# Patient Record
Sex: MALE | Race: WHITE | NOT HISPANIC OR LATINO | Employment: OTHER | URBAN - METROPOLITAN AREA
[De-identification: names, ages, dates, MRNs, and addresses within clinical notes are randomized per-mention and may not be internally consistent; named-entity substitution may affect disease eponyms.]

---

## 2017-01-09 ENCOUNTER — TELEPHONE (OUTPATIENT)
Dept: ENDOCRINOLOGY | Facility: CLINIC | Age: 72
End: 2017-01-09

## 2017-01-12 ENCOUNTER — LAB VISIT (OUTPATIENT)
Dept: LAB | Facility: HOSPITAL | Age: 72
End: 2017-01-12
Attending: NURSE PRACTITIONER
Payer: MEDICARE

## 2017-01-12 DIAGNOSIS — E78.5 HYPERLIPIDEMIA LDL GOAL <70: ICD-10-CM

## 2017-01-12 LAB
CHOLEST/HDLC SERPL: 4.7 {RATIO}
HDL/CHOLESTEROL RATIO: 21.5 %
HDLC SERPL-MCNC: 149 MG/DL
HDLC SERPL-MCNC: 32 MG/DL
LDLC SERPL CALC-MCNC: 76.6 MG/DL
NONHDLC SERPL-MCNC: 117 MG/DL
TRIGL SERPL-MCNC: 202 MG/DL

## 2017-01-12 PROCEDURE — 36415 COLL VENOUS BLD VENIPUNCTURE: CPT | Mod: PO

## 2017-01-12 PROCEDURE — 80061 LIPID PANEL: CPT

## 2017-01-12 PROCEDURE — 83036 HEMOGLOBIN GLYCOSYLATED A1C: CPT

## 2017-01-13 LAB
ESTIMATED AVG GLUCOSE: 143 MG/DL
HBA1C MFR BLD HPLC: 6.6 %

## 2017-01-19 ENCOUNTER — OFFICE VISIT (OUTPATIENT)
Dept: FAMILY MEDICINE | Facility: CLINIC | Age: 72
End: 2017-01-19
Attending: FAMILY MEDICINE
Payer: MEDICARE

## 2017-01-19 VITALS
SYSTOLIC BLOOD PRESSURE: 120 MMHG | HEART RATE: 80 BPM | DIASTOLIC BLOOD PRESSURE: 78 MMHG | WEIGHT: 230 LBS | HEIGHT: 73 IN | BODY MASS INDEX: 30.48 KG/M2

## 2017-01-19 DIAGNOSIS — I48.20 CHRONIC ATRIAL FIBRILLATION: ICD-10-CM

## 2017-01-19 DIAGNOSIS — E11.22 CONTROLLED TYPE 2 DIABETES MELLITUS WITH STAGE 3 CHRONIC KIDNEY DISEASE, WITHOUT LONG-TERM CURRENT USE OF INSULIN: Primary | ICD-10-CM

## 2017-01-19 DIAGNOSIS — I15.2 HYPERTENSION ASSOCIATED WITH DIABETES: ICD-10-CM

## 2017-01-19 DIAGNOSIS — I25.10 CORONARY ARTERY DISEASE, OCCLUSIVE: ICD-10-CM

## 2017-01-19 DIAGNOSIS — N18.30 CKD (CHRONIC KIDNEY DISEASE) STAGE 3, GFR 30-59 ML/MIN: ICD-10-CM

## 2017-01-19 DIAGNOSIS — E11.69 HYPERLIPIDEMIA ASSOCIATED WITH TYPE 2 DIABETES MELLITUS: ICD-10-CM

## 2017-01-19 DIAGNOSIS — E11.59 HYPERTENSION ASSOCIATED WITH DIABETES: ICD-10-CM

## 2017-01-19 DIAGNOSIS — E78.5 HYPERLIPIDEMIA ASSOCIATED WITH TYPE 2 DIABETES MELLITUS: ICD-10-CM

## 2017-01-19 DIAGNOSIS — N18.30 CONTROLLED TYPE 2 DIABETES MELLITUS WITH STAGE 3 CHRONIC KIDNEY DISEASE, WITHOUT LONG-TERM CURRENT USE OF INSULIN: Primary | ICD-10-CM

## 2017-01-19 PROCEDURE — 1126F AMNT PAIN NOTED NONE PRSNT: CPT | Mod: S$GLB,,, | Performed by: FAMILY MEDICINE

## 2017-01-19 PROCEDURE — 3044F HG A1C LEVEL LT 7.0%: CPT | Mod: S$GLB,,, | Performed by: FAMILY MEDICINE

## 2017-01-19 PROCEDURE — 99999 PR PBB SHADOW E&M-EST. PATIENT-LVL IV: CPT | Mod: PBBFAC,,, | Performed by: FAMILY MEDICINE

## 2017-01-19 PROCEDURE — 99213 OFFICE O/P EST LOW 20 MIN: CPT | Mod: S$GLB,,, | Performed by: FAMILY MEDICINE

## 2017-01-19 PROCEDURE — 1160F RVW MEDS BY RX/DR IN RCRD: CPT | Mod: S$GLB,,, | Performed by: FAMILY MEDICINE

## 2017-01-19 PROCEDURE — 99499 UNLISTED E&M SERVICE: CPT | Mod: S$GLB,,, | Performed by: FAMILY MEDICINE

## 2017-01-19 PROCEDURE — 1157F ADVNC CARE PLAN IN RCRD: CPT | Mod: S$GLB,,, | Performed by: FAMILY MEDICINE

## 2017-01-19 PROCEDURE — 3074F SYST BP LT 130 MM HG: CPT | Mod: S$GLB,,, | Performed by: FAMILY MEDICINE

## 2017-01-19 PROCEDURE — 3078F DIAST BP <80 MM HG: CPT | Mod: S$GLB,,, | Performed by: FAMILY MEDICINE

## 2017-01-19 PROCEDURE — 1159F MED LIST DOCD IN RCRD: CPT | Mod: S$GLB,,, | Performed by: FAMILY MEDICINE

## 2017-01-19 PROCEDURE — 4010F ACE/ARB THERAPY RXD/TAKEN: CPT | Mod: S$GLB,,, | Performed by: FAMILY MEDICINE

## 2017-01-19 PROCEDURE — 2022F DILAT RTA XM EVC RTNOPTHY: CPT | Mod: S$GLB,,, | Performed by: FAMILY MEDICINE

## 2017-01-19 NOTE — PROGRESS NOTES
Subjective:       Patient ID: Janak Booker is a 71 y.o. male.    Chief Complaint: Diabetes    Diabetes   He presents for his follow-up diabetic visit. He has type 2 diabetes mellitus. His disease course has been stable. There are no hypoglycemic associated symptoms. Pertinent negatives for hypoglycemia include no dizziness, headaches or nervousness/anxiousness. There are no diabetic associated symptoms. Pertinent negatives for diabetes include no chest pain, no fatigue and no weakness. There are no hypoglycemic complications. Diabetic complications include nephropathy. Risk factors for coronary artery disease include dyslipidemia, diabetes mellitus, male sex and hypertension. Current diabetic treatment includes oral agent (dual therapy) (w/Victoza). He is compliant with treatment most of the time. His weight is stable. He is following a diabetic and generally healthy diet. When asked about meal planning, he reported none. He has had a previous visit with a dietitian. He participates in exercise intermittently. There is no change in his home blood glucose trend. An ACE inhibitor/angiotensin II receptor blocker is being taken. He does not see a podiatrist.Eye exam is current.     Patient Active Problem List   Diagnosis    Family history of prostate cancer    Disc displacement, lumbar    GERD (gastroesophageal reflux disease)    Coronary artery disease, occlusive    Solitary kidney    CKD (chronic kidney disease) stage 3, GFR 30-59 ml/min    Controlled type 2 diabetes mellitus with stage 3 chronic kidney disease, without long-term current use of insulin    S/P coronary artery stent placement    Lumbar spondylosis    Facet arthritis of lumbar region    Hypertension associated with diabetes    Hyperlipidemia associated with type 2 diabetes mellitus    Facet arthritis of cervical region    Atrial fibrillation    DDD (degenerative disc disease), cervical       Current Outpatient Prescriptions:     blood  "sugar diagnostic [facility], Use as directed to check blood sugar daily., Disp: 100 each, Rfl: 3    blood-glucose meter Misc, Use as directed., Disp: 1 each, Rfl: 0    CALCIUM CITRATE-VITAMIN D3 ORAL, , Disp: , Rfl:     fenofibric acid (TRILIPIX) 135 mg capsule, 1 Capsule(s) Oral  Every day., Disp: , Rfl:     lancets (TRUEPLUS LANCETS) 28 gauge Misc, 1 lancet by Misc.(Non-Drug; Combo Route) route once daily., Disp: 100 each, Rfl: 3    lisinopril (PRINIVIL,ZESTRIL) 5 MG tablet, 1 Tablet(s) Oral Every evening., Disp: , Rfl:     metformin (GLUCOPHAGE-XR) 500 MG 24 hr tablet, TAKE ONE TABLET BY MOUTH ONCE DAILY, Disp: 90 tablet, Rfl: 0    multivitamin with minerals tablet, , Disp: , Rfl:     nitroGLYCERIN (NITROSTAT) 0.4 MG SL tablet, 0.4mg Sublingual PRN .  , Disp: , Rfl:     omega-3 fatty acids 1,000 mg Cap, 1 Capsule(s) Oral OTC once a day., Disp: , Rfl:     omeprazole (PRILOSEC) 40 MG capsule, Take 1 capsule (40 mg total) by mouth once daily., Disp: 90 capsule, Rfl: 3    pen needle, diabetic (BD ULTRA-FINE BARRY PEN NEEDLES) 32 gauge x 5/32" Ndle, 1 Device by Misc.(Non-Drug; Combo Route) route once daily., Disp: 100 each, Rfl: 3    pioglitazone (ACTOS) 30 MG tablet, Take 1 tablet (30 mg total) by mouth once daily., Disp: 90 tablet, Rfl: 1    PRADAXA 150 mg Cap, Take 150 mg by mouth Twice daily., Disp: , Rfl:     rosuvastatin (CRESTOR) 10 MG tablet, Every day, Disp: , Rfl:     sotalol (BETAPACE) 120 MG Tab, 2 (two) times daily. , Disp: , Rfl:     TRUE METRIX AIR GLUCOSE METER kit, , Disp: , Rfl:     VICTOZA 3-CAITIE 0.6 mg/0.1 mL (18 mg/3 mL) PnIj, INJECT 1.8 MGS UNDER THE SKIN DAILY., Disp: 9 mL, Rfl: 5    vitamin E 400 UNIT capsule, , Disp: , Rfl:     There have been no changes to the patient's past medical, surgical, family, or social histories.    Review of Systems   Constitutional: Negative for fatigue and unexpected weight change.   HENT: Negative for ear discharge, ear pain, hearing loss, tinnitus and " voice change.    Eyes: Negative for pain.   Respiratory: Negative for cough and shortness of breath.    Cardiovascular: Negative for chest pain, palpitations and leg swelling.   Gastrointestinal: Negative for abdominal pain, blood in stool, constipation, diarrhea, nausea and vomiting.   Genitourinary: Negative for decreased urine volume, difficulty urinating, dysuria, enuresis, frequency, hematuria and urgency.   Musculoskeletal: Negative for arthralgias, back pain and myalgias.   Skin: Negative for rash.   Neurological: Negative for dizziness, weakness, light-headedness and headaches.   Hematological: Does not bruise/bleed easily.   Psychiatric/Behavioral: Negative for dysphoric mood and sleep disturbance. The patient is not nervous/anxious.          Objective:      Physical Exam   Constitutional: He is oriented to person, place, and time. He appears well-developed and well-nourished. He is cooperative.   HENT:   Head: Normocephalic and atraumatic.   Right Ear: External ear normal.   Left Ear: External ear normal.   Nose: Nose normal.   Mouth/Throat: Oropharynx is clear and moist and mucous membranes are normal. No oropharyngeal exudate.   Eyes: Conjunctivae are normal. No scleral icterus.   Neck: Neck supple. No JVD present. Carotid bruit is not present. No thyromegaly present.   Cardiovascular: Normal rate, regular rhythm, normal heart sounds and normal pulses.  Exam reveals no gallop and no friction rub.    No murmur heard.  Pulmonary/Chest: Effort normal and breath sounds normal. He has no wheezes. He has no rhonchi. He has no rales.   Abdominal: Soft. Bowel sounds are normal. He exhibits no distension and no mass. There is no splenomegaly or hepatomegaly. There is no tenderness.   Musculoskeletal: Normal range of motion. He exhibits no edema or tenderness.   Lymphadenopathy:     He has no cervical adenopathy.     He has no axillary adenopathy.   Neurological: He is alert and oriented to person, place, and  "time. He has normal strength and normal reflexes. No cranial nerve deficit or sensory deficit. Coordination normal.   Skin: Skin is warm and dry.   Psychiatric: He has a normal mood and affect.   Vitals reviewed.        Assessment:       1. Controlled type 2 diabetes mellitus with stage 3 chronic kidney disease, without long-term current use of insulin    2. CKD (chronic kidney disease) stage 3, GFR 30-59 ml/min    3. Solitary kidney    4. Coronary artery disease, occlusive    5. Chronic atrial fibrillation    6. Hypertension associated with diabetes    7. Hyperlipidemia associated with type 2 diabetes mellitus        Plan:       Continue present medications.  RTC 6 months.    "This note will not be shared with the patient."  "

## 2017-03-15 RX ORDER — METFORMIN HYDROCHLORIDE 500 MG/1
TABLET, EXTENDED RELEASE ORAL
Qty: 90 TABLET | Refills: 0 | Status: SHIPPED | OUTPATIENT
Start: 2017-03-15 | End: 2017-06-16 | Stop reason: SDUPTHER

## 2017-04-06 RX ORDER — PIOGLITAZONEHYDROCHLORIDE 30 MG/1
TABLET ORAL
Qty: 90 TABLET | Refills: 0 | Status: SHIPPED | OUTPATIENT
Start: 2017-04-06 | End: 2017-07-26 | Stop reason: SDUPTHER

## 2017-05-10 RX ORDER — ISOPROPYL ALCOHOL 70 ML/100ML
1 SWAB TOPICAL
Refills: 0 | COMMUNITY
Start: 2017-05-10 | End: 2017-05-19 | Stop reason: SDUPTHER

## 2017-05-15 RX ORDER — LIRAGLUTIDE 6 MG/ML
INJECTION SUBCUTANEOUS
Qty: 9 ML | Refills: 4 | Status: SHIPPED | OUTPATIENT
Start: 2017-05-15 | End: 2017-10-23 | Stop reason: SDUPTHER

## 2017-05-19 RX ORDER — ISOPROPYL ALCOHOL 0.75 G/1
SWAB TOPICAL
Qty: 100 EACH | Refills: 5 | Status: SHIPPED | OUTPATIENT
Start: 2017-05-19

## 2017-05-22 RX ORDER — PEN NEEDLE, DIABETIC 31 GX5/16"
NEEDLE, DISPOSABLE MISCELLANEOUS
Qty: 100 EACH | Refills: 2 | Status: SHIPPED | OUTPATIENT
Start: 2017-05-22 | End: 2018-03-19 | Stop reason: SDUPTHER

## 2017-06-16 RX ORDER — METFORMIN HYDROCHLORIDE 500 MG/1
500 TABLET, EXTENDED RELEASE ORAL DAILY
Qty: 90 TABLET | Refills: 2 | Status: SHIPPED | OUTPATIENT
Start: 2017-06-16 | End: 2018-03-05 | Stop reason: SDUPTHER

## 2017-07-26 RX ORDER — PIOGLITAZONEHYDROCHLORIDE 30 MG/1
30 TABLET ORAL DAILY
Qty: 90 TABLET | Refills: 0 | OUTPATIENT
Start: 2017-07-26

## 2017-07-26 RX ORDER — PIOGLITAZONEHYDROCHLORIDE 30 MG/1
30 TABLET ORAL DAILY
Qty: 90 TABLET | Refills: 0 | Status: SHIPPED | OUTPATIENT
Start: 2017-07-26 | End: 2017-10-22 | Stop reason: SDUPTHER

## 2017-08-22 ENCOUNTER — LAB VISIT (OUTPATIENT)
Dept: LAB | Facility: HOSPITAL | Age: 72
End: 2017-08-22
Attending: FAMILY MEDICINE
Payer: MEDICARE

## 2017-08-22 DIAGNOSIS — E11.22 CONTROLLED TYPE 2 DIABETES MELLITUS WITH STAGE 3 CHRONIC KIDNEY DISEASE, WITHOUT LONG-TERM CURRENT USE OF INSULIN: ICD-10-CM

## 2017-08-22 DIAGNOSIS — N18.30 CKD (CHRONIC KIDNEY DISEASE) STAGE 3, GFR 30-59 ML/MIN: ICD-10-CM

## 2017-08-22 DIAGNOSIS — I25.10 CORONARY ARTERY DISEASE, OCCLUSIVE: ICD-10-CM

## 2017-08-22 DIAGNOSIS — N18.30 CONTROLLED TYPE 2 DIABETES MELLITUS WITH STAGE 3 CHRONIC KIDNEY DISEASE, WITHOUT LONG-TERM CURRENT USE OF INSULIN: ICD-10-CM

## 2017-08-22 LAB
ALBUMIN SERPL BCP-MCNC: 3.3 G/DL
ALP SERPL-CCNC: 31 U/L
ALT SERPL W/O P-5'-P-CCNC: 11 U/L
ANION GAP SERPL CALC-SCNC: 6 MMOL/L
AST SERPL-CCNC: 16 U/L
BILIRUB SERPL-MCNC: 0.6 MG/DL
BUN SERPL-MCNC: 22 MG/DL
CALCIUM SERPL-MCNC: 9.5 MG/DL
CHLORIDE SERPL-SCNC: 107 MMOL/L
CO2 SERPL-SCNC: 27 MMOL/L
CREAT SERPL-MCNC: 1.7 MG/DL
EST. GFR  (AFRICAN AMERICAN): 45.9 ML/MIN/1.73 M^2
EST. GFR  (NON AFRICAN AMERICAN): 39.7 ML/MIN/1.73 M^2
GLUCOSE SERPL-MCNC: 129 MG/DL
POTASSIUM SERPL-SCNC: 4.8 MMOL/L
PROT SERPL-MCNC: 6.6 G/DL
SODIUM SERPL-SCNC: 140 MMOL/L

## 2017-08-22 PROCEDURE — 80053 COMPREHEN METABOLIC PANEL: CPT

## 2017-08-22 PROCEDURE — 36415 COLL VENOUS BLD VENIPUNCTURE: CPT | Mod: PO

## 2017-08-22 PROCEDURE — 83036 HEMOGLOBIN GLYCOSYLATED A1C: CPT

## 2017-08-23 LAB
ESTIMATED AVG GLUCOSE: 131 MG/DL
HBA1C MFR BLD HPLC: 6.2 %

## 2017-08-31 ENCOUNTER — OFFICE VISIT (OUTPATIENT)
Dept: FAMILY MEDICINE | Facility: CLINIC | Age: 72
End: 2017-08-31
Attending: FAMILY MEDICINE
Payer: MEDICARE

## 2017-08-31 VITALS
HEIGHT: 73 IN | WEIGHT: 233.31 LBS | HEART RATE: 78 BPM | BODY MASS INDEX: 30.92 KG/M2 | OXYGEN SATURATION: 98 % | DIASTOLIC BLOOD PRESSURE: 70 MMHG | SYSTOLIC BLOOD PRESSURE: 120 MMHG

## 2017-08-31 DIAGNOSIS — N52.9 ERECTILE DYSFUNCTION, UNSPECIFIED ERECTILE DYSFUNCTION TYPE: ICD-10-CM

## 2017-08-31 DIAGNOSIS — Z95.5 S/P CORONARY ARTERY STENT PLACEMENT: ICD-10-CM

## 2017-08-31 DIAGNOSIS — E11.22 CONTROLLED TYPE 2 DIABETES MELLITUS WITH STAGE 3 CHRONIC KIDNEY DISEASE, WITHOUT LONG-TERM CURRENT USE OF INSULIN: ICD-10-CM

## 2017-08-31 DIAGNOSIS — E78.5 HYPERLIPIDEMIA ASSOCIATED WITH TYPE 2 DIABETES MELLITUS: ICD-10-CM

## 2017-08-31 DIAGNOSIS — E11.59 HYPERTENSION ASSOCIATED WITH DIABETES: ICD-10-CM

## 2017-08-31 DIAGNOSIS — Z00.00 ROUTINE GENERAL MEDICAL EXAMINATION AT A HEALTH CARE FACILITY: Primary | ICD-10-CM

## 2017-08-31 DIAGNOSIS — N18.30 CKD (CHRONIC KIDNEY DISEASE) STAGE 3, GFR 30-59 ML/MIN: ICD-10-CM

## 2017-08-31 DIAGNOSIS — I15.2 HYPERTENSION ASSOCIATED WITH DIABETES: ICD-10-CM

## 2017-08-31 DIAGNOSIS — I25.10 CORONARY ARTERY DISEASE, OCCLUSIVE: ICD-10-CM

## 2017-08-31 DIAGNOSIS — E11.69 HYPERLIPIDEMIA ASSOCIATED WITH TYPE 2 DIABETES MELLITUS: ICD-10-CM

## 2017-08-31 DIAGNOSIS — I48.20 CHRONIC ATRIAL FIBRILLATION: ICD-10-CM

## 2017-08-31 DIAGNOSIS — N18.30 CONTROLLED TYPE 2 DIABETES MELLITUS WITH STAGE 3 CHRONIC KIDNEY DISEASE, WITHOUT LONG-TERM CURRENT USE OF INSULIN: ICD-10-CM

## 2017-08-31 PROCEDURE — 99499 UNLISTED E&M SERVICE: CPT | Mod: S$GLB,,, | Performed by: FAMILY MEDICINE

## 2017-08-31 PROCEDURE — 99397 PER PM REEVAL EST PAT 65+ YR: CPT | Mod: S$GLB,,, | Performed by: FAMILY MEDICINE

## 2017-08-31 PROCEDURE — 99999 PR PBB SHADOW E&M-EST. PATIENT-LVL III: CPT | Mod: PBBFAC,,, | Performed by: FAMILY MEDICINE

## 2017-08-31 RX ORDER — TADALAFIL 20 MG/1
20 TABLET ORAL DAILY
Qty: 6 TABLET | Refills: 5 | Status: SHIPPED | OUTPATIENT
Start: 2017-08-31 | End: 2018-09-04

## 2017-08-31 NOTE — PROGRESS NOTES
"Subjective:       Patient ID: Janak Booker is a 72 y.o. male.    Chief Complaint: Annual Exam    HPI     The patient presents to the office today requesting a routine periodic health examination.    Patient Active Problem List   Diagnosis    Family history of prostate cancer    Disc displacement, lumbar    GERD (gastroesophageal reflux disease)    Coronary artery disease, occlusive    Solitary kidney    CKD (chronic kidney disease) stage 3, GFR 30-59 ml/min    Controlled type 2 diabetes mellitus with stage 3 chronic kidney disease, without long-term current use of insulin    S/P coronary artery stent placement    Lumbar spondylosis    Facet arthritis of lumbar region    Hypertension associated with diabetes    Hyperlipidemia associated with type 2 diabetes mellitus    Facet arthritis of cervical region    Atrial fibrillation    DDD (degenerative disc disease), cervical       Past Surgical History:   Procedure Laterality Date    BACK SURGERY      CARDIAC SURGERY      stents     CARPAL TUNNEL RELEASE Right     CATARACT EXTRACTION W/  INTRAOCULAR LENS IMPLANT Bilateral     CORONARY ANGIOPLASTY WITH STENT PLACEMENT      x 3    KNEE ARTHROSCOPY W/ MENISCECTOMY  12/22/2008    right    LUMBAR DISCECTOMY  12/2011    L4-L5 Fusion    ROTATOR CUFF REPAIR Left 7/2012    SHOULDER OPEN ROTATOR CUFF REPAIR      TIBIA FRACTURE SURGERY           Current Outpatient Prescriptions:     BD ALCOHOL SWABS PadM, USE AS DIRECTED TO CHECK BLOOD GLUCOSE DAILY, Disp: 100 each, Rfl: 5    BD ULTRA-FINE BARRY PEN NEEDLES 32 gauge x 5/32" Ndle, USE ONE NEEDLE ONCE DAILY, Disp: 100 each, Rfl: 2    blood sugar diagnostic Str[facility], Use as directed to check blood sugar daily., Disp: 100 each, Rfl: 3    blood-glucose meter Misc, Use as directed., Disp: 1 each, Rfl: 0    CALCIUM CITRATE-VITAMIN D3 ORAL, , Disp: , Rfl:     fenofibric acid (TRILIPIX) 135 mg capsule, 1 Capsule(s) Oral  Every day., Disp: , Rfl:     lancets " (TRUEPLUS LANCETS) 28 gauge Misc, 1 lancet by Misc.(Non-Drug; Combo Route) route once daily., Disp: 100 each, Rfl: 3    lisinopril (PRINIVIL,ZESTRIL) 5 MG tablet, 1 Tablet(s) Oral Every evening., Disp: , Rfl:     metformin (GLUCOPHAGE-XR) 500 MG 24 hr tablet, Take 1 tablet (500 mg total) by mouth once daily., Disp: 90 tablet, Rfl: 2    multivitamin with minerals tablet, , Disp: , Rfl:     nitroGLYCERIN (NITROSTAT) 0.4 MG SL tablet, 0.4mg Sublingual PRN .  , Disp: , Rfl:     omega-3 fatty acids 1,000 mg Cap, 1 Capsule(s) Oral OTC once a day., Disp: , Rfl:     omeprazole (PRILOSEC) 40 MG capsule, Take 1 capsule (40 mg total) by mouth once daily., Disp: 90 capsule, Rfl: 3    pioglitazone (ACTOS) 30 MG tablet, Take 1 tablet (30 mg total) by mouth once daily., Disp: 90 tablet, Rfl: 0    PRADAXA 150 mg Cap, Take 150 mg by mouth Twice daily., Disp: , Rfl:     rosuvastatin (CRESTOR) 10 MG tablet, Every day, Disp: , Rfl:     sotalol (BETAPACE) 120 MG Tab, 2 (two) times daily. , Disp: , Rfl:     TRUE METRIX AIR GLUCOSE METER kit, , Disp: , Rfl:     VICTOZA 3-CAITIE 0.6 mg/0.1 mL (18 mg/3 mL) PnIj, INJECT 1.8 MGS UNDER THE SKIN DAILY., Disp: 9 mL, Rfl: 4    Review of patient's allergies indicates:   Allergen Reactions    No known drug allergies        Family History   Problem Relation Age of Onset    Heart failure Father     Heart disease Father      MI    Prostate cancer Father     Heart failure Mother     Heart disease Mother     No Known Problems Sister     No Known Problems Daughter     No Known Problems Son     No Known Problems Daughter        Social History     Social History    Marital status:      Spouse name: Santino    Number of children: 3    Years of education: N/A     Occupational History    Not on file.     Social History Main Topics    Smoking status: Never Smoker    Smokeless tobacco: Never Used    Alcohol use 3.0 oz/week     6 Standard drinks or equivalent per week      Comment:  socially    Drug use: No    Sexual activity: Yes     Partners: Female     Other Topics Concern    Not on file     Social History Narrative    The patient does not exercise regularly ().      He is not satisfied with weight.    Rates diet as fair.    He does drink at least 1/2 gallon water daily.    He drinks 2 coffee/tea/caffeine-containing soft drinks daily.    Total sleep time at night is 7 hours.    He does wear seat belts.    Hobbies include off-road, camping.           Patient Care Team:  John Claire Jr., MD as PCP - General (Family Medicine)  Sharif Phipps MD as Consulting Physician (Cardiology)  Mahesh Martinez MD as Consulting Physician (Neurosurgery)  Jamari Cortes MD as Consulting Physician (Nephrology)  Zechariah Dillard OD (Optometry)      Review of Systems   Constitutional: Negative for fatigue and unexpected weight change.   HENT: Negative for ear discharge, ear pain, hearing loss, tinnitus and voice change.    Eyes: Negative for pain.   Respiratory: Negative for cough and shortness of breath.    Cardiovascular: Negative for chest pain, palpitations and leg swelling.   Gastrointestinal: Negative for abdominal pain, blood in stool, constipation, diarrhea, nausea and vomiting.        Periodic heartburn; uses PPIs prn with good results   Genitourinary: Negative for decreased urine volume, difficulty urinating, dysuria, enuresis, frequency, hematuria and urgency.   Musculoskeletal: Negative for arthralgias, back pain and myalgias.   Skin: Negative for rash.   Neurological: Negative for dizziness, weakness, light-headedness and headaches.   Hematological: Does not bruise/bleed easily.   Psychiatric/Behavioral: Negative for dysphoric mood and sleep disturbance. The patient is not nervous/anxious.        Lab Visit on 08/22/2017   Component Date Value Ref Range Status    Sodium 08/22/2017 140  136 - 145 mmol/L Final    Potassium 08/22/2017 4.8  3.5 - 5.1 mmol/L Final    Chloride 08/22/2017 107   95 - 110 mmol/L Final    CO2 08/22/2017 27  23 - 29 mmol/L Final    Glucose 08/22/2017 129* 70 - 110 mg/dL Final    BUN, Bld 08/22/2017 22  8 - 23 mg/dL Final    Creatinine 08/22/2017 1.7* 0.5 - 1.4 mg/dL Final    Calcium 08/22/2017 9.5  8.7 - 10.5 mg/dL Final    Total Protein 08/22/2017 6.6  6.0 - 8.4 g/dL Final    Albumin 08/22/2017 3.3* 3.5 - 5.2 g/dL Final    Total Bilirubin 08/22/2017 0.6  0.1 - 1.0 mg/dL Final    Alkaline Phosphatase 08/22/2017 31* 55 - 135 U/L Final    AST 08/22/2017 16  10 - 40 U/L Final    ALT 08/22/2017 11  10 - 44 U/L Final    Anion Gap 08/22/2017 6* 8 - 16 mmol/L Final    eGFR if  08/22/2017 45.9* >60 mL/min/1.73 m^2 Final    eGFR if non  08/22/2017 39.7* >60 mL/min/1.73 m^2 Final    Hemoglobin A1C 08/23/2017 6.2* 4.0 - 5.6 % Final    Estimated Avg Glucose 08/23/2017 131  68 - 131 mg/dL Final         Objective:      Physical Exam   Constitutional: He is oriented to person, place, and time. He appears well-developed and well-nourished. He is cooperative.   HENT:   Head: Normocephalic and atraumatic.   Right Ear: External ear normal.   Left Ear: External ear normal.   Nose: Nose normal.   Mouth/Throat: Oropharynx is clear and moist and mucous membranes are normal. No oropharyngeal exudate.   Eyes: Conjunctivae are normal. No scleral icterus.   Neck: Neck supple. No JVD present. Carotid bruit is not present. No thyromegaly present.   Cardiovascular: Normal rate, regular rhythm, normal heart sounds and normal pulses.  Exam reveals no gallop and no friction rub.    No murmur heard.  Pulmonary/Chest: Effort normal and breath sounds normal. He has no wheezes. He has no rhonchi. He has no rales.   Abdominal: Soft. Bowel sounds are normal. He exhibits no distension and no mass. There is no splenomegaly or hepatomegaly. There is no tenderness.   Musculoskeletal: Normal range of motion. He exhibits no edema or tenderness.   Lymphadenopathy:     He has  "no cervical adenopathy.     He has no axillary adenopathy.   Neurological: He is alert and oriented to person, place, and time. He has normal strength and normal reflexes. No cranial nerve deficit or sensory deficit. Coordination normal.   Skin: Skin is warm and dry.   Psychiatric: He has a normal mood and affect.   Vitals reviewed.        Assessment:       1. Routine general medical examination at a health care facility    2. Controlled type 2 diabetes mellitus with stage 3 chronic kidney disease, without long-term current use of insulin    3. CKD (chronic kidney disease) stage 3, GFR 30-59 ml/min    4. Hypertension associated with diabetes    5. Hyperlipidemia associated with type 2 diabetes mellitus    6. Chronic atrial fibrillation    7. Coronary artery disease, occlusive    8. S/P coronary artery stent placement    9. Solitary kidney        Plan:       Continue present medications. Reminded to see nephrologist annually.      "This note will not be shared with the patient."  "

## 2017-10-22 RX ORDER — PIOGLITAZONEHYDROCHLORIDE 30 MG/1
TABLET ORAL
Qty: 90 TABLET | Refills: 1 | Status: SHIPPED | OUTPATIENT
Start: 2017-10-22 | End: 2018-04-20 | Stop reason: SDUPTHER

## 2017-10-23 NOTE — TELEPHONE ENCOUNTER
----- Message from Reyna Smith sent at 10/23/2017  3:37 PM CDT -----  Contact: KIERRA WILSON [[id number]]  x 1st Request  _  2nd Request  _  3rd Request    Please refill the medication(s) listed below. Please call the patient when the prescription(s) is ready for  at this phone number            Medication #1 VICTOZA 3-CAITIE 0.6 mg/0.1 mL (18 mg/3 mL) PnIj      Preferred Pharmacy:   [id number] - [place] - [address]  [address] [place] [address]  Phone: [phone] Fax: [phone]

## 2017-12-14 ENCOUNTER — TELEPHONE (OUTPATIENT)
Dept: FAMILY MEDICINE | Facility: CLINIC | Age: 72
End: 2017-12-14

## 2017-12-14 NOTE — TELEPHONE ENCOUNTER
Please schedule the patient an appointment with the providers provider in the message below.Thanks.

## 2017-12-14 NOTE — TELEPHONE ENCOUNTER
----- Message from Tammi Cortez sent at 12/14/2017  1:53 PM CST -----  _  1st Request  _  2nd Request  _  3rd Request        Who: Janak Booker    Why: Called and stated Dr Claire was going to give him a couple of ENT in [place].  Pt stated he forgot.    What Number to Call Back:    When to Expect a call back: (Within 24 hours)    Please return the call at earliest convenience. Thanks!

## 2017-12-20 RX ORDER — OMEPRAZOLE 40 MG/1
40 CAPSULE, DELAYED RELEASE ORAL DAILY
Qty: 90 CAPSULE | Refills: 3 | Status: SHIPPED | OUTPATIENT
Start: 2017-12-20 | End: 2018-12-27 | Stop reason: SDUPTHER

## 2018-02-02 DIAGNOSIS — E11.9 TYPE 2 DIABETES MELLITUS WITHOUT COMPLICATION: ICD-10-CM

## 2018-03-05 DIAGNOSIS — I25.10 CORONARY ARTERY DISEASE, OCCLUSIVE: ICD-10-CM

## 2018-03-05 DIAGNOSIS — Z12.5 PROSTATE CANCER SCREENING: ICD-10-CM

## 2018-03-05 DIAGNOSIS — E11.22 CONTROLLED TYPE 2 DIABETES MELLITUS WITH STAGE 3 CHRONIC KIDNEY DISEASE, WITHOUT LONG-TERM CURRENT USE OF INSULIN: Primary | ICD-10-CM

## 2018-03-05 DIAGNOSIS — N18.30 CKD (CHRONIC KIDNEY DISEASE) STAGE 3, GFR 30-59 ML/MIN: ICD-10-CM

## 2018-03-05 DIAGNOSIS — E11.69 HYPERLIPIDEMIA ASSOCIATED WITH TYPE 2 DIABETES MELLITUS: ICD-10-CM

## 2018-03-05 DIAGNOSIS — I15.2 HYPERTENSION ASSOCIATED WITH DIABETES: ICD-10-CM

## 2018-03-05 DIAGNOSIS — N18.30 CONTROLLED TYPE 2 DIABETES MELLITUS WITH STAGE 3 CHRONIC KIDNEY DISEASE, WITHOUT LONG-TERM CURRENT USE OF INSULIN: Primary | ICD-10-CM

## 2018-03-05 DIAGNOSIS — E78.5 HYPERLIPIDEMIA ASSOCIATED WITH TYPE 2 DIABETES MELLITUS: ICD-10-CM

## 2018-03-05 DIAGNOSIS — E11.59 HYPERTENSION ASSOCIATED WITH DIABETES: ICD-10-CM

## 2018-03-05 RX ORDER — METFORMIN HYDROCHLORIDE 500 MG/1
TABLET, EXTENDED RELEASE ORAL
Qty: 90 TABLET | Refills: 1 | Status: SHIPPED | OUTPATIENT
Start: 2018-03-05 | End: 2018-10-01 | Stop reason: SDUPTHER

## 2018-03-05 NOTE — TELEPHONE ENCOUNTER
Medication refilled.  Patient was due for follow-up in February; please call to schedule previsit lab appointment and return visit with me.

## 2018-03-06 ENCOUNTER — LAB VISIT (OUTPATIENT)
Dept: LAB | Facility: HOSPITAL | Age: 73
End: 2018-03-06
Attending: FAMILY MEDICINE
Payer: MEDICARE

## 2018-03-06 DIAGNOSIS — N18.30 CONTROLLED TYPE 2 DIABETES MELLITUS WITH STAGE 3 CHRONIC KIDNEY DISEASE, WITHOUT LONG-TERM CURRENT USE OF INSULIN: ICD-10-CM

## 2018-03-06 DIAGNOSIS — I15.2 HYPERTENSION ASSOCIATED WITH DIABETES: ICD-10-CM

## 2018-03-06 DIAGNOSIS — N18.30 CKD (CHRONIC KIDNEY DISEASE) STAGE 3, GFR 30-59 ML/MIN: ICD-10-CM

## 2018-03-06 DIAGNOSIS — Z12.5 PROSTATE CANCER SCREENING: ICD-10-CM

## 2018-03-06 DIAGNOSIS — E11.22 CONTROLLED TYPE 2 DIABETES MELLITUS WITH STAGE 3 CHRONIC KIDNEY DISEASE, WITHOUT LONG-TERM CURRENT USE OF INSULIN: ICD-10-CM

## 2018-03-06 DIAGNOSIS — E11.69 HYPERLIPIDEMIA ASSOCIATED WITH TYPE 2 DIABETES MELLITUS: ICD-10-CM

## 2018-03-06 DIAGNOSIS — E78.5 HYPERLIPIDEMIA ASSOCIATED WITH TYPE 2 DIABETES MELLITUS: ICD-10-CM

## 2018-03-06 DIAGNOSIS — I25.10 CORONARY ARTERY DISEASE, OCCLUSIVE: ICD-10-CM

## 2018-03-06 DIAGNOSIS — E11.59 HYPERTENSION ASSOCIATED WITH DIABETES: ICD-10-CM

## 2018-03-06 LAB
ALBUMIN SERPL BCP-MCNC: 3.7 G/DL
ALP SERPL-CCNC: 39 U/L
ALT SERPL W/O P-5'-P-CCNC: 14 U/L
ANION GAP SERPL CALC-SCNC: 8 MMOL/L
AST SERPL-CCNC: 19 U/L
BILIRUB SERPL-MCNC: 0.9 MG/DL
BUN SERPL-MCNC: 25 MG/DL
CALCIUM SERPL-MCNC: 10.1 MG/DL
CHLORIDE SERPL-SCNC: 105 MMOL/L
CHOLEST SERPL-MCNC: 148 MG/DL
CHOLEST/HDLC SERPL: 4.4 {RATIO}
CO2 SERPL-SCNC: 26 MMOL/L
COMPLEXED PSA SERPL-MCNC: 0.31 NG/ML
CREAT SERPL-MCNC: 1.8 MG/DL
EST. GFR  (AFRICAN AMERICAN): 42.8 ML/MIN/1.73 M^2
EST. GFR  (NON AFRICAN AMERICAN): 37 ML/MIN/1.73 M^2
ESTIMATED AVG GLUCOSE: 128 MG/DL
GLUCOSE SERPL-MCNC: 111 MG/DL
HBA1C MFR BLD HPLC: 6.1 %
HDLC SERPL-MCNC: 34 MG/DL
HDLC SERPL: 23 %
LDLC SERPL CALC-MCNC: 79.4 MG/DL
NONHDLC SERPL-MCNC: 114 MG/DL
POTASSIUM SERPL-SCNC: 5.2 MMOL/L
PROT SERPL-MCNC: 7.1 G/DL
SODIUM SERPL-SCNC: 139 MMOL/L
TRIGL SERPL-MCNC: 173 MG/DL

## 2018-03-06 PROCEDURE — 80053 COMPREHEN METABOLIC PANEL: CPT

## 2018-03-06 PROCEDURE — 84153 ASSAY OF PSA TOTAL: CPT

## 2018-03-06 PROCEDURE — 83036 HEMOGLOBIN GLYCOSYLATED A1C: CPT

## 2018-03-06 PROCEDURE — 36415 COLL VENOUS BLD VENIPUNCTURE: CPT | Mod: PO

## 2018-03-06 PROCEDURE — 80061 LIPID PANEL: CPT

## 2018-03-13 ENCOUNTER — TELEPHONE (OUTPATIENT)
Dept: GASTROENTEROLOGY | Facility: CLINIC | Age: 73
End: 2018-03-13

## 2018-03-13 NOTE — TELEPHONE ENCOUNTER
----- Message from Lay De La Cruz sent at 3/13/2018  1:46 PM CDT -----  Please call patient in regards to scheduling a endoscopy, [phone] (eapq)

## 2018-03-14 DIAGNOSIS — Z86.010 HISTORY OF COLON POLYPS: Primary | ICD-10-CM

## 2018-03-15 ENCOUNTER — PES CALL (OUTPATIENT)
Dept: ADMINISTRATIVE | Facility: CLINIC | Age: 73
End: 2018-03-15

## 2018-03-19 ENCOUNTER — OFFICE VISIT (OUTPATIENT)
Dept: FAMILY MEDICINE | Facility: CLINIC | Age: 73
End: 2018-03-19
Attending: FAMILY MEDICINE
Payer: MEDICARE

## 2018-03-19 VITALS
HEIGHT: 73 IN | BODY MASS INDEX: 31.92 KG/M2 | HEART RATE: 63 BPM | DIASTOLIC BLOOD PRESSURE: 80 MMHG | OXYGEN SATURATION: 99 % | RESPIRATION RATE: 16 BRPM | WEIGHT: 240.81 LBS | SYSTOLIC BLOOD PRESSURE: 124 MMHG

## 2018-03-19 DIAGNOSIS — E78.5 HYPERLIPIDEMIA ASSOCIATED WITH TYPE 2 DIABETES MELLITUS: ICD-10-CM

## 2018-03-19 DIAGNOSIS — E11.69 HYPERLIPIDEMIA ASSOCIATED WITH TYPE 2 DIABETES MELLITUS: ICD-10-CM

## 2018-03-19 DIAGNOSIS — I48.20 CHRONIC ATRIAL FIBRILLATION: ICD-10-CM

## 2018-03-19 DIAGNOSIS — I25.10 CORONARY ARTERY DISEASE, OCCLUSIVE: ICD-10-CM

## 2018-03-19 DIAGNOSIS — N18.30 CKD (CHRONIC KIDNEY DISEASE) STAGE 3, GFR 30-59 ML/MIN: ICD-10-CM

## 2018-03-19 DIAGNOSIS — K21.9 GASTROESOPHAGEAL REFLUX DISEASE WITHOUT ESOPHAGITIS: ICD-10-CM

## 2018-03-19 DIAGNOSIS — I15.2 HYPERTENSION ASSOCIATED WITH DIABETES: ICD-10-CM

## 2018-03-19 DIAGNOSIS — Z95.5 S/P CORONARY ARTERY STENT PLACEMENT: ICD-10-CM

## 2018-03-19 DIAGNOSIS — E11.22 CONTROLLED TYPE 2 DIABETES MELLITUS WITH STAGE 3 CHRONIC KIDNEY DISEASE, WITHOUT LONG-TERM CURRENT USE OF INSULIN: Primary | ICD-10-CM

## 2018-03-19 DIAGNOSIS — E11.59 HYPERTENSION ASSOCIATED WITH DIABETES: ICD-10-CM

## 2018-03-19 DIAGNOSIS — N18.30 CONTROLLED TYPE 2 DIABETES MELLITUS WITH STAGE 3 CHRONIC KIDNEY DISEASE, WITHOUT LONG-TERM CURRENT USE OF INSULIN: Primary | ICD-10-CM

## 2018-03-19 PROCEDURE — 3079F DIAST BP 80-89 MM HG: CPT | Mod: CPTII,S$GLB,, | Performed by: FAMILY MEDICINE

## 2018-03-19 PROCEDURE — 99499 UNLISTED E&M SERVICE: CPT | Mod: S$GLB,,, | Performed by: FAMILY MEDICINE

## 2018-03-19 PROCEDURE — 3074F SYST BP LT 130 MM HG: CPT | Mod: CPTII,S$GLB,, | Performed by: FAMILY MEDICINE

## 2018-03-19 PROCEDURE — 99214 OFFICE O/P EST MOD 30 MIN: CPT | Mod: S$GLB,,, | Performed by: FAMILY MEDICINE

## 2018-03-19 PROCEDURE — 3044F HG A1C LEVEL LT 7.0%: CPT | Mod: CPTII,S$GLB,, | Performed by: FAMILY MEDICINE

## 2018-03-19 PROCEDURE — 99999 PR PBB SHADOW E&M-EST. PATIENT-LVL III: CPT | Mod: PBBFAC,,, | Performed by: FAMILY MEDICINE

## 2018-03-19 RX ORDER — NITROGLYCERIN 0.4 MG/1
1 TABLET SUBLINGUAL CONTINUOUS PRN
COMMUNITY
Start: 2018-02-22 | End: 2020-11-09 | Stop reason: SDUPTHER

## 2018-03-19 RX ORDER — METOPROLOL TARTRATE 25 MG/1
1 TABLET, FILM COATED ORAL 2 TIMES DAILY
COMMUNITY
Start: 2018-02-09 | End: 2021-01-29 | Stop reason: SDUPTHER

## 2018-03-19 NOTE — PATIENT INSTRUCTIONS
Your test results will be communicated to you via : [facility], Telephone or Letter.   If you have not received your test results in one week, please contact the clinic at [phone].

## 2018-03-19 NOTE — PROGRESS NOTES
"Subjective:       Janak Booker is a 72 y.o. male who presents for follow up of diabetes.. Current symptoms include: none. Patient denies foot ulcerations, hyperglycemia, hypoglycemia , increased appetite, nausea, paresthesia of the feet, polydipsia, polyuria, visual disturbances, vomiting and weight loss. Evaluation to date has been: fasting lipid panel, hemoglobin A1C and microalbuminuria. Home sugars: BGs consistently in an acceptable range. Current treatments: no recent interventions. Last dilated eye exam 2017.    Patient Active Problem List   Diagnosis    Family history of prostate cancer    Disc displacement, lumbar    GERD (gastroesophageal reflux disease)    Coronary artery disease, occlusive    Solitary kidney    CKD (chronic kidney disease) stage 3, GFR 30-59 ml/min    Controlled type 2 diabetes mellitus with stage 3 chronic kidney disease, without long-term current use of insulin    S/P coronary artery stent placement    Lumbar spondylosis    Facet arthritis of lumbar region    Hypertension associated with diabetes    Hyperlipidemia associated with type 2 diabetes mellitus    Facet arthritis of cervical region    Atrial fibrillation    DDD (degenerative disc disease), cervical       Current Outpatient Prescriptions:     BD ALCOHOL SWABS PadM, USE AS DIRECTED TO CHECK BLOOD GLUCOSE DAILY, Disp: 100 each, Rfl: 5    BD ULTRA-FINE BARRY PEN NEEDLES 32 gauge x 5/32" Ndle, USE ONE NEEDLE ONCE DAILY, Disp: 100 each, Rfl: 2    blood sugar diagnostic Strp, Use as directed to check blood sugar daily., Disp: 100 each, Rfl: 3    blood-glucose meter Misc, Use as directed., Disp: 1 each, Rfl: 0    CALCIUM CITRATE-VITAMIN D3 ORAL, , Disp: , Rfl:     fenofibric acid (TRILIPIX) 135 mg capsule, 1 Capsule(s) Oral  Every day., Disp: , Rfl:     lancets (TRUEPLUS LANCETS) 28 gauge Misc, 1 lancet by Misc.(Non-Drug; Combo Route) route once daily., Disp: 100 each, Rfl: 3    liraglutide 0.6 mg/0.1 mL, 18 " "mg/3 mL, subq PNIJ (VICTOZA 3-CAITIE) 0.6 mg/0.1 mL (18 mg/3 mL) PnIj, INJECT 1.8 MGS UNDER THE SKIN DAILY., Disp: 9 mL, Rfl: 4    lisinopril (PRINIVIL,ZESTRIL) 5 MG tablet, 1 Tablet(s) Oral Every evening., Disp: , Rfl:     metFORMIN (GLUCOPHAGE-XR) 500 MG 24 hr tablet, TAKE ONE TABLET BY MOUTH ONCE DAILY, Disp: 90 tablet, Rfl: 1    metoprolol tartrate (LOPRESSOR) 25 MG tablet, Take 1 tablet by mouth once daily., Disp: , Rfl:     multivitamin with minerals tablet, , Disp: , Rfl:     NITROSTAT 0.4 mg SL tablet, Take 1 tablet by mouth continuous prn., Disp: , Rfl:     omega-3 fatty acids 1,000 mg Cap, 1 Capsule(s) Oral OTC once a day., Disp: , Rfl:     omeprazole (PRILOSEC) 40 MG capsule, Take 1 capsule (40 mg total) by mouth once daily., Disp: 90 capsule, Rfl: 3    pioglitazone (ACTOS) 30 MG tablet, TAKE ONE TABLET BY MOUTH ONCE DAILY, Disp: 90 tablet, Rfl: 1    PRADAXA 150 mg Cap, Take 150 mg by mouth Twice daily., Disp: , Rfl:     rosuvastatin (CRESTOR) 10 MG tablet, Every day, Disp: , Rfl:     sotalol (BETAPACE) 120 MG Tab, 2 (two) times daily. , Disp: , Rfl:     tadalafil (CIALIS) 20 MG Tab, Take 1 tablet (20 mg total) by mouth once daily., Disp: 6 tablet, Rfl: 5    TRUE METRIX AIR GLUCOSE METER kit, , Disp: , Rfl:     The following portions of the patient's history were reviewed and updated as appropriate: allergies, past family history, past medical history, past social history and past surgical history.    Review of Systems  Pertinent items are noted in HPI.      Objective:      /80 (BP Location: Right arm, Patient Position: Sitting, BP Method: Large (Manual))   Pulse 63   Resp 16   Ht 6' 1" (1.854 m)   Wt 109.2 kg (240 lb 12.8 oz)   SpO2 99%   BMI 31.77 kg/m²     Physical Exam   Constitutional: He is oriented to person, place, and time. He appears well-developed and well-nourished. He is cooperative.   HENT:   Head: Normocephalic and atraumatic.   Right Ear: External ear normal.   Left " Ear: External ear normal.   Nose: Nose normal.   Mouth/Throat: Oropharynx is clear and moist and mucous membranes are normal. No oropharyngeal exudate.   Eyes: Conjunctivae are normal. No scleral icterus.   Neck: Neck supple. No JVD present. Carotid bruit is not present. No thyromegaly present.   Cardiovascular: Normal rate, regular rhythm, normal heart sounds and normal pulses.  Exam reveals no gallop and no friction rub.    No murmur heard.  Pulses:       Dorsalis pedis pulses are 2+ on the right side, and 2+ on the left side.        Posterior tibial pulses are 2+ on the right side, and 2+ on the left side.   Pulmonary/Chest: Effort normal and breath sounds normal. He has no wheezes. He has no rhonchi. He has no rales.   Abdominal: Soft. Bowel sounds are normal. He exhibits no distension and no mass. There is no splenomegaly or hepatomegaly. There is no tenderness.   Musculoskeletal: Normal range of motion. He exhibits no edema or tenderness.   Feet:   Right Foot:   Protective Sensation: 9 sites tested. 9 sites sensed.   Skin Integrity: Positive for callus (lateral great toe). Negative for erythema or dry skin.   Left Foot:   Protective Sensation: 9 sites tested. 9 sites sensed.   Skin Integrity: Positive for callus (lateral great toe). Negative for erythema or dry skin.   Lymphadenopathy:     He has no cervical adenopathy.     He has no axillary adenopathy.   Neurological: He is alert and oriented to person, place, and time. He has normal strength and normal reflexes. No cranial nerve deficit or sensory deficit. Coordination normal.   Skin: Skin is warm and dry.   Psychiatric: He has a normal mood and affect.   Vitals reviewed.    Laboratory:  Lab Visit on 03/06/2018   Component Date Value Ref Range Status    Microalbum.,U,Random 03/06/2018 75.0  ug/mL Final    Creatinine, Random Ur 03/06/2018 100.0  23.0 - 375.0 mg/dL Final    Microalb Creat Ratio 03/06/2018 75.0* 0.0 - 30.0 ug/mg Final   Lab Visit on  03/06/2018   Component Date Value Ref Range Status    Sodium 03/06/2018 139  136 - 145 mmol/L Final    Potassium 03/06/2018 5.2* 3.5 - 5.1 mmol/L Final    Chloride 03/06/2018 105  95 - 110 mmol/L Final    CO2 03/06/2018 26  23 - 29 mmol/L Final    Glucose 03/06/2018 111* 70 - 110 mg/dL Final    BUN, Bld 03/06/2018 25* 8 - 23 mg/dL Final    Creatinine 03/06/2018 1.8* 0.5 - 1.4 mg/dL Final    Calcium 03/06/2018 10.1  8.7 - 10.5 mg/dL Final    Total Protein 03/06/2018 7.1  6.0 - 8.4 g/dL Final    Albumin 03/06/2018 3.7  3.5 - 5.2 g/dL Final    Total Bilirubin 03/06/2018 0.9  0.1 - 1.0 mg/dL Final    Alkaline Phosphatase 03/06/2018 39* 55 - 135 U/L Final    AST 03/06/2018 19  10 - 40 U/L Final    ALT 03/06/2018 14  10 - 44 U/L Final    Anion Gap 03/06/2018 8  8 - 16 mmol/L Final    eGFR if  03/06/2018 42.8* >60 mL/min/1.73 m^2 Final    eGFR if non African American 03/06/2018 37.0* >60 mL/min/1.73 m^2 Final    Cholesterol 03/06/2018 148  120 - 199 mg/dL Final    Triglycerides 03/06/2018 173* 30 - 150 mg/dL Final    HDL 03/06/2018 34* 40 - 75 mg/dL Final    LDL Cholesterol 03/06/2018 79.4  63.0 - 159.0 mg/dL Final    HDL/Chol Ratio 03/06/2018 23.0  20.0 - 50.0 % Final    Total Cholesterol/HDL Ratio 03/06/2018 4.4  2.0 - 5.0 Final    Non-HDL Cholesterol 03/06/2018 114  mg/dL Final    Hemoglobin A1C 03/06/2018 6.1* 4.0 - 5.6 % Final    Estimated Avg Glucose 03/06/2018 128  68 - 131 mg/dL Final    PSA, SCREEN 03/06/2018 0.31  0.00 - 4.00 ng/mL Final          Assessment:     1. Controlled type 2 diabetes mellitus with stage 3 chronic kidney disease, without long-term current use of insulin    2. CKD (chronic kidney disease) stage 3, GFR 30-59 ml/min    3. Coronary artery disease, occlusive    4. S/P coronary artery stent placement    5. Hypertension associated with diabetes    6. Hyperlipidemia associated with type 2 diabetes mellitus    7. Chronic atrial fibrillation    8.  Gastroesophageal reflux disease without esophagitis         Plan:      Encouraged aerobic exercise.  Discussed foot care.  Reminded to get yearly retinal exam.  Follow up in 6 months or as needed.

## 2018-03-20 RX ORDER — PEN NEEDLE, DIABETIC 31 GX5/16"
NEEDLE, DISPOSABLE MISCELLANEOUS
Qty: 100 EACH | Refills: 1 | Status: SHIPPED | OUTPATIENT
Start: 2018-03-20 | End: 2018-07-20 | Stop reason: SDUPTHER

## 2018-03-20 RX ORDER — LIRAGLUTIDE 6 MG/ML
INJECTION SUBCUTANEOUS
Qty: 9 ML | Refills: 3 | Status: SHIPPED | OUTPATIENT
Start: 2018-03-20 | End: 2018-07-19 | Stop reason: SDUPTHER

## 2018-03-27 ENCOUNTER — ANESTHESIA EVENT (OUTPATIENT)
Dept: ENDOSCOPY | Facility: HOSPITAL | Age: 73
End: 2018-03-27
Payer: MEDICARE

## 2018-03-27 ENCOUNTER — SURGERY (OUTPATIENT)
Age: 73
End: 2018-03-27

## 2018-03-27 ENCOUNTER — HOSPITAL ENCOUNTER (OUTPATIENT)
Facility: HOSPITAL | Age: 73
Discharge: HOME | End: 2018-03-27
Attending: INTERNAL MEDICINE | Admitting: INTERNAL MEDICINE
Payer: MEDICARE

## 2018-03-27 ENCOUNTER — ANESTHESIA (OUTPATIENT)
Dept: ENDOSCOPY | Facility: HOSPITAL | Age: 73
End: 2018-03-27
Payer: MEDICARE

## 2018-03-27 VITALS
WEIGHT: 230 LBS | DIASTOLIC BLOOD PRESSURE: 73 MMHG | BODY MASS INDEX: 30.48 KG/M2 | RESPIRATION RATE: 14 BRPM | SYSTOLIC BLOOD PRESSURE: 110 MMHG | TEMPERATURE: 98 F | OXYGEN SATURATION: 100 % | HEART RATE: 69 BPM | HEIGHT: 73 IN

## 2018-03-27 DIAGNOSIS — Z86.010 HX OF COLONIC POLYPS: ICD-10-CM

## 2018-03-27 DIAGNOSIS — K57.30 DIVERTICULOSIS OF LARGE INTESTINE WITHOUT HEMORRHAGE: ICD-10-CM

## 2018-03-27 DIAGNOSIS — K64.8 INTERNAL HEMORRHOIDS: ICD-10-CM

## 2018-03-27 DIAGNOSIS — K63.5 POLYP OF COLON, UNSPECIFIED PART OF COLON, UNSPECIFIED TYPE: Primary | ICD-10-CM

## 2018-03-27 DIAGNOSIS — Z87.898 HISTORY OF CHEST PAIN: ICD-10-CM

## 2018-03-27 PROBLEM — Z86.0100 HX OF COLONIC POLYPS: Status: ACTIVE | Noted: 2018-03-27

## 2018-03-27 PROCEDURE — 93010 ELECTROCARDIOGRAM REPORT: CPT | Mod: ,,, | Performed by: INTERNAL MEDICINE

## 2018-03-27 PROCEDURE — 45380 COLONOSCOPY AND BIOPSY: CPT | Mod: 59,,, | Performed by: INTERNAL MEDICINE

## 2018-03-27 PROCEDURE — 45380 COLONOSCOPY AND BIOPSY: CPT | Performed by: INTERNAL MEDICINE

## 2018-03-27 PROCEDURE — D9220A PRA ANESTHESIA: Mod: PT,ANES,, | Performed by: ANESTHESIOLOGY

## 2018-03-27 PROCEDURE — 88305 TISSUE EXAM BY PATHOLOGIST: CPT | Performed by: PATHOLOGY

## 2018-03-27 PROCEDURE — 37000009 HC ANESTHESIA EA ADD 15 MINS: Performed by: INTERNAL MEDICINE

## 2018-03-27 PROCEDURE — 45385 COLONOSCOPY W/LESION REMOVAL: CPT | Performed by: INTERNAL MEDICINE

## 2018-03-27 PROCEDURE — 45385 COLONOSCOPY W/LESION REMOVAL: CPT | Mod: PT,,, | Performed by: INTERNAL MEDICINE

## 2018-03-27 PROCEDURE — 88305 TISSUE EXAM BY PATHOLOGIST: CPT | Mod: 26,,, | Performed by: PATHOLOGY

## 2018-03-27 PROCEDURE — D9220A PRA ANESTHESIA: Mod: PT,CRNA,, | Performed by: NURSE ANESTHETIST, CERTIFIED REGISTERED

## 2018-03-27 PROCEDURE — 37000008 HC ANESTHESIA 1ST 15 MINUTES: Performed by: INTERNAL MEDICINE

## 2018-03-27 PROCEDURE — 93005 ELECTROCARDIOGRAM TRACING: CPT

## 2018-03-27 PROCEDURE — 25000003 PHARM REV CODE 250: Performed by: INTERNAL MEDICINE

## 2018-03-27 PROCEDURE — 27201089 HC SNARE, DISP (ANY): Performed by: INTERNAL MEDICINE

## 2018-03-27 PROCEDURE — 25000003 PHARM REV CODE 250: Performed by: NURSE ANESTHETIST, CERTIFIED REGISTERED

## 2018-03-27 PROCEDURE — 27201012 HC FORCEPS, HOT/COLD, DISP: Performed by: INTERNAL MEDICINE

## 2018-03-27 PROCEDURE — 63600175 PHARM REV CODE 636 W HCPCS: Performed by: NURSE ANESTHETIST, CERTIFIED REGISTERED

## 2018-03-27 RX ORDER — LIDOCAINE HYDROCHLORIDE 10 MG/ML
INJECTION, SOLUTION EPIDURAL; INFILTRATION; INTRACAUDAL; PERINEURAL
Status: DISCONTINUED
Start: 2018-03-27 | End: 2018-03-27 | Stop reason: HOSPADM

## 2018-03-27 RX ORDER — LIDOCAINE HYDROCHLORIDE 10 MG/ML
INJECTION, SOLUTION EPIDURAL; INFILTRATION; INTRACAUDAL; PERINEURAL
Status: DISCONTINUED | OUTPATIENT
Start: 2018-03-27 | End: 2018-03-27

## 2018-03-27 RX ORDER — SODIUM CHLORIDE 9 MG/ML
INJECTION, SOLUTION INTRAVENOUS CONTINUOUS
Status: DISCONTINUED | OUTPATIENT
Start: 2018-03-27 | End: 2018-03-27 | Stop reason: HOSPADM

## 2018-03-27 RX ORDER — PROPOFOL 10 MG/ML
INJECTION, EMULSION INTRAVENOUS
Status: DISCONTINUED
Start: 2018-03-27 | End: 2018-03-27 | Stop reason: HOSPADM

## 2018-03-27 RX ORDER — DEXTROMETHORPHAN/PSEUDOEPHED 2.5-7.5/.8
DROPS ORAL
Status: DISCONTINUED
Start: 2018-03-27 | End: 2018-03-27 | Stop reason: HOSPADM

## 2018-03-27 RX ORDER — PROPOFOL 10 MG/ML
VIAL (ML) INTRAVENOUS
Status: DISCONTINUED | OUTPATIENT
Start: 2018-03-27 | End: 2018-03-27

## 2018-03-27 RX ADMIN — PROPOFOL 30 MG: 10 INJECTION, EMULSION INTRAVENOUS at 08:03

## 2018-03-27 RX ADMIN — SODIUM CHLORIDE: 0.9 INJECTION, SOLUTION INTRAVENOUS at 08:03

## 2018-03-27 RX ADMIN — PROPOFOL 100 MG: 10 INJECTION, EMULSION INTRAVENOUS at 08:03

## 2018-03-27 RX ADMIN — LIDOCAINE HYDROCHLORIDE 50 MG: 10 INJECTION, SOLUTION EPIDURAL; INFILTRATION; INTRACAUDAL; PERINEURAL at 08:03

## 2018-03-27 NOTE — ANESTHESIA POSTPROCEDURE EVALUATION
"Anesthesia Post Evaluation    Patient: Janak Booker    Procedure(s) Performed: Procedure(s) (LRB):  COLONOSCOPY (N/A)    Final Anesthesia Type: general  Patient location during evaluation: PACU  Patient participation: Yes- Able to Participate  Level of consciousness: awake and alert  Post-procedure vital signs: reviewed and stable  Pain management: adequate  Airway patency: patent  PONV status at discharge: No PONV  Anesthetic complications: no      Cardiovascular status: blood pressure returned to baseline and hemodynamically stable  Respiratory status: unassisted  Hydration status: euvolemic  Follow-up not needed.        Visit Vitals  /73   Pulse 70   Temp 36.7 °C (98.1 °F) (Oral)   Resp 18   Ht 6' 1" (1.854 m)   Wt 104.3 kg (230 lb)   SpO2 97%   BMI 30.34 kg/m²       Pain/Dilip Score: Pain Assessment Performed: Yes (3/27/2018  8:58 AM)  Presence of Pain: denies (3/27/2018  7:29 AM)      "

## 2018-03-27 NOTE — PROVATION PATIENT INSTRUCTIONS
Discharge Summary/Instructions after an Endoscopic Procedure  Patient Name: Janak Booker  Patient MRN: [id number]  Patient YOB: 1945 Tuesday, March 27, 2018  Rishi Fofana MD  RESTRICTIONS:  During your procedure today, you received medications for sedation.  These   medications may affect your judgment, balance and coordination.  Therefore,   for 24 hours, you have the following restrictions:   - DO NOT drive a car, operate machinery, make legal/financial decisions,   sign important papers or drink alcohol.    ACTIVITY:  The following day: return to full activity including work, except no heavy   lifting, straining or running for 3 days if polyps were removed.  DIET:  Eat and drink normally unless instructed otherwise.     TREATMENT FOR COMMON SIDE EFFECTS:  - Mild abdominal pain, nausea, belching, bloating or excessive gas:  rest,   eat lightly and use a heating pad.  - Sore Throat: treat with throat lozenges and/or gargle with warm salt   water.  - Because air was used during the procedure, expelling large amounts of air   from your rectum or belching is normal.  - If a bowel prep was taken, you may not have a bowel movement for 1-3 days.    This is normal.  SYMPTOMS TO WATCH FOR AND REPORT TO YOUR PHYSICIAN:  1. Abdominal pain or bloating, other than gas cramps.  2. Chest pain.  3. Back pain.  4. Signs of infection such as: chills or fever occurring within 24 hours   after the procedure.  5. Rectal bleeding, which would show as bright red, maroon, or black stools.   (A tablespoon of blood from the rectum is not serious, especially if   hemorrhoids are present.)  6. Vomiting.  7. Weakness or dizziness.  GO DIRECTLY TO THE NEAREST EMERGENCY ROOM IF YOU HAVE ANY OF THE FOLLOWING:      Difficulty breathing              Chills and/or fever over 101 F   Persistent vomiting and/or vomiting blood   Severe abdominal pain   Severe chest pain   Black, tarry stools   Bleeding- more than one tablespoon   Any  other symptom or condition that you feel may need urgent attention  Your doctor recommends these additional instructions:  If any biopsies were taken, your doctors clinic will contact you in 1 to 2   weeks with any results.  You have a contact number available for emergencies.  The signs and symptoms   of potential delayed complications were discussed with you.  You may return   to normal activities tomorrow.  Written discharge instructions were   provided to you.   Eat a high fiber diet.   Continue your present medications.   We are waiting for your pathology results.   Your physician has recommended a repeat colonoscopy in three to five years   for surveillance.   You are being discharged to home.   Return to my office as needed.  For questions, problems or results please call your physician - Rishi Fofana MD at Work:  [phone].  [facility], EMERGENCY ROOM PHONE NUMBER: [phone]  IF A COMPLICATION OR EMERGENCY SITUATION ARISES AND YOU ARE UNABLE TO REACH   YOUR PHYSICIAN - GO DIRECTLY TO THE EMERGENCY ROOM.  Rishi Fofana MD  3/27/2018 9:00:06 AM  This report has been verified and signed electronically.

## 2018-03-27 NOTE — DISCHARGE INSTRUCTIONS

## 2018-03-27 NOTE — TRANSFER OF CARE
"Anesthesia Transfer of Care Note    Patient: Janak Booker    Procedure(s) Performed: Procedure(s) (LRB):  COLONOSCOPY (N/A)    Patient location: PACU    Anesthesia Type: general    Transport from OR: Transported from OR on room air with adequate spontaneous ventilation    Post pain: adequate analgesia    Post assessment: no apparent anesthetic complications    Post vital signs: stable    Level of consciousness: awake    Nausea/Vomiting: no nausea/vomiting    Complications: none    Transfer of care protocol was followed      Last vitals:   Visit Vitals  /73   Pulse 70   Temp 36.7 °C (98.1 °F) (Oral)   Resp 18   Ht 6' 1" (1.854 m)   Wt 104.3 kg (230 lb)   SpO2 97%   BMI 30.34 kg/m²     "

## 2018-03-27 NOTE — ANESTHESIA PREPROCEDURE EVALUATION
03/27/2018  Janak Booker is a 72 y.o., male.    Anesthesia Evaluation    I have reviewed the Patient Summary Reports.    I have reviewed the Nursing Notes.      Review of Systems  Anesthesia Hx:  No problems with previous Anesthesia    Cardiovascular:   Hypertension, well controlled CAD asymptomatic CABG/stent  CAD - stents in place - stable   Endocrine:   Diabetes, well controlled, type 2        Physical Exam  General:  Obesity                 Anesthesia Plan  Type of Anesthesia, risks & benefits discussed:  Anesthesia Type:  general  Patient's Preference:   Intra-op Monitoring Plan:   Intra-op Monitoring Plan Comments:   Post Op Pain Control Plan:   Post Op Pain Control Plan Comments:   Induction:   IV  Beta Blocker:  Patient is not currently on a Beta-Blocker (No further documentation required).       Informed Consent: Patient understands risks and agrees with Anesthesia plan.  Questions answered. Anesthesia consent signed with patient.  ASA Score: 3     Day of Surgery Review of History & Physical:    H&P update referred to the surgeon.         Ready For Surgery From Anesthesia Perspective.

## 2018-03-27 NOTE — H&P
CC: History of colon polyps - last scope in 2014    72 year old male with above. States that symptoms are absent, no alleviating/exacerbating factors. No family history of CA. Positive personal history of polyps. No bleeding or weight loss.     ROS:  No headache, no fever/chills, no chest pain/SOB, no nausea/vomiting/diarrhea/constipation/GI bleeding/abdominal pain, no dysuria/hematuria.    VSSAF   Exam:   Alert and oriented x 3; no apparent distress   PERRLA, sclera anicteric  CV: Regular rate/rhythm, normal PMI   Lungs: Clear bilaterally with no wheeze/rales   Abdomen: Soft, NT/ND, normal bowel sounds   Ext: No cyanosis, clubbing     Impression:   As above    Plan:   Proceed with endoscopy. Further recs to follow.

## 2018-04-02 ENCOUNTER — TELEPHONE (OUTPATIENT)
Dept: GASTROENTEROLOGY | Facility: CLINIC | Age: 73
End: 2018-04-02

## 2018-04-02 NOTE — TELEPHONE ENCOUNTER
----- Message from Jenna Ramírez sent at 4/2/2018  9:00 AM CDT -----  Contact: patient   Patient returning a missed call. Please advise. Call to pod. Call connected. Warm transferred.  Thanks!

## 2018-04-21 RX ORDER — PIOGLITAZONEHYDROCHLORIDE 30 MG/1
TABLET ORAL
Qty: 90 TABLET | Refills: 3 | Status: SHIPPED | OUTPATIENT
Start: 2018-04-21 | End: 2019-04-13 | Stop reason: SDUPTHER

## 2018-05-29 ENCOUNTER — TELEPHONE (OUTPATIENT)
Dept: FAMILY MEDICINE | Facility: CLINIC | Age: 73
End: 2018-05-29

## 2018-05-29 NOTE — TELEPHONE ENCOUNTER
Spoke with [facility] in regards to the patient's orders. I Kyung was informed that we did not receive the fax for the orders. Kyung states she will manually fax it over to us.

## 2018-05-29 NOTE — TELEPHONE ENCOUNTER
----- Message from Luis Enrique Orellana sent at 5/29/2018  9:01 AM CDT -----  Contact: [facility]            Name of Who is Calling: [facility]      What is the request in detail: Called to get orders and chart notes for patient's diabetic shoes      Can the clinic reply by [facility]: No      What Number to Call Back if not in [facility]: [phone] Ext. [phone]

## 2018-05-30 ENCOUNTER — TELEPHONE (OUTPATIENT)
Dept: FAMILY MEDICINE | Facility: CLINIC | Age: 73
End: 2018-05-30

## 2018-05-30 NOTE — TELEPHONE ENCOUNTER
----- Message from Sangeetha Branham sent at 5/30/2018  1:48 PM CDT -----  Contact: Ej with [facility]            Name of Who is Calling: Ej with [facility]      What is the request in detail: Ej is calling to get some corrections that's needed on the documentation that was fax on today for pt. Ej says the physicians form states pt has corns and callus but chart notes on 03/19/18 states pt doesn't but they are needing his chart notes to also reflect pt has corns and callus and the location of them.  Please contact Ej to further discuss and advise.     Can the clinic reply by [facility]: No      What Number to Call Back if not in [facility]: [phone]

## 2018-06-01 ENCOUNTER — TELEPHONE (OUTPATIENT)
Dept: FAMILY MEDICINE | Facility: CLINIC | Age: 73
End: 2018-06-01

## 2018-06-01 NOTE — TELEPHONE ENCOUNTER
Ej states the patient's chart notes from 03/19/18. Need to state detailed location of the Corn and Callus in the patient's chart notes and Dr. lCaire's signature needs to be included as well in the chart notes.

## 2018-06-01 NOTE — TELEPHONE ENCOUNTER
----- Message from Leonor Blackburn sent at 6/1/2018  9:38 AM CDT -----  Name of Who is Calling: Ej ([facility]abetes [facility])      What is the request in detail: Ej is requesting the location of the pts callus and signed chart notes from 3.19.18      Can the clinic reply by [facility]:   No       [facility] PHONE: [phone]  FAX: [phone]

## 2018-06-01 NOTE — TELEPHONE ENCOUNTER
Chart notes were faxed over to [facility] this morning. Checked my email and it states the fax was successfully sent over.

## 2018-06-15 ENCOUNTER — PES CALL (OUTPATIENT)
Dept: ADMINISTRATIVE | Facility: CLINIC | Age: 73
End: 2018-06-15

## 2018-07-18 ENCOUNTER — PES CALL (OUTPATIENT)
Dept: ADMINISTRATIVE | Facility: CLINIC | Age: 73
End: 2018-07-18

## 2018-07-19 RX ORDER — LIRAGLUTIDE 6 MG/ML
INJECTION SUBCUTANEOUS
Qty: 9 ML | Refills: 2 | Status: SHIPPED | OUTPATIENT
Start: 2018-07-19 | End: 2018-10-16 | Stop reason: SDUPTHER

## 2018-07-20 RX ORDER — PEN NEEDLE, DIABETIC 31 GX5/16"
NEEDLE, DISPOSABLE MISCELLANEOUS
Qty: 100 EACH | Refills: 0 | Status: SHIPPED | OUTPATIENT
Start: 2018-07-20 | End: 2018-12-12 | Stop reason: SDUPTHER

## 2018-08-17 ENCOUNTER — TELEPHONE (OUTPATIENT)
Dept: FAMILY MEDICINE | Facility: CLINIC | Age: 73
End: 2018-08-17

## 2018-08-17 DIAGNOSIS — N18.30 CONTROLLED TYPE 2 DIABETES MELLITUS WITH STAGE 3 CHRONIC KIDNEY DISEASE, WITHOUT LONG-TERM CURRENT USE OF INSULIN: Primary | ICD-10-CM

## 2018-08-17 DIAGNOSIS — N18.30 CKD (CHRONIC KIDNEY DISEASE) STAGE 3, GFR 30-59 ML/MIN: ICD-10-CM

## 2018-08-17 DIAGNOSIS — E11.22 CONTROLLED TYPE 2 DIABETES MELLITUS WITH STAGE 3 CHRONIC KIDNEY DISEASE, WITHOUT LONG-TERM CURRENT USE OF INSULIN: Primary | ICD-10-CM

## 2018-08-17 NOTE — TELEPHONE ENCOUNTER
----- Message from Jannet Abernathy sent at 8/17/2018 11:13 AM CDT -----  Contact: pt  Name of Who is Calling: KIERRA WILSON [[id number]]    What is the request in detail:Patient is wondering if he needs blood work done before his appointment on 9/4......Please contact to further discuss and advise      Can the clinic reply by [facility]: No    What Number to Call Back if not in [facility]:  [phone]

## 2018-08-20 ENCOUNTER — LAB VISIT (OUTPATIENT)
Dept: LAB | Facility: HOSPITAL | Age: 73
End: 2018-08-20
Attending: FAMILY MEDICINE
Payer: MEDICARE

## 2018-08-20 DIAGNOSIS — N18.30 CKD (CHRONIC KIDNEY DISEASE) STAGE 3, GFR 30-59 ML/MIN: ICD-10-CM

## 2018-08-20 DIAGNOSIS — N18.30 CONTROLLED TYPE 2 DIABETES MELLITUS WITH STAGE 3 CHRONIC KIDNEY DISEASE, WITHOUT LONG-TERM CURRENT USE OF INSULIN: ICD-10-CM

## 2018-08-20 DIAGNOSIS — E11.22 CONTROLLED TYPE 2 DIABETES MELLITUS WITH STAGE 3 CHRONIC KIDNEY DISEASE, WITHOUT LONG-TERM CURRENT USE OF INSULIN: ICD-10-CM

## 2018-08-20 LAB
ANION GAP SERPL CALC-SCNC: 5 MMOL/L
BUN SERPL-MCNC: 27 MG/DL
CALCIUM SERPL-MCNC: 9.3 MG/DL
CHLORIDE SERPL-SCNC: 106 MMOL/L
CO2 SERPL-SCNC: 27 MMOL/L
CREAT SERPL-MCNC: 1.7 MG/DL
EST. GFR  (AFRICAN AMERICAN): 45.6 ML/MIN/1.73 M^2
EST. GFR  (NON AFRICAN AMERICAN): 39.4 ML/MIN/1.73 M^2
ESTIMATED AVG GLUCOSE: 134 MG/DL
GLUCOSE SERPL-MCNC: 127 MG/DL
HBA1C MFR BLD HPLC: 6.3 %
POTASSIUM SERPL-SCNC: 5 MMOL/L
SODIUM SERPL-SCNC: 138 MMOL/L

## 2018-08-20 PROCEDURE — 36415 COLL VENOUS BLD VENIPUNCTURE: CPT | Mod: PO

## 2018-08-20 PROCEDURE — 80048 BASIC METABOLIC PNL TOTAL CA: CPT

## 2018-08-20 PROCEDURE — 83036 HEMOGLOBIN GLYCOSYLATED A1C: CPT

## 2018-09-04 ENCOUNTER — TELEPHONE (OUTPATIENT)
Dept: FAMILY MEDICINE | Facility: CLINIC | Age: 73
End: 2018-09-04

## 2018-09-04 ENCOUNTER — OFFICE VISIT (OUTPATIENT)
Dept: FAMILY MEDICINE | Facility: CLINIC | Age: 73
End: 2018-09-04
Attending: FAMILY MEDICINE
Payer: MEDICARE

## 2018-09-04 VITALS
SYSTOLIC BLOOD PRESSURE: 106 MMHG | DIASTOLIC BLOOD PRESSURE: 66 MMHG | WEIGHT: 240.5 LBS | OXYGEN SATURATION: 95 % | HEIGHT: 73 IN | HEART RATE: 63 BPM | BODY MASS INDEX: 31.87 KG/M2

## 2018-09-04 DIAGNOSIS — I25.10 CORONARY ARTERY DISEASE, OCCLUSIVE: ICD-10-CM

## 2018-09-04 DIAGNOSIS — E11.69 HYPERLIPIDEMIA ASSOCIATED WITH TYPE 2 DIABETES MELLITUS: ICD-10-CM

## 2018-09-04 DIAGNOSIS — Z95.5 S/P CORONARY ARTERY STENT PLACEMENT: ICD-10-CM

## 2018-09-04 DIAGNOSIS — N18.30 CONTROLLED TYPE 2 DIABETES MELLITUS WITH STAGE 3 CHRONIC KIDNEY DISEASE, WITHOUT LONG-TERM CURRENT USE OF INSULIN: ICD-10-CM

## 2018-09-04 DIAGNOSIS — E78.5 HYPERLIPIDEMIA ASSOCIATED WITH TYPE 2 DIABETES MELLITUS: ICD-10-CM

## 2018-09-04 DIAGNOSIS — G47.9 SLEEP DISTURBANCE: ICD-10-CM

## 2018-09-04 DIAGNOSIS — I15.2 HYPERTENSION ASSOCIATED WITH DIABETES: ICD-10-CM

## 2018-09-04 DIAGNOSIS — Z80.42 FAMILY HISTORY OF PROSTATE CANCER: ICD-10-CM

## 2018-09-04 DIAGNOSIS — Z00.00 ROUTINE GENERAL MEDICAL EXAMINATION AT A HEALTH CARE FACILITY: Primary | ICD-10-CM

## 2018-09-04 DIAGNOSIS — I48.20 CHRONIC ATRIAL FIBRILLATION: ICD-10-CM

## 2018-09-04 DIAGNOSIS — N18.30 CKD (CHRONIC KIDNEY DISEASE) STAGE 3, GFR 30-59 ML/MIN: ICD-10-CM

## 2018-09-04 DIAGNOSIS — E11.59 HYPERTENSION ASSOCIATED WITH DIABETES: ICD-10-CM

## 2018-09-04 DIAGNOSIS — E11.22 CONTROLLED TYPE 2 DIABETES MELLITUS WITH STAGE 3 CHRONIC KIDNEY DISEASE, WITHOUT LONG-TERM CURRENT USE OF INSULIN: ICD-10-CM

## 2018-09-04 PROCEDURE — 3078F DIAST BP <80 MM HG: CPT | Mod: CPTII,,, | Performed by: FAMILY MEDICINE

## 2018-09-04 PROCEDURE — 99397 PER PM REEVAL EST PAT 65+ YR: CPT | Mod: S$PBB,,, | Performed by: FAMILY MEDICINE

## 2018-09-04 PROCEDURE — 99213 OFFICE O/P EST LOW 20 MIN: CPT | Mod: PBBFAC,PO | Performed by: FAMILY MEDICINE

## 2018-09-04 PROCEDURE — 3044F HG A1C LEVEL LT 7.0%: CPT | Mod: CPTII,,, | Performed by: FAMILY MEDICINE

## 2018-09-04 PROCEDURE — 3074F SYST BP LT 130 MM HG: CPT | Mod: CPTII,,, | Performed by: FAMILY MEDICINE

## 2018-09-04 PROCEDURE — 99999 PR PBB SHADOW E&M-EST. PATIENT-LVL III: CPT | Mod: PBBFAC,,, | Performed by: FAMILY MEDICINE

## 2018-09-04 RX ORDER — DOXEPIN HYDROCHLORIDE 10 MG/1
10 CAPSULE ORAL NIGHTLY
Qty: 10 CAPSULE | Refills: 0 | Status: SHIPPED | OUTPATIENT
Start: 2018-09-04 | End: 2018-11-29

## 2018-09-04 NOTE — TELEPHONE ENCOUNTER
Spoke with Lisa in regards to phone call to inform her that the Doxepin is a trial and medication is ok to fill.

## 2018-09-04 NOTE — TELEPHONE ENCOUNTER
----- Message from Jannet Abernathy sent at 9/4/2018 10:37 AM CDT -----  Contact: Lisa from [facility]   Name of Who is Calling: Lisa from [facility]     What is the request in detail:Lisa from [facility]  Is requesting a call back in regards to RX  doxepin (SINEQUAN) 10 MG capsule, Lisa states the medication counter acts with another medication patient is currently taking......Please contact to further discuss and advise      Can the clinic reply by [facility]: No     What Number to Call Back if not in [facility]: [phone].

## 2018-10-01 DIAGNOSIS — N18.30 CONTROLLED TYPE 2 DIABETES MELLITUS WITH STAGE 3 CHRONIC KIDNEY DISEASE, WITHOUT LONG-TERM CURRENT USE OF INSULIN: ICD-10-CM

## 2018-10-01 DIAGNOSIS — E11.22 CONTROLLED TYPE 2 DIABETES MELLITUS WITH STAGE 3 CHRONIC KIDNEY DISEASE, WITHOUT LONG-TERM CURRENT USE OF INSULIN: ICD-10-CM

## 2018-10-01 RX ORDER — METFORMIN HYDROCHLORIDE 500 MG/1
TABLET, EXTENDED RELEASE ORAL
Qty: 90 TABLET | Refills: 1 | Status: SHIPPED | OUTPATIENT
Start: 2018-10-01 | End: 2019-03-31 | Stop reason: SDUPTHER

## 2018-10-17 RX ORDER — LIRAGLUTIDE 6 MG/ML
INJECTION SUBCUTANEOUS
Qty: 9 ML | Refills: 1 | Status: SHIPPED | OUTPATIENT
Start: 2018-10-17 | End: 2018-10-17 | Stop reason: SDUPTHER

## 2018-10-17 RX ORDER — LIRAGLUTIDE 6 MG/ML
INJECTION SUBCUTANEOUS
Qty: 9 ML | Refills: 1 | Status: SHIPPED | OUTPATIENT
Start: 2018-10-17 | End: 2018-12-16 | Stop reason: SDUPTHER

## 2018-11-29 ENCOUNTER — OFFICE VISIT (OUTPATIENT)
Dept: FAMILY MEDICINE | Facility: CLINIC | Age: 73
End: 2018-11-29
Attending: FAMILY MEDICINE
Payer: MEDICARE

## 2018-11-29 VITALS
DIASTOLIC BLOOD PRESSURE: 60 MMHG | SYSTOLIC BLOOD PRESSURE: 108 MMHG | BODY MASS INDEX: 32.27 KG/M2 | WEIGHT: 243.5 LBS | HEIGHT: 73 IN

## 2018-11-29 DIAGNOSIS — R93.89 ABNORMAL CT OF THE CHEST: ICD-10-CM

## 2018-11-29 DIAGNOSIS — S46.211S: ICD-10-CM

## 2018-11-29 DIAGNOSIS — S22.32XD CLOSED FRACTURE OF ONE RIB OF LEFT SIDE WITH ROUTINE HEALING, SUBSEQUENT ENCOUNTER: Primary | ICD-10-CM

## 2018-11-29 PROCEDURE — 99999 PR PBB SHADOW E&M-EST. PATIENT-LVL III: CPT | Mod: PBBFAC,HCNC,, | Performed by: FAMILY MEDICINE

## 2018-11-29 PROCEDURE — 3074F SYST BP LT 130 MM HG: CPT | Mod: CPTII,HCNC,S$GLB, | Performed by: FAMILY MEDICINE

## 2018-11-29 PROCEDURE — 99214 OFFICE O/P EST MOD 30 MIN: CPT | Mod: HCNC,S$GLB,, | Performed by: FAMILY MEDICINE

## 2018-11-29 PROCEDURE — 3078F DIAST BP <80 MM HG: CPT | Mod: CPTII,HCNC,S$GLB, | Performed by: FAMILY MEDICINE

## 2018-11-29 PROCEDURE — 1100F PTFALLS ASSESS-DOCD GE2>/YR: CPT | Mod: CPTII,HCNC,S$GLB, | Performed by: FAMILY MEDICINE

## 2018-11-29 PROCEDURE — 3288F FALL RISK ASSESSMENT DOCD: CPT | Mod: CPTII,HCNC,S$GLB, | Performed by: FAMILY MEDICINE

## 2018-11-29 RX ORDER — DICLOFENAC SODIUM 10 MG/G
GEL TOPICAL
COMMUNITY
Start: 2018-11-19 | End: 2020-03-13

## 2018-11-29 RX ORDER — HYDROCODONE BITARTRATE AND ACETAMINOPHEN 5; 325 MG/1; MG/1
1 TABLET ORAL EVERY 6 HOURS PRN
Refills: 0 | COMMUNITY
Start: 2018-11-24 | End: 2019-04-10

## 2018-11-29 NOTE — PROGRESS NOTES
"Subjective:       Patient ID: Janak Booker is a 73 y.o. male.    Chief Complaint: Hospital Follow Up    HPI     The patient presents to the office today for Emergency Department follow-up.  He was seen at [facility] on 11/24, following a fall few days prior.  He suffered a head and rib contusion, with some bruising on his abdomen.  There was concern given that he is on anticoagulation.    CT reportedly showed a left 5th rib fracture, and a large calcification right lower lobe (not seen on chest x-ray).  There was also an asymptomatic gallstone.    The patient is on incentive spirometry at home.  He denies any fever; pain is well controlled.  He is concerned about possibly torn right biceps muscle.  He is scheduled for surgery with his orthopedic surgeon in January for a rotator cuff repair on the same shoulder.      Patient Active Problem List   Diagnosis    Family history of prostate cancer    Disc displacement, lumbar    GERD (gastroesophageal reflux disease)    Coronary artery disease, occlusive    Solitary kidney    CKD (chronic kidney disease) stage 3, GFR 30-59 ml/min    Controlled type 2 diabetes mellitus with stage 3 chronic kidney disease, without long-term current use of insulin    S/P coronary artery stent placement    Lumbar spondylosis    Facet arthritis of lumbar region    Hypertension associated with diabetes    Hyperlipidemia associated with type 2 diabetes mellitus    Facet arthritis of cervical region    Atrial fibrillation    DDD (degenerative disc disease), cervical    Hx of colonic polyps       Current Outpatient Medications:     BD ALCOHOL SWABS PadM, USE AS DIRECTED TO CHECK BLOOD GLUCOSE DAILY, Disp: 100 each, Rfl: 5    BD ULTRA-FINE BARRY PEN NEEDLE 32 gauge x 5/32" Ndle, USE ONE NEEDLE ONCE DAILY, Disp: 100 each, Rfl: 0    blood sugar diagnostic Strp, Use as directed to check blood sugar daily., Disp: 100 each, Rfl: 3    blood-glucose meter Misc, Use as " "directed., Disp: 1 each, Rfl: 0    CALCIUM CITRATE-VITAMIN D3 ORAL, , Disp: , Rfl:     fenofibric acid (TRILIPIX) 135 mg capsule, 1 Capsule(s) Oral  Every day., Disp: , Rfl:     HYDROcodone-acetaminophen (NORCO) 5-325 mg per tablet, Take 1 tablet by mouth every 6 (six) hours as needed., Disp: , Rfl: 0    lancets (TRUEPLUS LANCETS) 28 gauge Misc, 1 lancet by Misc.(Non-Drug; Combo Route) route once daily., Disp: 100 each, Rfl: 3    lisinopril (PRINIVIL,ZESTRIL) 5 MG tablet, 1 Tablet(s) Oral Every evening., Disp: , Rfl:     metFORMIN (GLUCOPHAGE-XR) 500 MG 24 hr tablet, TAKE ONE TABLET BY MOUTH ONCE DAILY, Disp: 90 tablet, Rfl: 1    metoprolol tartrate (LOPRESSOR) 25 MG tablet, Take 1 tablet by mouth once daily., Disp: , Rfl:     multivitamin with minerals tablet, , Disp: , Rfl:     NITROSTAT 0.4 mg SL tablet, Take 1 tablet by mouth continuous prn., Disp: , Rfl:     omega-3 fatty acids 1,000 mg Cap, 1 Capsule(s) Oral OTC once a day., Disp: , Rfl:     omeprazole (PRILOSEC) 40 MG capsule, Take 1 capsule (40 mg total) by mouth once daily., Disp: 90 capsule, Rfl: 3    pioglitazone (ACTOS) 30 MG tablet, TAKE ONE TABLET BY MOUTH ONCE DAILY, Disp: 90 tablet, Rfl: 3    PRADAXA 150 mg Cap, Take 150 mg by mouth Twice daily., Disp: , Rfl:     rosuvastatin (CRESTOR) 10 MG tablet, Every day, Disp: , Rfl:     sotalol (BETAPACE) 120 MG Tab, 2 (two) times daily. , Disp: , Rfl:     TRUE METRIX AIR GLUCOSE METER kit, , Disp: , Rfl:     VICTOZA 3-CAITIE 0.6 mg/0.1 mL (18 mg/3 mL) PnIj, INJECT 1.8 MGS UNDER THE SKIN DAILY., Disp: 9 mL, Rfl: 1    VOLTAREN 1 % Gel, Use as directed, Disp: , Rfl:     The following portions of the patient's history were reviewed and updated as appropriate: allergies, past family history, past medical history, past social history and past surgical history.    Review of Systems    Objective:      /60 (BP Location: Left arm, Patient Position: Sitting, BP Method: Large (Manual))   Ht 6' 1" " (1.854 m)   Wt 110.5 kg (243 lb 8 oz)   BMI 32.13 kg/m²     Physical Exam   Constitutional: He is oriented to person, place, and time. He appears well-developed and well-nourished. He is cooperative.   HENT:   Head: Normocephalic and atraumatic.   Right Ear: External ear normal.   Left Ear: External ear normal.   Nose: Nose normal.   Mouth/Throat: Oropharynx is clear and moist and mucous membranes are normal. No oropharyngeal exudate.   Eyes: Conjunctivae are normal. No scleral icterus.   Neck: Neck supple. No JVD present. Carotid bruit is not present. No thyromegaly present.   Cardiovascular: Normal rate, regular rhythm, normal heart sounds and normal pulses. Exam reveals no gallop and no friction rub.   No murmur heard.  Pulmonary/Chest: Effort normal and breath sounds normal. He has no wheezes. He has no rhonchi. He has no rales.   Abdominal: Soft. Bowel sounds are normal. He exhibits no distension and no mass. There is no splenomegaly or hepatomegaly. There is no tenderness.       Area of ecchymoses   Musculoskeletal: Normal range of motion. He exhibits no edema or tenderness.        Right upper arm: He exhibits deformity (c/w bicepos tendon tear). He exhibits no tenderness and no bony tenderness.        Arms:  Area of ecchymoses, with soft tissue deformity.   Lymphadenopathy:     He has no cervical adenopathy.     He has no axillary adenopathy.   Neurological: He is alert and oriented to person, place, and time. He has normal strength and normal reflexes. No cranial nerve deficit or sensory deficit. Coordination normal.   Skin: Skin is warm and dry.   Psychiatric: He has a normal mood and affect.   Vitals reviewed.                  Assessment:       1. Closed fracture of one rib of left side with routine healing, subsequent encounter    2. Traumatic partial tear of biceps tendon, right, sequela    3. Abnormal CT of the chest        Plan:       Continue incentive spirometry.  Heat prn.  Followup with  "orthopedist (right rotator cuff repair already scheduled early January).  Will request copy of CT.  Further recommendations to follow after above.      "This note will not be shared with the patient."  "

## 2018-12-12 RX ORDER — PEN NEEDLE, DIABETIC 31 GX5/16"
NEEDLE, DISPOSABLE MISCELLANEOUS
Qty: 100 EACH | Refills: 0 | Status: SHIPPED | OUTPATIENT
Start: 2018-12-12 | End: 2019-03-13 | Stop reason: SDUPTHER

## 2018-12-17 RX ORDER — LIRAGLUTIDE 6 MG/ML
INJECTION SUBCUTANEOUS
Qty: 9 ML | Refills: 2 | Status: SHIPPED | OUTPATIENT
Start: 2018-12-17 | End: 2019-03-18 | Stop reason: SDUPTHER

## 2018-12-27 RX ORDER — OMEPRAZOLE 40 MG/1
CAPSULE, DELAYED RELEASE ORAL
Qty: 90 CAPSULE | Refills: 2 | Status: SHIPPED | OUTPATIENT
Start: 2018-12-27 | End: 2019-09-27 | Stop reason: SDUPTHER

## 2019-01-17 ENCOUNTER — TELEPHONE (OUTPATIENT)
Dept: PAIN MEDICINE | Facility: CLINIC | Age: 74
End: 2019-01-17

## 2019-01-17 NOTE — TELEPHONE ENCOUNTER
----- Message from Zuleima Lewis sent at 1/17/2019  9:07 AM CST -----  Contact: pt  Type: Needs Medical Advice    Who Called:  Pt  Best Call Back Number: [phone] (home)   Additional Information: Would an earlier appt than scheduled if poss please. Thanks.

## 2019-01-18 ENCOUNTER — OFFICE VISIT (OUTPATIENT)
Dept: PAIN MEDICINE | Facility: CLINIC | Age: 74
End: 2019-01-18
Payer: MEDICARE

## 2019-01-18 VITALS
HEART RATE: 59 BPM | WEIGHT: 243 LBS | HEIGHT: 73 IN | DIASTOLIC BLOOD PRESSURE: 67 MMHG | SYSTOLIC BLOOD PRESSURE: 110 MMHG | BODY MASS INDEX: 32.2 KG/M2

## 2019-01-18 DIAGNOSIS — M54.16 LUMBAR RADICULOPATHY: Primary | ICD-10-CM

## 2019-01-18 DIAGNOSIS — M96.1 POST LAMINECTOMY SYNDROME: ICD-10-CM

## 2019-01-18 DIAGNOSIS — M51.36 DDD (DEGENERATIVE DISC DISEASE), LUMBAR: ICD-10-CM

## 2019-01-18 DIAGNOSIS — M47.26 OSTEOARTHRITIS OF SPINE WITH RADICULOPATHY, LUMBAR REGION: ICD-10-CM

## 2019-01-18 PROCEDURE — 3074F PR MOST RECENT SYSTOLIC BLOOD PRESSURE < 130 MM HG: ICD-10-PCS | Mod: CPTII,HCNC,S$GLB, | Performed by: PHYSICIAN ASSISTANT

## 2019-01-18 PROCEDURE — 3078F PR MOST RECENT DIASTOLIC BLOOD PRESSURE < 80 MM HG: ICD-10-PCS | Mod: CPTII,HCNC,S$GLB, | Performed by: PHYSICIAN ASSISTANT

## 2019-01-18 PROCEDURE — 3074F SYST BP LT 130 MM HG: CPT | Mod: CPTII,HCNC,S$GLB, | Performed by: PHYSICIAN ASSISTANT

## 2019-01-18 PROCEDURE — 1101F PT FALLS ASSESS-DOCD LE1/YR: CPT | Mod: CPTII,HCNC,S$GLB, | Performed by: PHYSICIAN ASSISTANT

## 2019-01-18 PROCEDURE — 99999 PR PBB SHADOW E&M-EST. PATIENT-LVL V: ICD-10-PCS | Mod: PBBFAC,HCNC,, | Performed by: PHYSICIAN ASSISTANT

## 2019-01-18 PROCEDURE — 99999 PR PBB SHADOW E&M-EST. PATIENT-LVL V: CPT | Mod: PBBFAC,HCNC,, | Performed by: PHYSICIAN ASSISTANT

## 2019-01-18 PROCEDURE — 99213 OFFICE O/P EST LOW 20 MIN: CPT | Mod: HCNC,S$GLB,, | Performed by: PHYSICIAN ASSISTANT

## 2019-01-18 PROCEDURE — 1101F PR PT FALLS ASSESS DOC 0-1 FALLS W/OUT INJ PAST YR: ICD-10-PCS | Mod: CPTII,HCNC,S$GLB, | Performed by: PHYSICIAN ASSISTANT

## 2019-01-18 PROCEDURE — 3078F DIAST BP <80 MM HG: CPT | Mod: CPTII,HCNC,S$GLB, | Performed by: PHYSICIAN ASSISTANT

## 2019-01-18 PROCEDURE — 99213 PR OFFICE/OUTPT VISIT, EST, LEVL III, 20-29 MIN: ICD-10-PCS | Mod: HCNC,S$GLB,, | Performed by: PHYSICIAN ASSISTANT

## 2019-01-18 RX ORDER — FENOFIBRATE 134 MG/1
CAPSULE ORAL
COMMUNITY
Start: 2018-11-14 | End: 2020-09-03

## 2019-01-18 NOTE — PROGRESS NOTES
Referring Physician: No ref. provider found    PCP: John Claire Jr, MD      CC: neck pain  Interval History:   Mr. Booker is a 73 y.o. male with chronic low back pain who presents today for evaluation. Reports low back pain with radiation into left buttock to posterior left thigh. Terminates before the knee. Denies b/b changes or LE weakness. Pain worsens with standing and prolonged sitting. Improves with rest. He had successful lumbar MB RFAs in 2016 with Dr. Harley. He has benefited from  Cervical ESIs. Pain today is rated 3/10.    HPI:   Patient is 70-year-old male with past medical history of diabetes, coronary artery disease, lumbar DDD who is referred by Dr. Norton for neck pain.  Neck pain has gradually worsened over the past 3 months.  No traumatic incident.  He has constant aching and sharp pain over his posterior neck.  Pain radiates to his right shoulder.  Pain worsens with turning his head from side to side.  He has tried physical therapy with minimal benefit.  He has taken NSAIDs with mild benefits as well.  He has a recent cervical MRI which showed cervical DDD and spondylosis.  He has not had any cervical spine injections.  He does have a history of lumbar DDD and spondylosis.  He has had moderate benefit from size and rhizotomies performed by my colleagues in the past.  He denies any weakness.  No bowel bladder changes.  He rates his pain 5/10 today.    ROS:  CONSTITUTIONAL: No fevers, chills, night sweats, wt. loss, appetite changes  SKIN: no rashes or itching  ENT: No headaches, head trauma, vision changes, or eye pain  LYMPH NODES: None noticed   CV: No chest pain, palpitations.   RESP: No shortness of breath, dyspnea on exertion, cough, wheezing, or hemoptysis  GI: No nausea, emesis, diarrhea, constipation, melena, hematochezia, pain.    : No dysuria, hematuria, urgency, or frequency   HEME: No easy bruising, bleeding problems  PSYCHIATRIC: No depression, anxiety, psychosis,  hallucinations.  NEURO: No seizures, memory loss, dizziness or difficulty sleeping  MSK: + history of present illness      Past Medical History:   Diagnosis Date    Anticoagulant long-term use     Arthritis     Atrial fibrillation     CAD (coronary artery disease)     Colon polyps     per colonoscopy 9/11/2014    Diabetes mellitus, type 2     Disc displacement, lumbar     L3    Family history of prostate cancer     GERD (gastroesophageal reflux disease)     Heel bone fracture     left foot    Hyperlipidemia LDL goal < 70     Hypertension     Renal manifestation of secondary diabetes mellitus     Spinal stenosis, lumbar      Past Surgical History:   Procedure Laterality Date    BACK SURGERY      BLOCK, NERVE, FACET JOINT, LUMBAR, MEDIAL BRANCH Left 10/30/2013    [facility]    BLOCK, NERVE, FACET JOINT, LUMBAR, MEDIAL BRANCH Left 10/23/2013    [facility]    CARDIAC SURGERY      stents     CARPAL TUNNEL RELEASE Right     CATARACT EXTRACTION W/  INTRAOCULAR LENS IMPLANT Bilateral     COLONOSCOPY N/A 3/27/2018    [facility]    COLONOSCOPY N/A 9/11/2014    [facility]    CORONARY ANGIOPLASTY WITH STENT PLACEMENT      x 3    ADI-TRANSFORAMINAL Right 9/18/2013    [facility]    INJECTION, FACET JOINT N/A 8/21/2013    Performed by Jose [facility]adalupe Linn [facility]    INJECTION-STEROID-EPIDURAL-CERVICAL N/A 2/12/2016    [facility]    KNEE ARTHROSCOPY W/ MENISCECTOMY  12/22/2008    right    LUMBAR DISCECTOMY  12/2011    L4-L5 Fusion    RADIOFREQUENCY ABLATION, NERVE, SPINAL, LUMBAR, MEDIAL BRANCH, 1 LEVEL Left 12/18/2013    [facility]    ROTATOR CUFF REPAIR Left 7/2012    SHOULDER OPEN ROTATOR CUFF REPAIR      TIBIA FRACTURE SURGERY    "    Family History   Problem Relation Age of Onset    Heart failure Father     Heart disease Father         MI    Prostate cancer Father     Heart failure Mother     Heart disease Mother     No Known Problems Sister     No Known Problems Daughter     No Known Problems Son     No Known Problems Daughter      Social History     Socioeconomic History    Marital status:      Spouse name: Santino    Number of children: 3    Years of education: None    Highest education level: None   Social Needs    Financial resource strain: Not hard at all    Food insecurity - worry: Never true    Food insecurity - inability: Never true    Transportation needs - medical: No    Transportation needs - non-medical: No   Occupational History    None   Tobacco Use    Smoking status: Never Smoker    Smokeless tobacco: Never Used   Substance and Sexual Activity    Alcohol use: Yes     Alcohol/week: 3.0 oz     Types: 6 Standard drinks or equivalent per week     Frequency: Monthly or less     Drinks per session: 1 or 2     Binge frequency: Never    Drug use: No    Sexual activity: Yes     Partners: Female   Other Topics Concern    None   Social History Narrative    The patient does not exercise regularly ().      He is not satisfied with weight.    Rates diet as fair.    He does drink at least 1/2 gallon water daily.    He drinks 2 coffee/tea/caffeine-containing soft drinks daily.    Total sleep time at night is 7 hours.    He does wear seat belts.    Hobbies include off-road, camping.         Medications/Allergies: See med card    Vitals:    01/18/19 0826   BP: 110/67   Pulse: (!) 59   Weight: 110.2 kg (243 lb)   Height: 6' 1" (1.854 m)   PainSc:   3   PainLoc: Back         Physical exam:    GENERAL: A and O x3, the patient appears well groomed and is in no acute distress.  Skin: No rashes or obvious lesions  HEENT: normocephalic, atraumatic  CARDIOVASCULAR:  Bradycardia  LUNGS: Non labored breathing  ABDOMEN: soft, " nontender   UPPER EXTREMITIES: Normal alignment, normal range of motion, no atrophy, no skin changes,  hair growth and nail growth normal and equal bilaterally. No swelling, no tenderness.    LOWER EXTREMITIES:  Normal alignment, normal range of motion, no atrophy, no skin changes,  hair growth and nail growth normal and equal bilaterally. No swelling, no tenderness.  CERVICAL SPINE:  Cervical spine: ROM is full in flexion, extension and lateral rotation with mild increased pain.  Spurling's maneuver negative   Myofascial exam: mild Tenderness to palpation across cervical paraspinous region bilaterally.    LUMBAR SPINE  Lumbar spine: ROM is full with flexion extension and oblique extension with increased pain on left.    Bobby's test causes no increased pain on either side.    Supine straight leg raise is positive on left  Internal and external rotation of the hip causes no increased pain on either side.  Myofascial exam: No tenderness to palpation across lumbar paraspinous muscles.      MENTAL STATUS: normal orientation, speech, language, and fund of knowledge for social situation.  Emotional state appropriate.    CRANIAL NERVES:  II:  PERRL bilaterally,   III,IV,VI: EOMI.    V:  Facial sensation equal bilaterally  VII:  Facial motor function normal.  VIII:  Hearing equal to finger rub bilaterally  IX/X: Gag normal, palate symmetric  XI:  Shoulder shrug equal, head turn equal  XII:  Tongue midline without fasciculations      MOTOR: Tone and bulk: normal bilateral upper and lower Strength: normal   Delt Bi Tri WE WF     R 5 5 5 5 5 5   L 5 5 5 5 5 5     IP ADD ABD Quad TA Gas HAM  R 5 5 5 5 5 5 5  L 5 5 5 5 5 5 5    SENSATION: Light touch and pinprick intact bilaterally  REFLEXES: normal, symmetric, nonbrisk.  Toes down, no clonus. No hoffmans.  GAIT: normal rise, base, steps, and arm swing.        Imaging:  MRI of the cervical spine without contrast from [facility] 10-28-15:  C3/4 left facet hypertrophy greater than  right with mild bilateral foraminal narrowing.  C4/5 facet hypertrophy with mild bilateral foraminal narrowing.  C5/6 disk/ osteophyte and facet hypertrophy with mild-moderate central canal narrowing and severe right/ moderate left foraminal narrowing.  C6/7 disk/ osteophyte and facet hypertrophy with mild central canal narrowing and moderate-severe bilateral foraminal narrowing.  No cord compression. No myelomalacia.    Assessment:  Mr. Booker is a 73 y.o. male with      1. Lumbar radiculopathy    2. DDD (degenerative disc disease), lumbar    3. Post laminectomy syndrome    4. Osteoarthritis of spine with radiculopathy, lumbar region      Plan:  1. I have stressed the importance of physical activity and exercise to improve overall health  2. Update lumbar MRI without contrast. He has CKD so we will avoid contrast despite surgical history  3. I think he would benefit from lumbar TF ADI at L5-S1. Will call with MRI results and further recommendations  4. Consider repeat MB RFA in the future

## 2019-01-18 NOTE — H&P (VIEW-ONLY)
Referring Physician: No ref. provider found    PCP: John Claire Jr, MD      CC: neck pain  Interval History:   Mr. Booker is a 73 y.o. male with chronic low back pain who presents today for evaluation. Reports low back pain with radiation into left buttock to posterior left thigh. Terminates before the knee. Denies b/b changes or LE weakness. Pain worsens with standing and prolonged sitting. Improves with rest. He had successful lumbar MB RFAs in 2016 with Dr. Harley. He has benefited from  Cervical ESIs. Pain today is rated 3/10.    HPI:   Patient is 70-year-old male with past medical history of diabetes, coronary artery disease, lumbar DDD who is referred by Dr. Norton for neck pain.  Neck pain has gradually worsened over the past 3 months.  No traumatic incident.  He has constant aching and sharp pain over his posterior neck.  Pain radiates to his right shoulder.  Pain worsens with turning his head from side to side.  He has tried physical therapy with minimal benefit.  He has taken NSAIDs with mild benefits as well.  He has a recent cervical MRI which showed cervical DDD and spondylosis.  He has not had any cervical spine injections.  He does have a history of lumbar DDD and spondylosis.  He has had moderate benefit from size and rhizotomies performed by my colleagues in the past.  He denies any weakness.  No bowel bladder changes.  He rates his pain 5/10 today.    ROS:  CONSTITUTIONAL: No fevers, chills, night sweats, wt. loss, appetite changes  SKIN: no rashes or itching  ENT: No headaches, head trauma, vision changes, or eye pain  LYMPH NODES: None noticed   CV: No chest pain, palpitations.   RESP: No shortness of breath, dyspnea on exertion, cough, wheezing, or hemoptysis  GI: No nausea, emesis, diarrhea, constipation, melena, hematochezia, pain.    : No dysuria, hematuria, urgency, or frequency   HEME: No easy bruising, bleeding problems  PSYCHIATRIC: No depression, anxiety, psychosis,  hallucinations.  NEURO: No seizures, memory loss, dizziness or difficulty sleeping  MSK: + history of present illness      Past Medical History:   Diagnosis Date    Anticoagulant long-term use     Arthritis     Atrial fibrillation     CAD (coronary artery disease)     Colon polyps     per colonoscopy 9/11/2014    Diabetes mellitus, type 2     Disc displacement, lumbar     L3    Family history of prostate cancer     GERD (gastroesophageal reflux disease)     Heel bone fracture     left foot    Hyperlipidemia LDL goal < 70     Hypertension     Renal manifestation of secondary diabetes mellitus     Spinal stenosis, lumbar      Past Surgical History:   Procedure Laterality Date    BACK SURGERY      BLOCK, NERVE, FACET JOINT, LUMBAR, MEDIAL BRANCH Left 10/30/2013    [facility] at [facility]    BLOCK, NERVE, FACET JOINT, LUMBAR, MEDIAL BRANCH Left 10/23/2013    [facility] at [facility]    CARDIAC SURGERY      stents     CARPAL TUNNEL RELEASE Right     CATARACT EXTRACTION W/  INTRAOCULAR LENS IMPLANT Bilateral     COLONOSCOPY N/A 3/27/2018    [facility]    COLONOSCOPY N/A 9/11/2014    [facility]    CORONARY ANGIOPLASTY WITH STENT PLACEMENT      x 3    ADI-TRANSFORAMINAL Right 9/18/2013    [facility]    INJECTION, FACET JOINT N/A 8/21/2013    Per[facility] by Jose Guadalupe Linn [facility] at [facility]    INJECTION-STEROID-EPIDURAL-CERVICAL N/A 2/12/2016    [facility]    KNEE ARTHROSCOPY W/ MENISCECTOMY  12/22/2008    right    LUMBAR DISCECTOMY  12/2011    L4-L5 Fusion    RADIOFREQUENCY ABLATION, NERVE, SPINAL, LUMBAR, MEDIAL BRANCH, 1 LEVEL Left 12/18/2013    [facility] at [facility]    ROTATOR CUFF REPAIR Left 7/2012    SHOULDER OPEN ROTATOR CUFF REPAIR      TIBIA FRACTURE SURGERY    "    Family History   Problem Relation Age of Onset    Heart failure Father     Heart disease Father         MI    Prostate cancer Father     Heart failure Mother     Heart disease Mother     No Known Problems Sister     No Known Problems Daughter     No Known Problems Son     No Known Problems Daughter      Social History     Socioeconomic History    Marital status:      Spouse name: Santino    Number of children: 3    Years of education: None    Highest education level: None   Social Needs    Financial resource strain: Not hard at all    Food insecurity - worry: Never true    Food insecurity - inability: Never true    Transportation needs - medical: No    Transportation needs - non-medical: No   Occupational History    None   Tobacco Use    Smoking status: Never Smoker    Smokeless tobacco: Never Used   Substance and Sexual Activity    Alcohol use: Yes     Alcohol/week: 3.0 oz     Types: 6 Standard drinks or equivalent per week     Frequency: Monthly or less     Drinks per session: 1 or 2     Binge frequency: Never    Drug use: No    Sexual activity: Yes     Partners: Female   Other Topics Concern    None   Social History Narrative    The patient does not exercise regularly ().      He is not satisfied with weight.    Rates diet as fair.    He does drink at least 1/2 gallon water daily.    He drinks 2 coffee/tea/caffeine-containing soft drinks daily.    Total sleep time at night is 7 hours.    He does wear seat belts.    Hobbies include off-road, camping.         Medications/Allergies: See med card    Vitals:    01/18/19 0826   BP: 110/67   Pulse: (!) 59   Weight: 110.2 kg (243 lb)   Height: 6' 1" (1.854 m)   PainSc:   3   PainLoc: Back         Physical exam:    GENERAL: A and O x3, the patient appears well groomed and is in no acute distress.  Skin: No rashes or obvious lesions  HEENT: normocephalic, atraumatic  CARDIOVASCULAR:  Bradycardia  LUNGS: Non labored breathing  ABDOMEN: soft, " nontender   UPPER EXTREMITIES: Normal alignment, normal range of motion, no atrophy, no skin changes,  hair growth and nail growth normal and equal bilaterally. No swelling, no tenderness.    LOWER EXTREMITIES:  Normal alignment, normal range of motion, no atrophy, no skin changes,  hair growth and nail growth normal and equal bilaterally. No swelling, no tenderness.  CERVICAL SPINE:  Cervical spine: ROM is full in flexion, extension and lateral rotation with mild increased pain.  Spurling's maneuver negative   Myofascial exam: mild Tenderness to palpation across cervical paraspinous region bilaterally.    LUMBAR SPINE  Lumbar spine: ROM is full with flexion extension and oblique extension with increased pain on left.    Bobby's test causes no increased pain on either side.    Supine straight leg raise is positive on left  Internal and external rotation of the hip causes no increased pain on either side.  Myofascial exam: No tenderness to palpation across lumbar paraspinous muscles.      MENTAL STATUS: normal orientation, speech, language, and fund of knowledge for social situation.  Emotional state appropriate.    CRANIAL NERVES:  II:  PERRL bilaterally,   III,IV,VI: EOMI.    V:  Facial sensation equal bilaterally  VII:  Facial motor function normal.  VIII:  Hearing equal to finger rub bilaterally  IX/X: Gag normal, palate symmetric  XI:  Shoulder shrug equal, head turn equal  XII:  Tongue midline without fasciculations      MOTOR: Tone and bulk: normal bilateral upper and lower Strength: normal   Delt Bi Tri WE WF     R 5 5 5 5 5 5   L 5 5 5 5 5 5     IP ADD ABD Quad TA Gas HAM  R 5 5 5 5 5 5 5  L 5 5 5 5 5 5 5    SENSATION: Light touch and pinprick intact bilaterally  REFLEXES: normal, symmetric, nonbrisk.  Toes down, no clonus. No hoffmans.  GAIT: normal rise, base, steps, and arm swing.        Imaging:  MRI of the cervical spine without contrast from [facility] 10-28-15:  C3/4 left facet hypertrophy greater than  right with mild bilateral foraminal narrowing.  C4/5 facet hypertrophy with mild bilateral foraminal narrowing.  C5/6 disk/ osteophyte and facet hypertrophy with mild-moderate central canal narrowing and severe right/ moderate left foraminal narrowing.  C6/7 disk/ osteophyte and facet hypertrophy with mild central canal narrowing and moderate-severe bilateral foraminal narrowing.  No cord compression. No myelomalacia.    Assessment:  Mr. Booker is a 73 y.o. male with      1. Lumbar radiculopathy    2. DDD (degenerative disc disease), lumbar    3. Post laminectomy syndrome    4. Osteoarthritis of spine with radiculopathy, lumbar region      Plan:  1. I have stressed the importance of physical activity and exercise to improve overall health  2. Update lumbar MRI without contrast. He has CKD so we will avoid contrast despite surgical history  3. I think he would benefit from lumbar TF ADI at L5-S1. Will call with MRI results and further recommendations  4. Consider repeat MB RFA in the future

## 2019-01-19 ENCOUNTER — HOSPITAL ENCOUNTER (OUTPATIENT)
Dept: RADIOLOGY | Facility: HOSPITAL | Age: 74
Discharge: HOME | End: 2019-01-19
Attending: PHYSICIAN ASSISTANT
Payer: MEDICARE

## 2019-01-19 DIAGNOSIS — M54.16 LUMBAR RADICULOPATHY: ICD-10-CM

## 2019-01-19 PROCEDURE — 72148 MRI LUMBAR SPINE WITHOUT CONTRAST: ICD-10-PCS | Mod: 26,HCNC,, | Performed by: RADIOLOGY

## 2019-01-19 PROCEDURE — 72148 MRI LUMBAR SPINE W/O DYE: CPT | Mod: 26,HCNC,, | Performed by: RADIOLOGY

## 2019-01-19 PROCEDURE — 72148 MRI LUMBAR SPINE W/O DYE: CPT | Mod: TC,HCNC

## 2019-01-21 ENCOUNTER — TELEPHONE (OUTPATIENT)
Dept: PAIN MEDICINE | Facility: CLINIC | Age: 74
End: 2019-01-21

## 2019-01-21 DIAGNOSIS — M51.36 DDD (DEGENERATIVE DISC DISEASE), LUMBAR: Primary | ICD-10-CM

## 2019-01-21 NOTE — TELEPHONE ENCOUNTER
Informed patient of MRI results and recommendation per NICOLE Doan. Patient scheduled for 2/4/19 instructions given. Patient accepted and voice understanding.

## 2019-02-04 ENCOUNTER — HOSPITAL ENCOUNTER (OUTPATIENT)
Facility: AMBULARY SURGERY CENTER | Age: 74
Discharge: HOME | End: 2019-02-04
Attending: ANESTHESIOLOGY | Admitting: ANESTHESIOLOGY
Payer: MEDICARE

## 2019-02-04 DIAGNOSIS — M54.16 LUMBAR RADICULITIS: Primary | ICD-10-CM

## 2019-02-04 LAB — POCT GLUCOSE: 98 MG/DL (ref 70–110)

## 2019-02-04 PROCEDURE — 99152 PR MOD CONSCIOUS SEDATION, SAME PHYS, 5+ YRS, FIRST 15 MIN: ICD-10-PCS | Mod: ,,, | Performed by: ANESTHESIOLOGY

## 2019-02-04 PROCEDURE — 64483 PR EPIDURAL INJ, ANES/STEROID, TRANSFORAMINAL, LUMB/SACR, SNGL LEVL: ICD-10-PCS | Mod: LT,,, | Performed by: ANESTHESIOLOGY

## 2019-02-04 PROCEDURE — 64483 NJX AA&/STRD TFRM EPI L/S 1: CPT | Performed by: ANESTHESIOLOGY

## 2019-02-04 PROCEDURE — 99152 MOD SED SAME PHYS/QHP 5/>YRS: CPT | Mod: ,,, | Performed by: ANESTHESIOLOGY

## 2019-02-04 PROCEDURE — 64483 NJX AA&/STRD TFRM EPI L/S 1: CPT | Mod: LT,,, | Performed by: ANESTHESIOLOGY

## 2019-02-04 RX ORDER — SODIUM CHLORIDE, SODIUM LACTATE, POTASSIUM CHLORIDE, CALCIUM CHLORIDE 600; 310; 30; 20 MG/100ML; MG/100ML; MG/100ML; MG/100ML
INJECTION, SOLUTION INTRAVENOUS ONCE AS NEEDED
Status: COMPLETED | OUTPATIENT
Start: 2019-02-04 | End: 2019-02-04

## 2019-02-04 RX ORDER — FENTANYL CITRATE 50 UG/ML
INJECTION, SOLUTION INTRAMUSCULAR; INTRAVENOUS
Status: DISCONTINUED
Start: 2019-02-04 | End: 2019-02-04 | Stop reason: HOSPADM

## 2019-02-04 RX ORDER — LIDOCAINE HYDROCHLORIDE 10 MG/ML
INJECTION, SOLUTION EPIDURAL; INFILTRATION; INTRACAUDAL; PERINEURAL
Status: DISCONTINUED
Start: 2019-02-04 | End: 2019-02-04 | Stop reason: HOSPADM

## 2019-02-04 RX ORDER — MIDAZOLAM HYDROCHLORIDE 1 MG/ML
INJECTION INTRAMUSCULAR; INTRAVENOUS
Status: DISCONTINUED | OUTPATIENT
Start: 2019-02-04 | End: 2019-02-04 | Stop reason: HOSPADM

## 2019-02-04 RX ORDER — BUPIVACAINE HYDROCHLORIDE 2.5 MG/ML
INJECTION, SOLUTION EPIDURAL; INFILTRATION; INTRACAUDAL
Status: DISCONTINUED | OUTPATIENT
Start: 2019-02-04 | End: 2019-02-04 | Stop reason: HOSPADM

## 2019-02-04 RX ORDER — MIDAZOLAM HYDROCHLORIDE 1 MG/ML
INJECTION INTRAMUSCULAR; INTRAVENOUS
Status: DISCONTINUED
Start: 2019-02-04 | End: 2019-02-04 | Stop reason: HOSPADM

## 2019-02-04 RX ORDER — DEXAMETHASONE SODIUM PHOSPHATE 10 MG/ML
INJECTION INTRAMUSCULAR; INTRAVENOUS
Status: DISCONTINUED
Start: 2019-02-04 | End: 2019-02-04 | Stop reason: HOSPADM

## 2019-02-04 RX ORDER — FENTANYL CITRATE 50 UG/ML
INJECTION, SOLUTION INTRAMUSCULAR; INTRAVENOUS
Status: DISCONTINUED | OUTPATIENT
Start: 2019-02-04 | End: 2019-02-04 | Stop reason: HOSPADM

## 2019-02-04 RX ORDER — BUPIVACAINE HYDROCHLORIDE 2.5 MG/ML
INJECTION, SOLUTION EPIDURAL; INFILTRATION; INTRACAUDAL
Status: DISCONTINUED
Start: 2019-02-04 | End: 2019-02-04 | Stop reason: HOSPADM

## 2019-02-04 RX ORDER — LIDOCAINE HYDROCHLORIDE 10 MG/ML
INJECTION, SOLUTION EPIDURAL; INFILTRATION; INTRACAUDAL; PERINEURAL
Status: DISCONTINUED | OUTPATIENT
Start: 2019-02-04 | End: 2019-02-04 | Stop reason: HOSPADM

## 2019-02-04 RX ORDER — DEXAMETHASONE SODIUM PHOSPHATE 10 MG/ML
INJECTION INTRAMUSCULAR; INTRAVENOUS
Status: DISCONTINUED | OUTPATIENT
Start: 2019-02-04 | End: 2019-02-04 | Stop reason: HOSPADM

## 2019-02-04 RX ADMIN — SODIUM CHLORIDE, SODIUM LACTATE, POTASSIUM CHLORIDE, CALCIUM CHLORIDE: 600; 310; 30; 20 INJECTION, SOLUTION INTRAVENOUS at 11:02

## 2019-02-04 NOTE — DISCHARGE SUMMARY
"[facility]  Discharge Note  Short Stay    Admit Date: 2/4/2019    Discharge Date and Time: 2/4/2019    Attending Physician: Quinten Doan MD     Discharge Provider: Quinten Doan    Diagnoses:  Active Hospital Problems    Diagnosis  POA    *Lumbar radiculitis [M54.16]  Yes      Resolved Hospital Problems   No resolved problems to display.       Hospital Course: Lumbar ADI  Discharged Condition: Good    Final Diagnoses:   Active Hospital Problems    Diagnosis  POA    *Lumbar radiculitis [M54.16]  Yes      Resolved Hospital Problems   No resolved problems to display.       Disposition: Home or Self Care    Follow up/Patient Instructions:    Medications:  Reconciled Home Medications:      Medication List      CONTINUE taking these medications    BD ALCOHOL SWABS Padm  Generic drug:  alcohol swabs  USE AS DIRECTED TO CHECK BLOOD GLUCOSE DAILY     BD ULTRA-FINE BARRY PEN NEEDLE 32 gauge x 5/32" Ndle  Generic drug:  pen needle, diabetic  USE ONE NEEDLE ONCE DAILY     blood sugar diagnostic Strp  Use as directed to check blood sugar daily.     * TRUE METRIX AIR GLUCOSE METER kit  Generic drug:  blood-glucose meter     * blood-glucose meter Misc  Use as directed.     CALCIUM CITRATE-VITAMIN D3 ORAL     CRESTOR 10 MG tablet  Generic drug:  rosuvastatin  Every day     fenofibrate micronized 134 MG Cap  Commonly known as:  LOFIBRA     HYDROcodone-acetaminophen 5-325 mg per tablet  Commonly known as:  NORCO  Take 1 tablet by mouth every 6 (six) hours as needed.     lancets 28 gauge Misc  Commonly known as:  TRUEPLUS LANCETS  1 lancet by Misc.(Non-Drug; Combo Route) route once daily.     lisinopril 5 MG tablet  Commonly known as:  PRINIVIL,ZESTRIL  1 Tablet(s) Oral Every evening.     metFORMIN 500 MG 24 hr tablet  Commonly known as:  GLUCOPHAGE-XR  TAKE ONE TABLET BY MOUTH ONCE DAILY     metoprolol tartrate 25 MG tablet  Commonly known as:  LOPRESSOR  Take 1 tablet by mouth once daily.     multivitamin with minerals " tablet     NITROSTAT 0.4 MG SL tablet  Generic drug:  nitroGLYCERIN  Take 1 tablet by mouth continuous prn.     omega-3 fatty acids 1,000 mg Cap  1 Capsule(s) Oral OTC once a day.     omeprazole 40 MG capsule  Commonly known as:  PRILOSEC  TAKE ONE CAPSULE BY MOUTH ONCE DAILY     pioglitazone 30 MG tablet  Commonly known as:  ACTOS  TAKE ONE TABLET BY MOUTH ONCE DAILY     PRADAXA 150 mg Cap  Generic drug:  dabigatran etexilate  Take 150 mg by mouth Twice daily.     sotalol 120 MG Tab  Commonly known as:  BETAPACE  2 (two) times daily.     TRILIPIX 135 mg Cpdr  Generic drug:  fenofibric acid  1 Capsule(s) Oral  Every day.     VICTOZA 3-CAITIE 0.6 mg/0.1 mL (18 mg/3 mL) Pnij  Generic drug:  liraglutide 0.6 mg/0.1 mL (18 mg/3 mL) subq PNIJ  INJECT 1.8 MGS UNDER THE SKIN DAILY.     VOLTAREN 1 % Gel  Generic drug:  diclofenac sodium  Use as directed         * This list has 2 medication(s) that are the same as other medications prescribed for you. Read the directions carefully, and ask your doctor or other care provider to review them with you.              Discharge Procedure Orders   Call MD for:  temperature >100.4     Call MD for:  persistent nausea and vomiting or diarrhea     Call MD for:  severe uncontrolled pain     Call MD for:  redness, tenderness, or signs of infection (pain, swelling, redness, odor or green/yellow discharge around incision site)     Call MD for:  difficulty breathing or increased cough     Call MD for:  severe persistent headache        Follow up with MD in 2-3 weeks    Discharge Procedure Orders (must include Diet, Follow-up, Activity):   Discharge Procedure Orders (must include Diet, Follow-up, Activity)   Call MD for:  temperature >100.4     Call MD for:  persistent nausea and vomiting or diarrhea     Call MD for:  severe uncontrolled pain     Call MD for:  redness, tenderness, or signs of infection (pain, swelling, redness, odor or green/yellow discharge around incision site)     Call MD for:   difficulty breathing or increased cough     Call MD for:  severe persistent headache

## 2019-02-04 NOTE — DISCHARGE INSTRUCTIONS
Before leaving, please make sure you have all your personal belongings such as glasses, purses, wallets, keys, cell phones, jewelry, jackets etc  Anesthesia information    Anesthesia Safety      You have been given medicine  to sedate you during your procedure today. This may have included both a pain medicine and sleeping medicine. Most of the effects have worn off; however, you may continue to have some drowsiness for the next  24 hours. Anesthesia and pain medicines can cause nausea, sleepiness, dizziness and  constipation.    HOME CARE:  1) For the next EIGHT HOURS, you should be watched by a responsible adult to look for any worsening of your condition.  2) DO NOT DRINK any ALCOHOL for the next 24 HOURS.  3) DO NOT DRIVE or operate dangerous machinery during the next 24 HOURS.  FOLLOW UP with your doctor or this facility if you are not alert and back to your usual level of activity within 24 hrs.  GET PROMPT MEDICAL ATTENTION if any of the following occur:  -- Increased drowsiness  -- Increased weakness or dizziness  -- Repeated vomiting  -- If you cannot be awakened    Pain injection instructions:     This procedure may take a couple weeks to relieve pain  You may get some pain relief from the local anesthetic initally.    No driving for 24 hrs.   Activity as tolerated- gradually increase activities.  Dont lift over 10 lbs for 24 hrs   No heat at injection sites x 2 days. No heating pads, hot tubs, saunas, or swimming in any body of water or pool for 2 days.  Use ice pack for mild swelling and for comfort , apply for 20 minutes, remove for 20 minute intervals. No direct contact of ice itself  to skin.  May shower today. No tub baths for two days.      Resume Aspirin, Plavix, or Coumadin the day after the procedure unless otherwise instructed.   If diabetic,monitor your glucose carefully as steroids can increase your glucose level    Seek immediate medical help for:   Severe increase in your usual pain or  appearance of new pain.  Prolonged (mor than 8 hours) or increasing weakness or numbness in the legs or arms. Numbing medicine was injected and can affect the messages to and from the brain and legs or arms.  .    Fever above 101 ,Drainage,redness,active bleeding, or increased swelling at the injection site.  Headache, shortness of breath, chest pain, or breathing problems.

## 2019-02-04 NOTE — PLAN OF CARE
Patient able to transfer to chair using gait belt. Numbness to left leg felt. His wife will be driving him home. She pulled the car to the front door. He is able to move foot and lift legs.

## 2019-02-04 NOTE — OP NOTE
PROCEDURE DATE: 2/4/2019    PROCEDURE: Left L5-S1 transforaminal epidural steroid injection under fluoroscopy    DIAGNOSIS: Lumbar disc displacement without myelopathy  Post op diagnosis: Same    PHYSICIAN: Quinten Doan MD    MEDICATIONS INJECTED:  Dexamethasone 5mg (0.5ml) and 1.5ml 0.25% bupivicaine at each nerve root.     LOCAL ANESTHETIC INJECTED:  Lidocaine 1%. 2 ml per site.    SEDATION MEDICATIONS: RN IV sedation    ESTIMATED BLOOD LOSS:  None    COMPLICATIONS:  None    TECHNIQUE:   A time-out was taken to identify patient and procedure side prior to starting the procedure. The patient was placed in a prone position, prepped and draped in the usual sterile fashion using ChloraPrep and sterile towels.  The area to be injected was determined under fluoroscopic guidance in AP and oblique view.  Local anesthetic was given by raising a wheal and going down to the hub of a 25-gauge 1.5 inch needle.  In oblique view, a 3.5 inch 22-gauge bent-tip spinal needle was introduced towards 6 oclock position of the pedicle of each above named nerve root level.  The needle was walked medially then hinged into the neural foramen and position was confirmed in AP and lateral views.  1ml contrast dye was injected to confirm appropriate placement and that there was no vascular uptake.  After negative aspiration for blood or CSF, the medication was then injected. This was performed at the left L5-S1 level(s). The patient tolerated the procedure well.    The patient was monitored after the procedure.  Patient was given post procedure and discharge instructions to follow at home. The patient was discharged in a stable condition.

## 2019-02-05 VITALS
TEMPERATURE: 98 F | BODY MASS INDEX: 32.2 KG/M2 | WEIGHT: 243 LBS | DIASTOLIC BLOOD PRESSURE: 68 MMHG | HEART RATE: 66 BPM | SYSTOLIC BLOOD PRESSURE: 117 MMHG | RESPIRATION RATE: 18 BRPM | HEIGHT: 73 IN | OXYGEN SATURATION: 95 %

## 2019-03-06 ENCOUNTER — OFFICE VISIT (OUTPATIENT)
Dept: PAIN MEDICINE | Facility: CLINIC | Age: 74
End: 2019-03-06
Payer: MEDICARE

## 2019-03-06 VITALS
HEIGHT: 73 IN | SYSTOLIC BLOOD PRESSURE: 114 MMHG | BODY MASS INDEX: 32.2 KG/M2 | HEART RATE: 73 BPM | WEIGHT: 243 LBS | DIASTOLIC BLOOD PRESSURE: 78 MMHG

## 2019-03-06 DIAGNOSIS — M47.26 OSTEOARTHRITIS OF SPINE WITH RADICULOPATHY, LUMBAR REGION: ICD-10-CM

## 2019-03-06 DIAGNOSIS — M54.16 LUMBAR RADICULOPATHY: Primary | ICD-10-CM

## 2019-03-06 DIAGNOSIS — M96.1 POST LAMINECTOMY SYNDROME: ICD-10-CM

## 2019-03-06 DIAGNOSIS — M51.36 DDD (DEGENERATIVE DISC DISEASE), LUMBAR: ICD-10-CM

## 2019-03-06 PROCEDURE — 99213 PR OFFICE/OUTPT VISIT, EST, LEVL III, 20-29 MIN: ICD-10-PCS | Mod: HCNC,S$GLB,, | Performed by: PHYSICIAN ASSISTANT

## 2019-03-06 PROCEDURE — 1100F PTFALLS ASSESS-DOCD GE2>/YR: CPT | Mod: HCNC,CPTII,S$GLB, | Performed by: PHYSICIAN ASSISTANT

## 2019-03-06 PROCEDURE — 99999 PR PBB SHADOW E&M-EST. PATIENT-LVL IV: CPT | Mod: PBBFAC,HCNC,, | Performed by: PHYSICIAN ASSISTANT

## 2019-03-06 PROCEDURE — 3078F PR MOST RECENT DIASTOLIC BLOOD PRESSURE < 80 MM HG: ICD-10-PCS | Mod: HCNC,CPTII,S$GLB, | Performed by: PHYSICIAN ASSISTANT

## 2019-03-06 PROCEDURE — 3288F FALL RISK ASSESSMENT DOCD: CPT | Mod: HCNC,CPTII,S$GLB, | Performed by: PHYSICIAN ASSISTANT

## 2019-03-06 PROCEDURE — 99999 PR PBB SHADOW E&M-EST. PATIENT-LVL IV: ICD-10-PCS | Mod: PBBFAC,HCNC,, | Performed by: PHYSICIAN ASSISTANT

## 2019-03-06 PROCEDURE — 3074F PR MOST RECENT SYSTOLIC BLOOD PRESSURE < 130 MM HG: ICD-10-PCS | Mod: HCNC,CPTII,S$GLB, | Performed by: PHYSICIAN ASSISTANT

## 2019-03-06 PROCEDURE — 3074F SYST BP LT 130 MM HG: CPT | Mod: HCNC,CPTII,S$GLB, | Performed by: PHYSICIAN ASSISTANT

## 2019-03-06 PROCEDURE — 99213 OFFICE O/P EST LOW 20 MIN: CPT | Mod: HCNC,S$GLB,, | Performed by: PHYSICIAN ASSISTANT

## 2019-03-06 PROCEDURE — 1100F PR PT FALLS ASSESS DOC 2+ FALLS/FALL W/INJURY/YR: ICD-10-PCS | Mod: HCNC,CPTII,S$GLB, | Performed by: PHYSICIAN ASSISTANT

## 2019-03-06 PROCEDURE — 3078F DIAST BP <80 MM HG: CPT | Mod: HCNC,CPTII,S$GLB, | Performed by: PHYSICIAN ASSISTANT

## 2019-03-06 PROCEDURE — 3288F PR FALLS RISK ASSESSMENT DOCUMENTED: ICD-10-PCS | Mod: HCNC,CPTII,S$GLB, | Performed by: PHYSICIAN ASSISTANT

## 2019-03-06 RX ORDER — GABAPENTIN 300 MG/1
300 CAPSULE ORAL 3 TIMES DAILY
Qty: 90 CAPSULE | Refills: 0 | Status: SHIPPED | OUTPATIENT
Start: 2019-03-06 | End: 2019-04-10

## 2019-03-06 NOTE — PROGRESS NOTES
Referring Physician: No ref. provider found    PCP: John Claire Jr, MD      CC: neck pain  Interval History:   Mr. Booker is a 73 y.o. male with chronic low back pain who presents today for  F/u s/p left TF ADI at L5-S1. Reports no improvement with ADI. He actually feels like symptoms are worsening and occurring after sitting or standing for less time. Pain is unchanged in quality or location. He reports low back pain with radiation into left buttock to posterior left thigh. Terminates before the knee. Denies b/b changes or LE weakness. Pain worsens with standing and prolonged sitting.  He had successful lumbar MB RFAs in 2016 with Dr. Harley. He has benefited from  Cervical ESIs. He is scheduled for right rotator cuff repair next week.  Pain today is rated 8/10.    HPI:   Patient is 70-year-old male with past medical history of diabetes, coronary artery disease, lumbar DDD who is referred by Dr. Norton for neck pain.  Neck pain has gradually worsened over the past 3 months.  No traumatic incident.  He has constant aching and sharp pain over his posterior neck.  Pain radiates to his right shoulder.  Pain worsens with turning his head from side to side.  He has tried physical therapy with minimal benefit.  He has taken NSAIDs with mild benefits as well.  He has a recent cervical MRI which showed cervical DDD and spondylosis.  He has not had any cervical spine injections.  He does have a history of lumbar DDD and spondylosis.  He has had moderate benefit from size and rhizotomies performed by my colleagues in the past.  He denies any weakness.  No bowel bladder changes.  He rates his pain 5/10 today.    ROS:  CONSTITUTIONAL: No fevers, chills, night sweats, wt. loss, appetite changes  SKIN: no rashes or itching  ENT: No headaches, head trauma, vision changes, or eye pain  LYMPH NODES: None noticed   CV: No chest pain, palpitations.   RESP: No shortness of breath, dyspnea on exertion, cough, wheezing, or  hemoptysis  GI: No nausea, emesis, diarrhea, constipation, melena, hematochezia, pain.    : No dysuria, hematuria, urgency, or frequency   HEME: No easy bruising, bleeding problems  PSYCHIATRIC: No depression, anxiety, psychosis, hallucinations.  NEURO: No seizures, memory loss, dizziness or difficulty sleeping  MSK: + history of present illness      Past Medical History:   Diagnosis Date    Anticoagulant long-term use     Arthritis     Atrial fibrillation     CAD (coronary artery disease)     Colon polyps     per colonoscopy 9/11/2014    Diabetes mellitus, type 2     Disc displacement, lumbar     L3    Family history of prostate cancer     GERD (gastroesophageal reflux disease)     Heel bone fracture     left foot    Hyperlipidemia LDL goal < 70     Hypertension     Renal manifestation of secondary diabetes mellitus     Spinal stenosis, lumbar      Past Surgical History:   Procedure Laterality Date    BACK SURGERY      BLOCK, NERVE, FACET JOINT, LUMBAR, MEDIAL BRANCH Left 10/30/2013    [facility] MGT    BLOCK, NERVE, FACET JOINT, LUMBAR, MEDIAL BRANCH Left 10/23/2013    [facility] at [facility] PAIN MGT    CARDIAC SURGERY      stents     CARPAL TUNNEL RELEASE Right     CATARACT EXTRACTION W/  INTRAOCULAR LENS IMPLANT Bilateral     COLONOSCOPY N/A 3/27/2018    [facility]ishi [facility] ENDO    COLONOSCOPY N/A 9/11/2014    [facility] iRshi [facility] at [facility] ENDO    CORONARY ANGIOPLASTY WITH STENT PLACEMENT      x 3    ADI-TRANSFORAMINAL Right 9/18/2013    [facility] at [facility] PA[facility] MGT    INJECTION, FACET JOINT N/A 8/21/2013    Performed by Jose Guadalupe Linn MD at [facility] PA[facility] MGT    Injection,steroid,epidural,transforaminal approach L5-S1 Left 2/4/2019    [facility] OR    INJECTION-STEROID-EPIDURAL-CERVICAL N/A 2/12/2016    [facility] at [facility] OR    KNEE  ARTHROSCOPY W/ MENISCECTOMY  12/22/2008    right    LUMBAR DISCECTOMY  12/2011    L4-L5 Fusion    LUMBAR EPIDURAL INJECTION  02/04/2019    RADIOFREQUENCY ABLATION, NERVE, SPINAL, LUMBAR, MEDIAL BRANCH, 1 LEVEL Left 12/18/2013    [facility] MGT    ROTATOR CUFF REPAIR Left 7/2012    SHOULDER OPEN ROTATOR CUFF REPAIR      TIBIA FRACTURE SURGERY       Family History   Problem Relation Age of Onset    Heart failure Father     Heart disease Father         MI    Prostate cancer Father     Heart failure Mother     Heart disease Mother     No Known Problems Sister     No Known Problems Daughter     No Known Problems Son     No Known Problems Daughter      Social History     Socioeconomic History    Marital status:      Spouse name: Santino    Number of children: 3    Years of education: None    Highest education level: None   Social Needs    Financial resource strain: Not hard at all    Food insecurity - worry: Never true    Food insecurity - inability: Never true    Transportation needs - medical: No    Transportation needs - non-medical: No   Occupational History    None   Tobacco Use    Smoking status: Never Smoker    Smokeless tobacco: Never Used   Substance and Sexual Activity    Alcohol use: Yes     Alcohol/week: 3.0 oz     Types: 6 Standard drinks or equivalent per week     Frequency: Monthly or less     Drinks per session: 1 or 2     Binge frequency: Never    Drug use: No    Sexual activity: Yes     Partners: Female   Other Topics Concern    None   Social History Narrative    The patient does not exercise regularly ().      He is not satisfied with weight.    Rates diet as fair.    He does drink at least 1/2 gallon water daily.    He drinks 2 coffee/tea/caffeine-containing soft drinks daily.    Total sleep time at night is 7 hours.    He does wear seat belts.    Hobbies include off-road, camping.         Medications/Allergies: See med  "card    Vitals:    03/06/19 0853   BP: 114/78   Pulse: 73   Weight: 110.2 kg (243 lb)   Height: 6' 1" (1.854 m)   PainSc:   8   PainLoc: Back         Physical exam:    GENERAL: A and O x3, the patient appears well groomed and is in no acute distress.  Skin: No rashes or obvious lesions  HEENT: normocephalic, atraumatic  CARDIOVASCULAR:  RRR  LUNGS: Non labored breathing  ABDOMEN: soft, nontender   UPPER EXTREMITIES: Normal alignment,  no atrophy, no skin changes,  hair growth and nail growth normal and equal bilaterally. No swelling. TTP right shoulder girdle. Decreased abduction.   LOWER EXTREMITIES:  Normal alignment, normal range of motion, no atrophy, no skin changes,  hair growth and nail growth normal and equal bilaterally. No swelling, no tenderness.  CERVICAL SPINE:  Cervical spine: ROM is full in flexion, extension and lateral rotation with mild increased pain.  Spurling's maneuver negative   Myofascial exam: mild Tenderness to palpation across cervical paraspinous region bilaterally.    LUMBAR SPINE  Lumbar spine: ROM is full with flexion extension and oblique extension with increased pain on left.    Bobby's test causes no increased pain on either side.    Supine straight leg raise is positive on left  Internal and external rotation of the hip causes no increased pain on either side.  Myofascial exam: No tenderness to palpation across lumbar paraspinous muscles.      MENTAL STATUS: normal orientation, speech, language, and fund of knowledge for social situation.  Emotional state appropriate.    CRANIAL NERVES:  II:  PERRL bilaterally,   III,IV,VI: EOMI.    V:  Facial sensation equal bilaterally  VII:  Facial motor function normal.  VIII:  Hearing equal to finger rub bilaterally  IX/X: Gag normal, palate symmetric  XI:  Shoulder shrug equal, head turn equal  XII:  Tongue midline without fasciculations      MOTOR: Tone and bulk: normal bilateral upper and lower Strength: normal "   Delt Bi Tri WE WF     R 5 5 5 5 5 5   L 5 5 5 5 5 5     IP ADD ABD Quad TA Gas HAM  R 5 5 5 5 5 5 5  L 5 5 5 5 5 5 5    SENSATION: Light touch and pinprick intact bilaterally  REFLEXES: normal, symmetric, nonbrisk.  Toes down, no clonus. No hoffmans.  GAIT: normal rise, base, steps, and arm swing.        Imaging:  Lumbar MRI 1/18/19  1. There are postsurgical changes of prior posterior interbody fusion of L4 and L5 without apparent complication.  Fusion appears solid.  There is stable trace anterolisthesis of L4 on L5.  2. There is no spinal canal or foraminal stenosis at the T11-12, T12-L1 levels.  3. At the L1-2 level there is borderline spinal stenosis but the foramina are patent.  4. At the L2-3 level there is mild spinal canal and bilateral foraminal stenosis.  5. At the L3-4 level there is moderate spinal stenosis with mild right and moderate left foraminal stenosis.  6. At the L4-5 level there is mild spinal stenosis with mild-to-moderate bilateral foraminal stenosis.  7. At the L5-S1 level there is mild bilateral foraminal stenosis as well as crowding of the left lateral recess without spinal stenosis.  MRI of the cervical spine without contrast from [facility] 10-28-15:  C3/4 left facet hypertrophy greater than right with mild bilateral foraminal narrowing.  C4/5 facet hypertrophy with mild bilateral foraminal narrowing.  C5/6 disk/ osteophyte and facet hypertrophy with mild-moderate central canal narrowing and severe right/ moderate left foraminal narrowing.  C6/7 disk/ osteophyte and facet hypertrophy with mild central canal narrowing and moderate-severe bilateral foraminal narrowing.  No cord compression. No myelomalacia.    Assessment:  Mr. Booker is a 73 y.o. male with      1. Lumbar radiculopathy    2. DDD (degenerative disc disease), lumbar    3. Post laminectomy syndrome    4. Osteoarthritis of spine with radiculopathy, lumbar region      Plan:  1. I have stressed the importance of physical activity  and exercise to improve overall health  2. Reviewed updated lumbar MRI results.   3. Will order LE EMG. He will wait until after shoulder surgery. Recommend surgical consult as well. He has had surgery with Dr. Martinez in the past but he wishes to remain in [place]. We will refer to Dr. Champagne   3. Start Gabapentin 300mg TID for radicular pain. Will start at 300 mg qhs for 1 week, if tolerated, increase to 300 mg in am and pm for 1 week, if tolerated increase to TID. We will titrate up to 9314-7563 mg based on therapeutic response and SEs.  4. Will call for referrals.

## 2019-03-13 RX ORDER — PEN NEEDLE, DIABETIC 31 GX5/16"
NEEDLE, DISPOSABLE MISCELLANEOUS
Qty: 100 EACH | Refills: 3 | Status: SHIPPED | OUTPATIENT
Start: 2019-03-13 | End: 2020-03-06

## 2019-03-15 DIAGNOSIS — E11.9 TYPE 2 DIABETES MELLITUS WITHOUT COMPLICATION: ICD-10-CM

## 2019-03-18 RX ORDER — LIRAGLUTIDE 6 MG/ML
INJECTION SUBCUTANEOUS
Qty: 9 ML | Refills: 1 | Status: SHIPPED | OUTPATIENT
Start: 2019-03-18 | End: 2019-06-14 | Stop reason: SDUPTHER

## 2019-03-31 DIAGNOSIS — E11.22 CONTROLLED TYPE 2 DIABETES MELLITUS WITH STAGE 3 CHRONIC KIDNEY DISEASE, WITHOUT LONG-TERM CURRENT USE OF INSULIN: ICD-10-CM

## 2019-03-31 DIAGNOSIS — N18.30 CONTROLLED TYPE 2 DIABETES MELLITUS WITH STAGE 3 CHRONIC KIDNEY DISEASE, WITHOUT LONG-TERM CURRENT USE OF INSULIN: ICD-10-CM

## 2019-03-31 RX ORDER — METFORMIN HYDROCHLORIDE 500 MG/1
TABLET, EXTENDED RELEASE ORAL
Qty: 90 TABLET | Refills: 0 | Status: SHIPPED | OUTPATIENT
Start: 2019-03-31 | End: 2019-06-30 | Stop reason: SDUPTHER

## 2019-04-02 RX ORDER — PREGABALIN 75 MG/1
75 CAPSULE ORAL 2 TIMES DAILY
Qty: 60 CAPSULE | Refills: 6 | Status: SHIPPED | OUTPATIENT
Start: 2019-04-02 | End: 2019-05-29

## 2019-04-02 NOTE — TELEPHONE ENCOUNTER
Pt stated he can not take gabapentin. He took this medication in the past and he had suicidal ideation. He also stated he can not take it because of his kidneys. Pt would like to know what else he can take.

## 2019-04-02 NOTE — TELEPHONE ENCOUNTER
----- Message from Nakita Frost sent at 4/2/2019  9:28 AM CDT -----  Type: Needs Medical Advice    Who Called:  Patient   Symptoms (please be specific):  Unable to take gabapentin (NEURONTIN) 300 MG capsule  Pharmacy name and phone #:    [id number] - [place] - [address] [address]  Phone: [phone] Fax: [phone]  [facility] Call Back Number: [phone]  Additional Information: Patient is requesting to speak to nurse regarding medication

## 2019-04-02 NOTE — TELEPHONE ENCOUNTER
We can try Lyrica if he would like. The recommended max dose is 300 mg daily in Stage 3 CKD and we would stay below that.

## 2019-04-04 ENCOUNTER — TELEPHONE (OUTPATIENT)
Dept: FAMILY MEDICINE | Facility: CLINIC | Age: 74
End: 2019-04-04

## 2019-04-04 NOTE — TELEPHONE ENCOUNTER
Patient states he had an appt on 03/06/19 with pain management in [place]. Patient states he was orginally prescribed gabapentin, but he's had issue with gabapentin in the past. Patient states he was then prescribed Lyrica, but the Lyrica has not been therapeutically effective.     Patient would like to know if Dr. Claire recommends alternative therapy?     Would you like the patient to schedule an appt to discuss?

## 2019-04-04 NOTE — TELEPHONE ENCOUNTER
----- Message from Leonor Blackburn sent at 4/4/2019  1:04 PM CDT -----  Name of Who is Calling:KIERRA WILSON [[id number]]    What is the request in detail: Pt wants to discuss his condition and treatment plan. Please call      Can the clinic reply by [facility]:   No       What Number to Call Back if not in [facility]:[phone]

## 2019-04-06 RX ORDER — HYDROCODONE BITARTRATE AND ACETAMINOPHEN 10; 325 MG/1; MG/1
TABLET ORAL
COMMUNITY
Start: 2019-03-22 | End: 2019-04-10 | Stop reason: SDUPTHER

## 2019-04-06 RX ORDER — DOXYCYCLINE 100 MG/1
CAPSULE ORAL
COMMUNITY
Start: 2019-03-11 | End: 2019-03-25

## 2019-04-07 NOTE — TELEPHONE ENCOUNTER
It appears that Lyrica was just prescribed last Tuesday (4/2).   It may take up to week to begin notice any improvement with Lyrica.    Dr. Amaya also referred patient to Dr. Champagne for surgical evaluation.  I see that that appointment has yet to be scheduled.    Have the narcotics prescribed by Dr. Myrick helped?

## 2019-04-08 NOTE — TELEPHONE ENCOUNTER
Spoke with the patient. Patient states the pain is bearable. However the prescription from Dr. Varela slightly helped. Patient states he has not taken the Lyrica because he has concerns due to his kidney condition.     Patient states a MRI was done on the lower back, however his pain is mainly down the back of his legs. Patient states the pain starts from his buttocks down to the back of his knees. Patient also states complaint of numbness whenever he has to stand or walk for a long period of time.     Patient did not schedule an appt Dr. Champagne. Patient wanted to know Dr. Claire thought it would be a good idea to have the surgical evaluation.

## 2019-04-10 ENCOUNTER — PATIENT OUTREACH (OUTPATIENT)
Dept: ADMINISTRATIVE | Facility: HOSPITAL | Age: 74
End: 2019-04-10

## 2019-04-10 ENCOUNTER — OFFICE VISIT (OUTPATIENT)
Dept: FAMILY MEDICINE | Facility: CLINIC | Age: 74
End: 2019-04-10
Attending: FAMILY MEDICINE
Payer: MEDICARE

## 2019-04-10 VITALS
WEIGHT: 252 LBS | SYSTOLIC BLOOD PRESSURE: 112 MMHG | DIASTOLIC BLOOD PRESSURE: 66 MMHG | OXYGEN SATURATION: 97 % | HEART RATE: 60 BPM | HEIGHT: 73 IN | BODY MASS INDEX: 33.4 KG/M2

## 2019-04-10 DIAGNOSIS — E11.59 HYPERTENSION ASSOCIATED WITH DIABETES: ICD-10-CM

## 2019-04-10 DIAGNOSIS — E78.5 HYPERLIPIDEMIA ASSOCIATED WITH TYPE 2 DIABETES MELLITUS: ICD-10-CM

## 2019-04-10 DIAGNOSIS — M51.26 DISC DISPLACEMENT, LUMBAR: ICD-10-CM

## 2019-04-10 DIAGNOSIS — M54.16 LUMBAR RADICULITIS: ICD-10-CM

## 2019-04-10 DIAGNOSIS — E11.69 HYPERLIPIDEMIA ASSOCIATED WITH TYPE 2 DIABETES MELLITUS: ICD-10-CM

## 2019-04-10 DIAGNOSIS — E11.22 CONTROLLED TYPE 2 DIABETES MELLITUS WITH STAGE 3 CHRONIC KIDNEY DISEASE, WITHOUT LONG-TERM CURRENT USE OF INSULIN: ICD-10-CM

## 2019-04-10 DIAGNOSIS — N18.30 CONTROLLED TYPE 2 DIABETES MELLITUS WITH STAGE 3 CHRONIC KIDNEY DISEASE, WITHOUT LONG-TERM CURRENT USE OF INSULIN: ICD-10-CM

## 2019-04-10 DIAGNOSIS — I15.2 HYPERTENSION ASSOCIATED WITH DIABETES: ICD-10-CM

## 2019-04-10 DIAGNOSIS — M47.816 LUMBAR SPONDYLOSIS: Primary | ICD-10-CM

## 2019-04-10 PROCEDURE — 99214 PR OFFICE/OUTPT VISIT, EST, LEVL IV, 30-39 MIN: ICD-10-PCS | Mod: HCNC,S$GLB,, | Performed by: FAMILY MEDICINE

## 2019-04-10 PROCEDURE — 99999 PR PBB SHADOW E&M-EST. PATIENT-LVL III: CPT | Mod: PBBFAC,HCNC,, | Performed by: FAMILY MEDICINE

## 2019-04-10 PROCEDURE — 3288F FALL RISK ASSESSMENT DOCD: CPT | Mod: HCNC,CPTII,S$GLB, | Performed by: FAMILY MEDICINE

## 2019-04-10 PROCEDURE — 3074F SYST BP LT 130 MM HG: CPT | Mod: HCNC,CPTII,S$GLB, | Performed by: FAMILY MEDICINE

## 2019-04-10 PROCEDURE — 3078F DIAST BP <80 MM HG: CPT | Mod: HCNC,CPTII,S$GLB, | Performed by: FAMILY MEDICINE

## 2019-04-10 PROCEDURE — 3078F PR MOST RECENT DIASTOLIC BLOOD PRESSURE < 80 MM HG: ICD-10-PCS | Mod: HCNC,CPTII,S$GLB, | Performed by: FAMILY MEDICINE

## 2019-04-10 PROCEDURE — 99999 PR PBB SHADOW E&M-EST. PATIENT-LVL III: ICD-10-PCS | Mod: PBBFAC,HCNC,, | Performed by: FAMILY MEDICINE

## 2019-04-10 PROCEDURE — 99499 UNLISTED E&M SERVICE: CPT | Mod: HCNC,S$GLB,, | Performed by: FAMILY MEDICINE

## 2019-04-10 PROCEDURE — 3044F HG A1C LEVEL LT 7.0%: CPT | Mod: HCNC,CPTII,S$GLB, | Performed by: FAMILY MEDICINE

## 2019-04-10 PROCEDURE — 1100F PR PT FALLS ASSESS DOC 2+ FALLS/FALL W/INJURY/YR: ICD-10-PCS | Mod: HCNC,CPTII,S$GLB, | Performed by: FAMILY MEDICINE

## 2019-04-10 PROCEDURE — 1100F PTFALLS ASSESS-DOCD GE2>/YR: CPT | Mod: HCNC,CPTII,S$GLB, | Performed by: FAMILY MEDICINE

## 2019-04-10 PROCEDURE — 99499 RISK ADDL DX/OHS AUDIT: ICD-10-PCS | Mod: HCNC,S$GLB,, | Performed by: FAMILY MEDICINE

## 2019-04-10 PROCEDURE — 3074F PR MOST RECENT SYSTOLIC BLOOD PRESSURE < 130 MM HG: ICD-10-PCS | Mod: HCNC,CPTII,S$GLB, | Performed by: FAMILY MEDICINE

## 2019-04-10 PROCEDURE — 3288F PR FALLS RISK ASSESSMENT DOCUMENTED: ICD-10-PCS | Mod: HCNC,CPTII,S$GLB, | Performed by: FAMILY MEDICINE

## 2019-04-10 PROCEDURE — 3044F PR MOST RECENT HEMOGLOBIN A1C LEVEL <7.0%: ICD-10-PCS | Mod: HCNC,CPTII,S$GLB, | Performed by: FAMILY MEDICINE

## 2019-04-10 PROCEDURE — 99214 OFFICE O/P EST MOD 30 MIN: CPT | Mod: HCNC,S$GLB,, | Performed by: FAMILY MEDICINE

## 2019-04-10 NOTE — PATIENT INSTRUCTIONS
Janak,     We are always striving for excellence. Should you receive a patient experience survey in the mail, we would appreciate if you would take a few moments to give us your feedback. These surveys let us know our strengths as well as areas of opportunity for improvement to better serve you.    Thank you for your time,  Марина Astorga LPN    Test results will be communicated to you via : [facility], Telephone or Letter.   If you have not received test results in one week, please contact the clinic at   [phone].

## 2019-04-10 NOTE — TELEPHONE ENCOUNTER
Spoke with patient. Patient states he will discuss with Dr. Claire during his office visit today.

## 2019-04-10 NOTE — PROGRESS NOTES
"Subjective:       Patient ID: Janak Booker is a 73 y.o. male.    Chief Complaint: Back Pain    HPI     The patient present today to discuss his back pain.  He had been prescribed gabapentin, but did not take due to side effects during a prior trial (suicidal thoughts).  Lyrica has also been recommended; he filled the Rx but has yet to take it (concerns about his CKD).    We reviewed his recent MRI, which revealed significant DJD with central and foraminal stenosis, and bulging discs.    Patient Active Problem List   Diagnosis    Family history of prostate cancer    Disc displacement, lumbar    GERD (gastroesophageal reflux disease)    Coronary artery disease, occlusive    Solitary kidney    CKD (chronic kidney disease) stage 3, GFR 30-59 ml/min    Controlled type 2 diabetes mellitus with stage 3 chronic kidney disease, without long-term current use of insulin    S/P coronary artery stent placement    Lumbar spondylosis    Facet arthritis of lumbar region    Hypertension associated with diabetes    Hyperlipidemia associated with type 2 diabetes mellitus    Facet arthritis of cervical region    Atrial fibrillation    DDD (degenerative disc disease), cervical    Hx of colonic polyps    Lumbar radiculitis       Current Outpatient Medications:     BD ALCOHOL SWABS PadM, USE AS DIRECTED TO CHECK BLOOD GLUCOSE DAILY, Disp: 100 each, Rfl: 5    BD ULTRA-FINE BARRY PEN NEEDLE 32 gauge x 5/32" Ndle, USE ONE NEEDLE ONCE DAILY, Disp: 100 each, Rfl: 3    blood sugar diagnostic Strp, Use as directed to check blood sugar daily., Disp: 100 each, Rfl: 3    blood-glucose meter Misc, Use as directed., Disp: 1 each, Rfl: 0    CALCIUM CITRATE-VITAMIN D3 ORAL, , Disp: , Rfl:     fenofibrate micronized (LOFIBRA) 134 MG Cap, , Disp: , Rfl:     lancets (TRUEPLUS LANCETS) 28 gauge Misc, 1 lancet by Misc.(Non-Drug; Combo Route) route once daily., Disp: 100 each, Rfl: 3    lisinopril (PRINIVIL,ZESTRIL) 5 MG tablet, 1 " "Tablet(s) Oral Every evening., Disp: , Rfl:     metFORMIN (GLUCOPHAGE-XR) 500 MG 24 hr tablet, TAKE ONE TABLET BY MOUTH ONCE DAILY, Disp: 90 tablet, Rfl: 0    metoprolol tartrate (LOPRESSOR) 25 MG tablet, Take 1 tablet by mouth once daily., Disp: , Rfl:     multivitamin with minerals tablet, , Disp: , Rfl:     NITROSTAT 0.4 mg SL tablet, Take 1 tablet by mouth continuous prn., Disp: , Rfl:     omega-3 fatty acids 1,000 mg Cap, 1 Capsule(s) Oral OTC once a day., Disp: , Rfl:     omeprazole (PRILOSEC) 40 MG capsule, TAKE ONE CAPSULE BY MOUTH ONCE DAILY, Disp: 90 capsule, Rfl: 2    pioglitazone (ACTOS) 30 MG tablet, TAKE ONE TABLET BY MOUTH ONCE DAILY, Disp: 90 tablet, Rfl: 3    PRADAXA 150 mg Cap, Take 150 mg by mouth Twice daily., Disp: , Rfl:     pregabalin (LYRICA) 75 MG capsule, Take 1 capsule (75 mg total) by mouth 2 (two) times daily., Disp: 60 capsule, Rfl: 6    rosuvastatin (CRESTOR) 10 MG tablet, Every day, Disp: , Rfl:     sotalol (BETAPACE) 120 MG Tab, 2 (two) times daily. , Disp: , Rfl:     TRUE METRIX AIR GLUCOSE METER kit, , Disp: , Rfl:     VICTOZA 3-CAITIE 0.6 mg/0.1 mL (18 mg/3 mL) PnIj, INJECT 1.8 MGS UNDER THE SKIN DAILY., Disp: 9 mL, Rfl: 1    VOLTAREN 1 % Gel, Use as directed, Disp: , Rfl:     fenofibric acid (TRILIPIX) 135 mg capsule, 1 Capsule(s) Oral  Every day., Disp: , Rfl:     The following portions of the patient's history were reviewed and updated as appropriate: allergies, past family history, past medical history, past social history and past surgical history.    Review of Systems   Cardiovascular: Negative for chest pain, palpitations and leg swelling.         Objective:      /66 (BP Location: Left arm, Patient Position: Sitting, BP Method: Large (Manual))   Pulse 60   Ht 6' 1" (1.854 m)   Wt 114.3 kg (252 lb)   SpO2 97%   BMI 33.25 kg/m²     Physical Exam   Constitutional: He is oriented to person, place, and time. He appears well-developed and well-nourished. " "  Musculoskeletal:        Lumbar back: He exhibits tenderness, bony tenderness and pain.   Positive SLR on left.   Neurological: He is alert and oriented to person, place, and time.   Skin: Skin is warm and dry.   Psychiatric: He has a normal mood and affect.   Vitals reviewed.      Assessment:       1. Lumbar spondylosis    2. Disc displacement, lumbar    3. Lumbar radiculitis    4. Controlled type 2 diabetes mellitus with stage 3 chronic kidney disease, without long-term current use of insulin    5. Hypertension associated with diabetes    6. Hyperlipidemia associated with type 2 diabetes mellitus        Plan:       Ensure eye exam soon.  Labs (see Orders).  Enroll in digital medicine clinics.  Schedule appt w/Dr. Martinez for neurosurgical followup.        "This note will not be shared with the patient."  "

## 2019-04-11 ENCOUNTER — LAB VISIT (OUTPATIENT)
Dept: LAB | Facility: HOSPITAL | Age: 74
End: 2019-04-11
Attending: FAMILY MEDICINE
Payer: MEDICARE

## 2019-04-11 DIAGNOSIS — I15.2 HYPERTENSION ASSOCIATED WITH DIABETES: ICD-10-CM

## 2019-04-11 DIAGNOSIS — N18.30 CONTROLLED TYPE 2 DIABETES MELLITUS WITH STAGE 3 CHRONIC KIDNEY DISEASE, WITHOUT LONG-TERM CURRENT USE OF INSULIN: ICD-10-CM

## 2019-04-11 DIAGNOSIS — E11.59 HYPERTENSION ASSOCIATED WITH DIABETES: ICD-10-CM

## 2019-04-11 DIAGNOSIS — E11.22 CONTROLLED TYPE 2 DIABETES MELLITUS WITH STAGE 3 CHRONIC KIDNEY DISEASE, WITHOUT LONG-TERM CURRENT USE OF INSULIN: ICD-10-CM

## 2019-04-11 DIAGNOSIS — E11.69 HYPERLIPIDEMIA ASSOCIATED WITH TYPE 2 DIABETES MELLITUS: ICD-10-CM

## 2019-04-11 DIAGNOSIS — E78.5 HYPERLIPIDEMIA ASSOCIATED WITH TYPE 2 DIABETES MELLITUS: ICD-10-CM

## 2019-04-11 LAB
ALBUMIN SERPL BCP-MCNC: 3.6 G/DL (ref 3.5–5.2)
ALP SERPL-CCNC: 39 U/L (ref 55–135)
ALT SERPL W/O P-5'-P-CCNC: 11 U/L (ref 10–44)
ANION GAP SERPL CALC-SCNC: 7 MMOL/L (ref 8–16)
AST SERPL-CCNC: 20 U/L (ref 10–40)
BILIRUB SERPL-MCNC: 0.8 MG/DL (ref 0.1–1)
BUN SERPL-MCNC: 24 MG/DL (ref 8–23)
CALCIUM SERPL-MCNC: 9.8 MG/DL (ref 8.7–10.5)
CHLORIDE SERPL-SCNC: 106 MMOL/L (ref 95–110)
CHOLEST SERPL-MCNC: 133 MG/DL (ref 120–199)
CHOLEST/HDLC SERPL: 3.4 {RATIO} (ref 2–5)
CO2 SERPL-SCNC: 26 MMOL/L (ref 23–29)
CREAT SERPL-MCNC: 1.8 MG/DL (ref 0.5–1.4)
EST. GFR  (AFRICAN AMERICAN): 42.5 ML/MIN/1.73 M^2
EST. GFR  (NON AFRICAN AMERICAN): 36.8 ML/MIN/1.73 M^2
GLUCOSE SERPL-MCNC: 90 MG/DL (ref 70–110)
HDLC SERPL-MCNC: 39 MG/DL (ref 40–75)
HDLC SERPL: 29.3 % (ref 20–50)
LDLC SERPL CALC-MCNC: 66.8 MG/DL (ref 63–159)
NONHDLC SERPL-MCNC: 94 MG/DL
POTASSIUM SERPL-SCNC: 5.3 MMOL/L (ref 3.5–5.1)
PROT SERPL-MCNC: 6.6 G/DL (ref 6–8.4)
SODIUM SERPL-SCNC: 139 MMOL/L (ref 136–145)
TRIGL SERPL-MCNC: 136 MG/DL (ref 30–150)

## 2019-04-11 PROCEDURE — 83036 HEMOGLOBIN GLYCOSYLATED A1C: CPT | Mod: HCNC

## 2019-04-11 PROCEDURE — 80061 LIPID PANEL: CPT | Mod: HCNC

## 2019-04-11 PROCEDURE — 36415 COLL VENOUS BLD VENIPUNCTURE: CPT | Mod: HCNC,PO

## 2019-04-11 PROCEDURE — 80053 COMPREHEN METABOLIC PANEL: CPT | Mod: HCNC

## 2019-04-12 ENCOUNTER — PATIENT MESSAGE (OUTPATIENT)
Dept: FAMILY MEDICINE | Facility: CLINIC | Age: 74
End: 2019-04-12

## 2019-04-12 LAB
ESTIMATED AVG GLUCOSE: 140 MG/DL (ref 68–131)
HBA1C MFR BLD HPLC: 6.5 % (ref 4–5.6)

## 2019-04-13 RX ORDER — PIOGLITAZONEHYDROCHLORIDE 30 MG/1
TABLET ORAL
Qty: 90 TABLET | Refills: 2 | Status: SHIPPED | OUTPATIENT
Start: 2019-04-13 | End: 2020-01-07

## 2019-04-24 ENCOUNTER — OFFICE VISIT (OUTPATIENT)
Dept: NEUROSURGERY | Facility: CLINIC | Age: 74
End: 2019-04-24
Payer: MEDICARE

## 2019-04-24 VITALS
WEIGHT: 253.31 LBS | HEART RATE: 66 BPM | HEIGHT: 73 IN | BODY MASS INDEX: 33.57 KG/M2 | TEMPERATURE: 98 F | SYSTOLIC BLOOD PRESSURE: 102 MMHG | DIASTOLIC BLOOD PRESSURE: 50 MMHG

## 2019-04-24 DIAGNOSIS — M47.816 FACET ARTHRITIS OF LUMBAR REGION: Primary | ICD-10-CM

## 2019-04-24 PROCEDURE — 3074F PR MOST RECENT SYSTOLIC BLOOD PRESSURE < 130 MM HG: ICD-10-PCS | Mod: HCNC,CPTII,S$GLB, | Performed by: NEUROLOGICAL SURGERY

## 2019-04-24 PROCEDURE — 1101F PT FALLS ASSESS-DOCD LE1/YR: CPT | Mod: HCNC,CPTII,S$GLB, | Performed by: NEUROLOGICAL SURGERY

## 2019-04-24 PROCEDURE — 99999 PR PBB SHADOW E&M-EST. PATIENT-LVL III: CPT | Mod: PBBFAC,HCNC,, | Performed by: NEUROLOGICAL SURGERY

## 2019-04-24 PROCEDURE — 3078F DIAST BP <80 MM HG: CPT | Mod: HCNC,CPTII,S$GLB, | Performed by: NEUROLOGICAL SURGERY

## 2019-04-24 PROCEDURE — 99204 OFFICE O/P NEW MOD 45 MIN: CPT | Mod: HCNC,S$GLB,, | Performed by: NEUROLOGICAL SURGERY

## 2019-04-24 PROCEDURE — 99204 PR OFFICE/OUTPT VISIT, NEW, LEVL IV, 45-59 MIN: ICD-10-PCS | Mod: HCNC,S$GLB,, | Performed by: NEUROLOGICAL SURGERY

## 2019-04-24 PROCEDURE — 3078F PR MOST RECENT DIASTOLIC BLOOD PRESSURE < 80 MM HG: ICD-10-PCS | Mod: HCNC,CPTII,S$GLB, | Performed by: NEUROLOGICAL SURGERY

## 2019-04-24 PROCEDURE — 1101F PR PT FALLS ASSESS DOC 0-1 FALLS W/OUT INJ PAST YR: ICD-10-PCS | Mod: HCNC,CPTII,S$GLB, | Performed by: NEUROLOGICAL SURGERY

## 2019-04-24 PROCEDURE — 3074F SYST BP LT 130 MM HG: CPT | Mod: HCNC,CPTII,S$GLB, | Performed by: NEUROLOGICAL SURGERY

## 2019-04-24 PROCEDURE — 99999 PR PBB SHADOW E&M-EST. PATIENT-LVL III: ICD-10-PCS | Mod: PBBFAC,HCNC,, | Performed by: NEUROLOGICAL SURGERY

## 2019-04-24 NOTE — PROGRESS NOTES
Subjective:   I, Yadira Hassan, attest that this documentation has been prepared under the direction and in the presence of Mahesh Martinez MD.     Patient ID: Janak Booker is a 73 y.o. male     Chief Complaint: Consult      HPI  Mr. Janak Booker is a pleasant 73 y.o. gentleman with hx of cervical and lumbar spondylosis who presents today for follow up. He has a hx of L4-5 direct lateral fusion, which I performed numerous years ago. The pt was seen by me in 2013 after sustaining two falls, resulting in two leg fractures and exacerbation of his low back pain.     He returns today with a complaint of recurrent low back pain, which intermittently radiates into his BLE, and into his left foot whenever he stands. He states he is able to walk for approximately 10 minutes and drive for at least 30 minutes before the pain begins. He endorses associated numbness and paraesthesias in his left foot. He recently followed up with pain management and received a L5-S1 ADI with no improvement in his pain. In 2013, he did receive a rhizotomy of the medial branch nerves at the L4, L5 and sacral ala.       Review of Systems   Constitutional: Negative for activity change, appetite change, fatigue, fever and unexpected weight change.   HENT: Negative for facial swelling.    Eyes: Negative.    Respiratory: Negative.    Cardiovascular: Negative.    Gastrointestinal: Negative for diarrhea, nausea and vomiting.   Endocrine: Negative.    Genitourinary: Negative.    Musculoskeletal: Positive for back pain (low). Negative for joint swelling, myalgias and neck pain.   Neurological: Positive for numbness (bilateral feet). Negative for dizziness, weakness and headaches.        Positive for pain in BLE and paraesthesias in feet.   Psychiatric/Behavioral: Negative.       Past Medical History:   Diagnosis Date    Anticoagulant long-term use     Arthritis     Atrial fibrillation     CAD (coronary artery disease)     Colon polyps     per  colonoscopy 9/11/2014    Diabetes mellitus, type 2     Disc displacement, lumbar     L3    Family history of prostate cancer     GERD (gastroesophageal reflux disease)     Heel bone fracture     left foot    Hyperlipidemia LDL goal < 70     Hypertension     Renal manifestation of secondary diabetes mellitus     Spinal stenosis, lumbar        Objective:      Vitals:    04/24/19 1231   BP: (!) 102/50   Pulse: 66   Temp: 97.8 °F (36.6 °C)      Physical Exam   Constitutional: He is oriented to person, place, and time. He appears well-nourished.   HENT:   Head: Normocephalic and atraumatic.   Neck: Neck supple.   Neurological: He is alert and oriented to person, place, and time. No cranial nerve deficit. He displays a negative Romberg sign. GCS eye subscore is 4. GCS verbal subscore is 5. GCS motor subscore is 6.          IMAGING:  MRI Lumbar Spine Without Contrast (01/19/2019) shows evidence of fusion at L4-5 without stenosis at that level. At the L3-4 level there are some degenerative changes and a lateral annular tear and a little lateral recess stenosis bilaterally at that level. There is no canal stenosis or foraminal stenosis at L2-3 or L1-2.       I have personally reviewed the images with the pt.      I, Dr. Mahesh Martinez, personally performed the services described in this documentation. All medical record entries made by the scribe, Yadira Hassan, were at my direction and in my presence.  I have reviewed the chart and agree that the record reflects my personal performance and is accurate and complete. Mahesh Martinez MD. 04/24/2019    Assessment:       1. Facet arthritis of lumbar region         Plan:   I have personally reviewed the MRI of lumbar spine with the pt which shows evidence of fusion at L4-5 without stenosis at that level. At the L3-4 level there are some degenerative changes and a lateral annular tear and a little lateral recess stenosis bilaterally at that level. There is no canal stenosis or  foraminal stenosis at L2-3 or L1-2.     I recommend the patient receive a facet block at L3-4. I will refer him to pain management.      I will schedule the patient for follow up approximately 2-3 weeks after he receives the injection.

## 2019-04-24 NOTE — PATIENT INSTRUCTIONS
I have personally reviewed the MRI of lumbar spine with the pt which shows evidence of fusion at L4-5 without stenosis at that level. At the L3-4 level there are some degenerative changes and a lateral annular tear and a little lateral recess stenosis bilaterally at that level. There is no canal stenosis or foraminal stenosis at L2-3 or L1-2.     I recommend the patient receive a facet block at L3-4. I will refer him to pain management.      I will schedule the patient for follow up approximately 2-3 weeks after he receives the injection.

## 2019-04-25 ENCOUNTER — TELEPHONE (OUTPATIENT)
Dept: FAMILY MEDICINE | Facility: CLINIC | Age: 74
End: 2019-04-25

## 2019-04-25 ENCOUNTER — TELEPHONE (OUTPATIENT)
Dept: PAIN MEDICINE | Facility: CLINIC | Age: 74
End: 2019-04-25

## 2019-04-25 DIAGNOSIS — M47.816 FACET ARTHRITIS OF LUMBAR REGION: Primary | ICD-10-CM

## 2019-04-25 NOTE — TELEPHONE ENCOUNTER
----- Message from Caterina Johnson LPN sent at 4/25/2019  8:56 AM CDT -----  Regarding: PRADAXA HOLD  Good morning,  Patient is scheduled for a Lumbar Block on 5/14. Staff requesting to hold Pradaxa 5 days prior to procedure. Please advise.    Thanks,  Caterina

## 2019-04-25 NOTE — TELEPHONE ENCOUNTER
The pain medicine literature would suggest that this medication should be continued.    https://academic.oup.com/painmedicine/article/[email or web address]/7/[email or web address]    However, if procedure will be canceled due to this, then withhold the medication for 5 days.

## 2019-04-25 NOTE — TELEPHONE ENCOUNTER
Thank you sir, we prefer to hold the medication for safety because this is an epidural and not a peripheral joint injection.    Routing comment        John Claire Jr., MD routed conversation to You 28 minutes ago (9:11 AM)      John Claire Jr., MD 31 minutes ago (9:08 AM)         The pain medicine literature would suggest that this medication should be continued.     https://academic.oup.com/painmedicine/article/[email or web address]/7/1218/9757431     However, if procedure will be canceled due to this, then withhold the medication for 5 days.         Documentation       John Claire Jr., MD 34 minutes ago (9:05 AM)         ----- Message from Caterina Johnson LPN sent at 4/25/2019  8:56 AM CDT -----  Regarding: PRADAXA HOLD  Good morning,  Patient is scheduled for a Lumbar Block on 5/14. Staff requesting to hold Pradaxa 5 days prior to procedure. Please advise.     Thanks,  Caterina

## 2019-04-29 ENCOUNTER — PATIENT MESSAGE (OUTPATIENT)
Dept: FAMILY MEDICINE | Facility: CLINIC | Age: 74
End: 2019-04-29

## 2019-05-14 ENCOUNTER — HOSPITAL ENCOUNTER (OUTPATIENT)
Facility: OTHER | Age: 74
Discharge: HOME | End: 2019-05-14
Attending: ANESTHESIOLOGY | Admitting: ANESTHESIOLOGY
Payer: MEDICARE

## 2019-05-14 VITALS
HEART RATE: 53 BPM | DIASTOLIC BLOOD PRESSURE: 72 MMHG | OXYGEN SATURATION: 99 % | TEMPERATURE: 99 F | RESPIRATION RATE: 18 BRPM | SYSTOLIC BLOOD PRESSURE: 152 MMHG

## 2019-05-14 DIAGNOSIS — M47.816 FACET ARTHRITIS OF LUMBAR REGION: ICD-10-CM

## 2019-05-14 DIAGNOSIS — G89.29 CHRONIC PAIN: ICD-10-CM

## 2019-05-14 DIAGNOSIS — M47.816 LUMBAR SPONDYLOSIS: Primary | ICD-10-CM

## 2019-05-14 LAB — POCT GLUCOSE: 109 MG/DL (ref 70–110)

## 2019-05-14 PROCEDURE — 64493 INJ PARAVERT F JNT L/S 1 LEV: CPT | Mod: 50,HCNC | Performed by: ANESTHESIOLOGY

## 2019-05-14 PROCEDURE — 82947 ASSAY GLUCOSE BLOOD QUANT: CPT | Mod: HCNC | Performed by: ANESTHESIOLOGY

## 2019-05-14 PROCEDURE — 25500020 PHARM REV CODE 255: Mod: HCNC | Performed by: ANESTHESIOLOGY

## 2019-05-14 PROCEDURE — 64493 PR INJ DX/THER AGNT PARAVERT FACET JOINT,IMG GUIDE,LUMBAR/SAC,1ST LVL: ICD-10-PCS | Mod: 50,HCNC,, | Performed by: ANESTHESIOLOGY

## 2019-05-14 PROCEDURE — 64493 INJ PARAVERT F JNT L/S 1 LEV: CPT | Mod: 50,HCNC,, | Performed by: ANESTHESIOLOGY

## 2019-05-14 PROCEDURE — 25000003 PHARM REV CODE 250: Mod: HCNC | Performed by: ANESTHESIOLOGY

## 2019-05-14 PROCEDURE — 63600175 PHARM REV CODE 636 W HCPCS: Mod: HCNC | Performed by: ANESTHESIOLOGY

## 2019-05-14 RX ORDER — METHYLPREDNISOLONE ACETATE 40 MG/ML
INJECTION, SUSPENSION INTRA-ARTICULAR; INTRALESIONAL; INTRAMUSCULAR; SOFT TISSUE
Status: DISCONTINUED | OUTPATIENT
Start: 2019-05-14 | End: 2019-05-14 | Stop reason: HOSPADM

## 2019-05-14 RX ORDER — BUPIVACAINE HYDROCHLORIDE 2.5 MG/ML
INJECTION, SOLUTION EPIDURAL; INFILTRATION; INTRACAUDAL
Status: DISCONTINUED | OUTPATIENT
Start: 2019-05-14 | End: 2019-05-14 | Stop reason: HOSPADM

## 2019-05-14 RX ORDER — SODIUM CHLORIDE 9 MG/ML
500 INJECTION, SOLUTION INTRAVENOUS CONTINUOUS
Status: DISCONTINUED | OUTPATIENT
Start: 2019-05-14 | End: 2019-05-14 | Stop reason: HOSPADM

## 2019-05-14 RX ORDER — LIDOCAINE HYDROCHLORIDE 10 MG/ML
INJECTION INFILTRATION; PERINEURAL
Status: DISCONTINUED | OUTPATIENT
Start: 2019-05-14 | End: 2019-05-14 | Stop reason: HOSPADM

## 2019-05-14 NOTE — DISCHARGE INSTRUCTIONS
Thank you for allowing us to care for you today. You may receive a survey about the care we provided. Your feedback is valuable and helps us provide excellent care throughout the community.     Home Care Instructions for Pain Management:    1. DIET:   You may resume your normal diet today.   2. BATHING:   You may shower with luke warm water. No tub baths or anything that will soak injection sites under water for the next 24 hours.  3. DRESSING:   You may remove your bandage today.   4. ACTIVITY LEVEL:   You may resume your normal activities 24 hrs after your procedure. Nothing strenuous today.  5. MEDICATIONS:   You may resume your normal medications today. To restart blood thinners, ask your doctor.  6. DRIVING    If you have received any sedatives by mouth today, you may not drive for 12 hours.    If you have received any sedation through your IV, you may not drive for 24 hrs.   7. SPECIAL INSTRUCTIONS:   No heat to the injection site for 24 hrs including, hot bath or shower, heating pad, moist heat, or hot tubs.    Use ice pack to injection site for any pain or discomfort.  Apply ice packs for 20 minute intervals as needed.    IF you have diabetes, be sure to monitor your blood sugar more closely. IF your injection contained steroids your blood sugar levels may become higher than normal.    If you are still having pain upon discharge:  Your pain may improve over the next 48 hours. The anesthetic (numbing medication) works immediately to 48 hours. IF your injection contained a steroid (anti-inflammatory medication), it takes approximately 3 days to start feeling relief and 7-10 days to see your greatest results from the medication. It is possible you may need subsequent injections. This would be discussed at your follow up appointment with pain management or your referring doctor.      PLEASE CALL YOUR DOCTOR IF:  1. Redness or swelling around the injection site.  2. Fever of 101 degrees or more  3. Drainage  (pus) from the injection site.  4. For any continuous bleeding (some dried blood over the incision is normal.)    FOR EMERGENCIES:   If any unusual problems or difficulties occur during clinic hours, call [phone] or [phone].

## 2019-05-14 NOTE — OP NOTE
Facet joint injection     05/14/2019    Attending: Catrachito Harley MD    ASSISTANTS: Елена Musa MD    PREOPERATIVE DIAGNOSIS:  Facet arthritis of lumbar region [M46.96]     OPERATION:   Bilateral facet joint injection L3/4  .    POSTOPERATIVE DIAGNOSIS:  Facet arthritis of lumbar region [M46.96]    Estimated Blood Loss:  None    Complications:  None    Specimens:  None    PROCEDURE:  After obtaining consent, the patient was brought to the procedure room and placed in the prone position.  I identified the facet joint at the above levels in the Scottie dog view.  I prepped the area with chlorhexidine using sterile fashion technique.  I used  1% lidocaine for skin infiltration overlying the facet joint.  I Used a 22 gauge spinal quincke needle.  I advanced the needle toward the facet joint until I had good position of the needle into the above level.  I had negative aspirate and negative paresthesia.  I injected  0.5 mL of 300mg/mL radio opaque contrast dye into each joint, which showed good spread of the dye confirming needle tip position.  This was followed by the injection of a mixture of 3 mL 0.25% bupivicaine + 80depo-medrol for a total volume of 4 mL, and 2 mL injected per joint.  Displacement of the radio opaque contrast after injection of the medication confirmed that the medication went into the area of the joint.  The patient tolerated the procedure well and was transferred to the recovery room in stable condition.      Attending physician was present throughout the entire case and all critical portions.

## 2019-05-16 ENCOUNTER — PES CALL (OUTPATIENT)
Dept: ADMINISTRATIVE | Facility: CLINIC | Age: 74
End: 2019-05-16

## 2019-05-29 ENCOUNTER — OFFICE VISIT (OUTPATIENT)
Dept: NEUROSURGERY | Facility: CLINIC | Age: 74
End: 2019-05-29
Payer: MEDICARE

## 2019-05-29 VITALS
HEART RATE: 56 BPM | BODY MASS INDEX: 32.07 KG/M2 | WEIGHT: 242 LBS | TEMPERATURE: 98 F | SYSTOLIC BLOOD PRESSURE: 116 MMHG | DIASTOLIC BLOOD PRESSURE: 66 MMHG | HEIGHT: 73 IN

## 2019-05-29 DIAGNOSIS — M47.816 FACET ARTHRITIS OF LUMBAR REGION: Primary | ICD-10-CM

## 2019-05-29 PROCEDURE — 1101F PT FALLS ASSESS-DOCD LE1/YR: CPT | Mod: HCNC,CPTII,S$GLB, | Performed by: NEUROLOGICAL SURGERY

## 2019-05-29 PROCEDURE — 99499 UNLISTED E&M SERVICE: CPT | Mod: HCNC,S$GLB,, | Performed by: NEUROLOGICAL SURGERY

## 2019-05-29 PROCEDURE — 3074F SYST BP LT 130 MM HG: CPT | Mod: HCNC,CPTII,S$GLB, | Performed by: NEUROLOGICAL SURGERY

## 2019-05-29 PROCEDURE — 99214 PR OFFICE/OUTPT VISIT, EST, LEVL IV, 30-39 MIN: ICD-10-PCS | Mod: HCNC,S$GLB,, | Performed by: NEUROLOGICAL SURGERY

## 2019-05-29 PROCEDURE — 3078F DIAST BP <80 MM HG: CPT | Mod: HCNC,CPTII,S$GLB, | Performed by: NEUROLOGICAL SURGERY

## 2019-05-29 PROCEDURE — 99999 PR PBB SHADOW E&M-EST. PATIENT-LVL III: CPT | Mod: PBBFAC,HCNC,, | Performed by: NEUROLOGICAL SURGERY

## 2019-05-29 PROCEDURE — 99214 OFFICE O/P EST MOD 30 MIN: CPT | Mod: HCNC,S$GLB,, | Performed by: NEUROLOGICAL SURGERY

## 2019-05-29 PROCEDURE — 3078F PR MOST RECENT DIASTOLIC BLOOD PRESSURE < 80 MM HG: ICD-10-PCS | Mod: HCNC,CPTII,S$GLB, | Performed by: NEUROLOGICAL SURGERY

## 2019-05-29 PROCEDURE — 3074F PR MOST RECENT SYSTOLIC BLOOD PRESSURE < 130 MM HG: ICD-10-PCS | Mod: HCNC,CPTII,S$GLB, | Performed by: NEUROLOGICAL SURGERY

## 2019-05-29 PROCEDURE — 1101F PR PT FALLS ASSESS DOC 0-1 FALLS W/OUT INJ PAST YR: ICD-10-PCS | Mod: HCNC,CPTII,S$GLB, | Performed by: NEUROLOGICAL SURGERY

## 2019-05-29 PROCEDURE — 99999 PR PBB SHADOW E&M-EST. PATIENT-LVL III: ICD-10-PCS | Mod: PBBFAC,HCNC,, | Performed by: NEUROLOGICAL SURGERY

## 2019-05-29 PROCEDURE — 99499 RISK ADDL DX/OHS AUDIT: ICD-10-PCS | Mod: HCNC,S$GLB,, | Performed by: NEUROLOGICAL SURGERY

## 2019-05-29 NOTE — PATIENT INSTRUCTIONS
I have referred the pt to pain management for a facet rhizotomy at L3-4 bilaterally and a SI joint injection.   He will follow up with me as needed and advised to contact us with any questions, concerns, or if he experiences any new or worsening symptoms.

## 2019-05-29 NOTE — PROGRESS NOTES
Subjective:   I, Yadira Hassan, attest that this documentation has been prepared under the direction and in the presence of Mahesh Martinez MD.     Patient ID: Janak Booker is a 73 y.o. male     Chief Complaint: Follow-up      HPI  Mr. Janak Booker is a pleasant 73 y.o. gentleman with facet arthritis of lumbar region who presents today for follow up s/p L3/4 facet injections.  He has a hx of L4-5 direct lateral fusion, which I performed numerous years ago. He returned to clinic on 04/24/2019 with a complaint of recurrent low back pain that intermittently radiates into his BLE, and left foot whenever he stands. The pain has limited his mobility and he is only able to walk for approximately 10 minutes and drive for at least 30 minutes before the pain recurs.  He has associated numbness and paraesthesias in his left foot.  I therefore referred him to pain management for facet injections and he returns today to discuss his response.    Pt states his pain has decreased significantly s/p injections. He is now able to drive and walk for longer periods of time (at least 45 minutes) before he develops discomfort/pain. He has a hx of facet rhizotomy at L4/5 several years ago. He has tried Gabapentin and Lyrica which he was unable to tolerate secondary to mood disturbances..     Review of Systems   Constitutional: Negative for activity change, appetite change, fatigue, fever and unexpected weight change.   HENT: Negative for facial swelling.    Eyes: Negative.    Respiratory: Negative.    Cardiovascular: Negative.    Gastrointestinal: Negative for diarrhea, nausea and vomiting.   Endocrine: Negative.    Genitourinary: Negative.    Musculoskeletal: Negative for back pain, joint swelling, myalgias and neck pain.   Neurological: Negative for dizziness, weakness, numbness and headaches.   Psychiatric/Behavioral: Negative.       Past Medical History:   Diagnosis Date    Anticoagulant long-term use     Arthritis     Atrial  fibrillation     CAD (coronary artery disease)     Colon polyps     per colonoscopy 9/11/2014    Diabetes mellitus, type 2     Disc displacement, lumbar     L3    Family history of prostate cancer     GERD (gastroesophageal reflux disease)     Heel bone fracture     left foot    Hyperlipidemia LDL goal < 70     Hypertension     Renal manifestation of secondary diabetes mellitus     Spinal stenosis, lumbar        Objective:      Vitals:    05/29/19 1040   BP: 116/66   Pulse: (!) 56   Temp: 97.8 °F (36.6 °C)      Physical Exam   Constitutional: He is oriented to person, place, and time. He appears well-nourished.   HENT:   Head: Normocephalic and atraumatic.   Neck: Neck supple.   Neurological: He is alert and oriented to person, place, and time. No cranial nerve deficit. He displays a negative Romberg sign. GCS eye subscore is 4. GCS verbal subscore is 5. GCS motor subscore is 6.         I, Dr. Mahesh Martinez, personally performed the services described in this documentation. All medical record entries made by the scribe, Yadira Hassan, were at my direction and in my presence.  I have reviewed the chart and agree that the record reflects my personal performance and is accurate and complete. Mahesh Martinez MD. 05/29/2019    Assessment:       1. Facet arthritis of lumbar region         Plan:     I have referred the pt to pain management for a facet rhizotomy at L3-4 bilaterally and a SI joint injection.   He will follow up with me as needed and advised to contact us with any questions, concerns, or if he experiences any new or worsening symptoms.

## 2019-05-30 ENCOUNTER — TELEPHONE (OUTPATIENT)
Dept: PAIN MEDICINE | Facility: CLINIC | Age: 74
End: 2019-05-30

## 2019-05-30 ENCOUNTER — TELEPHONE (OUTPATIENT)
Dept: FAMILY MEDICINE | Facility: CLINIC | Age: 74
End: 2019-05-30

## 2019-05-30 DIAGNOSIS — M47.816 FACET ARTHRITIS OF LUMBAR REGION: Primary | ICD-10-CM

## 2019-05-30 NOTE — TELEPHONE ENCOUNTER
----- Message from Caterina Johnson LPN sent at 5/30/2019  9:24 AM CDT -----  Regarding: Request to hold Pradaxa  Good morning,    Patient is scheduled to have an L3-4 Radiofrequency Ablation on 7/8. Staff requesting to hold Pradaxa 5 days prior to procedure.    Thank you,  Caterina

## 2019-05-30 NOTE — TELEPHONE ENCOUNTER
You  John Claire Jr., MD 2 minutes ago (10:50 AM)      Ok. I will let him know. Thank you.    Routing comment        John Claire Jr., MD routed conversation to You 3 minutes ago (10:48 AM)      John Claire Jr., MD 5 minutes ago (10:46 AM)         The patient should take his last dose of Pradaxa 5 days before the procedure, and restart the medication on Postprocedure Day #3.         Documentation       John Claire Jr., MD 7 minutes ago (10:44 AM)         ----- Message from Caterina Johnson LPN sent at 5/30/2019  9:24 AM CDT -----  Regarding: Request to hold Pradaxa  Good morning,     Patient is scheduled to have an L3-4 Radiofrequency Ablation on 7/8. Staff requesting to hold Pradaxa 5 days prior to procedure.     Thank you,  Caterina            Documentation

## 2019-05-30 NOTE — TELEPHONE ENCOUNTER
Contacted patient to schedule procedure. Date. Time, and instructions given and mailed. Patient verbalized understanding.

## 2019-05-30 NOTE — TELEPHONE ENCOUNTER
The patient should take his last dose of Pradaxa 5 days before the procedure, and restart the medication on Postprocedure Day #3.

## 2019-06-14 RX ORDER — LIRAGLUTIDE 6 MG/ML
INJECTION SUBCUTANEOUS
Qty: 9 ML | Refills: 0 | Status: SHIPPED | OUTPATIENT
Start: 2019-06-14 | End: 2019-07-13 | Stop reason: SDUPTHER

## 2019-06-30 DIAGNOSIS — E11.22 CONTROLLED TYPE 2 DIABETES MELLITUS WITH STAGE 3 CHRONIC KIDNEY DISEASE, WITHOUT LONG-TERM CURRENT USE OF INSULIN: ICD-10-CM

## 2019-06-30 DIAGNOSIS — N18.30 CONTROLLED TYPE 2 DIABETES MELLITUS WITH STAGE 3 CHRONIC KIDNEY DISEASE, WITHOUT LONG-TERM CURRENT USE OF INSULIN: ICD-10-CM

## 2019-07-01 RX ORDER — METFORMIN HYDROCHLORIDE 500 MG/1
TABLET, EXTENDED RELEASE ORAL
Qty: 90 TABLET | Refills: 0 | Status: SHIPPED | OUTPATIENT
Start: 2019-07-01 | End: 2019-09-26 | Stop reason: SDUPTHER

## 2019-07-13 RX ORDER — LIRAGLUTIDE 6 MG/ML
INJECTION SUBCUTANEOUS
Qty: 9 ML | Refills: 0 | Status: SHIPPED | OUTPATIENT
Start: 2019-07-13 | End: 2019-08-10 | Stop reason: SDUPTHER

## 2019-07-18 ENCOUNTER — TELEPHONE (OUTPATIENT)
Dept: PAIN MEDICINE | Facility: CLINIC | Age: 74
End: 2019-07-18

## 2019-07-18 NOTE — TELEPHONE ENCOUNTER
Spoke with patient to schedule office appointment with Dr Linn for 07/23/2019 after receiving staff message

## 2019-07-18 NOTE — TELEPHONE ENCOUNTER
----- Message from Ginette Rose sent at 7/18/2019  3:27 PM CDT -----  Message forward to Dr. Linn staff to get a sooner consult date.   ----- Message -----  From: Agatha Blackburn RN  Sent: 7/18/2019   3:03 PM  To: Temitope Shi Staff, #    Can you please schedule pt a consult visit with Dr Temitope YU (per MD)? He did not have his injection today because his pain has changed and Dr. Linn wants to evaluate him in the office. Please call the pt with his appt. Info.     Thanks

## 2019-07-22 ENCOUNTER — PATIENT OUTREACH (OUTPATIENT)
Dept: ADMINISTRATIVE | Facility: OTHER | Age: 74
End: 2019-07-22

## 2019-07-22 DIAGNOSIS — N18.30 CONTROLLED TYPE 2 DIABETES MELLITUS WITH STAGE 3 CHRONIC KIDNEY DISEASE, WITHOUT LONG-TERM CURRENT USE OF INSULIN: Primary | ICD-10-CM

## 2019-07-22 DIAGNOSIS — E11.22 CONTROLLED TYPE 2 DIABETES MELLITUS WITH STAGE 3 CHRONIC KIDNEY DISEASE, WITHOUT LONG-TERM CURRENT USE OF INSULIN: Primary | ICD-10-CM

## 2019-07-23 ENCOUNTER — OFFICE VISIT (OUTPATIENT)
Dept: PAIN MEDICINE | Facility: CLINIC | Age: 74
End: 2019-07-23
Attending: ANESTHESIOLOGY
Payer: MEDICARE

## 2019-07-23 VITALS
TEMPERATURE: 98 F | BODY MASS INDEX: 32.64 KG/M2 | HEIGHT: 73 IN | WEIGHT: 246.25 LBS | DIASTOLIC BLOOD PRESSURE: 74 MMHG | SYSTOLIC BLOOD PRESSURE: 106 MMHG | HEART RATE: 60 BPM

## 2019-07-23 DIAGNOSIS — M79.652 LEFT THIGH PAIN: ICD-10-CM

## 2019-07-23 DIAGNOSIS — M96.1 LUMBAR POSTLAMINECTOMY SYNDROME: ICD-10-CM

## 2019-07-23 DIAGNOSIS — M54.16 LUMBAR RADICULOPATHY: ICD-10-CM

## 2019-07-23 DIAGNOSIS — M62.89 HAMSTRING TIGHTNESS OF LEFT LOWER EXTREMITY: Primary | ICD-10-CM

## 2019-07-23 PROCEDURE — 99499 RISK ADDL DX/OHS AUDIT: ICD-10-PCS | Mod: HCNC,S$GLB,, | Performed by: ANESTHESIOLOGY

## 2019-07-23 PROCEDURE — 3078F PR MOST RECENT DIASTOLIC BLOOD PRESSURE < 80 MM HG: ICD-10-PCS | Mod: HCNC,CPTII,S$GLB, | Performed by: ANESTHESIOLOGY

## 2019-07-23 PROCEDURE — 3074F SYST BP LT 130 MM HG: CPT | Mod: HCNC,CPTII,S$GLB, | Performed by: ANESTHESIOLOGY

## 2019-07-23 PROCEDURE — 99215 OFFICE O/P EST HI 40 MIN: CPT | Mod: HCNC,S$GLB,, | Performed by: ANESTHESIOLOGY

## 2019-07-23 PROCEDURE — 99499 UNLISTED E&M SERVICE: CPT | Mod: HCNC,S$GLB,, | Performed by: ANESTHESIOLOGY

## 2019-07-23 PROCEDURE — 1101F PT FALLS ASSESS-DOCD LE1/YR: CPT | Mod: HCNC,CPTII,S$GLB, | Performed by: ANESTHESIOLOGY

## 2019-07-23 PROCEDURE — 99215 PR OFFICE/OUTPT VISIT, EST, LEVL V, 40-54 MIN: ICD-10-PCS | Mod: HCNC,S$GLB,, | Performed by: ANESTHESIOLOGY

## 2019-07-23 PROCEDURE — 3074F PR MOST RECENT SYSTOLIC BLOOD PRESSURE < 130 MM HG: ICD-10-PCS | Mod: HCNC,CPTII,S$GLB, | Performed by: ANESTHESIOLOGY

## 2019-07-23 PROCEDURE — 3078F DIAST BP <80 MM HG: CPT | Mod: HCNC,CPTII,S$GLB, | Performed by: ANESTHESIOLOGY

## 2019-07-23 PROCEDURE — 1101F PR PT FALLS ASSESS DOC 0-1 FALLS W/OUT INJ PAST YR: ICD-10-PCS | Mod: HCNC,CPTII,S$GLB, | Performed by: ANESTHESIOLOGY

## 2019-07-23 PROCEDURE — 99999 PR PBB SHADOW E&M-EST. PATIENT-LVL III: CPT | Mod: PBBFAC,HCNC,, | Performed by: ANESTHESIOLOGY

## 2019-07-23 PROCEDURE — 99999 PR PBB SHADOW E&M-EST. PATIENT-LVL III: ICD-10-PCS | Mod: PBBFAC,HCNC,, | Performed by: ANESTHESIOLOGY

## 2019-07-23 NOTE — LETTER
July 23, 2019      Mahesh Martinez MD  [address]  [facility] [address]           [facility] [address] [facility] [address]  [address]  [facility] [address]  Phone: [phone]  Fax: [phone]          Patient: Janak Booker   MR Number: [id number]   YOB: 1945   Date of Visit: 7/23/2019       Dear Dr. Mahesh Martinez:    Thank you for referring Janak Booker to me for evaluation. Attached you will find relevant portions of my assessment and plan of care.    If you have questions, please do not hesitate to call me. I look forward to following Janak Booker along with you.    Sincerely,    Jose Guadalupe Linn MD    Enclosure  CC:  No Recipients    If you would like to receive this communication electronically, please contact [email or web address] or [phone] to request more information on [facility] access.    For providers and/or their staff who would like to refer a patient to [facility], please contact us through our one-stop-shop provider referral line, [facility], at [phone].    If you feel you have received this communication in error or would no longer like to receive these types of communications, please e-mail [email or web address]

## 2019-07-23 NOTE — PROGRESS NOTES
Subjective:      Patient ID: Janak Booker is a 74 y.o. male.    Chief Complaint: No chief complaint on file.    Referred by: Mahesh Martinez MD     Pain Scales  Best: 2/10  Worst: 9/10  Usually: 7/10  Today: 6/10    Very pleasant 75 yo M with PMH A fib on pradaxa, DM, CAD, CKD, GERD, and HTN who comes for consultation regarding left leg pain. He was a direct referral from Dr Martinez for facet rhizotomy, but his symptomatology was not completely consistent with this diagnosis at time of presentation and so it was recommended he come for full evaluation in the office. He has a history of L4-5 direct lateral fusion performed by Dr Martinez several years ago. Previously he has had rhizotomy at left L3,4,5 by Dr Harley in 2013.    Today he reports similar pain as before with left sided low back pain with radiation into left buttock to posterior left thigh. Terminates before the knee. Current pain started after a fall Nov 2018, had MRI post fall. Denies b/b changes or LE weakness. This pain is most bothersome/exacerbated with sitting in the car for long periods of time. He does experience intermittent left sided paresthesias in posterior calf and foot, happens occasionally when standing for long periods of time.    He has tried Gabapentin and Lyrica which he was unable to tolerate secondary to mood disturbances. He has CKD and solitary kidney, should not take NSAIDs. He occasionally takes Tylenol with partial improvement.     Interventional Pain history:  -5/14/19: B facet joint injection L3/4: minimal improvement  -2/4/19: L L5-S1 TFESI Dr Doan-no improvement  -2/12/16: C7/T1 ADI  -12/18/13: RFA left L3,4,5-good relief for several years for pain that was higher in lumbar spine (different than current complaints)  - 10/2013: L L3,4,5 MBB    Imaging:  MRI L spine 1/2019:  FINDINGS:  Vertebral column: There are postsurgical changes of posterior interbody fusion of L4 and L5.  There is susceptibility artifact related to fusion  hardware.  Interbody fusion appears solid.  There is stable trace anterolisthesis of L4 on L5.  There is a chronic Schmorl's node in the superior endplate of L2.  The discs are mildly desiccated but there is no significant disc space narrowing.  There is a small hemangioma in the L1 vertebral body centrally in the superior aspect of T12.  Otherwise, baseline marrow signal is normal.    Spinal canal, conus, epidural space: The spinal canal is developmentally normal.  The conus is normal in location, contour and signal intensity, terminating at the level of T12-L1.  There is no abnormal epidural mass or fluid collection.    Findings by level:    On the sagittal images, there is no spinal stenosis or cord compression at the T11-T12 or T12-L1 levels.    L1-2: There is mild disc space narrowing.  There is a diffuse disc bulge with superimposed right paracentral disc protrusion with annular fissure.  There is mild facet joint arthropathy.  There is borderline spinal stenosis.  The foramina are patent.    L2-3: There is a diffuse disc bulge with mild-to-moderate facet joint arthropathy.  There is flattening of the ventral dural sac.  There is mild spinal stenosis and bilateral foraminal stenosis.    L3-4: There is a mild-to-moderate diffuse disc bulge.  There is marked facet joint arthropathy with ligamentum flavum thickening.  There is moderate spinal stenosis with mild right and moderate left foraminal stenosis.    L4-5: There are changes of posterior interbody fusion.  There is facet joint overgrowth and fusion.  There is ligamentum flavum thickening.  There is mild spinal stenosis with mild-to-moderate bilateral foraminal stenosis.    L5-S1: There is a diffuse disc bulge with osteophytic ridging and mild-to-moderate facet joint arthropathy.  There is no spinal stenosis.  There is mild crowding of the left lateral recess.  There is mild bilateral foraminal stenosis.    Soft tissues, other: The prevertebral soft tissues  are normal.    Past Medical History:   Diagnosis Date    Anticoagulant long-term use     Arthritis     Atrial fibrillation     CAD (coronary artery disease)     Colon polyps     per colonoscopy 9/11/2014    Diabetes mellitus, type 2     Disc displacement, lumbar     L3    Family history of prostate cancer     GERD (gastroesophageal reflux disease)     Heel bone fracture     left foot    Hyperlipidemia LDL goal < 70     Hypertension     Renal manifestation of secondary diabetes mellitus     Spinal stenosis, lumbar        Past Surgical History:   Procedure Laterality Date    BACK SURGERY      BLOCK, NERVE, FACET JOINT, LUMBAR, MEDIAL BRANCH Left 10/30/2013    [facility] at [facility]    BLOCK, NERVE, FACET JOINT, LUMBAR, MEDIAL BRANCH Left 10/23/2013    [facility]    CARDIAC SURGERY      stents     CARPAL TUNNEL RELEASE Right     CATARACT EXTRACTION W/  INTRAOCULAR LENS IMPLANT Bilateral     COLONOSCOPY N/A 3/27/2018    Performed by Rishi [facility] at [facility]    COLONOSCOPY N/A 9/11/2014    Performed by Rishi [facility] at [facility]    CORONARY ANGIOPLASTY WITH STENT PLACEMENT      x 3    ADI-TRANSFORAMINAL Right 9/18/2013    Per[facility] at [facility]    INJECTION, FACET JOINT N/A 8/21/2013    Performed by Jose Guadalupe Linn MD at [facility]    INJECTION, FACET JOINT INJECTION (LUMBAR BLOCK) PLEASE INJECT L3/4 N/A 5/14/2019    Performed by Cat[facility]ito [facility]ley [facility] at [facility]    Injection,steroid,epidural,transforaminal approach L5-S1 Left 2/4/2019    [facility] at [facility]    INJECTION-STEROID-EPIDURAL-CERVICAL N/A 2/12/2016    [facility] at [facility]    KNEE ARTHROSCOPY W/ MENISCECTOMY  12/22/2008    right    LUMBAR DISCECTOMY  12/2011    L4-L5 Fusion    LUMBAR EPIDURAL INJECTION  02/04/2019    RADIOFREQUENCY ABLATION, NERVE, SPINAL, LUMBAR, MEDIAL  "BRANCH, 1 LEVEL Left 12/18/2013    [facility]    ROTATOR CUFF REPAIR Left 7/2012    SHOULDER OPEN ROTATOR CUFF REPAIR      TIBIA FRACTURE SURGERY         Review of patient's allergies indicates:   Allergen Reactions    No known drug allergies        Current Outpatient Medications   Medication Sig Dispense Refill    BD ALCOHOL SWABS PadM USE AS DIRECTED TO CHECK BLOOD GLUCOSE DAILY 100 each 5    BD ULTRA-FINE BARRY PEN NEEDLE 32 gauge x 5/32" Ndle USE ONE NEEDLE ONCE DAILY 100 each 3    blood sugar diagnostic Strp Use as directed to check blood sugar daily. 100 each 3    blood-glucose meter Misc Use as directed. 1 each 0    CALCIUM CITRATE-VITAMIN D3 ORAL       fenofibrate micronized (LOFIBRA) 134 MG Cap       fenofibric acid (TRILIPIX) 135 mg capsule 1 Capsule(s) Oral  Every day.      lancets (TRUEPLUS LANCETS) 28 gauge Misc 1 lancet by Misc.(Non-Drug; Combo Route) route once daily. 100 each 3    lisinopril (PRINIVIL,ZESTRIL) 5 MG tablet 1 Tablet(s) Oral Every evening.      metFORMIN (GLUCOPHAGE-XR) 500 MG 24 hr tablet TAKE ONE TABLET BY MOUTH ONCE DAILY 90 tablet 0    metoprolol tartrate (LOPRESSOR) 25 MG tablet Take 1 tablet by mouth once daily.      multivitamin with minerals tablet       NITROSTAT 0.4 mg SL tablet Take 1 tablet by mouth continuous prn.      omega-3 fatty acids 1,000 mg Cap 1 Capsule(s) Oral OTC once a day.      omeprazole (PRILOSEC) 40 MG capsule TAKE ONE CAPSULE BY MOUTH ONCE DAILY 90 capsule 2    pioglitazone (ACTOS) 30 MG tablet TAKE ONE TABLET BY MOUTH ONCE DAILY 90 tablet 2    PRADAXA 150 mg Cap Take 150 mg by mouth Twice daily.      rosuvastatin (CRESTOR) 10 MG tablet Every day      sotalol (BETAPACE) 120 MG Tab 2 (two) times daily.       TRUE METRIX AIR GLUCOSE METER kit       VICTOZA 3-CAITIE 0.6 mg/0.1 mL (18 mg/3 mL) PnIj INJECT 1.8 MGS UNDER THE SKIN DAILY. 9 mL 0    VOLTAREN 1 % Gel Use as directed       No current " facility-administered medications for this visit.        Family History   Problem Relation Age of Onset    Heart failure Father     Heart disease Father         MI    Prostate cancer Father     Heart failure Mother     Heart disease Mother     No Known Problems Sister     No Known Problems Daughter     No Known Problems Son     No Known Problems Daughter        Social History     Socioeconomic History    Marital status:      Spouse name: Santino    Number of children: 3    Years of education: Not on file    Highest education level: Not on file   Occupational History    Not on file   Social Needs    Financial resource strain: Not hard at all    Food insecurity:     Worry: Never true     Inability: Never true    Transportation needs:     Medical: No     Non-medical: No   Tobacco Use    Smoking status: Never Smoker    Smokeless tobacco: Never Used   Substance and Sexual Activity    Alcohol use: Yes     Alcohol/week: 3.0 oz     Types: 6 Standard drinks or equivalent per week     Frequency: Monthly or less     Drinks per session: 1 or 2     Binge frequency: Never    Drug use: No    Sexual activity: Yes     Partners: Female   Lifestyle    Physical activity:     Days per week: 0 days     Minutes per session: Not on file    Stress: Not at all   Relationships    Social connections:     Talks on phone: More than three times a week     Gets together: Twice a week     Attends Mosque service: More than 4 times per year     Active member of club or organization: Yes     Attends meetings of clubs or organizations: More than 4 times per year     Relationship status:    Other Topics Concern    Not on file   Social History Narrative    The patient does not exercise regularly ().      He is not satisfied with weight.    Rates diet as fair.    He does drink at least 1/2 gallon water daily.    He drinks 2 coffee/tea/caffeine-containing soft drinks daily.    Total sleep time at night is 7 hours.     "He does wear seat belts.    Hobbies include off-road, camping.           Review of Systems   Constitution: Negative for chills, fever and weight loss.   HENT: Negative for hearing loss.    Eyes: Negative for visual disturbance.   Cardiovascular: Negative for chest pain.        A fib on pradaxa   Respiratory: Negative for cough.    Hematologic/Lymphatic: Bruises/bleeds easily (on pradaxa).   Skin: Negative for rash.   Musculoskeletal: Positive for back pain and stiffness.        Leg Pain   Gastrointestinal: Negative for bowel incontinence.   Genitourinary: Negative for bladder incontinence.   Neurological: Positive for paresthesias.   Psychiatric/Behavioral: Negative for altered mental status.           Objective:   /74   Pulse 60   Temp 98.3 °F (36.8 °C)   Ht 6' 1" (1.854 m)   Wt 111.7 kg (246 lb 4.1 oz)   BMI 32.49 kg/m²   Pain Disability Index Review:  Last 3 PDI Scores 7/23/2019 3/6/2019 1/18/2019   Pain Disability Index (PDI) 44 52 45     Normocephalic.  Atraumatic.  Affect appropriate.  Breathing unlabored.  Extra ocular muscles intact.               General Musculoskeletal Exam   Gait: normal     Right Ankle/Foot Exam     Tests   Heel Walk: able to perform  Tiptoe Walk: able to perform    Left Ankle/Foot Exam     Tests   Heel Walk: able to perform  Tiptoe Walk: able to perform      Right Hip Exam     Range of Motion   External rotation: normal   Internal rotation: normal     Tests   Pain w/ forced internal rotation (KATELYNN): absent  Merna: negative    Other   Sensation: normal  Left Hip Exam     Range of Motion   External rotation: normal   Internal rotation: normal     Tests   Pain w/ forced internal rotation (KATELYNN): present  Merna: negative    Other   Sensation: normal      Back (L-Spine & T-Spine) / Neck (C-Spine) Exam     Tenderness Left paramedian tenderness of the Lower L-Spine.     Back (L-Spine & T-Spine) Range of Motion   Extension: normal   Flexion: normal   Lateral bend right: normal "   Lateral bend left: normal   Rotation right: normal   Rotation left: normal     Spinal Sensation   Right Side Sensation  L-Spine Level: normal  S-Spine Level: normal  Left Side Sensation  L-Spine Level: normal  S-Spine Level: normal    Back (L-Spine & T-Spine) Tests   Right Side Tests  Femoral Stretch: negative  Left Side Tests  Straight leg raise:       Supine SLR:  60 degrees  Femoral Stretch: positive    Comments:      Femoral nerve stress test reproduces anterior thigh pain on left    Straight leg raise produces radiating pain into left posterior thigh    + tenderness to palpation over ischial bursa    SI joint nontender but + left KATELYNN testing for SIJ region pain    +Milgram's test for low back pain      Muscle Strength   Right Lower Extremity   Hip Flexion: 5/5   Quadriceps:  5/5   Hamstrin/5   Gastrocsoleus:  5/5/5  EHL:  5/5  Left Lower Extremity   Hip Flexion: 4/5   Quadriceps:  5/5   Hamstrin/5   Gastrocsoleus:  5/5/5  EHL:  5/5    Reflexes     Left Side  Quadriceps:  1+  Achilles:  1+  Left Nuñez's Sign:  Absent  Babinski Sign:  absent  Ankle Clonus:  absent    Right Side   Quadriceps:  1+  Achilles:  1+  Right Nuñez's Sign:  absent  Babinski Sign:  absent  Ankle Clonus:  absent    Vascular Exam     Right Pulses  Dorsalis Pedis:      2+          Left Pulses  Dorsalis Pedis:      2+          Capillary Refill  Right Hand: normal capillary refill  Left Hand: normal capillary refill    Edema  Right Upper Leg: absent  Left Upper Leg: absent        Assessment:       Encounter Diagnoses   Name Primary?    Hamstring tightness of left lower extremity Yes    Left thigh pain     Lumbar radiculopathy     Lumbar postlaminectomy syndrome          Plan:   We discussed with the patient the assessment and recommendations. The following is the plan we agreed on:    1. Patient has complicated symptomatology with likely multiple contributing factors, but on exam he is most tender within the proximal  hamstring muscles, reproducing his symptoms on palpation. Would first recommend conservative treatment for tight hamstring muscles including home exercise program and PT. Home stretches demonstrated today for tight hamstrings. Patient given paper copy of PT order to take to outside facility per his request.     2. Alternate etiology could be lumbar radiculopathy, as MRI does not crowding of nerve in left lateral recess at L5-S1. He has failed ADI at this level. We could consider TFESI at left L4 and L5 to target postoperative scarring at these levels.     3. If subopitmal improvement with PT, will first obtain new imaging including the hamstring tendons, MRI l-spine and left hip/thigh, to evaluate for etiology of symptoms.     4. Patient is on pradaxa which would possibly need to be held for future procedures.    5. RTC 4 weeks to assess response.         Diagnoses and all orders for this visit:    Hamstring tightness of left lower extremity  -     Ambulatory referral to Physical Therapy    Left thigh pain    Lumbar radiculopathy    Lumbar postlaminectomy syndrome     NANDA Dobbins MD  [facility]      I have personally taken the history and examined this patient and agree with the fellow's note as stated above.

## 2019-08-10 RX ORDER — LIRAGLUTIDE 6 MG/ML
INJECTION SUBCUTANEOUS
Qty: 9 ML | Refills: 1 | Status: SHIPPED | OUTPATIENT
Start: 2019-08-10 | End: 2019-10-09 | Stop reason: SDUPTHER

## 2019-08-16 ENCOUNTER — TELEPHONE (OUTPATIENT)
Dept: PAIN MEDICINE | Facility: CLINIC | Age: 74
End: 2019-08-16

## 2019-08-16 NOTE — TELEPHONE ENCOUNTER
My name is Staff, I am contacting you from [facility] regarding your appointment scheduled for 08.20.19, with Provider Dr. Linn, just confirming you will be able to make it.    If you feel you need to reschedule or canceled please give our office a call so we can better assist you.      Staff requesting patient to arrive 15 mins ahead of schedule appointment time.    Left voicemail reminding patient of their appt

## 2019-08-19 ENCOUNTER — PATIENT OUTREACH (OUTPATIENT)
Dept: ADMINISTRATIVE | Facility: OTHER | Age: 74
End: 2019-08-19

## 2019-08-20 ENCOUNTER — OFFICE VISIT (OUTPATIENT)
Dept: SPINE | Facility: CLINIC | Age: 74
End: 2019-08-20
Attending: ANESTHESIOLOGY
Payer: MEDICARE

## 2019-08-20 VITALS
DIASTOLIC BLOOD PRESSURE: 73 MMHG | BODY MASS INDEX: 32.67 KG/M2 | HEIGHT: 73 IN | WEIGHT: 246.5 LBS | TEMPERATURE: 98 F | SYSTOLIC BLOOD PRESSURE: 127 MMHG | HEART RATE: 68 BPM

## 2019-08-20 DIAGNOSIS — M62.838 MUSCLE SPASM: ICD-10-CM

## 2019-08-20 DIAGNOSIS — M47.26 OSTEOARTHRITIS OF SPINE WITH RADICULOPATHY, LUMBAR REGION: Primary | ICD-10-CM

## 2019-08-20 PROCEDURE — 3078F DIAST BP <80 MM HG: CPT | Mod: HCNC,CPTII,S$GLB, | Performed by: ANESTHESIOLOGY

## 2019-08-20 PROCEDURE — 99999 PR PBB SHADOW E&M-EST. PATIENT-LVL III: CPT | Mod: PBBFAC,HCNC,, | Performed by: ANESTHESIOLOGY

## 2019-08-20 PROCEDURE — 3074F SYST BP LT 130 MM HG: CPT | Mod: HCNC,CPTII,S$GLB, | Performed by: ANESTHESIOLOGY

## 2019-08-20 PROCEDURE — 1101F PR PT FALLS ASSESS DOC 0-1 FALLS W/OUT INJ PAST YR: ICD-10-PCS | Mod: HCNC,CPTII,S$GLB, | Performed by: ANESTHESIOLOGY

## 2019-08-20 PROCEDURE — 99999 PR PBB SHADOW E&M-EST. PATIENT-LVL III: ICD-10-PCS | Mod: PBBFAC,HCNC,, | Performed by: ANESTHESIOLOGY

## 2019-08-20 PROCEDURE — 99214 OFFICE O/P EST MOD 30 MIN: CPT | Mod: HCNC,S$GLB,, | Performed by: ANESTHESIOLOGY

## 2019-08-20 PROCEDURE — 1101F PT FALLS ASSESS-DOCD LE1/YR: CPT | Mod: HCNC,CPTII,S$GLB, | Performed by: ANESTHESIOLOGY

## 2019-08-20 PROCEDURE — 99214 PR OFFICE/OUTPT VISIT, EST, LEVL IV, 30-39 MIN: ICD-10-PCS | Mod: HCNC,S$GLB,, | Performed by: ANESTHESIOLOGY

## 2019-08-20 PROCEDURE — 3074F PR MOST RECENT SYSTOLIC BLOOD PRESSURE < 130 MM HG: ICD-10-PCS | Mod: HCNC,CPTII,S$GLB, | Performed by: ANESTHESIOLOGY

## 2019-08-20 PROCEDURE — 3078F PR MOST RECENT DIASTOLIC BLOOD PRESSURE < 80 MM HG: ICD-10-PCS | Mod: HCNC,CPTII,S$GLB, | Performed by: ANESTHESIOLOGY

## 2019-08-20 NOTE — PROGRESS NOTES
Subjective:      Patient ID: Janak Booker is a 74 y.o. male.    Chief Complaint: Low-back Pain    Referred by: No ref. provider found     Pain Scales  Best: 2/10  Worst: 9/10  Usually: 4/10  Today: 6/10    Janak Booker returns to clinic for follow-up after conservative management with PT and home exercises. He does not feel that these therapies improved his pain and now it is possibly worse. He continues to report pain originating in his back/hip that radiates down the posterior left leg above his knee. Worse with driving; nothing improves it.    Interventional Pain history:  -5/14/19: B facet joint injection L3/4: minimal improvement  -2/4/19: L L5-S1 TFESI Dr Doan-no improvement  -2/12/16: C7/T1 ADI  -12/18/13: RFA left L3,4,5-good relief for several years for pain that was higher in lumbar spine (different than current complaints)  -10/2013: L L3,4,5 MBB    Imaging:  MRI L spine 1/2019:  FINDINGS:  Vertebral column: There are postsurgical changes of posterior interbody fusion of L4 and L5.  There is susceptibility artifact related to fusion hardware.  Interbody fusion appears solid.  There is stable trace anterolisthesis of L4 on L5.  There is a chronic Schmorl's node in the superior endplate of L2.  The discs are mildly desiccated but there is no significant disc space narrowing.  There is a small hemangioma in the L1 vertebral body centrally in the superior aspect of T12.  Otherwise, baseline marrow signal is normal.    Spinal canal, conus, epidural space: The spinal canal is developmentally normal.  The conus is normal in location, contour and signal intensity, terminating at the level of T12-L1.  There is no abnormal epidural mass or fluid collection.    Findings by level:    On the sagittal images, there is no spinal stenosis or cord compression at the T11-T12 or T12-L1 levels.    L1-2: There is mild disc space narrowing.  There is a diffuse disc bulge with superimposed right paracentral disc protrusion  with annular fissure.  There is mild facet joint arthropathy.  There is borderline spinal stenosis.  The foramina are patent.    L2-3: There is a diffuse disc bulge with mild-to-moderate facet joint arthropathy.  There is flattening of the ventral dural sac.  There is mild spinal stenosis and bilateral foraminal stenosis.    L3-4: There is a mild-to-moderate diffuse disc bulge.  There is marked facet joint arthropathy with ligamentum flavum thickening.  There is moderate spinal stenosis with mild right and moderate left foraminal stenosis.    L4-5: There are changes of posterior interbody fusion.  There is facet joint overgrowth and fusion.  There is ligamentum flavum thickening.  There is mild spinal stenosis with mild-to-moderate bilateral foraminal stenosis.    L5-S1: There is a diffuse disc bulge with osteophytic ridging and mild-to-moderate facet joint arthropathy.  There is no spinal stenosis.  There is mild crowding of the left lateral recess.  There is mild bilateral foraminal stenosis.    Soft tissues, other: The prevertebral soft tissues are normal.    Past Medical History:   Diagnosis Date    Anticoagulant long-term use     Arthritis     Atrial fibrillation     CAD (coronary artery disease)     Colon polyps     per colonoscopy 9/11/2014    Diabetes mellitus, type 2     Disc displacement, lumbar     L3    Family history of prostate cancer     GERD (gastroesophageal reflux disease)     Heel bone fracture     left foot    Hyperlipidemia LDL goal < 70     Hypertension     Renal manifestation of secondary diabetes mellitus     Spinal stenosis, lumbar        Past Surgical History:   Procedure Laterality Date    BACK SURGERY      BLOCK, NERVE, FACET JOINT, LUMBAR, MEDIAL BRANCH Left 10/30/2013    [facility]    BLOCK, NERVE, FACET JOINT, LUMBAR, MEDIAL BRANCH Left 10/23/2013    [facility]    CARDIAC SURGERY       "stents     CARPAL TUNNEL RELEASE Right     CATARACT EXTRACTION W/  INTRAOCULAR LENS IMPLANT Bilateral     COLONOSCOPY N/A 3/27/2018    [facility]    COLONOSCOPY N/A 9/11/2014    [facility]    CORONARY ANGIOPLASTY WITH STENT PLACEMENT      x 3    ADI-TRANSFORAMINAL Right 9/18/2013    [facility]GT    INJECTION, FACET JOINT N/A 8/21/2013    Performed by Jose [facility]adalupe Linn MD at [facility]    INJECTION, FACET JOINT INJECTION (LUMBAR BLOCK) PLEASE INJECT L3/4 N/A 5/14/2019    Performed by [facility] at [facility]    Injection,steroid,epidural,transforaminal approach L5-S1 Left 2/4/2019    [facility] at [facility]    INJECTION-STEROID-EPIDURAL-CERVICAL N/A 2/12/2016    [facility] at [facility]    KNEE ARTHROSCOPY W/ MENISCECTOMY  12/22/2008    right    LUMBAR DISCECTOMY  12/2011    L4-L5 Fusion    LUMBAR EPIDURAL INJECTION  02/04/2019    RADIOFREQUENCY ABLATION, NERVE, SPINAL, LUMBAR, MEDIAL BRANCH, 1 LEVEL Left 12/18/2013    Performed by [facility] at [facility]GT    ROTATOR CUFF REPAIR Left 7/2012    SHOULDER OPEN ROTATOR CUFF REPAIR      TIBIA FRACTURE SURGERY         Review of patient's allergies indicates:   Allergen Reactions    No known drug allergies        Current Outpatient Medications   Medication Sig Dispense Refill    BD ALCOHOL SWABS PadM USE AS DIRECTED TO CHECK BLOOD GLUCOSE DAILY 100 each 5    BD ULTRA-FINE BARRY PEN NEEDLE 32 gauge x 5/32" Ndle USE ONE NEEDLE ONCE DAILY 100 each 3    blood sugar diagnostic Strp Use as directed to check blood sugar daily. 100 each 3    blood-glucose meter Misc Use as directed. 1 each 0    CALCIUM CITRATE-VITAMIN D3 ORAL       fenofibrate micronized (LOFIBRA) 134 MG Cap       fenofibric acid (TRILIPIX) 135 mg capsule 1 Capsule(s) Oral  Every day.      lancets (TRUEPLUS LANCETS) 28 gauge " Misc 1 lancet by Misc.(Non-Drug; Combo Route) route once daily. 100 each 3    lisinopril (PRINIVIL,ZESTRIL) 5 MG tablet 1 Tablet(s) Oral Every evening.      metFORMIN (GLUCOPHAGE-XR) 500 MG 24 hr tablet TAKE ONE TABLET BY MOUTH ONCE DAILY 90 tablet 0    metoprolol tartrate (LOPRESSOR) 25 MG tablet Take 1 tablet by mouth once daily.      multivitamin with minerals tablet       NITROSTAT 0.4 mg SL tablet Take 1 tablet by mouth continuous prn.      omega-3 fatty acids 1,000 mg Cap 1 Capsule(s) Oral OTC once a day.      omeprazole (PRILOSEC) 40 MG capsule TAKE ONE CAPSULE BY MOUTH ONCE DAILY 90 capsule 2    pioglitazone (ACTOS) 30 MG tablet TAKE ONE TABLET BY MOUTH ONCE DAILY 90 tablet 2    PRADAXA 150 mg Cap Take 150 mg by mouth Twice daily.      rosuvastatin (CRESTOR) 10 MG tablet Every day      sotalol (BETAPACE) 120 MG Tab 2 (two) times daily.       TRUE METRIX AIR GLUCOSE METER kit       VICTOZA 3-CAITIE 0.6 mg/0.1 mL (18 mg/3 mL) PnIj INJECT 1.8 MGS UNDER THE SKIN DAILY. 9 mL 1    VOLTAREN 1 % Gel Use as directed       No current facility-administered medications for this visit.        Family History   Problem Relation Age of Onset    Heart failure Father     Heart disease Father         MI    Prostate cancer Father     Heart failure Mother     Heart disease Mother     No Known Problems Sister     No Known Problems Daughter     No Known Problems Son     No Known Problems Daughter        Social History     Socioeconomic History    Marital status:      Spouse name: Santino    Number of children: 3    Years of education: Not on file    Highest education level: Not on file   Occupational History    Not on file   Social Needs    Financial resource strain: Not hard at all    Food insecurity:     Worry: Never true     Inability: Never true    Transportation needs:     Medical: No     Non-medical: No   Tobacco Use    Smoking status: Never Smoker    Smokeless tobacco: Never Used  "  Substance and Sexual Activity    Alcohol use: Yes     Alcohol/week: 3.0 oz     Types: 6 Standard drinks or equivalent per week     Frequency: Monthly or less     Drinks per session: 1 or 2     Binge frequency: Never    Drug use: No    Sexual activity: Yes     Partners: Female   Lifestyle    Physical activity:     Days per week: 0 days     Minutes per session: Not on file    Stress: Not at all   Relationships    Social connections:     Talks on phone: More than three times a week     Gets together: Twice a week     Attends Jewish service: More than 4 times per year     Active member of club or organization: Yes     Attends meetings of clubs or organizations: More than 4 times per year     Relationship status:    Other Topics Concern    Not on file   Social History Narrative    The patient does not exercise regularly ().      He is not satisfied with weight.    Rates diet as fair.    He does drink at least 1/2 gallon water daily.    He drinks 2 coffee/tea/caffeine-containing soft drinks daily.    Total sleep time at night is 7 hours.    He does wear seat belts.    Hobbies include off-road, camping.           Review of Systems   Constitution: Negative for chills, fever and weight loss.   HENT: Negative for hearing loss.    Eyes: Negative for visual disturbance.   Cardiovascular: Negative for chest pain.        A fib on pradaxa   Respiratory: Negative for cough.    Hematologic/Lymphatic: Bruises/bleeds easily (on pradaxa).   Skin: Negative for rash.   Musculoskeletal: Positive for back pain and stiffness.        Leg Pain   Gastrointestinal: Negative for bowel incontinence.   Genitourinary: Negative for bladder incontinence.   Neurological: Positive for paresthesias.   Psychiatric/Behavioral: Negative for altered mental status.           Objective:   /73   Pulse 68   Temp 98.1 °F (36.7 °C)   Ht 6' 1" (1.854 m)   Wt 111.8 kg (246 lb 7.6 oz)   BMI 32.52 kg/m²   Pain Disability Index " Review:  Last 3 PDI Scores 2019 2019 3/6/2019   Pain Disability Index (PDI) 51 44 52     Normocephalic.  Atraumatic.  Affect appropriate.  Breathing unlabored.  Extra ocular muscles intact.               General Musculoskeletal Exam   Gait: normal     Right Ankle/Foot Exam     Tests   Heel Walk: able to perform  Tiptoe Walk: able to perform    Left Ankle/Foot Exam     Tests   Heel Walk: able to perform  Tiptoe Walk: able to perform      Right Hip Exam     Range of Motion   External rotation: normal   Internal rotation: normal     Tests   Pain w/ forced internal rotation (KATELYNN): absent  Merna: negative    Other   Sensation: normal  Left Hip Exam     Range of Motion   External rotation: normal   Internal rotation: normal     Tests   Pain w/ forced internal rotation (KATELYNN): present  Merna: negative    Other   Sensation: normal      Back (L-Spine & T-Spine) / Neck (C-Spine) Exam     Tenderness Left paramedian tenderness of the Lower L-Spine.     Back (L-Spine & T-Spine) Range of Motion   Extension: normal   Flexion: normal   Lateral bend right: normal   Lateral bend left: normal   Rotation right: normal   Rotation left: normal     Spinal Sensation   Right Side Sensation  L-Spine Level: normal  S-Spine Level: normal  Left Side Sensation  L-Spine Level: normal  S-Spine Level: normal    Back (L-Spine & T-Spine) Tests   Right Side Tests  Femoral Stretch: negative  Left Side Tests  Straight leg raise:       Supine SLR:  60 degrees  Femoral Stretch: positive    Comments:      Femoral nerve stress test reproduces anterior thigh pain on left    Straight leg raise produces radiating pain into left posterior thigh    + tenderness to palpation over ischial bursa    SI joint nontender but + left KATELYNN testing for SIJ region pain    +Milgram's test for low back pain      Muscle Strength   Right Lower Extremity   Hip Flexion: 5/5   Quadriceps:  5/5   Hamstrin/5   Gastrocsoleus:    EHL:  /  Left Lower Extremity    Hip Flexion: 4/5   Quadriceps:  5/5   Hamstrin/5   Gastrocsoleus:  5/5/5  EHL:  5/5    Reflexes     Left Side  Quadriceps:  1+  Achilles:  1+  Left Nuñez's Sign:  Absent  Babinski Sign:  absent  Ankle Clonus:  absent    Right Side   Quadriceps:  1+  Achilles:  1+  Right Nuñez's Sign:  absent  Babinski Sign:  absent  Ankle Clonus:  absent    Vascular Exam     Right Pulses  Dorsalis Pedis:      2+          Left Pulses  Dorsalis Pedis:      2+          Capillary Refill  Right Hand: normal capillary refill  Left Hand: normal capillary refill    Edema  Right Upper Leg: absent  Left Upper Leg: absent        Assessment:       Encounter Diagnoses   Name Primary?    Osteoarthritis of spine with radiculopathy, lumbar region Yes    Muscle spasm          Plan:   We discussed with the patient the assessment and recommendations. The following is the plan we agreed on:    1. Patient did not benefit from PT and home exercises. Unlikely that hamstrings are his etiology of pain.    2. Scheduled for Left T4 and T5 TFESI to target sciatic-type radicular pain. Consented in office.    3. RTC in 2 weeks following procedure.        Diagnoses and all orders for this visit:    Osteoarthritis of spine with radiculopathy, lumbar region    Muscle spasm  Comments:  hamstrings    Valente Hodges MD  PGY3/CA2  I have personally taken the history and examined this patient and agree with the resident's note as stated above.

## 2019-09-23 ENCOUNTER — HOSPITAL ENCOUNTER (OUTPATIENT)
Facility: OTHER | Age: 74
Discharge: HOME | End: 2019-09-23
Attending: ANESTHESIOLOGY | Admitting: ANESTHESIOLOGY
Payer: MEDICARE

## 2019-09-23 VITALS
BODY MASS INDEX: 31.14 KG/M2 | WEIGHT: 235 LBS | OXYGEN SATURATION: 95 % | HEART RATE: 69 BPM | RESPIRATION RATE: 18 BRPM | SYSTOLIC BLOOD PRESSURE: 109 MMHG | TEMPERATURE: 98 F | DIASTOLIC BLOOD PRESSURE: 71 MMHG | HEIGHT: 73 IN

## 2019-09-23 DIAGNOSIS — M54.16 LUMBAR RADICULITIS: ICD-10-CM

## 2019-09-23 DIAGNOSIS — M54.16 LUMBAR RADICULOPATHY: Primary | ICD-10-CM

## 2019-09-23 DIAGNOSIS — M50.30 DDD (DEGENERATIVE DISC DISEASE), CERVICAL: ICD-10-CM

## 2019-09-23 DIAGNOSIS — M47.816 LUMBAR SPONDYLOSIS: ICD-10-CM

## 2019-09-23 LAB — POCT GLUCOSE: 106 MG/DL (ref 70–110)

## 2019-09-23 PROCEDURE — 64483 PR EPIDURAL INJ, ANES/STEROID, TRANSFORAMINAL, LUMB/SACR, SNGL LEVL: ICD-10-PCS | Mod: HCNC,LT,, | Performed by: ANESTHESIOLOGY

## 2019-09-23 PROCEDURE — 25500020 PHARM REV CODE 255: Mod: HCNC | Performed by: ANESTHESIOLOGY

## 2019-09-23 PROCEDURE — 64484 PRA INJECT ANES/STEROID FORAMEN LUMBAR/SACRAL W IMG GUIDE ,EA ADD LEVEL: ICD-10-PCS | Mod: HCNC,LT,, | Performed by: ANESTHESIOLOGY

## 2019-09-23 PROCEDURE — 63600175 PHARM REV CODE 636 W HCPCS: Mod: HCNC | Performed by: ANESTHESIOLOGY

## 2019-09-23 PROCEDURE — 64484 NJX AA&/STRD TFRM EPI L/S EA: CPT | Mod: HCNC | Performed by: ANESTHESIOLOGY

## 2019-09-23 PROCEDURE — 99152 PR MOD CONSCIOUS SEDATION, SAME PHYS, 5+ YRS, FIRST 15 MIN: ICD-10-PCS | Mod: HCNC,,, | Performed by: ANESTHESIOLOGY

## 2019-09-23 PROCEDURE — 64483 NJX AA&/STRD TFRM EPI L/S 1: CPT | Mod: HCNC | Performed by: ANESTHESIOLOGY

## 2019-09-23 PROCEDURE — 64483 NJX AA&/STRD TFRM EPI L/S 1: CPT | Mod: HCNC,LT,, | Performed by: ANESTHESIOLOGY

## 2019-09-23 PROCEDURE — 99152 MOD SED SAME PHYS/QHP 5/>YRS: CPT | Mod: HCNC,,, | Performed by: ANESTHESIOLOGY

## 2019-09-23 PROCEDURE — 25000003 PHARM REV CODE 250: Mod: HCNC | Performed by: ANESTHESIOLOGY

## 2019-09-23 PROCEDURE — 64484 NJX AA&/STRD TFRM EPI L/S EA: CPT | Mod: HCNC,LT,, | Performed by: ANESTHESIOLOGY

## 2019-09-23 RX ORDER — FENTANYL CITRATE 50 UG/ML
INJECTION, SOLUTION INTRAMUSCULAR; INTRAVENOUS
Status: DISCONTINUED | OUTPATIENT
Start: 2019-09-23 | End: 2019-09-23 | Stop reason: HOSPADM

## 2019-09-23 RX ORDER — SODIUM CHLORIDE 9 MG/ML
INJECTION, SOLUTION INTRAVENOUS
Status: COMPLETED | OUTPATIENT
Start: 2019-09-23 | End: 2019-09-23

## 2019-09-23 RX ORDER — LIDOCAINE HYDROCHLORIDE 10 MG/ML
INJECTION INFILTRATION; PERINEURAL
Status: DISCONTINUED | OUTPATIENT
Start: 2019-09-23 | End: 2019-09-23 | Stop reason: HOSPADM

## 2019-09-23 RX ORDER — MIDAZOLAM HYDROCHLORIDE 1 MG/ML
INJECTION INTRAMUSCULAR; INTRAVENOUS
Status: DISCONTINUED | OUTPATIENT
Start: 2019-09-23 | End: 2019-09-23 | Stop reason: HOSPADM

## 2019-09-23 RX ORDER — DEXAMETHASONE SODIUM PHOSPHATE 100 MG/10ML
INJECTION INTRAMUSCULAR; INTRAVENOUS
Status: DISCONTINUED | OUTPATIENT
Start: 2019-09-23 | End: 2019-09-23 | Stop reason: HOSPADM

## 2019-09-23 RX ORDER — LIDOCAINE HYDROCHLORIDE 10 MG/ML
INJECTION, SOLUTION EPIDURAL; INFILTRATION; INTRACAUDAL; PERINEURAL
Status: DISCONTINUED | OUTPATIENT
Start: 2019-09-23 | End: 2019-09-23 | Stop reason: HOSPADM

## 2019-09-23 RX ORDER — SODIUM CHLORIDE 9 MG/ML
500 INJECTION, SOLUTION INTRAVENOUS CONTINUOUS
Status: DISCONTINUED | OUTPATIENT
Start: 2019-09-23 | End: 2019-09-23 | Stop reason: HOSPADM

## 2019-09-23 RX ADMIN — SODIUM CHLORIDE 500 ML: 0.9 INJECTION, SOLUTION INTRAVENOUS at 07:09

## 2019-09-23 NOTE — DISCHARGE INSTRUCTIONS

## 2019-09-23 NOTE — DISCHARGE SUMMARY
"Discharge Note  Short Stay      SUMMARY     Admit Date: 9/23/2019    Attending Physician: Jose Guadalupe Linn      Discharge Physician: Jose Guadalupe Linn      Discharge Date: 9/23/2019 8:30 AM    Procedure(s) (LRB):  INJECTION, STEROID, EPIDURAL, TRANSFORAMINAL APPROACH (Left)    Final Diagnosis: Lumbar radiculopathy [M54.16]    Disposition: Home or self care    Patient Instructions:   Current Discharge Medication List      CONTINUE these medications which have NOT CHANGED    Details   BD ALCOHOL SWABS PadM USE AS DIRECTED TO CHECK BLOOD GLUCOSE DAILY  Qty: 100 each, Refills: 5      BD ULTRA-FINE BARRY PEN NEEDLE 32 gauge x 5/32" Ndle USE ONE NEEDLE ONCE DAILY  Qty: 100 each, Refills: 3      blood sugar diagnostic Strp Use as directed to check blood sugar daily.  Qty: 100 each, Refills: 3      blood-glucose meter Misc Use as directed.  Qty: 1 each, Refills: 0      CALCIUM CITRATE-VITAMIN D3 ORAL       fenofibrate micronized (LOFIBRA) 134 MG Cap       fenofibric acid (TRILIPIX) 135 mg capsule 1 Capsule(s) Oral  Every day.      lancets (TRUEPLUS LANCETS) 28 gauge Misc 1 lancet by Misc.(Non-Drug; Combo Route) route once daily.  Qty: 100 each, Refills: 3      lisinopril (PRINIVIL,ZESTRIL) 5 MG tablet 1 Tablet(s) Oral Every evening.      metFORMIN (GLUCOPHAGE-XR) 500 MG 24 hr tablet TAKE ONE TABLET BY MOUTH ONCE DAILY  Qty: 90 tablet, Refills: 0    Associated Diagnoses: Controlled type 2 diabetes mellitus with stage 3 chronic kidney disease, without long-term current use of insulin      metoprolol tartrate (LOPRESSOR) 25 MG tablet Take 1 tablet by mouth once daily.      multivitamin with minerals tablet       NITROSTAT 0.4 mg SL tablet Take 1 tablet by mouth continuous prn.      omega-3 fatty acids 1,000 mg Cap 1 Capsule(s) Oral OTC once a day.      omeprazole (PRILOSEC) 40 MG capsule TAKE ONE CAPSULE BY MOUTH ONCE DAILY  Qty: 90 capsule, Refills: 2      pioglitazone (ACTOS) 30 MG tablet TAKE ONE TABLET BY MOUTH ONCE DAILY  Qty: 90 " tablet, Refills: 2    Associated Diagnoses: Uncontrolled type 2 diabetes mellitus with complication, without long-term current use of insulin      PRADAXA 150 mg Cap Take 150 mg by mouth Twice daily.      rosuvastatin (CRESTOR) 10 MG tablet Every day      sotalol (BETAPACE) 120 MG Tab 2 (two) times daily.       TRUE METRIX AIR GLUCOSE METER kit       VICTOZA 3-CAITIE 0.6 mg/0.1 mL (18 mg/3 mL) PnIj INJECT 1.8 MGS UNDER THE SKIN DAILY.  Qty: 9 mL, Refills: 1      VOLTAREN 1 % Gel Use as directed                 Discharge Diagnosis: Lumbar radiculopathy [M54.16]  Condition on Discharge: Stable with no complications to procedure   Diet on Discharge: Same as before.  Activity: as per instruction sheet.  Discharge to: Home with a responsible adult.  Follow up: 2-4 weeks       Please call my office or pager at [phone] if experienced any weakness or loss of sensation, fever > 101.5, pain uncontrolled with oral medications, persistent nausea/vomiting/or diarrhea, redness or drainage from the incisions, or any other worrisome concerns. If physician on call was not reached or could not communicate with our office for any reason please go to the nearest emergency department

## 2019-09-23 NOTE — H&P
HPI  Patient presenting for Procedure(s) (LRB):  INJECTION, STEROID, EPIDURAL, TRANSFORAMINAL APPROACH (Left) L4 and L5    Patient on Anti-coagulation Yes, last took Pradaxa on 9/17/19    No health changes since previous encounter    Past Medical History:   Diagnosis Date    Anticoagulant long-term use     Arthritis     Atrial fibrillation     CAD (coronary artery disease)     Colon polyps     per colonoscopy 9/11/2014    Diabetes mellitus, type 2     Disc displacement, lumbar     L3    Family history of prostate cancer     GERD (gastroesophageal reflux disease)     Heel bone fracture     left foot    Hyperlipidemia LDL goal < 70     Hypertension     Renal manifestation of secondary diabetes mellitus     Spinal stenosis, lumbar      Past Surgical History:   Procedure Laterality Date    BACK SURGERY      BLOCK, NERVE, FACET JOINT, LUMBAR, MEDIAL BRANCH Left 10/30/2013    [facility]    BLOCK, NERVE, FACET JOINT, LUMBAR, MEDIAL BRANCH Left 10/23/2013    [facility]    CARDIAC SURGERY      stents     CARPAL TUNNEL RELEASE Right     CATARACT EXTRACTION W/  INTRAOCULAR LENS IMPLANT Bilateral     COLONOSCOPY N/A 3/27/2018    [facility] Rishi [facility]    COLONOSCOPY N/A 9/11/2014    [facility]    CORONARY ANGIOPLASTY WITH STENT PLACEMENT      x 3    ADI-TRANSFORAMINAL Right 9/18/2013    [facility] at [facility] PAIN MGT    INJECTION, FACET JOINT N/A 8/21/2013    Performed by Jose Guadalupe Linn MD at [facility] PA[facility] MGT    INJECTION, FACET JOINT INJECTION (LUMBAR BLOCK) PLEASE INJECT L3/4 N/A 5/14/2019    Performed by [facility] at [facility]GT    Injection,steroid,epidural,transforaminal approach L5-S1 Left 2/4/2019    [facility]    INJECTION-STEROID-EPIDURAL-CERVICAL N/A 2/12/2016    Performed by [facility] at  "[facility] OR    KNEE ARTHROSCOPY W/ MENISCECTOMY  12/22/2008    right    LUMBAR DISCECTOMY  12/2011    L4-L5 Fusion    LUMBAR EPIDURAL INJECTION  02/04/2019    RADIOFREQUENCY ABLATION, NERVE, SPINAL, LUMBAR, MEDIAL BRANCH, 1 LEVEL Left 12/18/2013    [facility] at [facility]    ROTATOR CUFF REPAIR Left 7/2012    SHOULDER OPEN ROTATOR CUFF REPAIR      TIBIA FRACTURE SURGERY       Review of patient's allergies indicates:   Allergen Reactions    No known drug allergies       No current facility-administered medications for this encounter.        PMHx, PSHx, Allergies, Medications reviewed in epic    ROS negative except pain complaints in HPI    OBJECTIVE:    BP (!) 148/84 (BP Location: Right arm)   Temp 98.2 °F (36.8 °C) (Oral)   Ht 6' 1" (1.854 m)   Wt 106.6 kg (235 lb)   SpO2 98%   BMI 31.00 kg/m²     PHYSICAL EXAMINATION:    GENERAL: Well appearing, in no acute distress, alert and oriented x3.  PSYCH:  Mood and affect appropriate.  SKIN: Skin color, texture, turgor normal, no rashes or lesions which will impact the procedure.  CV: RRR with palpation of the radial artery.  PULM: No evidence of respiratory difficulty, symmetric chest rise. Clear to auscultation.  NEURO: Cranial nerves grossly intact.    Plan:    Proceed with procedure as planned Procedure(s) (LRB):  INJECTION, STEROID, EPIDURAL, TRANSFORAMINAL APPROACH (Left) L4 and L5    Alphonso Matson  09/23/2019            "

## 2019-09-23 NOTE — OP NOTE
Date of Service: 09/23/2019    PCP: John Claire Jr, MD    Referring Physician:    Time-out taken to identify patient and procedure side prior to starting the procedure.   I attest that I have reviewed the patient's home medications prior to the procedure and no contraindication have been identified. I  re-evaluated the patient after the patient was positioned for the procedure in the procedure room immediately before the procedural time-out. The vital signs are current and represent the current state of the patient which has not significantly changed since the preprocedure assessment.                                                           PROCEDURE: Left L4 & 5 transforaminal epidural steroid injection under fluoroscopy    REASON FOR PROCEDURE: Left Lumbar radiculopathy [M54.16]  1. Lumbar radiculopathy    2. Lumbar spondylosis    3. DDD (degenerative disc disease), cervical    4. Lumbar radiculitis      POSTPROCEDURE DIAGNOSIS:   Lumbar radiculopathy [M54.16]    1. Lumbar radiculopathy    2. Lumbar spondylosis    3. DDD (degenerative disc disease), cervical    4. Lumbar radiculitis           PHYSICIAN: Jose Guadalupe Linn MD  ASSISTANTS:Alphonso Matson D.O.  Pain Fellow    MEDICATIONS INJECTED:  Preservative-free dexamethasone 10mg, Xylocaine 1% MPF 3-5ml. 3ml per level. Preservative free, sterile normal saline is used to get larger volume as needed.  LOCAL ANESTHETIC INJECTED:  Xylocaine 1% 9ml with Sodium Bicarbonate 1ml. 3ml per site.    SEDATION MEDICATIONS: Versed 2mg, Fentanyl 25mcg    ESTIMATED BLOOD LOSS:  None.    COMPLICATIONS:  None.    TECHNIQUE:   Laying in a prone position, the patient was prepped and draped in the usual sterile fashion using ChloraPrep and fenestrated drape.  The area to be injected was determined under fluoroscopic guidance.  Local anesthetic was given by raising a wheel and going down to the hub of a 27-gauge 1.25 inch needle.  The 3.5inch 22-gauge spinal needle was introduced  towards the transverse process of all levels as stated above.   The needle was walked medially then hinged into the neural foramen.  Omnipaque was injected to confirm appropriate placement and that there was no vascular runoff.  The medication was then injected after applying negative pressure. The patient tolerated the procedure well.    PAIN BEFORE THE PROCEDURE: 5/10.    PAIN AFTER THE PROCEDURE: 0/10.    The patient was monitored after the procedure.  Patient was given post procedure and discharge instructions to follow at home.  We will see the patient back in two weeks or the patient may call to inform of status. The patient was discharged in a stable condition.

## 2019-09-26 ENCOUNTER — TELEPHONE (OUTPATIENT)
Dept: FAMILY MEDICINE | Facility: CLINIC | Age: 74
End: 2019-09-26

## 2019-09-26 DIAGNOSIS — E11.22 CONTROLLED TYPE 2 DIABETES MELLITUS WITH STAGE 3 CHRONIC KIDNEY DISEASE, WITHOUT LONG-TERM CURRENT USE OF INSULIN: ICD-10-CM

## 2019-09-26 DIAGNOSIS — N18.30 CONTROLLED TYPE 2 DIABETES MELLITUS WITH STAGE 3 CHRONIC KIDNEY DISEASE, WITHOUT LONG-TERM CURRENT USE OF INSULIN: ICD-10-CM

## 2019-09-26 RX ORDER — METFORMIN HYDROCHLORIDE 500 MG/1
TABLET, EXTENDED RELEASE ORAL
Qty: 90 TABLET | Refills: 0 | Status: SHIPPED | OUTPATIENT
Start: 2019-09-26 | End: 2019-12-27

## 2019-09-27 RX ORDER — OMEPRAZOLE 40 MG/1
CAPSULE, DELAYED RELEASE ORAL
Qty: 90 CAPSULE | Refills: 1 | Status: SHIPPED | OUTPATIENT
Start: 2019-09-27 | End: 2020-03-27

## 2019-10-06 ENCOUNTER — PATIENT OUTREACH (OUTPATIENT)
Dept: ADMINISTRATIVE | Facility: OTHER | Age: 74
End: 2019-10-06

## 2019-10-08 ENCOUNTER — OFFICE VISIT (OUTPATIENT)
Dept: PAIN MEDICINE | Facility: CLINIC | Age: 74
End: 2019-10-08
Payer: MEDICARE

## 2019-10-08 VITALS
HEIGHT: 73 IN | BODY MASS INDEX: 32.7 KG/M2 | DIASTOLIC BLOOD PRESSURE: 71 MMHG | WEIGHT: 246.69 LBS | HEART RATE: 68 BPM | RESPIRATION RATE: 18 BRPM | SYSTOLIC BLOOD PRESSURE: 105 MMHG | TEMPERATURE: 98 F

## 2019-10-08 DIAGNOSIS — M50.30 DDD (DEGENERATIVE DISC DISEASE), CERVICAL: ICD-10-CM

## 2019-10-08 DIAGNOSIS — M54.16 LUMBAR RADICULOPATHY: Primary | ICD-10-CM

## 2019-10-08 DIAGNOSIS — M47.816 LUMBAR SPONDYLOSIS: ICD-10-CM

## 2019-10-08 DIAGNOSIS — M62.89 HAMSTRING TIGHTNESS OF LEFT LOWER EXTREMITY: ICD-10-CM

## 2019-10-08 PROCEDURE — 1101F PT FALLS ASSESS-DOCD LE1/YR: CPT | Mod: HCNC,CPTII,S$GLB, | Performed by: NURSE PRACTITIONER

## 2019-10-08 PROCEDURE — 99213 OFFICE O/P EST LOW 20 MIN: CPT | Mod: HCNC,S$GLB,, | Performed by: NURSE PRACTITIONER

## 2019-10-08 PROCEDURE — 3074F SYST BP LT 130 MM HG: CPT | Mod: HCNC,CPTII,S$GLB, | Performed by: NURSE PRACTITIONER

## 2019-10-08 PROCEDURE — 99213 PR OFFICE/OUTPT VISIT, EST, LEVL III, 20-29 MIN: ICD-10-PCS | Mod: HCNC,S$GLB,, | Performed by: NURSE PRACTITIONER

## 2019-10-08 PROCEDURE — 3074F PR MOST RECENT SYSTOLIC BLOOD PRESSURE < 130 MM HG: ICD-10-PCS | Mod: HCNC,CPTII,S$GLB, | Performed by: NURSE PRACTITIONER

## 2019-10-08 PROCEDURE — 99999 PR PBB SHADOW E&M-EST. PATIENT-LVL III: CPT | Mod: PBBFAC,HCNC,, | Performed by: NURSE PRACTITIONER

## 2019-10-08 PROCEDURE — 1101F PR PT FALLS ASSESS DOC 0-1 FALLS W/OUT INJ PAST YR: ICD-10-PCS | Mod: HCNC,CPTII,S$GLB, | Performed by: NURSE PRACTITIONER

## 2019-10-08 PROCEDURE — 99499 RISK ADDL DX/OHS AUDIT: ICD-10-PCS | Mod: S$GLB,,, | Performed by: NURSE PRACTITIONER

## 2019-10-08 PROCEDURE — 99499 UNLISTED E&M SERVICE: CPT | Mod: S$GLB,,, | Performed by: NURSE PRACTITIONER

## 2019-10-08 PROCEDURE — 99999 PR PBB SHADOW E&M-EST. PATIENT-LVL III: ICD-10-PCS | Mod: PBBFAC,HCNC,, | Performed by: NURSE PRACTITIONER

## 2019-10-08 PROCEDURE — 3078F PR MOST RECENT DIASTOLIC BLOOD PRESSURE < 80 MM HG: ICD-10-PCS | Mod: HCNC,CPTII,S$GLB, | Performed by: NURSE PRACTITIONER

## 2019-10-08 PROCEDURE — 3078F DIAST BP <80 MM HG: CPT | Mod: HCNC,CPTII,S$GLB, | Performed by: NURSE PRACTITIONER

## 2019-10-08 NOTE — PROGRESS NOTES
Chronic patient Established Note (Follow up visit)      SUBJECTIVE:    Janak Booker presents to the clinic for a follow-up appointment for lower back and left leg pain.  He is s/p left L4 and L5 TF ADI with about 50% relief.  He says that this helped him more than previous injections for this pain.  The pain radiates down the back of the left leg, stopping at the knee.  He denies any symptoms on the right side.  He denies any numbness in his feet.  He has intermittent numbness to left lateral thigh.  The pain worsens with prolonged standing/sitting and with increased activity.  His left leg pain is currently greater than his back pain.  He started PT in August but stopped because it worsened his pain.  Since the last visit, Janak Booker states the pain has been improving.  Current pain intensity is 3/10.    Pain Disability Index Review:  Last 3 PDI Scores 10/8/2019 8/20/2019 7/23/2019   Pain Disability Index (PDI) 19 51 44       Pain Medications:  None    Opioid Contract: no     report:  Not applicable    Pain Procedures:   12/18/13 Left L3,4,5 RFA  2/12/16 C7-T1 IL ADI  2/4/19 Left L5-S1 TF ADI  5/14/19 Bilateral L3/4 facet joint injections  9/23/19 Left L4 and L5 TF ADI- 50% relief    Physical Therapy/Home Exercise: yes    Imaging:     Narrative     EXAMINATION:  MRI LUMBAR SPINE WITHOUT CONTRAST    CLINICAL HISTORY:  lumbar radiculopathy; Radiculopathy, lumbar region    TECHNIQUE:  Multiplanar, multisequence MR images were acquired from the thoracolumbar junction to the sacrum without the administration of contrast.    COMPARISON:  CT lumbar spine dated 08/07/2013, plain films of the lumbar spine dated 01/31/2012    FINDINGS:  Vertebral column: There are postsurgical changes of posterior interbody fusion of L4 and L5.  There is susceptibility artifact related to fusion hardware.  Interbody fusion appears solid.  There is stable trace anterolisthesis of L4 on L5.  There is a chronic Schmorl's node in the  superior endplate of L2.  The discs are mildly desiccated but there is no significant disc space narrowing.  There is a small hemangioma in the L1 vertebral body centrally in the superior aspect of T12.  Otherwise, baseline marrow signal is normal.    Spinal canal, conus, epidural space: The spinal canal is developmentally normal.  The conus is normal in location, contour and signal intensity, terminating at the level of T12-L1.  There is no abnormal epidural mass or fluid collection.    Findings by level:    On the sagittal images, there is no spinal stenosis or cord compression at the T11-T12 or T12-L1 levels.    L1-2: There is mild disc space narrowing.  There is a diffuse disc bulge with superimposed right paracentral disc protrusion with annular fissure.  There is mild facet joint arthropathy.  There is borderline spinal stenosis.  The foramina are patent.    L2-3: There is a diffuse disc bulge with mild-to-moderate facet joint arthropathy.  There is flattening of the ventral dural sac.  There is mild spinal stenosis and bilateral foraminal stenosis.    L3-4: There is a mild-to-moderate diffuse disc bulge.  There is marked facet joint arthropathy with ligamentum flavum thickening.  There is moderate spinal stenosis with mild right and moderate left foraminal stenosis.    L4-5: There are changes of posterior interbody fusion.  There is facet joint overgrowth and fusion.  There is ligamentum flavum thickening.  There is mild spinal stenosis with mild-to-moderate bilateral foraminal stenosis.    L5-S1: There is a diffuse disc bulge with osteophytic ridging and mild-to-moderate facet joint arthropathy.  There is no spinal stenosis.  There is mild crowding of the left lateral recess.  There is mild bilateral foraminal stenosis.    Soft tissues, other: The prevertebral soft tissues are normal.      Impression       1. There are postsurgical changes of prior posterior interbody fusion of L4 and L5 without apparent  "complication.  Fusion appears solid.  There is stable trace anterolisthesis of L4 on L5.  2. There is no spinal canal or foraminal stenosis at the T11-12, T12-L1 levels.  3. At the L1-2 level there is borderline spinal stenosis but the foramina are patent.  4. At the L2-3 level there is mild spinal canal and bilateral foraminal stenosis.  5. At the L3-4 level there is moderate spinal stenosis with mild right and moderate left foraminal stenosis.  6. At the L4-5 level there is mild spinal stenosis with mild-to-moderate bilateral foraminal stenosis.  7. At the L5-S1 level there is mild bilateral foraminal stenosis as well as crowding of the left lateral recess without spinal stenosis.         Allergies:   Review of patient's allergies indicates:   Allergen Reactions    No known drug allergies        Current Medications:   Current Outpatient Medications   Medication Sig Dispense Refill    BD ALCOHOL SWABS PadM USE AS DIRECTED TO CHECK BLOOD GLUCOSE DAILY 100 each 5    BD ULTRA-FINE BARRY PEN NEEDLE 32 gauge x 5/32" Ndle USE ONE NEEDLE ONCE DAILY 100 each 3    blood sugar diagnostic Strp Use as directed to check blood sugar daily. 100 each 3    blood-glucose meter Misc Use as directed. 1 each 0    CALCIUM CITRATE-VITAMIN D3 ORAL       fenofibrate micronized (LOFIBRA) 134 MG Cap       fenofibric acid (TRILIPIX) 135 mg capsule 1 Capsule(s) Oral  Every day.      FLUZONE HIGH-DOSE 2019-20, PF, 180 mcg/0.5 mL Syrg       lancets (TRUEPLUS LANCETS) 28 gauge Misc 1 lancet by Misc.(Non-Drug; Combo Route) route once daily. 100 each 3    lisinopril (PRINIVIL,ZESTRIL) 5 MG tablet 1 Tablet(s) Oral Every evening.      metFORMIN (GLUCOPHAGE-XR) 500 MG 24 hr tablet TAKE ONE TABLET BY MOUTH ONCE DAILY 90 tablet 0    metoprolol tartrate (LOPRESSOR) 25 MG tablet Take 1 tablet by mouth once daily.      multivitamin with minerals tablet       omega-3 fatty acids 1,000 mg Cap 1 Capsule(s) Oral OTC once a day.      omeprazole " (PRILOSEC) 40 MG capsule TAKE ONE CAPSULE BY MOUTH ONCE DAILY 90 capsule 1    pioglitazone (ACTOS) 30 MG tablet TAKE ONE TABLET BY MOUTH ONCE DAILY 90 tablet 2    PRADAXA 150 mg Cap Take 150 mg by mouth Twice daily.      rosuvastatin (CRESTOR) 10 MG tablet Every day      sotalol (BETAPACE) 120 MG Tab 2 (two) times daily.       TRUE METRIX AIR GLUCOSE METER kit       VICTOZA 3-CAITIE 0.6 mg/0.1 mL (18 mg/3 mL) PnIj INJECT 1.8 MGS UNDER THE SKIN DAILY. 9 mL 1    NITROSTAT 0.4 mg SL tablet Take 1 tablet by mouth continuous prn.      VOLTAREN 1 % Gel Use as directed       No current facility-administered medications for this visit.        REVIEW OF SYSTEMS:    GENERAL:  No weight loss, malaise or fevers.  HEENT:  Negative for frequent or significant headaches.  NECK:  Negative for lumps, goiter, pain and significant neck swelling.  RESPIRATORY:  Negative for cough, wheezing or shortness of breath.  CARDIOVASCULAR:  Negative for chest pain, leg swelling or palpitations. CAD, A-fib.  GI:  Negative for abdominal discomfort, blood in stools or black stools or change in bowel habits.  MUSCULOSKELETAL:  See HPI.  SKIN:  Negative for lesions, rash, and itching.  PSYCH:  Negative for sleep disturbance, mood disorder and recent psychosocial stressors.  HEMATOLOGY/LYMPHOLOGY:  Negative for prolonged bleeding, bruising easily or swollen nodes.  NEURO:   No history of headaches, syncope, paralysis, seizures or tremors.  ENDO: Diabetes.  All other reviewed and negative other than HPI.    Past Medical History:  Past Medical History:   Diagnosis Date    Anticoagulant long-term use     Arthritis     Atrial fibrillation     CAD (coronary artery disease)     Colon polyps     per colonoscopy 9/11/2014    Diabetes mellitus, type 2     Disc displacement, lumbar     L3    Family history of prostate cancer     GERD (gastroesophageal reflux disease)     Heel bone fracture     left foot    Hyperlipidemia LDL goal < 70      Hypertension     Renal manifestation of secondary diabetes mellitus     Spinal stenosis, lumbar        Past Surgical History:  Past Surgical History:   Procedure Laterality Date    BACK SURGERY      CARDIAC SURGERY      stents     CARPAL TUNNEL RELEASE Right     CATARACT EXTRACTION W/  INTRAOCULAR LENS IMPLANT Bilateral     COLONOSCOPY N/A 3/27/2018    Procedure: COLONOSCOPY;  Surgeon: Rishi Garcia MD;  Location: [facility] ENDO;  Service: Endoscopy;  Laterality: N/A;    CORONARY ANGIOPLASTY WITH STENT PLACEMENT      x 3    INJECTION OF FACET JOINT N/A 5/14/2019    Procedure: INJECTION, FACET JOINT INJECTION (LUMBAR BLOCK) PLEASE INJECT L3/4;  Surgeon: Catrachito Harley MD;  Location: [facility] PAIN MGT;  Service: Pain Management;  Laterality: N/A;  NEEDS CONSENT, PRADAXA CLEARANCE IN CHART    KNEE ARTHROSCOPY W/ MENISCECTOMY  12/22/2008    right    LUMBAR DISCECTOMY  12/2011    L4-L5 Fusion    LUMBAR EPIDURAL INJECTION  02/04/2019    ROTATOR CUFF REPAIR Left 7/2012    SHOULDER OPEN ROTATOR CUFF REPAIR      TIBIA FRACTURE SURGERY      TRANSFORAMINAL EPIDURAL INJECTION OF STEROID Left 9/23/2019    Procedure: INJECTION, STEROID, EPIDURAL, TRANSFORAMINAL APPROACH;  Surgeon: Jose Guadalupe Linn MD;  Location: [facility] PAIN MGT;  Service: Pain Management;  Laterality: Left;  Left TF ADI L4 and L5  OK to hold Pradaxa       Family History:  Family History   Problem Relation Age of Onset    Heart failure Father     Heart disease Father         MI    Prostate cancer Father     Heart failure Mother     Heart disease Mother     No Known Problems Sister     No Known Problems Daughter     No Known Problems Son     No Known Problems Daughter        Social History:  Social History     Socioeconomic History    Marital status:      Spouse name: Santino    Number of children: 3    Years of education: Not on file    Highest education level: Not on file   Occupational History    Not on file   Social Needs     "Financial resource strain: Not hard at all    Food insecurity:     Worry: Never true     Inability: Never true    Transportation needs:     Medical: No     Non-medical: No   Tobacco Use    Smoking status: Never Smoker    Smokeless tobacco: Never Used   Substance and Sexual Activity    Alcohol use: Yes     Alcohol/week: 5.0 standard drinks     Types: 6 Standard drinks or equivalent per week     Frequency: Monthly or less     Drinks per session: 1 or 2     Binge frequency: Never    Drug use: No    Sexual activity: Yes     Partners: Female   Lifestyle    Physical activity:     Days per week: 0 days     Minutes per session: Not on file    Stress: Not at all   Relationships    Social connections:     Talks on phone: More than three times a week     Gets together: Twice a week     Attends Zoroastrian service: More than 4 times per year     Active member of club or organization: Yes     Attends meetings of clubs or organizations: More than 4 times per year     Relationship status:    Other Topics Concern    Not on file   Social History Narrative    The patient does not exercise regularly ().      He is not satisfied with weight.    Rates diet as fair.    He does drink at least 1/2 gallon water daily.    He drinks 2 coffee/tea/caffeine-containing soft drinks daily.    Total sleep time at night is 7 hours.    He does wear seat belts.    Hobbies include off-road, camping.       OBJECTIVE:    /71   Pulse 68   Temp 97.9 °F (36.6 °C)   Resp 18   Ht 6' 1" (1.854 m)   Wt 111.9 kg (246 lb 11.1 oz)   BMI 32.55 kg/m²     PHYSICAL EXAMINATION:    General appearance: Well appearing, in no acute distress, alert and oriented x3.  Psych:  Mood and affect appropriate.  Skin: Skin color, texture, turgor normal, no rashes or lesions, in both upper and lower body.  Head/face:  Atraumatic, normocephalic. No palpable lymph nodes  Cor: RRR  Pulm: CTA  GI: Abdomen soft and non-tender.  Back: Straight leg raising in the " sitting and supine positions is positive to radicular pain at left L5 distribution.  No pain to palpation over the spine or costovertebral angles.  Normal range of motion with pain on flexion and extension.  Positive facet loading bilaterally, L>R.  Extremities: Peripheral joint ROM is full and pain free without obvious instability or laxity in all four extremities. No deformities, edema, or skin discoloration. Good capillary refill.  Musculoskeletal: TTP over left SI joint.  KATELYNN is negative.  Bilateral upper and lower extremity strength is normal and symmetric.  No atrophy or tone abnormalities are noted.  Neuro: Bilateral upper and lower extremity coordination and muscle stretch reflexes are physiologic and symmetric.  Plantar response are downgoing.  Decreased sensation at left L4 and L5.  Gait: Antalgic- ambulates without assistance.    ASSESSMENT: 74 y.o. year old male with back and left leg pain, consistent with the following diagnoses:     1. Lumbar radiculopathy     2. Lumbar spondylosis     3. Hamstring tightness of left lower extremity     4. DDD (degenerative disc disease), cervical           PLAN:     - Previous imaging was reviewed and discussed with the patient today.    - He is s/p left L4 and L5 TF ADI with 50% relief.  This has helped more than previous procedures.  His pain is in identical character and distribution.  Will schedule for repeat to hopefully obtain more benefit.    - Will need to hold Pradaxa for above.    - The patient will continue a home exercise routine to help with pain and strengthening.      - RTC 2 weeks after ADI.    - Counseled patient regarding the importance of constant sleeping habits and physical therapy.      The above plan and management options were discussed at length with patient. Patient is in agreement with the above and verbalized understanding.    Elma Xavier  10/08/2019

## 2019-10-08 NOTE — PROGRESS NOTES
Referring Physician: No ref. provider found    PCP: John Claire Jr, MD      CC: neck pain  Interval History:   Mr. Booker is a 74 y.o. male with chronic low back pain who presents today for  F/u s/p left TF ADI at L5-S1. Reports no improvement with ADI. He actually feels like symptoms are worsening and occurring after sitting or standing for less time. Pain is unchanged in quality or location. He reports low back pain with radiation into left buttock to posterior left thigh. Terminates before the knee. Denies b/b changes or LE weakness. Pain worsens with standing and prolonged sitting.  He had successful lumbar MB RFAs in 2016 with Dr. Harley. He has benefited from  Cervical ESIs. He is scheduled for right rotator cuff repair next week.  Pain today is rated 8/10.    HPI:   Patient is 70-year-old male with past medical history of diabetes, coronary artery disease, lumbar DDD who is referred by Dr. Norton for neck pain.  Neck pain has gradually worsened over the past 3 months.  No traumatic incident.  He has constant aching and sharp pain over his posterior neck.  Pain radiates to his right shoulder.  Pain worsens with turning his head from side to side.  He has tried physical therapy with minimal benefit.  He has taken NSAIDs with mild benefits as well.  He has a recent cervical MRI which showed cervical DDD and spondylosis.  He has not had any cervical spine injections.  He does have a history of lumbar DDD and spondylosis.  He has had moderate benefit from size and rhizotomies performed by my colleagues in the past.  He denies any weakness.  No bowel bladder changes.  He rates his pain 5/10 today.    ROS:  CONSTITUTIONAL: No fevers, chills, night sweats, wt. loss, appetite changes  SKIN: no rashes or itching  ENT: No headaches, head trauma, vision changes, or eye pain  LYMPH NODES: None noticed   CV: No chest pain, palpitations.   RESP: No shortness of breath, dyspnea on exertion, cough, wheezing, or  hemoptysis  GI: No nausea, emesis, diarrhea, constipation, melena, hematochezia, pain.    : No dysuria, hematuria, urgency, or frequency   HEME: No easy bruising, bleeding problems  PSYCHIATRIC: No depression, anxiety, psychosis, hallucinations.  NEURO: No seizures, memory loss, dizziness or difficulty sleeping  MSK: + history of present illness      Past Medical History:   Diagnosis Date    Anticoagulant long-term use     Arthritis     Atrial fibrillation     CAD (coronary artery disease)     Colon polyps     per colonoscopy 9/11/2014    Diabetes mellitus, type 2     Disc displacement, lumbar     L3    Family history of prostate cancer     GERD (gastroesophageal reflux disease)     Heel bone fracture     left foot    Hyperlipidemia LDL goal < 70     Hypertension     Renal manifestation of secondary diabetes mellitus     Spinal stenosis, lumbar      Past Surgical History:   Procedure Laterality Date    BACK SURGERY      CARDIAC SURGERY      stents     CARPAL TUNNEL RELEASE Right     CATARACT EXTRACTION W/  INTRAOCULAR LENS IMPLANT Bilateral     COLONOSCOPY N/A 3/27/2018    Procedure: COLONOSCOPY;  Surgeon: Rishi Garcia MD;  Location: [facility];  Service: Endoscopy;  Laterality: N/A;    CORONARY ANGIOPLASTY WITH STENT PLACEMENT      x 3    INJECTION OF FACET JOINT N/A 5/14/2019    Procedure: INJECTION, FACET JOINT INJECTION (LUMBAR BLOCK) PLEASE INJECT L3/4;  Surgeon: Catrachito Harley MD;  Location: [facility];  Service: Pain Management;  Laterality: N/A;  NEEDS CONSENT, PRADAXA CLEARANCE IN CHART    KNEE ARTHROSCOPY W/ MENISCECTOMY  12/22/2008    right    LUMBAR DISCECTOMY  12/2011    L4-L5 Fusion    LUMBAR EPIDURAL INJECTION  02/04/2019    ROTATOR CUFF REPAIR Left 7/2012    SHOULDER OPEN ROTATOR CUFF REPAIR      TIBIA FRACTURE SURGERY      TRANSFORAMINAL EPIDURAL INJECTION OF STEROID Left 9/23/2019    Procedure: INJECTION, STEROID, EPIDURAL, TRANSFORAMINAL APPROACH;  Surgeon:  Jose Guadalupe Linn MD;  Location: [facility];  Service: Pain Management;  Laterality: Left;  Left TF ADI L4 and L5  OK to hold Pradaxa     Family History   Problem Relation Age of Onset    Heart failure Father     Heart disease Father         MI    Prostate cancer Father     Heart failure Mother     Heart disease Mother     No Known Problems Sister     No Known Problems Daughter     No Known Problems Son     No Known Problems Daughter      Social History     Socioeconomic History    Marital status:      Spouse name: Santino    Number of children: 3    Years of education: Not on file    Highest education level: Not on file   Occupational History    Not on file   Social Needs    Financial resource strain: Not hard at all    Food insecurity:     Worry: Never true     Inability: Never true    Transportation needs:     Medical: No     Non-medical: No   Tobacco Use    Smoking status: Never Smoker    Smokeless tobacco: Never Used   Substance and Sexual Activity    Alcohol use: Yes     Alcohol/week: 5.0 standard drinks     Types: 6 Standard drinks or equivalent per week     Frequency: Monthly or less     Drinks per session: 1 or 2     Binge frequency: Never    Drug use: No    Sexual activity: Yes     Partners: Female   Lifestyle    Physical activity:     Days per week: 0 days     Minutes per session: Not on file    Stress: Not at all   Relationships    Social connections:     Talks on phone: More than three times a week     Gets together: Twice a week     Attends Sikh service: More than 4 times per year     Active member of club or organization: Yes     Attends meetings of clubs or organizations: More than 4 times per year     Relationship status:    Other Topics Concern    Not on file   Social History Narrative    The patient does not exercise regularly ().      He is not satisfied with weight.    Rates diet as fair.    He does drink at least 1/2 gallon water daily.    He drinks 2  "coffee/tea/caffeine-containing soft drinks daily.    Total sleep time at night is 7 hours.    He does wear seat belts.    Hobbies include off-road, camping.         Medications/Allergies: See med card    Vitals:    10/08/19 0837   BP: 105/71   Pulse: 68   Resp: 18   Temp: 97.9 °F (36.6 °C)   Weight: 111.9 kg (246 lb 11.1 oz)   Height: 6' 1" (1.854 m)   PainSc:   3   PainLoc: Back         Physical exam:    GENERAL: A and O x3, the patient appears well groomed and is in no acute distress.  Skin: No rashes or obvious lesions  HEENT: normocephalic, atraumatic  CARDIOVASCULAR:  RRR  LUNGS: Non labored breathing  ABDOMEN: soft, nontender   UPPER EXTREMITIES: Normal alignment,  no atrophy, no skin changes,  hair growth and nail growth normal and equal bilaterally. No swelling. TTP right shoulder girdle. Decreased abduction.   LOWER EXTREMITIES:  Normal alignment, normal range of motion, no atrophy, no skin changes,  hair growth and nail growth normal and equal bilaterally. No swelling, no tenderness.  CERVICAL SPINE:  Cervical spine: ROM is full in flexion, extension and lateral rotation with mild increased pain.  Spurling's maneuver negative   Myofascial exam: mild Tenderness to palpation across cervical paraspinous region bilaterally.    LUMBAR SPINE  Lumbar spine: ROM is full with flexion extension and oblique extension with increased pain on left.    Bobby's test causes no increased pain on either side.    Supine straight leg raise is positive on left  Internal and external rotation of the hip causes no increased pain on either side.  Myofascial exam: No tenderness to palpation across lumbar paraspinous muscles.      MENTAL STATUS: normal orientation, speech, language, and fund of knowledge for social situation.  Emotional state appropriate.    CRANIAL NERVES:  II:  PERRL bilaterally,   III,IV,VI: EOMI.    V:  Facial sensation equal bilaterally  VII:  Facial motor function normal.  VIII:  Hearing equal to finger rub " bilaterally  IX/X: Gag normal, palate symmetric  XI:  Shoulder shrug equal, head turn equal  XII:  Tongue midline without fasciculations      MOTOR: Tone and bulk: normal bilateral upper and lower Strength: normal   Delt Bi Tri WE WF     R 5 5 5 5 5 5   L 5 5 5 5 5 5     IP ADD ABD Quad TA Gas HAM  R 5 5 5 5 5 5 5  L 5 5 5 5 5 5 5    SENSATION: Light touch and pinprick intact bilaterally  REFLEXES: normal, symmetric, nonbrisk.  Toes down, no clonus. No hoffmans.  GAIT: normal rise, base, steps, and arm swing.        Imaging:  Lumbar MRI 1/18/19  1. There are postsurgical changes of prior posterior interbody fusion of L4 and L5 without apparent complication.  Fusion appears solid.  There is stable trace anterolisthesis of L4 on L5.  2. There is no spinal canal or foraminal stenosis at the T11-12, T12-L1 levels.  3. At the L1-2 level there is borderline spinal stenosis but the foramina are patent.  4. At the L2-3 level there is mild spinal canal and bilateral foraminal stenosis.  5. At the L3-4 level there is moderate spinal stenosis with mild right and moderate left foraminal stenosis.  6. At the L4-5 level there is mild spinal stenosis with mild-to-moderate bilateral foraminal stenosis.  7. At the L5-S1 level there is mild bilateral foraminal stenosis as well as crowding of the left lateral recess without spinal stenosis.  MRI of the cervical spine without contrast from [facility] 10-28-15:  C3/4 left facet hypertrophy greater than right with mild bilateral foraminal narrowing.  C4/5 facet hypertrophy with mild bilateral foraminal narrowing.  C5/6 disk/ osteophyte and facet hypertrophy with mild-moderate central canal narrowing and severe right/ moderate left foraminal narrowing.  C6/7 disk/ osteophyte and facet hypertrophy with mild central canal narrowing and moderate-severe bilateral foraminal narrowing.  No cord compression. No myelomalacia.    Assessment:  Mr. Booker is a 74 y.o. male with      No diagnosis  found.  Plan:  1. I have stressed the importance of physical activity and exercise to improve overall health  2. Reviewed updated lumbar MRI results.   3. Will order LE EMG. He will wait until after shoulder surgery. Recommend surgical consult as well. He has had surgery with Dr. Martinez in the past but he wishes to remain in [place]. We will refer to Dr. Champagne   3. Start Gabapentin 300mg TID for radicular pain. Will start at 300 mg qhs for 1 week, if tolerated, increase to 300 mg in am and pm for 1 week, if tolerated increase to TID. We will titrate up to 7487-9954 mg based on therapeutic response and SEs.  4. Will call for referrals.

## 2019-10-09 RX ORDER — LIRAGLUTIDE 6 MG/ML
INJECTION SUBCUTANEOUS
Qty: 9 ML | Refills: 0 | Status: SHIPPED | OUTPATIENT
Start: 2019-10-09 | End: 2019-11-07 | Stop reason: SDUPTHER

## 2019-10-21 NOTE — PROGRESS NOTES
Subjective:       Patient ID: Janak Booker is a 74 y.o. male.    Chief Complaint: Diabetes    Diabetes   He presents for his follow-up diabetic visit. He has type 2 diabetes mellitus. His disease course has been stable. There are no hypoglycemic associated symptoms. Pertinent negatives for hypoglycemia include no dizziness, headaches or nervousness/anxiousness. There are no diabetic associated symptoms. Pertinent negatives for diabetes include no chest pain, no fatigue and no weakness. There are no hypoglycemic complications. Diabetic complications include heart disease and nephropathy. Risk factors for coronary artery disease include diabetes mellitus, dyslipidemia, male sex and hypertension. Current diabetic treatment includes oral agent (dual therapy) (plus GLP1RA). He is compliant with treatment most of the time. His weight is stable. He is following a diabetic diet. He has had a previous visit with a dietitian. He participates in exercise intermittently (due to LBP/sciatica; scheduled for ADI). He monitors blood glucose at home 1-2 x per day. His breakfast blood glucose is taken between 6-7 am. His breakfast blood glucose range is generally 110-130 mg/dl. An ACE inhibitor/angiotensin II receptor blocker is being taken. He does not see a podiatrist.Eye exam is not current.       Patient Active Problem List   Diagnosis    Family history of prostate cancer    Disc displacement, lumbar    GERD (gastroesophageal reflux disease)    Coronary artery disease, occlusive    Solitary kidney    CKD (chronic kidney disease) stage 3, GFR 30-59 ml/min    Controlled type 2 diabetes mellitus with stage 3 chronic kidney disease, without long-term current use of insulin    S/P coronary artery stent placement    Lumbar spondylosis    Facet arthritis of lumbar region    Hypertension associated with diabetes    Hyperlipidemia associated with type 2 diabetes mellitus    Facet arthritis of cervical region    Atrial  "fibrillation    DDD (degenerative disc disease), cervical    Hx of colonic polyps    Lumbar radiculitis    Chronic pain    Lumbar radiculopathy       Current Outpatient Medications:     BD ALCOHOL SWABS PadM, USE AS DIRECTED TO CHECK BLOOD GLUCOSE DAILY, Disp: 100 each, Rfl: 5    BD ULTRA-FINE BARRY PEN NEEDLE 32 gauge x 5/32" Ndle, USE ONE NEEDLE ONCE DAILY, Disp: 100 each, Rfl: 3    blood sugar diagnostic Strp, Use as directed to check blood sugar daily., Disp: 100 each, Rfl: 3    blood-glucose meter Misc, Use as directed., Disp: 1 each, Rfl: 0    CALCIUM CITRATE-VITAMIN D3 ORAL, , Disp: , Rfl:     fenofibrate micronized (LOFIBRA) 134 MG Cap, , Disp: , Rfl:     fenofibric acid (TRILIPIX) 135 mg capsule, 1 Capsule(s) Oral  Every day., Disp: , Rfl:     lancets (TRUEPLUS LANCETS) 28 gauge Misc, 1 lancet by Misc.(Non-Drug; Combo Route) route once daily., Disp: 100 each, Rfl: 3    lisinopril (PRINIVIL,ZESTRIL) 5 MG tablet, 1 Tablet(s) Oral Every evening., Disp: , Rfl:     metFORMIN (GLUCOPHAGE-XR) 500 MG 24 hr tablet, TAKE ONE TABLET BY MOUTH ONCE DAILY, Disp: 90 tablet, Rfl: 0    metoprolol tartrate (LOPRESSOR) 25 MG tablet, Take 1 tablet by mouth once daily., Disp: , Rfl:     multivitamin with minerals tablet, , Disp: , Rfl:     NITROSTAT 0.4 mg SL tablet, Take 1 tablet by mouth continuous prn., Disp: , Rfl:     omega-3 fatty acids 1,000 mg Cap, 1 Capsule(s) Oral OTC once a day., Disp: , Rfl:     omeprazole (PRILOSEC) 40 MG capsule, TAKE ONE CAPSULE BY MOUTH ONCE DAILY, Disp: 90 capsule, Rfl: 1    pioglitazone (ACTOS) 30 MG tablet, TAKE ONE TABLET BY MOUTH ONCE DAILY, Disp: 90 tablet, Rfl: 2    PRADAXA 150 mg Cap, Take 150 mg by mouth Twice daily., Disp: , Rfl:     rosuvastatin (CRESTOR) 10 MG tablet, Every day, Disp: , Rfl:     sotalol (BETAPACE) 120 MG Tab, 2 (two) times daily. , Disp: , Rfl:     TRUE METRIX AIR GLUCOSE METER kit, , Disp: , Rfl:     VICTOZA 3-CAITIE 0.6 mg/0.1 mL (18 mg/3 mL) " "PnIj, INJECT 1.8 MGS UNDER THE SKIN DAILY., Disp: 9 mL, Rfl: 0    FLUZONE HIGH-DOSE 2019-20, PF, 180 mcg/0.5 mL Syrg, , Disp: , Rfl:     [START ON 11/26/2019] varicella-zoster gE-AS01B, PF, (SHINGRIX, PF,) 50 mcg/0.5 mL injection, Inject 0.5 mLs into the muscle once. for 1 dose, Disp: 0.5 mL, Rfl: 0    VOLTAREN 1 % Gel, Use as directed, Disp: , Rfl:     The following portions of the patient's history were reviewed and updated as appropriate: allergies, past family history, past medical history, past social history and past surgical history.    Review of Systems   Constitutional: Negative for fatigue and unexpected weight change.   HENT: Negative for ear discharge, ear pain, hearing loss, tinnitus and voice change.    Eyes: Negative for pain.   Respiratory: Negative for cough and shortness of breath.    Cardiovascular: Negative for chest pain, palpitations and leg swelling.   Gastrointestinal: Negative for abdominal pain, blood in stool, constipation, diarrhea, nausea and vomiting.        Occ heartburn   Genitourinary: Negative for decreased urine volume, difficulty urinating, dysuria, enuresis, frequency, hematuria and urgency.   Musculoskeletal: Negative for arthralgias, back pain and myalgias.   Skin: Negative for rash.   Neurological: Negative for dizziness, weakness, light-headedness and headaches.   Hematological: Does not bruise/bleed easily.   Psychiatric/Behavioral: Negative for dysphoric mood and sleep disturbance. The patient is not nervous/anxious.          Objective:      /70   Pulse 71   Ht 6' 1" (1.854 m)   Wt 112.1 kg (247 lb 1.6 oz)   SpO2 95%   BMI 32.60 kg/m²     Physical Exam   Constitutional: He is oriented to person, place, and time. He appears well-developed and well-nourished. He is cooperative.   HENT:   Head: Normocephalic and atraumatic.   Right Ear: External ear normal.   Left Ear: External ear normal.   Nose: Nose normal.   Mouth/Throat: Oropharynx is clear and moist and " mucous membranes are normal. No oropharyngeal exudate.   Eyes: Conjunctivae are normal. No scleral icterus.   Neck: Neck supple. No JVD present. Carotid bruit is not present. No thyromegaly present.   Cardiovascular: Normal rate, regular rhythm, normal heart sounds and normal pulses. Exam reveals no gallop and no friction rub.   No murmur heard.  Pulses:       Dorsalis pedis pulses are 2+ on the right side, and 2+ on the left side.        Posterior tibial pulses are 2+ on the right side, and 2+ on the left side.   Pulmonary/Chest: Effort normal and breath sounds normal. He has no wheezes. He has no rhonchi. He has no rales.   Abdominal: Soft. Bowel sounds are normal. He exhibits no distension and no mass. There is no splenomegaly or hepatomegaly. There is no tenderness.   Musculoskeletal: Normal range of motion. He exhibits no edema or tenderness.        Right foot: There is normal range of motion and no deformity.        Left foot: There is normal range of motion and no deformity.   Feet:   Right Foot:   Protective Sensation: 9 sites tested. 9 sites sensed.   Skin Integrity: Negative for erythema, warmth or dry skin.   Left Foot:   Protective Sensation: 9 sites tested. 9 sites sensed.   Skin Integrity: Negative for erythema, callus or dry skin.   Lymphadenopathy:     He has no cervical adenopathy.     He has no axillary adenopathy.   Neurological: He is alert and oriented to person, place, and time. He has normal strength and normal reflexes. No cranial nerve deficit or sensory deficit. Coordination normal.   Skin: Skin is warm and dry.   Psychiatric: He has a normal mood and affect.   Vitals reviewed.          Assessment:       1. Controlled type 2 diabetes mellitus with stage 3 chronic kidney disease, without long-term current use of insulin    2. CKD (chronic kidney disease) stage 3, GFR 30-59 ml/min    3. Coronary artery disease, occlusive    4. Hypertension associated with diabetes    5. Hyperlipidemia  "associated with type 2 diabetes mellitus    6. Need for shingles vaccine        Plan:       Offered influenza vaccine today.  Shingrix #2 late November 2019.  Foot exam performed today.  Obtain copy of most recent eye exam.    Labs (see Orders)  Orders Placed This Encounter    Basic metabolic panel    Microalbumin/creatinine urine ratio    Hemoglobin A1c    varicella-zoster gE-AS01B, PF, (SHINGRIX, PF,) 50 mcg/0.5 mL injection     We will call the patient with results & make further recommendations at that time.  RTC 6 months.        "This note will not be shared with the patient."  "

## 2019-10-22 ENCOUNTER — OFFICE VISIT (OUTPATIENT)
Dept: FAMILY MEDICINE | Facility: CLINIC | Age: 74
End: 2019-10-22
Attending: FAMILY MEDICINE
Payer: MEDICARE

## 2019-10-22 ENCOUNTER — OFFICE VISIT (OUTPATIENT)
Dept: INTERNAL MEDICINE | Facility: CLINIC | Age: 74
End: 2019-10-22
Payer: MEDICARE

## 2019-10-22 VITALS
HEIGHT: 73 IN | DIASTOLIC BLOOD PRESSURE: 70 MMHG | OXYGEN SATURATION: 95 % | SYSTOLIC BLOOD PRESSURE: 108 MMHG | HEART RATE: 71 BPM | WEIGHT: 245.38 LBS | BODY MASS INDEX: 32.52 KG/M2

## 2019-10-22 VITALS
OXYGEN SATURATION: 95 % | HEIGHT: 73 IN | HEART RATE: 71 BPM | SYSTOLIC BLOOD PRESSURE: 108 MMHG | DIASTOLIC BLOOD PRESSURE: 70 MMHG | BODY MASS INDEX: 32.75 KG/M2 | WEIGHT: 247.13 LBS

## 2019-10-22 DIAGNOSIS — N18.30 CKD (CHRONIC KIDNEY DISEASE) STAGE 3, GFR 30-59 ML/MIN: ICD-10-CM

## 2019-10-22 DIAGNOSIS — H91.90 HEARING LOSS, UNSPECIFIED HEARING LOSS TYPE, UNSPECIFIED LATERALITY: ICD-10-CM

## 2019-10-22 DIAGNOSIS — Z95.5 S/P CORONARY ARTERY STENT PLACEMENT: ICD-10-CM

## 2019-10-22 DIAGNOSIS — G89.29 OTHER CHRONIC PAIN: ICD-10-CM

## 2019-10-22 DIAGNOSIS — E11.22 CONTROLLED TYPE 2 DIABETES MELLITUS WITH STAGE 3 CHRONIC KIDNEY DISEASE, WITHOUT LONG-TERM CURRENT USE OF INSULIN: ICD-10-CM

## 2019-10-22 DIAGNOSIS — M47.816 FACET ARTHRITIS OF LUMBAR REGION: ICD-10-CM

## 2019-10-22 DIAGNOSIS — E78.5 HYPERLIPIDEMIA ASSOCIATED WITH TYPE 2 DIABETES MELLITUS: ICD-10-CM

## 2019-10-22 DIAGNOSIS — M54.16 LUMBAR RADICULITIS: ICD-10-CM

## 2019-10-22 DIAGNOSIS — I25.10 CORONARY ARTERY DISEASE, OCCLUSIVE: ICD-10-CM

## 2019-10-22 DIAGNOSIS — Z00.00 ENCOUNTER FOR PREVENTIVE HEALTH EXAMINATION: Primary | ICD-10-CM

## 2019-10-22 DIAGNOSIS — M47.816 LUMBAR SPONDYLOSIS: ICD-10-CM

## 2019-10-22 DIAGNOSIS — E11.22 CONTROLLED TYPE 2 DIABETES MELLITUS WITH STAGE 3 CHRONIC KIDNEY DISEASE, WITHOUT LONG-TERM CURRENT USE OF INSULIN: Primary | ICD-10-CM

## 2019-10-22 DIAGNOSIS — N18.30 CONTROLLED TYPE 2 DIABETES MELLITUS WITH STAGE 3 CHRONIC KIDNEY DISEASE, WITHOUT LONG-TERM CURRENT USE OF INSULIN: Primary | ICD-10-CM

## 2019-10-22 DIAGNOSIS — Z86.010 HX OF COLONIC POLYPS: ICD-10-CM

## 2019-10-22 DIAGNOSIS — I15.2 HYPERTENSION ASSOCIATED WITH DIABETES: ICD-10-CM

## 2019-10-22 DIAGNOSIS — M54.16 LUMBAR RADICULOPATHY: ICD-10-CM

## 2019-10-22 DIAGNOSIS — E11.59 HYPERTENSION ASSOCIATED WITH DIABETES: ICD-10-CM

## 2019-10-22 DIAGNOSIS — I48.91 ATRIAL FIBRILLATION, UNSPECIFIED TYPE: ICD-10-CM

## 2019-10-22 DIAGNOSIS — K21.9 GASTROESOPHAGEAL REFLUX DISEASE WITHOUT ESOPHAGITIS: ICD-10-CM

## 2019-10-22 DIAGNOSIS — M50.30 DDD (DEGENERATIVE DISC DISEASE), CERVICAL: ICD-10-CM

## 2019-10-22 DIAGNOSIS — Z80.42 FAMILY HISTORY OF PROSTATE CANCER: ICD-10-CM

## 2019-10-22 DIAGNOSIS — M47.812 FACET ARTHRITIS OF CERVICAL REGION: ICD-10-CM

## 2019-10-22 DIAGNOSIS — E11.69 HYPERLIPIDEMIA ASSOCIATED WITH TYPE 2 DIABETES MELLITUS: ICD-10-CM

## 2019-10-22 DIAGNOSIS — Z23 NEED FOR SHINGLES VACCINE: ICD-10-CM

## 2019-10-22 DIAGNOSIS — N18.30 CONTROLLED TYPE 2 DIABETES MELLITUS WITH STAGE 3 CHRONIC KIDNEY DISEASE, WITHOUT LONG-TERM CURRENT USE OF INSULIN: ICD-10-CM

## 2019-10-22 DIAGNOSIS — M51.26 DISC DISPLACEMENT, LUMBAR: ICD-10-CM

## 2019-10-22 PROCEDURE — G0439 PR MEDICARE ANNUAL WELLNESS SUBSEQUENT VISIT: ICD-10-PCS | Mod: HCNC,S$GLB,, | Performed by: NURSE PRACTITIONER

## 2019-10-22 PROCEDURE — 99999 PR PBB SHADOW E&M-EST. PATIENT-LVL III: ICD-10-PCS | Mod: PBBFAC,HCNC,, | Performed by: FAMILY MEDICINE

## 2019-10-22 PROCEDURE — 3074F SYST BP LT 130 MM HG: CPT | Mod: HCNC,CPTII,S$GLB, | Performed by: FAMILY MEDICINE

## 2019-10-22 PROCEDURE — 99999 PR PBB SHADOW E&M-EST. PATIENT-LVL III: CPT | Mod: PBBFAC,HCNC,, | Performed by: FAMILY MEDICINE

## 2019-10-22 PROCEDURE — 3078F PR MOST RECENT DIASTOLIC BLOOD PRESSURE < 80 MM HG: ICD-10-PCS | Mod: HCNC,CPTII,S$GLB, | Performed by: NURSE PRACTITIONER

## 2019-10-22 PROCEDURE — 99214 PR OFFICE/OUTPT VISIT, EST, LEVL IV, 30-39 MIN: ICD-10-PCS | Mod: HCNC,S$GLB,, | Performed by: FAMILY MEDICINE

## 2019-10-22 PROCEDURE — G0439 PPPS, SUBSEQ VISIT: HCPCS | Mod: HCNC,S$GLB,, | Performed by: NURSE PRACTITIONER

## 2019-10-22 PROCEDURE — 99499 UNLISTED E&M SERVICE: CPT | Mod: S$GLB,,, | Performed by: NURSE PRACTITIONER

## 2019-10-22 PROCEDURE — 99999 PR PBB SHADOW E&M-EST. PATIENT-LVL IV: ICD-10-PCS | Mod: PBBFAC,HCNC,, | Performed by: NURSE PRACTITIONER

## 2019-10-22 PROCEDURE — 99499 RISK ADDL DX/OHS AUDIT: ICD-10-PCS | Mod: S$GLB,,, | Performed by: NURSE PRACTITIONER

## 2019-10-22 PROCEDURE — 3044F HG A1C LEVEL LT 7.0%: CPT | Mod: HCNC,CPTII,S$GLB, | Performed by: FAMILY MEDICINE

## 2019-10-22 PROCEDURE — 3078F PR MOST RECENT DIASTOLIC BLOOD PRESSURE < 80 MM HG: ICD-10-PCS | Mod: HCNC,CPTII,S$GLB, | Performed by: FAMILY MEDICINE

## 2019-10-22 PROCEDURE — 3044F PR MOST RECENT HEMOGLOBIN A1C LEVEL <7.0%: ICD-10-PCS | Mod: HCNC,CPTII,S$GLB, | Performed by: NURSE PRACTITIONER

## 2019-10-22 PROCEDURE — 1101F PT FALLS ASSESS-DOCD LE1/YR: CPT | Mod: HCNC,CPTII,S$GLB, | Performed by: FAMILY MEDICINE

## 2019-10-22 PROCEDURE — 3074F PR MOST RECENT SYSTOLIC BLOOD PRESSURE < 130 MM HG: ICD-10-PCS | Mod: HCNC,CPTII,S$GLB, | Performed by: FAMILY MEDICINE

## 2019-10-22 PROCEDURE — 1101F PR PT FALLS ASSESS DOC 0-1 FALLS W/OUT INJ PAST YR: ICD-10-PCS | Mod: HCNC,CPTII,S$GLB, | Performed by: FAMILY MEDICINE

## 2019-10-22 PROCEDURE — 99999 PR PBB SHADOW E&M-EST. PATIENT-LVL IV: CPT | Mod: PBBFAC,HCNC,, | Performed by: NURSE PRACTITIONER

## 2019-10-22 PROCEDURE — 3044F PR MOST RECENT HEMOGLOBIN A1C LEVEL <7.0%: ICD-10-PCS | Mod: HCNC,CPTII,S$GLB, | Performed by: FAMILY MEDICINE

## 2019-10-22 PROCEDURE — 99214 OFFICE O/P EST MOD 30 MIN: CPT | Mod: HCNC,S$GLB,, | Performed by: FAMILY MEDICINE

## 2019-10-22 PROCEDURE — 3074F PR MOST RECENT SYSTOLIC BLOOD PRESSURE < 130 MM HG: ICD-10-PCS | Mod: HCNC,CPTII,S$GLB, | Performed by: NURSE PRACTITIONER

## 2019-10-22 PROCEDURE — 3044F HG A1C LEVEL LT 7.0%: CPT | Mod: HCNC,CPTII,S$GLB, | Performed by: NURSE PRACTITIONER

## 2019-10-22 PROCEDURE — 3078F DIAST BP <80 MM HG: CPT | Mod: HCNC,CPTII,S$GLB, | Performed by: FAMILY MEDICINE

## 2019-10-22 PROCEDURE — 3078F DIAST BP <80 MM HG: CPT | Mod: HCNC,CPTII,S$GLB, | Performed by: NURSE PRACTITIONER

## 2019-10-22 PROCEDURE — 3074F SYST BP LT 130 MM HG: CPT | Mod: HCNC,CPTII,S$GLB, | Performed by: NURSE PRACTITIONER

## 2019-10-22 NOTE — PROGRESS NOTES
"Janak Booker presented for a  Medicare AWV and comprehensive Health Risk Assessment today. The following components were reviewed and updated:    · Medical history  · Family History  · Social history  · Allergies and Current Medications  · Health Risk Assessment  · Health Maintenance  · Care Team     ** See Completed Assessments for Annual Wellness Visit within the encounter summary.**       The following assessments were completed:  · Living Situation  · CAGE  · Depression Screening  · Timed Get Up and Go  · Whisper Test  · Cognitive Function Screening  · Nutrition Screening  · ADL Screening  · PAQ Screening          Vitals:    10/22/19 1307   BP: 108/70   Pulse: 71   SpO2: 95%   Weight: 111.3 kg (245 lb 6 oz)   Height: 6' 1" (1.854 m)     Body mass index is 32.37 kg/m².     Physical Exam   Constitutional: He is oriented to person, place, and time.   Obese   HENT:   Head: Normocephalic and atraumatic. Not macrocephalic and not microcephalic. Head is without raccoon's eyes, without Webster's sign, without abrasion, without contusion, without laceration, without right periorbital erythema and without left periorbital erythema. Hair is normal.   Right Ear: No lacerations. No drainage, swelling or tenderness. No foreign bodies. No mastoid tenderness. Tympanic membrane is not injected, not scarred, not perforated, not erythematous, not retracted and not bulging. Tympanic membrane mobility is normal. No middle ear effusion. No hemotympanum. No decreased hearing is noted.   Left Ear: No lacerations. No drainage, swelling or tenderness. No foreign bodies. No mastoid tenderness. Tympanic membrane is not injected, not scarred, not perforated, not erythematous, not retracted and not bulging. Tympanic membrane mobility is normal.  No middle ear effusion. No hemotympanum. No decreased hearing is noted.   Nose: Nose normal. No mucosal edema, rhinorrhea, nose lacerations, sinus tenderness or nasal deformity.   Mouth/Throat: Uvula " is midline.   Eyes: Conjunctivae and lids are normal. No scleral icterus.   Neck: Trachea normal. Neck supple. No spinous process tenderness and no muscular tenderness present. No neck rigidity. No edema, no erythema and normal range of motion present. No thyroid mass and no thyromegaly present.   Cardiovascular: Normal rate, regular rhythm, normal heart sounds and intact distal pulses. Exam reveals no gallop and no friction rub.   No murmur heard.  Pulmonary/Chest: Effort normal and breath sounds normal. No stridor. No respiratory distress. He has no wheezes. He has no rales. He exhibits no tenderness.   Abdominal: Soft. Bowel sounds are normal. He exhibits no distension.   Musculoskeletal: Normal range of motion.   Lymphadenopathy:        Head (right side): No submental, no submandibular, no tonsillar, no preauricular and no posterior auricular adenopathy present.        Head (left side): No submental, no submandibular, no tonsillar, no preauricular, no posterior auricular and no occipital adenopathy present.   Neurological: He is alert and oriented to person, place, and time.   Skin: Skin is warm and dry.   Psychiatric: He has a normal mood and affect. His behavior is normal. Judgment and thought content normal.   Vitals reviewed.      Diagnoses and health risks identified today and associated recommendations/orders:    1. Encounter for preventive health examination  Annual Health Risk Assessment (HRA) visit today.  Counseling and referral of health maintenance and preventative health measures performed.  Patient given annual wellness paperwork to take home.  Encouraged to return in 1 year for subsequent HRA visit.     2. CKD (chronic kidney disease) stage 3, GFR 30-59 ml/min  Chronic. Stable. Continue current treatment plan as previously prescribed by PCP.    3. Coronary artery disease, occlusive  Chronic. Stable. Continue current treatment plan as previously prescribed by PCP.    4. Hypertension associated with  diabetes  Chronic. Stable. Continue current treatment plan as previously prescribed by PCP.    5. Hyperlipidemia associated with type 2 diabetes mellitus  Chronic. Stable. Continue current treatment plan as previously prescribed by PCP.    6. Controlled type 2 diabetes mellitus with stage 3 chronic kidney disease, without long-term current use of insulin  Chronic. Stable. Controlled. Last Hgb  A1c=6.5 from 4/11/19. Continue current treatment plan as previously prescribed by PCP.    7. Atrial fibrillation, unspecified type  Chronic. Stable. Continue current treatment plan as previously prescribed by PCP.    8. S/P coronary artery stent placement  Chronic. Stable. Continue current treatment plan as previously prescribed by PCP.    9. Solitary kidney  Chronic. Stable. Continue current treatment plan as previously prescribed by PCP.    10. Gastroesophageal reflux disease without esophagitis  Chronic. Stable. Continue current treatment plan as previously prescribed by PCP.    11. Hx of colonic polyps  Chronic. Stable. Continue current treatment plan as previously prescribed by PCP.    12. Family history of prostate cancer  Chronic. Stable. Continue current treatment plan as previously prescribed by PCP.    13. Facet arthritis of lumbar region  Chronic. Stable. Continue current treatment plan as previously prescribed by PCP.\    14. Facet arthritis of cervical region  Chronic. Stable. Continue current treatment plan as previously prescribed by PCP.    15. Lumbar radiculopathy  Chronic. Stable. Continue current treatment plan as previously prescribed by PCP.    16. Other chronic pain  Chronic. Stable. Continue current treatment plan as previously prescribed by PCP.    17. Lumbar radiculitis  Chronic. Stable. Continue current treatment plan as previously prescribed by PCP.    18. Lumbar spondylosis  Chronic. Stable. Continue current treatment plan as previously prescribed by PCP.    19. Disc displacement, lumbar  Chronic.  Stable. Continue current treatment plan as previously prescribed by PCP.    20. DDD (degenerative disc disease), cervical  Chronic. Stable. Continue current treatment plan as previously prescribed by PCP.    21. Hearing loss, unspecified hearing loss type, unspecified laterality  - Ambulatory Referral to ENT      Provided Janak with a 5-10 year written screening schedule and personal prevention plan. Recommendations were developed using the USPSTF age appropriate recommendations. Education, counseling, and referrals were provided as needed. After Visit Summary printed and given to patient which includes a list of additional screenings\tests needed.    Follow up in about 1 year (around 10/22/2020).    Isak Stapleton NP  I offered to discuss end of life issues, including information on how to make advance directives that the patient could use to name someone who would make medical decisions on their behalf if they became too ill to make themselves.    ___Patient declined  _X_Patient is interested, I provided paper work and offered to discuss.

## 2019-10-22 NOTE — PATIENT INSTRUCTIONS
Counseling and Referral of Other Preventative  (Italic type indicates deductible and co-insurance are waived)    Patient Name: Janak Booker  Today's Date: 10/22/2019    Health Maintenance       Date Due Completion Date    Eye Exam 03/05/2019 3/5/2018    Override on 7/13/2016: Done (date approximate)    Override on 2/1/2014: Done (date approximate)    Influenza Vaccine (1) 09/01/2019 10/10/2018    Override on 11/1/2017: Done (date approximate)    Override on 10/10/2015: Done    Override on 10/1/2014: Done    Hemoglobin A1c 10/11/2019 4/11/2019    Shingles Vaccine (3 of 3) 11/26/2019 10/1/2019    Lipid Panel 04/11/2020 4/11/2019    Override on 8/23/2016: Done    High Dose Statin 10/22/2020 10/22/2019    Foot Exam 10/22/2020 10/22/2019    Override on 10/22/2019: Done    Override on 3/19/2018: Done    Override on 8/29/2016: Done    Override on 8/24/2015: Done    Override on 7/23/2014: Done    Override on 7/9/2013: Done    Colonoscopy 03/27/2021 3/27/2018    TETANUS VACCINE 08/29/2026 8/29/2016        No orders of the defined types were placed in this encounter.    The following information is provided to all patients.  This information is to help you find resources for any of the problems found today that may be affecting your health:                Living healthy guide: www.[facility].[place].gov      Understanding Diabetes: www.diabetes.org      Eating healthy: www.cdc.gov/healthyweight      CDC home safety checklist: www.cdc.gov/steadi/patient.html      Agency on Aging: www.goea.[place].[facility]      Alcoholics anonymous (AA): www.aa.org      Physical Activity: www.luis.nih.gov/tq4sxjr      Tobacco use: www.quitwithusla.org

## 2019-11-07 RX ORDER — LIRAGLUTIDE 6 MG/ML
INJECTION SUBCUTANEOUS
Qty: 9 ML | Refills: 0 | Status: SHIPPED | OUTPATIENT
Start: 2019-11-07 | End: 2019-12-04 | Stop reason: SDUPTHER

## 2019-11-14 ENCOUNTER — HOSPITAL ENCOUNTER (OUTPATIENT)
Facility: OTHER | Age: 74
Discharge: HOME | End: 2019-11-14
Attending: ANESTHESIOLOGY | Admitting: ANESTHESIOLOGY
Payer: MEDICARE

## 2019-11-14 VITALS
HEART RATE: 69 BPM | RESPIRATION RATE: 18 BRPM | TEMPERATURE: 98 F | WEIGHT: 245 LBS | HEIGHT: 73 IN | DIASTOLIC BLOOD PRESSURE: 66 MMHG | OXYGEN SATURATION: 93 % | BODY MASS INDEX: 32.47 KG/M2 | SYSTOLIC BLOOD PRESSURE: 118 MMHG

## 2019-11-14 DIAGNOSIS — M54.16 LUMBAR RADICULITIS: Primary | ICD-10-CM

## 2019-11-14 DIAGNOSIS — G89.29 CHRONIC PAIN: ICD-10-CM

## 2019-11-14 DIAGNOSIS — M54.16 LUMBAR RADICULOPATHY: ICD-10-CM

## 2019-11-14 LAB — POCT GLUCOSE: 103 MG/DL (ref 70–110)

## 2019-11-14 PROCEDURE — 64484 NJX AA&/STRD TFRM EPI L/S EA: CPT | Mod: HCNC | Performed by: ANESTHESIOLOGY

## 2019-11-14 PROCEDURE — 25000003 PHARM REV CODE 250: Mod: HCNC | Performed by: ANESTHESIOLOGY

## 2019-11-14 PROCEDURE — 25500020 PHARM REV CODE 255: Mod: HCNC | Performed by: ANESTHESIOLOGY

## 2019-11-14 PROCEDURE — 64484 PRA INJECT ANES/STEROID FORAMEN LUMBAR/SACRAL W IMG GUIDE ,EA ADD LEVEL: ICD-10-PCS | Mod: HCNC,LT,, | Performed by: ANESTHESIOLOGY

## 2019-11-14 PROCEDURE — 64483 NJX AA&/STRD TFRM EPI L/S 1: CPT | Mod: HCNC,LT,, | Performed by: ANESTHESIOLOGY

## 2019-11-14 PROCEDURE — 63600175 PHARM REV CODE 636 W HCPCS: Mod: HCNC | Performed by: PHYSICAL MEDICINE & REHABILITATION

## 2019-11-14 PROCEDURE — 63600175 PHARM REV CODE 636 W HCPCS: Mod: HCNC | Performed by: ANESTHESIOLOGY

## 2019-11-14 PROCEDURE — 64484 NJX AA&/STRD TFRM EPI L/S EA: CPT | Mod: HCNC,LT,, | Performed by: ANESTHESIOLOGY

## 2019-11-14 PROCEDURE — 64483 PR EPIDURAL INJ, ANES/STEROID, TRANSFORAMINAL, LUMB/SACR, SNGL LEVL: ICD-10-PCS | Mod: HCNC,LT,, | Performed by: ANESTHESIOLOGY

## 2019-11-14 PROCEDURE — 99152 MOD SED SAME PHYS/QHP 5/>YRS: CPT | Mod: HCNC,,, | Performed by: ANESTHESIOLOGY

## 2019-11-14 PROCEDURE — 64483 NJX AA&/STRD TFRM EPI L/S 1: CPT | Mod: HCNC | Performed by: ANESTHESIOLOGY

## 2019-11-14 PROCEDURE — 99152 PR MOD CONSCIOUS SEDATION, SAME PHYS, 5+ YRS, FIRST 15 MIN: ICD-10-PCS | Mod: HCNC,,, | Performed by: ANESTHESIOLOGY

## 2019-11-14 RX ORDER — LIDOCAINE HYDROCHLORIDE 10 MG/ML
INJECTION INFILTRATION; PERINEURAL
Status: DISCONTINUED | OUTPATIENT
Start: 2019-11-14 | End: 2019-11-14 | Stop reason: HOSPADM

## 2019-11-14 RX ORDER — SODIUM CHLORIDE 9 MG/ML
500 INJECTION, SOLUTION INTRAVENOUS CONTINUOUS
Status: DISCONTINUED | OUTPATIENT
Start: 2019-11-14 | End: 2019-11-14 | Stop reason: HOSPADM

## 2019-11-14 RX ORDER — LIDOCAINE HYDROCHLORIDE 10 MG/ML
INJECTION, SOLUTION EPIDURAL; INFILTRATION; INTRACAUDAL; PERINEURAL
Status: DISCONTINUED | OUTPATIENT
Start: 2019-11-14 | End: 2019-11-14 | Stop reason: HOSPADM

## 2019-11-14 RX ORDER — MIDAZOLAM HYDROCHLORIDE 1 MG/ML
INJECTION INTRAMUSCULAR; INTRAVENOUS
Status: DISCONTINUED | OUTPATIENT
Start: 2019-11-14 | End: 2019-11-14 | Stop reason: HOSPADM

## 2019-11-14 RX ORDER — DEXAMETHASONE SODIUM PHOSPHATE 100 MG/10ML
INJECTION INTRAMUSCULAR; INTRAVENOUS
Status: DISCONTINUED | OUTPATIENT
Start: 2019-11-14 | End: 2019-11-14 | Stop reason: HOSPADM

## 2019-11-14 RX ORDER — FENTANYL CITRATE 50 UG/ML
INJECTION, SOLUTION INTRAMUSCULAR; INTRAVENOUS
Status: DISCONTINUED | OUTPATIENT
Start: 2019-11-14 | End: 2019-11-14 | Stop reason: HOSPADM

## 2019-11-14 RX ADMIN — SODIUM CHLORIDE 500 ML: 0.9 INJECTION, SOLUTION INTRAVENOUS at 07:11

## 2019-11-14 NOTE — DISCHARGE INSTRUCTIONS

## 2019-11-14 NOTE — DISCHARGE SUMMARY
"Discharge Note  Short Stay      SUMMARY     Admit Date: 11/14/2019    Attending Physician: Jose Guadalupe Linn      Discharge Physician: Jose Guadalupe Linn      Discharge Date: 11/14/2019 8:25 AM    Procedure(s) (LRB):  INJECTION, STEROID, EPIDURAL, TRANSFORAMINAL APPROACH (Left)    Final Diagnosis: Lumbar radiculopathy [M54.16]    Disposition: Home or self care    Patient Instructions:   Current Discharge Medication List      CONTINUE these medications which have NOT CHANGED    Details   BD ALCOHOL SWABS PadM USE AS DIRECTED TO CHECK BLOOD GLUCOSE DAILY  Qty: 100 each, Refills: 5      BD ULTRA-FINE BARRY PEN NEEDLE 32 gauge x 5/32" Ndle USE ONE NEEDLE ONCE DAILY  Qty: 100 each, Refills: 3      blood sugar diagnostic Strp Use as directed to check blood sugar daily.  Qty: 100 each, Refills: 3      blood-glucose meter Misc Use as directed.  Qty: 1 each, Refills: 0      CALCIUM CITRATE-VITAMIN D3 ORAL       fenofibrate micronized (LOFIBRA) 134 MG Cap       fenofibric acid (TRILIPIX) 135 mg capsule 1 Capsule(s) Oral  Every day.      FLUZONE HIGH-DOSE 2019-20, PF, 180 mcg/0.5 mL Syrg       lancets (TRUEPLUS LANCETS) 28 gauge Misc 1 lancet by Misc.(Non-Drug; Combo Route) route once daily.  Qty: 100 each, Refills: 3      lisinopril (PRINIVIL,ZESTRIL) 5 MG tablet 1 Tablet(s) Oral Every evening.      metFORMIN (GLUCOPHAGE-XR) 500 MG 24 hr tablet TAKE ONE TABLET BY MOUTH ONCE DAILY  Qty: 90 tablet, Refills: 0    Associated Diagnoses: Controlled type 2 diabetes mellitus with stage 3 chronic kidney disease, without long-term current use of insulin      metoprolol tartrate (LOPRESSOR) 25 MG tablet Take 1 tablet by mouth once daily.      multivitamin with minerals tablet       NITROSTAT 0.4 mg SL tablet Take 1 tablet by mouth continuous prn.      omega-3 fatty acids 1,000 mg Cap 1 Capsule(s) Oral OTC once a day.      omeprazole (PRILOSEC) 40 MG capsule TAKE ONE CAPSULE BY MOUTH ONCE DAILY  Qty: 90 capsule, Refills: 1      pioglitazone (ACTOS) 30 " MG tablet TAKE ONE TABLET BY MOUTH ONCE DAILY  Qty: 90 tablet, Refills: 2    Associated Diagnoses: Uncontrolled type 2 diabetes mellitus with complication, without long-term current use of insulin      PRADAXA 150 mg Cap Take 150 mg by mouth Twice daily.      rosuvastatin (CRESTOR) 10 MG tablet Every day      sotalol (BETAPACE) 120 MG Tab 2 (two) times daily.       TRUE METRIX AIR GLUCOSE METER kit       varicella-zoster [id number], PF, (SHINGRIX, PF,) 50 mcg/0.5 mL injection Inject 0.5 mLs into the muscle once. for 1 dose  Qty: 0.5 mL, Refills: 0    Associated Diagnoses: Need for shingles vaccine      VICTOZA 3-CAITIE 0.6 mg/0.1 mL (18 mg/3 mL) PnIj INJECT 1.8 MGS UNDER THE SKIN DAILY.  Qty: 9 mL, Refills: 0      VOLTAREN 1 % Gel Use as directed                 Discharge Diagnosis: Lumbar radiculopathy [M54.16]  Condition on Discharge: Stable with no complications to procedure   Diet on Discharge: Same as before.  Activity: as per instruction sheet.  Discharge to: Home with a responsible adult.  Follow up: 2-4 weeks       Please call my office or pager at [phone] if experienced any weakness or loss of sensation, fever > 101.5, pain uncontrolled with oral medications, persistent nausea/vomiting/or diarrhea, redness or drainage from the incisions, or any other worrisome concerns. If physician on call was not reached or could not communicate with our office for any reason please go to the nearest emergency department

## 2019-11-14 NOTE — OP NOTE
Date of Service: 11/14/2019    PCP: John Claire Jr, MD    Referring Physician:    Time-out taken to identify patient and procedure side prior to starting the procedure.   I attest that I have reviewed the patient's home medications prior to the procedure and no contraindication have been identified. I  re-evaluated the patient after the patient was positioned for the procedure in the procedure room immediately before the procedural time-out. The vital signs are current and represent the current state of the patient which has not significantly changed since the preprocedure assessment.                                                           PROCEDURE: Left L4/5 & L5/S1 transforaminal epidural steroid injection under fluoroscopy    REASON FOR PROCEDURE: Left Lumbar radiculopathy [M54.16]  1. Lumbar radiculitis    2. Lumbar radiculopathy    3. Chronic pain      POSTPROCEDURE DIAGNOSIS:   Lumbar radiculopathy [M54.16]    1. Lumbar radiculitis    2. Lumbar radiculopathy    3. Chronic pain           PHYSICIAN: Jose Guadalupe Linn MD  ASSISTANTS:Yury Stone MD [employer] pain fellow  Rolando Porras MD resident     MEDICATIONS INJECTED:  Preservative-free dexamethasone 10mg, Xylocaine 1% MPF 3-5ml. 3ml per level. Preservative free, sterile normal saline is used to get larger volume as needed.  LOCAL ANESTHETIC INJECTED:  Xylocaine 1% 9ml with Sodium Bicarbonate 1ml. 3ml per site.    SEDATION MEDICATIONS: Versed 2mg, Fentanyl 25mcg    ESTIMATED BLOOD LOSS:  None.    COMPLICATIONS:  None.    TECHNIQUE:   Laying in a prone position, the patient was prepped and draped in the usual sterile fashion using ChloraPrep and fenestrated drape.  The area to be injected was determined under fluoroscopic guidance.  Local anesthetic was given by raising a wheel and going down to the hub of a 27-gauge 1.25 inch needle.  The 3.5inch 22-gauge spinal needle was introduced towards the transverse process of all levels as stated above.   The needle was  walked medially then hinged into the neural foramen.  Omnipaque was injected to confirm appropriate placement and that there was no vascular runoff.  The medication was then injected after applying negative pressure. The patient tolerated the procedure well.    PAIN BEFORE THE PROCEDURE: 3/10.    PAIN AFTER THE PROCEDURE: 0/10.    The patient was monitored after the procedure.  Patient was given post procedure and discharge instructions to follow at home.  We will see the patient back in two weeks or the patient may call to inform of status. The patient was discharged in a stable condition.

## 2019-11-14 NOTE — H&P
HPI  Patient presenting for Procedure(s) (LRB):  INJECTION, STEROID, EPIDURAL, TRANSFORAMINAL APPROACH (Left)     Patient on Anti-coagulation No, stopped Pradaxa on Saturday    No health changes since previous encounter    Past Medical History:   Diagnosis Date    Anticoagulant long-term use     Arthritis     Atrial fibrillation     CAD (coronary artery disease)     Colon polyps     per colonoscopy 9/11/2014    Diabetes mellitus, type 2     Disc displacement, lumbar     L3    Family history of prostate cancer     GERD (gastroesophageal reflux disease)     Heel bone fracture     left foot    Hyperlipidemia LDL goal < 70     Hypertension     Renal manifestation of secondary diabetes mellitus     Spinal stenosis, lumbar      Past Surgical History:   Procedure Laterality Date    BACK SURGERY      CARDIAC SURGERY      stents     CARPAL TUNNEL RELEASE Right     CATARACT EXTRACTION W/  INTRAOCULAR LENS IMPLANT Bilateral     COLONOSCOPY N/A 3/27/2018    Procedure: COLONOSCOPY;  Surgeon: Rishi Garcia MD;  Location: [facility];  Service: Endoscopy;  Laterality: N/A;    CORONARY ANGIOPLASTY WITH STENT PLACEMENT      x 3    EYE SURGERY      INJECTION OF FACET JOINT N/A 5/14/2019    Procedure: INJECTION, FACET JOINT INJECTION (LUMBAR BLOCK) PLEASE INJECT L3/4;  Surgeon: Catrachito Harley MD;  Location: [facility];  Service: Pain Management;  Laterality: N/A;  NEEDS CONSENT, PRADAXA CLEARANCE IN CHART    KNEE ARTHROSCOPY W/ MENISCECTOMY  12/22/2008    right    LUMBAR DISCECTOMY  12/2011    L4-L5 Fusion    LUMBAR EPIDURAL INJECTION  02/04/2019    ROTATOR CUFF REPAIR Left 7/2012    SHOULDER OPEN ROTATOR CUFF REPAIR      SPINE SURGERY      TIBIA FRACTURE SURGERY      TRANSFORAMINAL EPIDURAL INJECTION OF STEROID Left 9/23/2019    Procedure: INJECTION, STEROID, EPIDURAL, TRANSFORAMINAL APPROACH;  Surgeon: Jose Guadalupe Linn MD;  Location: [facility];  Service: Pain Management;  Laterality: Left;   "Left TF ADI L4 and L5  OK to hold Pradaxa     Review of patient's allergies indicates:   Allergen Reactions    No known drug allergies       Current Facility-Administered Medications   Medication    0.9%  NaCl infusion       PMHx, PSHx, Allergies, Medications reviewed in epic    ROS negative except pain complaints in HPI    OBJECTIVE:    BP (!) 145/72 (BP Location: Right arm, Patient Position: Sitting)   Pulse 66   Temp 98.3 °F (36.8 °C) (Oral)   Ht 6' 1" (1.854 m)   Wt 111.1 kg (245 lb)   SpO2 98%   BMI 32.32 kg/m²     PHYSICAL EXAMINATION:    GENERAL: Well appearing, in no acute distress, alert and oriented x3.  PSYCH:  Mood and affect appropriate.  SKIN: Skin color, texture, turgor normal, no rashes or lesions which will impact the procedure.  CV: RRR with palpation of the radial artery.  PULM: No evidence of respiratory difficulty, symmetric chest rise. Clear to auscultation.  NEURO: Cranial nerves grossly intact.    Plan:    Proceed with procedure as planned Procedure(s) (LRB):  INJECTION, STEROID, EPIDURAL, TRANSFORAMINAL APPROACH (Left)    Yury Stone  11/14/2019            "

## 2019-12-02 ENCOUNTER — TELEPHONE (OUTPATIENT)
Dept: PAIN MEDICINE | Facility: CLINIC | Age: 74
End: 2019-12-02

## 2019-12-02 NOTE — TELEPHONE ENCOUNTER
My name is Staff, I am contacting you from [facility] pain [facility] regarding your appointment scheduled for 12.03.19, with provider Dr. Linn, just confirming you will be able to make it.    If you feel you need to reschedule or canceled please give our office a call so we can better assist you.      Staff requesting patient to arrive 15 mins ahead of schedule appointment time.    Pt verbalized understanding and has confirmed appt

## 2019-12-03 ENCOUNTER — OFFICE VISIT (OUTPATIENT)
Dept: PAIN MEDICINE | Facility: CLINIC | Age: 74
End: 2019-12-03
Attending: ANESTHESIOLOGY
Payer: MEDICARE

## 2019-12-03 VITALS
WEIGHT: 244 LBS | SYSTOLIC BLOOD PRESSURE: 109 MMHG | HEART RATE: 60 BPM | BODY MASS INDEX: 32.34 KG/M2 | DIASTOLIC BLOOD PRESSURE: 77 MMHG | TEMPERATURE: 98 F | HEIGHT: 73 IN

## 2019-12-03 DIAGNOSIS — M47.816 LUMBAR SPONDYLOSIS: ICD-10-CM

## 2019-12-03 DIAGNOSIS — M47.816 FACET ARTHRITIS OF LUMBAR REGION: ICD-10-CM

## 2019-12-03 DIAGNOSIS — M54.16 LUMBAR RADICULOPATHY: Primary | ICD-10-CM

## 2019-12-03 PROCEDURE — 99214 OFFICE O/P EST MOD 30 MIN: CPT | Mod: HCNC,S$GLB,, | Performed by: ANESTHESIOLOGY

## 2019-12-03 PROCEDURE — 3078F PR MOST RECENT DIASTOLIC BLOOD PRESSURE < 80 MM HG: ICD-10-PCS | Mod: HCNC,CPTII,S$GLB, | Performed by: ANESTHESIOLOGY

## 2019-12-03 PROCEDURE — 99214 PR OFFICE/OUTPT VISIT, EST, LEVL IV, 30-39 MIN: ICD-10-PCS | Mod: HCNC,S$GLB,, | Performed by: ANESTHESIOLOGY

## 2019-12-03 PROCEDURE — 1101F PR PT FALLS ASSESS DOC 0-1 FALLS W/OUT INJ PAST YR: ICD-10-PCS | Mod: HCNC,CPTII,S$GLB, | Performed by: ANESTHESIOLOGY

## 2019-12-03 PROCEDURE — 1125F PR PAIN SEVERITY QUANTIFIED, PAIN PRESENT: ICD-10-PCS | Mod: HCNC,S$GLB,, | Performed by: ANESTHESIOLOGY

## 2019-12-03 PROCEDURE — 1125F AMNT PAIN NOTED PAIN PRSNT: CPT | Mod: HCNC,S$GLB,, | Performed by: ANESTHESIOLOGY

## 2019-12-03 PROCEDURE — 1159F PR MEDICATION LIST DOCUMENTED IN MEDICAL RECORD: ICD-10-PCS | Mod: HCNC,S$GLB,, | Performed by: ANESTHESIOLOGY

## 2019-12-03 PROCEDURE — 3078F DIAST BP <80 MM HG: CPT | Mod: HCNC,CPTII,S$GLB, | Performed by: ANESTHESIOLOGY

## 2019-12-03 PROCEDURE — 99999 PR PBB SHADOW E&M-EST. PATIENT-LVL III: ICD-10-PCS | Mod: PBBFAC,HCNC,, | Performed by: ANESTHESIOLOGY

## 2019-12-03 PROCEDURE — 3074F SYST BP LT 130 MM HG: CPT | Mod: HCNC,CPTII,S$GLB, | Performed by: ANESTHESIOLOGY

## 2019-12-03 PROCEDURE — 1159F MED LIST DOCD IN RCRD: CPT | Mod: HCNC,S$GLB,, | Performed by: ANESTHESIOLOGY

## 2019-12-03 PROCEDURE — 99999 PR PBB SHADOW E&M-EST. PATIENT-LVL III: CPT | Mod: PBBFAC,HCNC,, | Performed by: ANESTHESIOLOGY

## 2019-12-03 PROCEDURE — 3074F PR MOST RECENT SYSTOLIC BLOOD PRESSURE < 130 MM HG: ICD-10-PCS | Mod: HCNC,CPTII,S$GLB, | Performed by: ANESTHESIOLOGY

## 2019-12-03 PROCEDURE — 1101F PT FALLS ASSESS-DOCD LE1/YR: CPT | Mod: HCNC,CPTII,S$GLB, | Performed by: ANESTHESIOLOGY

## 2019-12-03 NOTE — PROGRESS NOTES
Subjective:      Patient ID: Janak Booker is a 74 y.o. male.    Chief Complaint: Back Pain    Referred by: No ref. provider found     HPI  Mr. Booker presents in follow up today. He is s/p left L4, L5 TF ADI two weeks ago. He reports minimal (<30% relief) from this injection. This is his second time getting the same injections, he reports the first injections gave him significant relief and reports overall he is at least 50% improved. He denies any change in quality/location of his pain. Denies any bowel or bladder changes. Denies any adverse effects from the injections. Prolonged sitting makes his pain worse. Still having intermittent numbness of the posterior aspect of left leg. He reports previously he was only able to drive for 10-15 miles but since the first L4, 5 TF ADI injection and now with this second round of injections he is able to drive 40-50 miles before having to stop and stretch. He is using a lumbar support in the car.     Interventional Pain History  Facet injections 5/14/19  Left L4, L5 TF ADI 9/29/19    Past Medical History:   Diagnosis Date    Anticoagulant long-term use     Arthritis     Atrial fibrillation     CAD (coronary artery disease)     Colon polyps     per colonoscopy 9/11/2014    Diabetes mellitus, type 2     Disc displacement, lumbar     L3    Family history of prostate cancer     GERD (gastroesophageal reflux disease)     Heel bone fracture     left foot    Hyperlipidemia LDL goal < 70     Hypertension     Renal manifestation of secondary diabetes mellitus     Spinal stenosis, lumbar        Past Surgical History:   Procedure Laterality Date    BACK SURGERY      CARDIAC SURGERY      stents     CARPAL TUNNEL RELEASE Right     CATARACT EXTRACTION W/  INTRAOCULAR LENS IMPLANT Bilateral     COLONOSCOPY N/A 3/27/2018    Procedure: COLONOSCOPY;  Surgeon: Rishi Garcia MD;  Location: [facility];  Service: Endoscopy;  Laterality: N/A;    CORONARY ANGIOPLASTY WITH  "STENT PLACEMENT      x 3    EYE SURGERY      INJECTION OF FACET JOINT N/A 5/14/2019    Procedure: INJECTION, FACET JOINT INJECTION (LUMBAR BLOCK) PLEASE INJECT L3/4;  Surgeon: Catrachito Harley MD;  Location: [facility];  Service: Pain Management;  Laterality: N/A;  NEEDS CONSENT, PRADAXA CLEARANCE IN CHART    KNEE ARTHROSCOPY W/ MENISCECTOMY  12/22/2008    right    LUMBAR DISCECTOMY  12/2011    L4-L5 Fusion    LUMBAR EPIDURAL INJECTION  02/04/2019    ROTATOR CUFF REPAIR Left 7/2012    SHOULDER OPEN ROTATOR CUFF REPAIR      SPINE SURGERY      TIBIA FRACTURE SURGERY      TRANSFORAMINAL EPIDURAL INJECTION OF STEROID Left 9/23/2019    Procedure: INJECTION, STEROID, EPIDURAL, TRANSFORAMINAL APPROACH;  Surgeon: Jose Guadalupe Linn MD;  Location: [facility];  Service: Pain Management;  Laterality: Left;  Left TF ADI L4 and L5  OK to hold Pradaxa    TRANSFORAMINAL EPIDURAL INJECTION OF STEROID Left 11/14/2019    Procedure: INJECTION, STEROID, EPIDURAL, TRANSFORAMINAL APPROACH;  Surgeon: Jose Guadalupe Linn MD;  Location: [facility];  Service: Pain Management;  Laterality: Left;  Left TF ADI L4-5 and L5-S1       Review of patient's allergies indicates:   Allergen Reactions    No known drug allergies        Current Outpatient Medications   Medication Sig Dispense Refill    BD ALCOHOL SWABS PadM USE AS DIRECTED TO CHECK BLOOD GLUCOSE DAILY 100 each 5    BD ULTRA-FINE BARRY PEN NEEDLE 32 gauge x 5/32" Ndle USE ONE NEEDLE ONCE DAILY 100 each 3    blood sugar diagnostic Strp Use as directed to check blood sugar daily. 100 each 3    blood-glucose meter Misc Use as directed. 1 each 0    CALCIUM CITRATE-VITAMIN D3 ORAL       fenofibrate micronized (LOFIBRA) 134 MG Cap       fenofibric acid (TRILIPIX) 135 mg capsule 1 Capsule(s) Oral  Every day.      FLUZONE HIGH-DOSE 2019-20, PF, 180 mcg/0.5 mL Syrg       lancets (TRUEPLUS LANCETS) 28 gauge Misc 1 lancet by Misc.(Non-Drug; Combo Route) route once daily. 100 each 3 "    lisinopril (PRINIVIL,ZESTRIL) 5 MG tablet 1 Tablet(s) Oral Every evening.      metFORMIN (GLUCOPHAGE-XR) 500 MG 24 hr tablet TAKE ONE TABLET BY MOUTH ONCE DAILY 90 tablet 0    metoprolol tartrate (LOPRESSOR) 25 MG tablet Take 1 tablet by mouth once daily.      multivitamin with minerals tablet       NITROSTAT 0.4 mg SL tablet Take 1 tablet by mouth continuous prn.      omega-3 fatty acids 1,000 mg Cap 1 Capsule(s) Oral OTC once a day.      omeprazole (PRILOSEC) 40 MG capsule TAKE ONE CAPSULE BY MOUTH ONCE DAILY 90 capsule 1    pioglitazone (ACTOS) 30 MG tablet TAKE ONE TABLET BY MOUTH ONCE DAILY 90 tablet 2    PRADAXA 150 mg Cap Take 150 mg by mouth Twice daily.      rosuvastatin (CRESTOR) 10 MG tablet Every day      TRUE METRIX AIR GLUCOSE METER kit       VICTOZA 3-CAITIE 0.6 mg/0.1 mL (18 mg/3 mL) PnIj INJECT 1.8 MGS UNDER THE SKIN DAILY. 9 mL 0    sotalol (BETAPACE) 120 MG Tab 2 (two) times daily.       VOLTAREN 1 % Gel Use as directed       No current facility-administered medications for this visit.        Family History   Problem Relation Age of Onset    Heart failure Father     Heart disease Father         MI    Prostate cancer Father     Heart failure Mother     Heart disease Mother     No Known Problems Sister     No Known Problems Daughter     No Known Problems Son     No Known Problems Daughter        Social History     Socioeconomic History    Marital status:      Spouse name: Santino    Number of children: 3    Years of education: Not on file    Highest education level: Not on file   Occupational History    Not on file   Social Needs    Financial resource strain: Not hard at all    Food insecurity:     Worry: Never true     Inability: Never true    Transportation needs:     Medical: No     Non-medical: No   Tobacco Use    Smoking status: Never Smoker    Smokeless tobacco: Never Used   Substance and Sexual Activity    Alcohol use: Yes     Alcohol/week: 5.0 standard  "drinks     Types: 6 Standard drinks or equivalent per week     Frequency: Monthly or less     Drinks per session: 1 or 2     Binge frequency: Never     Comment: occasionally    Drug use: No    Sexual activity: Yes     Partners: Female   Lifestyle    Physical activity:     Days per week: 0 days     Minutes per session: Not on file    Stress: Not at all   Relationships    Social connections:     Talks on phone: More than three times a week     Gets together: Twice a week     Attends Congregational service: More than 4 times per year     Active member of club or organization: Yes     Attends meetings of clubs or organizations: More than 4 times per year     Relationship status:    Other Topics Concern    Not on file   Social History Narrative    The patient does not exercise regularly ().      He is not satisfied with weight.    Rates diet as fair.    He does drink at least 1/2 gallon water daily.    He drinks 2 coffee/tea/caffeine-containing soft drinks daily.    Total sleep time at night is 7 hours.    He does wear seat belts.    Hobbies include off-road, camping.           ROS  Gen: denies fevers/chills/malaise.  Neuro: see HPI  MSK: see HPI      Objective:   /77   Pulse 60   Temp 97.8 °F (36.6 °C)   Ht 6' 1" (1.854 m)   Wt 110.7 kg (244 lb)   BMI 32.19 kg/m²   Pain Disability Index Review:  Last 3 PDI Scores 12/3/2019 10/8/2019 8/20/2019   Pain Disability Index (PDI) 12 19 51       GEN:  Well developed, well nourished.  No acute distress.   HEENT:  No trauma.  Mucous membranes moist.  Nares patent bilaterally.  PSYCH: Normal affect. Thought content appropriate.  CHEST:  Breathing symmetric.  No audible wheezing.  ABD: Soft, non-distended.  SKIN:  Warm, pink, dry.  No rash on exposed areas.    EXT:  No cyanosis, clubbing, or edema.  No color change or changes in nail or hair growth.  NEURO/MUSCULOSKELETAL:  Fully alert, oriented, and appropriate. Speech normal tung. No cranial nerve deficits. "   Gait: Wnl.    Motor Strength: 5/5 motor strength throughout lower extremities.   Sensory: No sensory deficit in the lower extremities.   Reflexes:  1+ and symmetric throughout.  Downgoing Babinski's bilaterally.  No clonus or spasticity.  L-Spine:  Limited active ROM in all planes with pain on extension.   Left side:  + pain with axial/facet loading.  Neg SLR.    Right side:  + pain with axial/facet loading.  Neg SLR.    Palpation:  Left side: No TTP over lumbar paraspinals, SI joint, hip, piriformis muscle, or GTB.  +TTP over posterior hamstring (not the same pain as he normally feels)  Right side: No TTP over lumbar paraspinals, SI joint, hip, piriformis muscle, or GTB.            Assessment:       Encounter Diagnoses   Name Primary?    Lumbar radiculopathy Yes    Lumbar spondylosis     Facet arthritis of lumbar region          Plan:   We discussed with the patient the assessment and recommendations. The following is the plan we agreed on:    1. Discussed spinal cord stimulator if this pain is intolerable. At this point he is overall significantly improved in pain and function.  2. Discussed starting [facility] PT program. Patient would like to wait and see how he feels in the next several months.  3. Discussed avoiding any more steroid injections for at least the next 6 months given recent steroid load.   4. RTC PRN        Janak was seen today for back pain.    Diagnoses and all orders for this visit:    Lumbar radiculopathy    Lumbar spondylosis    Facet arthritis of lumbar region     Bonnie Irwin DO  PGY-3  [employer] [facility]   I have personally taken the history and examined this patient and agree with the resident's note as stated above.

## 2019-12-04 RX ORDER — LIRAGLUTIDE 6 MG/ML
INJECTION SUBCUTANEOUS
Qty: 9 ML | Refills: 0 | Status: SHIPPED | OUTPATIENT
Start: 2019-12-04 | End: 2019-12-30

## 2019-12-10 ENCOUNTER — CLINICAL SUPPORT (OUTPATIENT)
Dept: OTOLARYNGOLOGY | Facility: CLINIC | Age: 74
End: 2019-12-10
Payer: MEDICARE

## 2019-12-10 ENCOUNTER — OFFICE VISIT (OUTPATIENT)
Dept: OTOLARYNGOLOGY | Facility: CLINIC | Age: 74
End: 2019-12-10
Payer: MEDICARE

## 2019-12-10 ENCOUNTER — LAB VISIT (OUTPATIENT)
Dept: LAB | Facility: OTHER | Age: 74
End: 2019-12-10
Attending: SPECIALIST
Payer: MEDICARE

## 2019-12-10 VITALS
SYSTOLIC BLOOD PRESSURE: 102 MMHG | WEIGHT: 243.5 LBS | TEMPERATURE: 98 F | BODY MASS INDEX: 32.27 KG/M2 | DIASTOLIC BLOOD PRESSURE: 61 MMHG | HEART RATE: 62 BPM | HEIGHT: 73 IN

## 2019-12-10 DIAGNOSIS — J34.2 NASAL SEPTAL DEVIATION: ICD-10-CM

## 2019-12-10 DIAGNOSIS — R42 DIZZINESS: ICD-10-CM

## 2019-12-10 DIAGNOSIS — H90.3 ASYMMETRICAL SENSORINEURAL HEARING LOSS: Primary | ICD-10-CM

## 2019-12-10 DIAGNOSIS — H90.A21 SENSORINEURAL HEARING LOSS (SNHL) OF RIGHT EAR WITH RESTRICTED HEARING OF LEFT EAR: ICD-10-CM

## 2019-12-10 DIAGNOSIS — Z13.9 SCREENING FOR CONDITION: ICD-10-CM

## 2019-12-10 DIAGNOSIS — H90.A21 SENSORINEURAL HEARING LOSS (SNHL) OF RIGHT EAR WITH RESTRICTED HEARING OF LEFT EAR: Primary | ICD-10-CM

## 2019-12-10 DIAGNOSIS — H93.13 TINNITUS OF BOTH EARS: ICD-10-CM

## 2019-12-10 LAB
BUN SERPL-MCNC: 19 MG/DL (ref 8–23)
CREAT SERPL-MCNC: 1.6 MG/DL (ref 0.5–1.4)
EST. GFR  (AFRICAN AMERICAN): 49 ML/MIN/1.73 M^2
EST. GFR  (NON AFRICAN AMERICAN): 42 ML/MIN/1.73 M^2

## 2019-12-10 PROCEDURE — 84520 ASSAY OF UREA NITROGEN: CPT | Mod: HCNC

## 2019-12-10 PROCEDURE — 1159F PR MEDICATION LIST DOCUMENTED IN MEDICAL RECORD: ICD-10-PCS | Mod: S$GLB,,, | Performed by: SPECIALIST

## 2019-12-10 PROCEDURE — 3078F PR MOST RECENT DIASTOLIC BLOOD PRESSURE < 80 MM HG: ICD-10-PCS | Mod: CPTII,S$GLB,, | Performed by: SPECIALIST

## 2019-12-10 PROCEDURE — 3078F DIAST BP <80 MM HG: CPT | Mod: CPTII,S$GLB,, | Performed by: SPECIALIST

## 2019-12-10 PROCEDURE — 92550 PR TYMPANOMETRY AND REFLEX THRESHOLD MEASUREMENTS: ICD-10-PCS | Mod: S$GLB,,, | Performed by: AUDIOLOGIST-HEARING AID FITTER

## 2019-12-10 PROCEDURE — 99204 OFFICE O/P NEW MOD 45 MIN: CPT | Mod: S$GLB,,, | Performed by: SPECIALIST

## 2019-12-10 PROCEDURE — 3074F SYST BP LT 130 MM HG: CPT | Mod: CPTII,S$GLB,, | Performed by: SPECIALIST

## 2019-12-10 PROCEDURE — 99204 PR OFFICE/OUTPT VISIT, NEW, LEVL IV, 45-59 MIN: ICD-10-PCS | Mod: S$GLB,,, | Performed by: SPECIALIST

## 2019-12-10 PROCEDURE — 3074F PR MOST RECENT SYSTOLIC BLOOD PRESSURE < 130 MM HG: ICD-10-PCS | Mod: CPTII,S$GLB,, | Performed by: SPECIALIST

## 2019-12-10 PROCEDURE — 1159F MED LIST DOCD IN RCRD: CPT | Mod: S$GLB,,, | Performed by: SPECIALIST

## 2019-12-10 PROCEDURE — 92557 PR COMPREHENSIVE HEARING TEST: ICD-10-PCS | Mod: S$GLB,,, | Performed by: AUDIOLOGIST-HEARING AID FITTER

## 2019-12-10 PROCEDURE — 1126F AMNT PAIN NOTED NONE PRSNT: CPT | Mod: S$GLB,,, | Performed by: SPECIALIST

## 2019-12-10 PROCEDURE — 1126F PR PAIN SEVERITY QUANTIFIED, NO PAIN PRESENT: ICD-10-PCS | Mod: S$GLB,,, | Performed by: SPECIALIST

## 2019-12-10 PROCEDURE — 36415 COLL VENOUS BLD VENIPUNCTURE: CPT | Mod: HCNC

## 2019-12-10 PROCEDURE — 1101F PR PT FALLS ASSESS DOC 0-1 FALLS W/OUT INJ PAST YR: ICD-10-PCS | Mod: CPTII,S$GLB,, | Performed by: SPECIALIST

## 2019-12-10 PROCEDURE — 1101F PT FALLS ASSESS-DOCD LE1/YR: CPT | Mod: CPTII,S$GLB,, | Performed by: SPECIALIST

## 2019-12-10 PROCEDURE — 92550 TYMPANOMETRY & REFLEX THRESH: CPT | Mod: S$GLB,,, | Performed by: AUDIOLOGIST-HEARING AID FITTER

## 2019-12-10 PROCEDURE — 92557 COMPREHENSIVE HEARING TEST: CPT | Mod: S$GLB,,, | Performed by: AUDIOLOGIST-HEARING AID FITTER

## 2019-12-10 PROCEDURE — 82565 ASSAY OF CREATININE: CPT | Mod: HCNC

## 2019-12-10 NOTE — PROGRESS NOTES
Subjective:       Patient ID: Janak Booker is a 74 y.o. male.    Chief Complaint: Hearing Loss    The patient has been experiencing some hearing loss for at least 20 years.  He notices he he does well with 1 on 1 conversation only in a quiet environment if there is any background sound such as TV playing a he has difficulty understanding.  When the significant background noise he has great difficulty in understanding.  He does have bilateral tinnitus that is a high-pitched sound bilaterally and occasionally a chirping sound on the left.  He has tinnitus all day, every day.  He has not had serious issues with his balance.  He does experience some episodes of unsteadiness after rapid position change.  His maternal grandfather did have hearing loss that developed in his older age.  He was exposed to significant industrial noise.  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  He also was a [employer] in his youth an early adult life.  He used both a shotgun and a high-powered rifle.  Shut off the right shoulder.  He would not wear hearing protection when practicing.  If he was at the target range he would wear hearing protection.    Review of Systems   Constitutional: Negative for activity change, appetite change, chills, fatigue, fever and unexpected weight change.   HENT: Positive for congestion, hearing loss, postnasal drip, rhinorrhea and tinnitus. Negative for ear discharge, ear pain, facial swelling, mouth sores, sinus pressure, sinus pain, sneezing, sore throat, trouble swallowing and voice change.    Eyes: Negative for photophobia, pain, discharge, redness, itching and visual disturbance.   Respiratory: Negative for apnea, cough, choking, shortness of breath and wheezing.    Cardiovascular: Negative for chest pain and palpitations.   Gastrointestinal: Negative for abdominal distention, abdominal pain, nausea and vomiting.   Musculoskeletal: Negative for arthralgias, myalgias, neck pain  and neck stiffness.   Skin: Negative.  Negative for color change, pallor and rash.   Allergic/Immunologic: Positive for environmental allergies. Negative for food allergies and immunocompromised state.   Neurological: Negative for dizziness, facial asymmetry, speech difficulty, weakness, light-headedness, numbness and headaches.   Hematological: Negative for adenopathy. Does not bruise/bleed easily.   Psychiatric/Behavioral: Negative for agitation, confusion, decreased concentration and sleep disturbance.           I personally reviewed the results of the audiogram and explained them in detail to the patient    Objective:      Physical Exam   Constitutional: He is oriented to person, place, and time. He appears well-developed and well-nourished. He is cooperative.   HENT:   Head: Normocephalic.   Right Ear: External ear and ear canal normal. Tympanic membrane is retracted. Decreased hearing is noted.   Left Ear: External ear and ear canal normal. Tympanic membrane is retracted. Decreased hearing is noted.   Nose: Mucosal edema (cyanotic, boggy inferior turbinates bilaterally), rhinorrhea (clear mucus bilaterally) and septal deviation (To the right) present.   Mouth/Throat: Uvula is midline, oropharynx is clear and moist and mucous membranes are normal. No oral lesions.   Eyes: Pupils are equal, round, and reactive to light. EOM and lids are normal. Right eye exhibits no discharge and no exudate. Left eye exhibits no discharge and no exudate. Right conjunctiva is injected. Left conjunctiva is injected.   Neck: Trachea normal and normal range of motion. No muscular tenderness present. No tracheal deviation present. No thyroid mass and no thyromegaly present.   Cardiovascular: Normal rate, regular rhythm, normal heart sounds and normal pulses.   Pulmonary/Chest: Effort normal and breath sounds normal. No stridor. He has no decreased breath sounds. He has no wheezes. He has no rhonchi. He has no rales.   Abdominal:  Soft. Bowel sounds are normal. There is no tenderness.   Musculoskeletal: Normal range of motion.   Lymphadenopathy:        Head (right side): No submental, no submandibular, no preauricular, no posterior auricular and no occipital adenopathy present.        Head (left side): No submental, no submandibular, no preauricular, no posterior auricular and no occipital adenopathy present.     He has no cervical adenopathy.   Neurological: He is alert and oriented to person, place, and time. He has normal strength. No cranial nerve deficit or sensory deficit. Gait normal.   Skin: Skin is warm and dry. No petechiae and no rash noted. No cyanosis. Nails show no clubbing.   Psychiatric: He has a normal mood and affect. His speech is normal and behavior is normal. Judgment and thought content normal. Cognition and memory are normal.       Assessment:       1. Sensorineural hearing loss (SNHL) of right ear with restricted hearing of left ear    2. Tinnitus of both ears    3. Nasal septal deviation    4. Dizziness    5. Screening for condition        Plan:       I will have the patient schedule a hearing aid evaluation with the audiologist.  He is medically cleared for bilateral amplification/hearing aids.  He should use hearing protection when he is around loud noise.  I will schedule him for an MRI of the temporal bones because of the asymmetry.  I will telephone the results to him.  He needs to undergo annual evaluation and audiologic testing.        ADDENDUM:  The patient is renal function is decreased.  His nephrologist would prefer that the MRI be done without IV contrast.  I will change the orders to MRI of the temporal bone without contrast.

## 2019-12-10 NOTE — LETTER
December 11, 2019      Isak Stapleton, HALI  [address]  [address]  [facility] [address]           [facility] [address]  [address]  [facility] [address]  Phone: [phone]  Fax: [phone]          Patient: Janak Booker   MR Number: [id number]   YOB: 1945   Date of Visit: 12/10/2019       Dear Isak Stapleton:    Thank you for referring Janak Booker to me for evaluation. Attached you will find relevant portions of my assessment and plan of care.    If you have questions, please do not hesitate to call me. I look forward to following Janak Booker along with you.    Sincerely,    NANDA Melendez MD    Enclosure  CC:  No Recipients    If you would like to receive this communication electronically, please contact [email or web address] or [phone] to request more information on [employer] access.    For providers and/or their staff who would like to refer a patient to [facility], please contact us through our one-stop-shop provider referral line, [facility], at [phone].    If you feel you have received this communication in error or would no longer like to receive these types of communications, please e-mail [email or web address]

## 2019-12-13 ENCOUNTER — TELEPHONE (OUTPATIENT)
Dept: OTOLARYNGOLOGY | Facility: CLINIC | Age: 74
End: 2019-12-13

## 2019-12-13 DIAGNOSIS — H90.A21 SENSORINEURAL HEARING LOSS (SNHL) OF RIGHT EAR WITH RESTRICTED HEARING OF LEFT EAR: Primary | ICD-10-CM

## 2019-12-13 DIAGNOSIS — N18.30 CKD (CHRONIC KIDNEY DISEASE) STAGE 3, GFR 30-59 ML/MIN: ICD-10-CM

## 2019-12-13 NOTE — TELEPHONE ENCOUNTER
Returned pt call. Informed pt that MRI order was adjusted to w/o contrast per he and his kidney provider's request.       ----- Message from Matt Vick sent at 12/13/2019  8:41 AM CST -----  Contact: KIERRA WILSON [[id number]]  Name of Who is Calling: KIERRA WILSON [[id number]]      What is the request in detail: Would like to speak with staff in regards to getting MRI orders changed to without contrast only. States his Dr. Cortes (kidney doctor), does not want him to use contrast being bad for his kidney. Please advise      Can the clinic reply by [facility]: no      What Number to Call Back if not in [facility]: [phone]

## 2019-12-16 ENCOUNTER — LAB VISIT (OUTPATIENT)
Dept: LAB | Facility: HOSPITAL | Age: 74
End: 2019-12-16
Attending: INTERNAL MEDICINE
Payer: MEDICARE

## 2019-12-16 DIAGNOSIS — N18.30 CHRONIC KIDNEY DISEASE, STAGE III (MODERATE): ICD-10-CM

## 2019-12-16 DIAGNOSIS — N18.9 ANEMIA OF CHRONIC RENAL FAILURE: ICD-10-CM

## 2019-12-16 DIAGNOSIS — D63.1 ANEMIA OF CHRONIC RENAL FAILURE: ICD-10-CM

## 2019-12-16 DIAGNOSIS — I12.9 PARENCHYMAL RENAL HYPERTENSION: ICD-10-CM

## 2019-12-16 DIAGNOSIS — I10 ESSENTIAL HYPERTENSION, MALIGNANT: ICD-10-CM

## 2019-12-16 DIAGNOSIS — E87.5 HYPERPOTASSEMIA: ICD-10-CM

## 2019-12-16 DIAGNOSIS — E11.9 DIABETES MELLITUS WITHOUT COMPLICATION: ICD-10-CM

## 2019-12-16 DIAGNOSIS — E87.20 ACIDOSIS: Primary | ICD-10-CM

## 2019-12-16 LAB
ALBUMIN SERPL BCP-MCNC: 3.8 G/DL (ref 3.5–5.2)
ALP SERPL-CCNC: 30 U/L (ref 55–135)
ALT SERPL W/O P-5'-P-CCNC: 14 U/L (ref 10–44)
ANION GAP SERPL CALC-SCNC: 5 MMOL/L (ref 8–16)
AST SERPL-CCNC: 17 U/L (ref 10–40)
BASOPHILS # BLD AUTO: 0.02 K/UL (ref 0–0.2)
BASOPHILS NFR BLD: 0.4 % (ref 0–1.9)
BILIRUB SERPL-MCNC: 1.2 MG/DL (ref 0.1–1)
BUN SERPL-MCNC: 24 MG/DL (ref 8–23)
CALCIUM SERPL-MCNC: 9.1 MG/DL (ref 8.7–10.5)
CHLORIDE SERPL-SCNC: 105 MMOL/L (ref 95–110)
CO2 SERPL-SCNC: 28 MMOL/L (ref 23–29)
CREAT SERPL-MCNC: 1.7 MG/DL (ref 0.5–1.4)
CREAT UR-MCNC: 284 MG/DL (ref 23–375)
DIFFERENTIAL METHOD: ABNORMAL
EOSINOPHIL # BLD AUTO: 0.2 K/UL (ref 0–0.5)
EOSINOPHIL NFR BLD: 3.7 % (ref 0–8)
ERYTHROCYTE [DISTWIDTH] IN BLOOD BY AUTOMATED COUNT: 15 % (ref 11.5–14.5)
EST. GFR  (AFRICAN AMERICAN): 45.2 ML/MIN/1.73 M^2
EST. GFR  (NON AFRICAN AMERICAN): 39.1 ML/MIN/1.73 M^2
GLUCOSE SERPL-MCNC: 135 MG/DL (ref 70–110)
HCT VFR BLD AUTO: 41 % (ref 40–54)
HGB BLD-MCNC: 13.4 G/DL (ref 14–18)
IMM GRANULOCYTES # BLD AUTO: 0.02 K/UL (ref 0–0.04)
IMM GRANULOCYTES NFR BLD AUTO: 0.4 % (ref 0–0.5)
LYMPHOCYTES # BLD AUTO: 2.1 K/UL (ref 1–4.8)
LYMPHOCYTES NFR BLD: 37.8 % (ref 18–48)
MCH RBC QN AUTO: 28.2 PG (ref 27–31)
MCHC RBC AUTO-ENTMCNC: 32.7 G/DL (ref 32–36)
MCV RBC AUTO: 86 FL (ref 82–98)
MONOCYTES # BLD AUTO: 0.4 K/UL (ref 0.3–1)
MONOCYTES NFR BLD: 7.7 % (ref 4–15)
NEUTROPHILS # BLD AUTO: 2.8 K/UL (ref 1.8–7.7)
NEUTROPHILS NFR BLD: 50 % (ref 38–73)
NRBC BLD-RTO: 0 /100 WBC
PHOSPHATE SERPL-MCNC: 3.2 MG/DL (ref 2.7–4.5)
PLATELET # BLD AUTO: 200 K/UL (ref 150–350)
PMV BLD AUTO: 11.2 FL (ref 9.2–12.9)
POTASSIUM SERPL-SCNC: 4.3 MMOL/L (ref 3.5–5.1)
PROT SERPL-MCNC: 6.5 G/DL (ref 6–8.4)
PROT UR-MCNC: 31 MG/DL (ref 0–15)
PROT/CREAT UR: 0.11 MG/G{CREAT} (ref 0–0.2)
RBC # BLD AUTO: 4.75 M/UL (ref 4.6–6.2)
SODIUM SERPL-SCNC: 138 MMOL/L (ref 136–145)
WBC # BLD AUTO: 5.61 K/UL (ref 3.9–12.7)

## 2019-12-16 PROCEDURE — 36415 COLL VENOUS BLD VENIPUNCTURE: CPT

## 2019-12-16 PROCEDURE — 84100 ASSAY OF PHOSPHORUS: CPT

## 2019-12-16 PROCEDURE — 82570 ASSAY OF URINE CREATININE: CPT

## 2019-12-16 PROCEDURE — 80053 COMPREHEN METABOLIC PANEL: CPT

## 2019-12-16 PROCEDURE — 85025 COMPLETE CBC W/AUTO DIFF WBC: CPT

## 2019-12-17 ENCOUNTER — HOSPITAL ENCOUNTER (OUTPATIENT)
Dept: RADIOLOGY | Facility: HOSPITAL | Age: 74
Discharge: HOME | End: 2019-12-17
Attending: SPECIALIST
Payer: MEDICARE

## 2019-12-17 DIAGNOSIS — R42 DIZZINESS: ICD-10-CM

## 2019-12-17 PROCEDURE — 70551 MRI IAC/TEMPORAL BONES WITHOUT CONTRAST: ICD-10-PCS | Mod: 26,HCNC,, | Performed by: RADIOLOGY

## 2019-12-17 PROCEDURE — 70551 MRI BRAIN STEM W/O DYE: CPT | Mod: 26,HCNC,, | Performed by: RADIOLOGY

## 2019-12-17 PROCEDURE — 70551 MRI BRAIN STEM W/O DYE: CPT | Mod: TC,HCNC

## 2019-12-27 DIAGNOSIS — E11.22 CONTROLLED TYPE 2 DIABETES MELLITUS WITH STAGE 3 CHRONIC KIDNEY DISEASE, WITHOUT LONG-TERM CURRENT USE OF INSULIN: ICD-10-CM

## 2019-12-27 DIAGNOSIS — N18.30 CONTROLLED TYPE 2 DIABETES MELLITUS WITH STAGE 3 CHRONIC KIDNEY DISEASE, WITHOUT LONG-TERM CURRENT USE OF INSULIN: ICD-10-CM

## 2019-12-27 RX ORDER — METFORMIN HYDROCHLORIDE 500 MG/1
TABLET, EXTENDED RELEASE ORAL
Qty: 90 TABLET | Refills: 0 | Status: SHIPPED | OUTPATIENT
Start: 2019-12-27 | End: 2020-03-26

## 2019-12-30 RX ORDER — LIRAGLUTIDE 6 MG/ML
INJECTION SUBCUTANEOUS
Qty: 9 ML | Refills: 2 | Status: SHIPPED | OUTPATIENT
Start: 2019-12-30 | End: 2020-03-31

## 2020-01-07 RX ORDER — PIOGLITAZONEHYDROCHLORIDE 30 MG/1
TABLET ORAL
Qty: 90 TABLET | Refills: 1 | Status: SHIPPED | OUTPATIENT
Start: 2020-01-07 | End: 2020-07-03

## 2020-01-14 ENCOUNTER — CLINICAL SUPPORT (OUTPATIENT)
Dept: OTOLARYNGOLOGY | Facility: CLINIC | Age: 75
End: 2020-01-14
Payer: MEDICARE

## 2020-01-14 DIAGNOSIS — Z71.89 ENCOUNTER FOR HEARING AID CONSULTATION: Primary | ICD-10-CM

## 2020-01-14 PROCEDURE — 99499 NO LOS: ICD-10-PCS | Mod: S$GLB,,, | Performed by: AUDIOLOGIST-HEARING AID FITTER

## 2020-01-14 PROCEDURE — 99499 UNLISTED E&M SERVICE: CPT | Mod: S$GLB,,, | Performed by: AUDIOLOGIST-HEARING AID FITTER

## 2020-01-14 NOTE — PROGRESS NOTES
Nena Og, CCC-A  Audiologist - [facility] [address] [place] [address]  [email or web address]  [phone]    Patient: Janak Booker   MRN: [id number]  [address]  Home Phone [phone]   Work Phone Not on file.   Mobile [phone]   Mobile [phone]   : 1945  AL: 2020      HEARING AID CONSULT    Hearing aid prices and styles were discussed with Janak Booker at length.  He would like to consider his financial options and will call the clinic when he is ready to place an order.      _______________________________  Nena Og, ADAMA-A  Audiologist

## 2020-03-06 RX ORDER — PEN NEEDLE, DIABETIC 32GX 5/32"
NEEDLE, DISPOSABLE MISCELLANEOUS
Qty: 100 EACH | Refills: 2 | Status: SHIPPED | OUTPATIENT
Start: 2020-03-06 | End: 2020-12-17

## 2020-03-12 ENCOUNTER — TELEPHONE (OUTPATIENT)
Dept: FAMILY MEDICINE | Facility: CLINIC | Age: 75
End: 2020-03-12

## 2020-03-12 DIAGNOSIS — I15.2 HYPERTENSION ASSOCIATED WITH DIABETES: ICD-10-CM

## 2020-03-12 DIAGNOSIS — E11.69 HYPERLIPIDEMIA ASSOCIATED WITH TYPE 2 DIABETES MELLITUS: ICD-10-CM

## 2020-03-12 DIAGNOSIS — N18.30 CONTROLLED TYPE 2 DIABETES MELLITUS WITH STAGE 3 CHRONIC KIDNEY DISEASE, WITHOUT LONG-TERM CURRENT USE OF INSULIN: Primary | ICD-10-CM

## 2020-03-12 DIAGNOSIS — E78.5 HYPERLIPIDEMIA ASSOCIATED WITH TYPE 2 DIABETES MELLITUS: ICD-10-CM

## 2020-03-12 DIAGNOSIS — I25.10 CORONARY ARTERY DISEASE, OCCLUSIVE: ICD-10-CM

## 2020-03-12 DIAGNOSIS — E11.22 CONTROLLED TYPE 2 DIABETES MELLITUS WITH STAGE 3 CHRONIC KIDNEY DISEASE, WITHOUT LONG-TERM CURRENT USE OF INSULIN: Primary | ICD-10-CM

## 2020-03-12 DIAGNOSIS — N18.30 CKD (CHRONIC KIDNEY DISEASE) STAGE 3, GFR 30-59 ML/MIN: ICD-10-CM

## 2020-03-12 DIAGNOSIS — E11.59 HYPERTENSION ASSOCIATED WITH DIABETES: ICD-10-CM

## 2020-03-12 NOTE — TELEPHONE ENCOUNTER
----- Message from Rena Toro sent at 3/12/2020  1:10 PM CDT -----  Contact: KIERRA WILSON [[id number]]  Name of Who is Calling: KIERRA WILSON [[id number]]       What is the request in detail: KIERRA WILSON [[id number]] is requesting blood work orders Please contact to further discuss and advise      Can the clinic reply by [facility]: yes     [facility]:  [phone] (home)

## 2020-03-20 ENCOUNTER — PATIENT MESSAGE (OUTPATIENT)
Dept: ADMINISTRATIVE | Facility: OTHER | Age: 75
End: 2020-03-20

## 2020-03-21 ENCOUNTER — PATIENT MESSAGE (OUTPATIENT)
Dept: ADMINISTRATIVE | Facility: OTHER | Age: 75
End: 2020-03-21

## 2020-03-23 DIAGNOSIS — E11.9 TYPE 2 DIABETES MELLITUS: ICD-10-CM

## 2020-03-26 DIAGNOSIS — E11.22 CONTROLLED TYPE 2 DIABETES MELLITUS WITH STAGE 3 CHRONIC KIDNEY DISEASE, WITHOUT LONG-TERM CURRENT USE OF INSULIN: ICD-10-CM

## 2020-03-26 DIAGNOSIS — N18.30 CONTROLLED TYPE 2 DIABETES MELLITUS WITH STAGE 3 CHRONIC KIDNEY DISEASE, WITHOUT LONG-TERM CURRENT USE OF INSULIN: ICD-10-CM

## 2020-03-26 RX ORDER — METFORMIN HYDROCHLORIDE 500 MG/1
TABLET, EXTENDED RELEASE ORAL
Qty: 90 TABLET | Refills: 3 | Status: SHIPPED | OUTPATIENT
Start: 2020-03-26 | End: 2021-05-05

## 2020-03-27 RX ORDER — OMEPRAZOLE 40 MG/1
CAPSULE, DELAYED RELEASE ORAL
Qty: 90 CAPSULE | Refills: 1 | Status: SHIPPED | OUTPATIENT
Start: 2020-03-27 | End: 2020-09-18

## 2020-03-31 RX ORDER — LIRAGLUTIDE 6 MG/ML
INJECTION SUBCUTANEOUS
Qty: 9 ML | Refills: 1 | OUTPATIENT
Start: 2020-03-31 | End: 2020-05-01

## 2020-04-28 ENCOUNTER — PATIENT OUTREACH (OUTPATIENT)
Dept: OTHER | Facility: OTHER | Age: 75
End: 2020-04-28

## 2020-04-28 NOTE — LETTER
April 28, 2020     Janak Booker  [address] [place] [address]       Dear Janak,    Welcome to [facility]! Our goal is to make care effective, proactive and convenient by using data you send us from home to better treat your chronic conditions.              My name is Alaina Fofana, and I am your dedicated Digital Medicine clinician. As an expert in medication management, I will help ensure that the medications you are taking continue to provide the intended benefits and help you reach your goals. You can reach me directly at [phone] or by sending me a message directly through your [facility] account.      I am Karli Karu and I will be your health . My job is to help you identify lifestyle changes to improve your disease control. We will talk about nutrition, exercise, and other ways you may be able to adjust your current habits to better your health. Additionally, we will help ensure you are completing the tests and screenings that are necessary to help manage your conditions. You can reach me directly at [phone] or by sending me a message directly through your [facility] account.    Most importantly, YOU are at the center of this team. Together, we will work to improve your overall health and encourage you to meet your goals for a healthier lifestyle.     What we expect from YOU:  · Please take frequent home blood pressure measurements. We ask that you take at least 1 blood pressure reading per week, but more information will better help us get you know you. Be sure you rest for a few minutes before taking the reading in a quiet, comfortable place.    · Please take frequent home blood sugar measurements according to the frequency your physician and Digital Medicine care team specify. It is important that your team see both fasting and after meal readings.      Be available to receive phone calls or [employer] messages, when appropriate, from your care team. Please  let us know if there are any specific days or times that work best for us to reach you via phone.     Complete routine tests and screenings. Dont worry, we will help keep you on track!           What you should expect from your Digital Medicine Care Team:   We will work with you to create a personalized plan of care and provide you with encouragement and education, including regarding lifestyle changes, that could help you manage your disease states.     We will adjust your current medications, if needed, and continue to monitor your long-term progress.     We will provide you and your physician with monthly progress reports after you have been in the program for more than 30 days.     We will send you reminders through [employer] and text messages to help ensure you do not miss any testing deadlines to help manage your disease states.    You will be able to reach us by phone or through your [employer] account by clicking our names under Care Team on the right side of the home screen.    I look forward to working with you to achieve your blood pressure goals!    We look forward to working with you to help manage your health,    Sincerely,    Your Digital Medicine Team    Please visit our websites to learn more:   · Hypertension: [email or web address]  · Diabetes: [email or web address]      Remember, we are not available for emergencies. If you have an emergency, please contact your doctors office directly or call [facility] on-call ([phone] or [phone]) or 911.    Diabetes: We want help you get important tests and screenings done regularly to assure that your health needs are met. We have put a new system in place, called CareTouch that will help us improve how we monitor and reach out to you about the following lab tests that you will need to help manage your diabetes.  · Hemoglobin A1c testing (Frequency: Every 3 to 6 months, dependent on A1c  goal)  · Nephropathy Assessment, generally urine micro albumin testing (Frequency: Yearly)  · Eye exam through a quick 30-minute Eye Photo Exam (Frequency: 1-2 Years, depending on result)    When necessary you can come in to one of the labs on the attached page any weekday between 10:30 am and 4:00 pm to have your tests done prior to their due date. Tell the  you received a CareTouch letter, or just look for the CareTouch sign.

## 2020-04-28 NOTE — PROGRESS NOTES
Digital Medicine: Health  Introduction    Introduced Janakkylah Sanonau to Digital Medicine. Discussed health  role and recommended lifestyle modifications.    The history is provided by the patient.     HYPERTENSION  Our goal is to get BP to consistently below 130/80mmHg and make the process convenient so patient can avoid extra trips to the office. Getting your blood pressure below 130/80mmHg (definition of control) will reduce your risk for heart attack, kidney failure, stroke and death (as well as kidney failure, eye disease, & dementia)      Reviewed that the Digital Medicine care team - consisting of a clinician and a health  - will follow the most current evidence-based national guidelines for treating your condition.  The health  will focus on lifestyle modifications and motivation while the clinician will focus on medication therapy.  The care team will review all data on a regular basis and reach out as needed.      Explained that one of the key parts of the program is communication with the care team.  Asked patient to respond to outreach attempts and complete questionnaires.  Stressed importance of medication adherence.    Explained that we expect patient to obtain several blood pressures per week at random times of day.  Instructed patient not to allow anyone else to use phone and monitoring device.  Confirmed appropriate BP monitoring technique.      Explained to patient that the digital medicine team is not available for emergencies.  Patient will call [facility] on-call ([phone] or [phone]) or [phone] if needed.      Patient's BP goal is 130/80.Patient's BP average is 118/81 mmHg, which is above goal, per 2017 ACC/AHA Hypertension Guidelines.          Last 5 Patient Entered Readings                                      Current 30 Day Average: 118/81     Recent Readings 4/28/2020 4/27/2020 4/26/2020 4/25/2020 4/24/2020    SBP (mmHg) 114 121 116 127 114    DBP (mmHg) 84 80 84 93 78     Pulse 67 63 59 49 62            INTERVENTION(S)  encouragement/support    PLAN  patient verbalizes understanding, Clinician follow-up and continue monitoring    Patient requested that we conduct the health  portion of the call during our next encounter. Will follow up in 2 weeks.           Topic    Lipid (Cholesterol) Test     Hemoglobin A1C        Reviewed the importance of self-monitoring, medication adherence, and that the health  can be used as a resource for lifestyle modifications to help reduce or maintain a healthy lifestyle.    Sent link to [facility] webpages and my contact information via [employer] for future questions. Follow up scheduled.         Screenings    SDOH

## 2020-04-29 DIAGNOSIS — E11.9 TYPE 2 DIABETES MELLITUS: ICD-10-CM

## 2020-05-01 RX ORDER — LIRAGLUTIDE 6 MG/ML
INJECTION SUBCUTANEOUS
Qty: 9 ML | Refills: 1 | Status: SHIPPED | OUTPATIENT
Start: 2020-05-01 | End: 2020-07-01

## 2020-05-12 ENCOUNTER — PATIENT OUTREACH (OUTPATIENT)
Dept: OTHER | Facility: OTHER | Age: 75
End: 2020-05-12

## 2020-05-12 NOTE — PROGRESS NOTES
"Digital Medicine: Health  Follow-Up    The history is provided by the patient.     Follow Up  Follow-up reason(s): reading review          INTERVENTION(S)  encouragement/support and denied questions    PLAN  patient verbalizes understanding, Clinician follow-up and continue monitoring    BP readings are stable, BG readings are within goal.     Reviewed with patient to rest before taking BP readings    Plan to follow up in 4 weeks.           Topic    Lipid (Cholesterol) Test     Hemoglobin A1C        Last 5 Patient Entered Readings                                      Current 30 Day Average: 121/79     Recent Readings 5/11/2020 5/11/2020 5/7/2020 5/6/2020 5/5/2020    SBP (mmHg) 138 143 119 118 123    DBP (mmHg) 84 90 68 77 83    Pulse 59 73 72 69 66        Last 6 Patient Entered Readings                                          Most Recent A1c: 6.4% on 10/22/2019  (Goal: 7%)     Recent Readings 5/12/2020 5/11/2020 5/7/2020 5/6/2020 5/5/2020    Blood Glucose (mg/dL) 115 106 162 126 131                    Diet Screening   Breakfast is typically between. Eggs or toast w/ 2 cups of coffee.  Lunch is typically between. Castell, salad, or leftovers from dinner.    Dinner is typically between. Shrimp salad, or pork loin w/ vegetables, or bbq chicken with broccoli or cauliflower .    Snacks are typically. Occasional bag of chips, banana or apple.    Patient reports eating or drinking the following: fast food, fruit, water, fresh vegetables, caffeine, condiments, packaged/processed foods, deli meat and restaurant foodHe has the following dietary restrictions: low sodium diet    The patient states that he typically eats a non-home cooked meal (ex: dining out, take out, frozen meals) 0 times per week.  He cooks for self and has meals prepared by family.      Patient and wife both cook the meals.     He states he drinks "quite a bit of water"    Very seldom does he eat out. If he's on the road and working he may stop and " get something.         Physical Activity Screening   When asked if exercising, patient responded: yesHis level of intensity when exercising is low.    Patient participates in the following activities: yard/housework and walking    Patient states he works in his yard a lot and cuts grass.     Patient's house is raised, he states walks up and down the stairs a lot during the day.        Medication Adherence Screening   He did not miss a dose this month.    Tobacco and Alcohol Screening       No tobacco use       Patient is not eligible for referral to smoking cessation.      Will occasionally have a cocktail at night      SDOH

## 2020-05-27 ENCOUNTER — PATIENT OUTREACH (OUTPATIENT)
Dept: OTHER | Facility: OTHER | Age: 75
End: 2020-05-27

## 2020-05-27 DIAGNOSIS — E11.59 HYPERTENSION ASSOCIATED WITH DIABETES: Primary | ICD-10-CM

## 2020-05-27 DIAGNOSIS — I15.2 HYPERTENSION ASSOCIATED WITH DIABETES: Primary | ICD-10-CM

## 2020-05-29 PROCEDURE — 99457 PR MONITORING, PHYSIOL PARAM, REMOTE, 1ST 20 MINS, PER MONTH: ICD-10-PCS | Mod: S$GLB,,, | Performed by: FAMILY MEDICINE

## 2020-05-29 PROCEDURE — 99457 RPM TX MGMT 1ST 20 MIN: CPT | Mod: S$GLB,,, | Performed by: FAMILY MEDICINE

## 2020-05-29 RX ORDER — IBUPROFEN 100 MG/5ML
1000 SUSPENSION, ORAL (FINAL DOSE FORM) ORAL DAILY
COMMUNITY

## 2020-05-29 NOTE — PROGRESS NOTES
Digital Medicine: Clinician Introduction    Janak Booker is a 74 y.o. male who is newly enrolled in the Digital Medicine Clinic.    The following information was reviewed and updated:  Preferred pharmacy   [id number] - [place], [place] - [address]  [address]  [place] [address]  Phone: [phone] Fax: [phone]      Patient prefers a 90 days supply.     Review of patient's allergies indicates:   Allergen Reactions    No known drug allergies        Patient reports doing well without major concern.  Got home safely yesterday from road trip.  Reports that he had some difficulty with connection where he was so couldn't get some readings done.    Diabetes  Hyper-/hypoglycemic symptoms: denies    Medication issues/non-adherence: denies    Glucometer issues: denies    2. HTN  Hyper-/hypotensive symptoms: denies    Medication issues/non-adherence: denies    Blood pressure cuff issues: denies      Dietary issues/non-adherence: denies    Physical activity issues/non-adherence: denies          The history is provided by the patient.     HYPERTENSION  Our goal is to get BP to consistently below 130/80mmHg and make the process convenient so patient can avoid extra trips to the office. Getting your blood pressure below 130/80mmHg (definition of control) will reduce your risk for heart attack, kidney failure, stroke and death (as well as kidney failure, eye disease, & dementia)      Reviewed non-pharmacologic therapies and impact on BP      Explained that we expect patient to obtain several blood pressures per week at random times of day.  Instructed patient not to allow anyone else to use phone and monitoring device.  Confirmed appropriate BP monitoring technique.      Explained to patient that the digital medicine team is not available for emergencies.  Patient will call [facility] on-call ([phone] or [phone]) or [phone] if needed.    Patient's BP goal is 130/80. Patients BP average is 124/78 mmHg,  which is at or below goal, per 2017 ACC/AHA Hypertension Guidelines.      Med Review complete.    Allergies reviewed.    DIABETES  Instructed patient not to allow anyone else to use phone and monitoring device.  Explained that we expect patient to test their blood sugar as prescribed by their physician or Digital Medicine Clinician.      Reviewed general Self-Monitoring of Blood Glucose (SMBG) goals:  · FPG: <  mg/dL  · 1h PPG: < 180 mg/dL  · 2h PPG: < 160 mg/dL  · Bedtime: < 150 mg/dL    Explained to patient that the digital medicine team is not available for emergencies.  Patient will call [facility] on-call ([phone] or [phone]) or [phone] if needed.      Expected SMBG schedule: Daily  Patient does not have history of hypoglycemia.    Reviewed signs and symptoms of hypoglycemia (weakness, dizziness, hunger, shakiness, nausea, headache, heart palpitations, sweating, fatigue, anxiety, etc.).  Reviewed treatment of hypoglycemia (15/15 rule).      Reviewed signs or symptoms of hyperglycemia (headache, increased thirst, increased urination, fatigue, blurred vision, etc.).      Patient is on ace inhibitor or angiotensin II receptor blocker.   Patient is on statin.     Patient's A1C goals is less than or equal to 7. Patient's most recent A1C result is at or below goal. Lab Results     Component                Value               Date                     HGBA1C                   6.4 (H)             10/22/2019             Med Review complete.    Allergies reviewed.        Last 5 Patient Entered Readings                                      Current 30 Day Average: 124/78     Recent Readings 5/29/2020 5/28/2020 5/21/2020 5/20/2020 5/19/2020    SBP (mmHg) 127 121 124 122 138    DBP (mmHg) 79 70 76 80 82    Pulse 62 62 63 55 67        Last 6 Patient Entered Readings                                          Most Recent A1c: 6.4% on 10/22/2019  (Goal: 7%)     Recent Readings 5/28/2020 5/26/2020 5/22/2020 5/21/2020  5/20/2020    Blood Glucose (mg/dL) 137 123 113 186 129          INTERVENTION(S)  reviewed appropriate dose schedule, reviewed monitoring technique, encouragement/support and denied questions    PLAN  patient verbalizes understanding, additional monitoring needed and continue monitoring    Diabetes  -Glycemia is Controlled per recent A1c and home readings  -Hypoglycemia absent    Continue current medications as prescribed    HTN  -Blood pressure is Controlled per recent readings  -Established with non[facility]ologist at [facility] who prescribes meds    -Continue current medications as prescribed    -Pt getting labs drawn on June 9            Topic    Lipid (Cholesterol) Test     Hemoglobin A1C     Eye Exam        Current Medication Regimen:  Hypertension Medications             lisinopril (PRINIVIL,ZESTRIL) 5 MG tablet 1 Tablet(s) Oral Every evening.    metoprolol tartrate (LOPRESSOR) 25 MG tablet Take 1 tablet by mouth once daily.    NITROSTAT 0.4 mg SL tablet Take 1 tablet by mouth continuous prn.    sotalol (BETAPACE) 120 MG Tab 2 (two) times daily.         Diabetes Medications             metFORMIN (GLUCOPHAGE-XR) 500 MG XR 24hr tablet TAKE ONE TABLET BY MOUTH ONCE DAILY    pioglitazone (ACTOS) 30 MG tablet TAKE ONE TABLET BY MOUTH ONCE DAILY    VICTOZA 3-CAITIE 0.6 mg/0.1 mL (18 mg/3 mL) PnIj INJECT 1.8 MGS UNDER THE SKIN DAILY.        Reviewed the importance of self-monitoring, medication adherence, and that the health  can be used as a resource for lifestyle modifications to help reduce or maintain a healthy lifestyle.    Sent link to [facility] webpages and my contact information via [employer] for future questions. Follow up scheduled.             Sleep Apnea Screening  Patient not previously diagnosed with DALE and         Medication Adherence Screening   He did not miss a dose this month.  Patient knows purpose of medications.      Patient identified the following reasons for  non-compliance: None    Adherence tools used: Pill box

## 2020-05-31 PROCEDURE — 99454 REM MNTR PHYSIOL PARAM 16-30: CPT | Mod: S$GLB,,, | Performed by: FAMILY MEDICINE

## 2020-05-31 PROCEDURE — 99454 PR REMOTE MNTR, PHYS PARAM, INITIAL, EA 30 DAYS: ICD-10-PCS | Mod: S$GLB,,, | Performed by: FAMILY MEDICINE

## 2020-06-03 ENCOUNTER — PATIENT OUTREACH (OUTPATIENT)
Dept: ADMINISTRATIVE | Facility: HOSPITAL | Age: 75
End: 2020-06-03

## 2020-06-09 ENCOUNTER — LAB VISIT (OUTPATIENT)
Dept: LAB | Facility: HOSPITAL | Age: 75
End: 2020-06-09
Attending: FAMILY MEDICINE
Payer: MEDICARE

## 2020-06-09 DIAGNOSIS — E11.22 CONTROLLED TYPE 2 DIABETES MELLITUS WITH STAGE 3 CHRONIC KIDNEY DISEASE, WITHOUT LONG-TERM CURRENT USE OF INSULIN: ICD-10-CM

## 2020-06-09 DIAGNOSIS — I25.10 CORONARY ARTERY DISEASE, OCCLUSIVE: ICD-10-CM

## 2020-06-09 DIAGNOSIS — E11.69 HYPERLIPIDEMIA ASSOCIATED WITH TYPE 2 DIABETES MELLITUS: ICD-10-CM

## 2020-06-09 DIAGNOSIS — E78.5 HYPERLIPIDEMIA ASSOCIATED WITH TYPE 2 DIABETES MELLITUS: ICD-10-CM

## 2020-06-09 DIAGNOSIS — E11.59 HYPERTENSION ASSOCIATED WITH DIABETES: ICD-10-CM

## 2020-06-09 DIAGNOSIS — I15.2 HYPERTENSION ASSOCIATED WITH DIABETES: ICD-10-CM

## 2020-06-09 DIAGNOSIS — N18.30 CONTROLLED TYPE 2 DIABETES MELLITUS WITH STAGE 3 CHRONIC KIDNEY DISEASE, WITHOUT LONG-TERM CURRENT USE OF INSULIN: ICD-10-CM

## 2020-06-09 DIAGNOSIS — N18.30 CKD (CHRONIC KIDNEY DISEASE) STAGE 3, GFR 30-59 ML/MIN: ICD-10-CM

## 2020-06-09 LAB
ALBUMIN SERPL BCP-MCNC: 3.5 G/DL (ref 3.5–5.2)
ALP SERPL-CCNC: 34 U/L (ref 55–135)
ALT SERPL W/O P-5'-P-CCNC: 13 U/L (ref 10–44)
ANION GAP SERPL CALC-SCNC: 6 MMOL/L (ref 8–16)
AST SERPL-CCNC: 17 U/L (ref 10–40)
BILIRUB SERPL-MCNC: 0.8 MG/DL (ref 0.1–1)
BUN SERPL-MCNC: 24 MG/DL (ref 8–23)
CALCIUM SERPL-MCNC: 9.8 MG/DL (ref 8.7–10.5)
CHLORIDE SERPL-SCNC: 104 MMOL/L (ref 95–110)
CHOLEST SERPL-MCNC: 130 MG/DL (ref 120–199)
CHOLEST/HDLC SERPL: 3.2 {RATIO} (ref 2–5)
CO2 SERPL-SCNC: 26 MMOL/L (ref 23–29)
CREAT SERPL-MCNC: 2 MG/DL (ref 0.5–1.4)
EST. GFR  (AFRICAN AMERICAN): 37.2 ML/MIN/1.73 M^2
EST. GFR  (NON AFRICAN AMERICAN): 32.2 ML/MIN/1.73 M^2
GLUCOSE SERPL-MCNC: 104 MG/DL (ref 70–110)
HDLC SERPL-MCNC: 41 MG/DL (ref 40–75)
HDLC SERPL: 31.5 % (ref 20–50)
LDLC SERPL CALC-MCNC: 67.2 MG/DL (ref 63–159)
NONHDLC SERPL-MCNC: 89 MG/DL
POTASSIUM SERPL-SCNC: 4.7 MMOL/L (ref 3.5–5.1)
PROT SERPL-MCNC: 6.7 G/DL (ref 6–8.4)
SODIUM SERPL-SCNC: 136 MMOL/L (ref 136–145)
TRIGL SERPL-MCNC: 109 MG/DL (ref 30–150)

## 2020-06-09 PROCEDURE — 83036 HEMOGLOBIN GLYCOSYLATED A1C: CPT | Mod: HCNC

## 2020-06-09 PROCEDURE — 36415 COLL VENOUS BLD VENIPUNCTURE: CPT | Mod: HCNC,PO

## 2020-06-09 PROCEDURE — 80061 LIPID PANEL: CPT | Mod: HCNC

## 2020-06-09 PROCEDURE — 80053 COMPREHEN METABOLIC PANEL: CPT | Mod: HCNC

## 2020-06-10 LAB
ESTIMATED AVG GLUCOSE: 126 MG/DL (ref 68–131)
HBA1C MFR BLD HPLC: 6 % (ref 4–5.6)

## 2020-06-11 ENCOUNTER — PATIENT OUTREACH (OUTPATIENT)
Dept: OTHER | Facility: OTHER | Age: 75
End: 2020-06-11

## 2020-06-11 DIAGNOSIS — E11.22 CONTROLLED TYPE 2 DIABETES MELLITUS WITH STAGE 3 CHRONIC KIDNEY DISEASE, WITHOUT LONG-TERM CURRENT USE OF INSULIN: Primary | ICD-10-CM

## 2020-06-11 DIAGNOSIS — N18.30 CONTROLLED TYPE 2 DIABETES MELLITUS WITH STAGE 3 CHRONIC KIDNEY DISEASE, WITHOUT LONG-TERM CURRENT USE OF INSULIN: Primary | ICD-10-CM

## 2020-06-11 NOTE — PROGRESS NOTES
Digital Medicine: Clinician Follow-Up    Patient reports doing very well without major concern.  Successfully got labs this week.  Family medicine visit next week.    Diabetes  Hyper-/hypoglycemic symptoms: denies    Medication issues/non-adherence: denies    Glucometer issues: denies    2. HTN  Hyper-/hypotensive symptoms: denies    Medication issues/non-adherence: denies    Blood pressure cuff issues: denies      Diet: No changes    Physical activity: No changes          The history is provided by the patient. No  was used.   Follow Up  Follow-up reason(s): reading review          INTERVENTION(S)  reviewed appropriate dose schedule, reviewed monitoring technique, encouragement/support and denied questions    PLAN  patient verbalizes understanding and continue monitoring    Diabetes  -Glycemia is Controlled per recent A1c and home readings  -Hypoglycemia absent  -Current regimen is appropriate and adequate for control.  May even be able to taper off of pioglitazone in the future    -Continue current medications as prescribed    HTN  -Blood pressure is Controlled per recent readings  -Current regimen is appropriate and adequate for control  -Note slight elevation in creatinine compared to last check but BUN also high so could have been a little dehydrated.    -Continue current medications as prescribed            Topic    Eye Exam        Last 5 Patient Entered Readings                                      Current 30 Day Average: 123/77     Recent Readings 6/11/2020 6/9/2020 6/8/2020 6/6/2020 6/4/2020    SBP (mmHg) 115 107 137 132 115    DBP (mmHg) 67 71 79 77 73    Pulse 66 69 56 60 65        Last 6 Patient Entered Readings                                          Most Recent A1c: 6% on 6/9/2020  (Goal: 8%)     Recent Readings 6/11/2020 6/9/2020 6/8/2020 6/6/2020 6/4/2020    Blood Glucose (mg/dL) 121 128 123 102 129           Hypertension Medications             lisinopril (PRINIVIL,ZESTRIL) 5  MG tablet 1 Tablet(s) Oral Every evening.    metoprolol tartrate (LOPRESSOR) 25 MG tablet Take 1 tablet by mouth 2 (two) times a day.    NITROSTAT 0.4 mg SL tablet Take 1 tablet by mouth continuous prn.    sotalol (BETAPACE) 120 MG Tab 2 (two) times daily.         Diabetes Medications             metFORMIN (GLUCOPHAGE-XR) 500 MG XR 24hr tablet TAKE ONE TABLET BY MOUTH ONCE DAILY    pioglitazone (ACTOS) 30 MG tablet TAKE ONE TABLET BY MOUTH ONCE DAILY    VICTOZA 3-CAITIE 0.6 mg/0.1 mL (18 mg/3 mL) PnIj INJECT 1.8 MGS UNDER THE SKIN DAILY.               Screenings

## 2020-06-16 ENCOUNTER — PATIENT OUTREACH (OUTPATIENT)
Dept: OTHER | Facility: OTHER | Age: 75
End: 2020-06-16

## 2020-06-17 ENCOUNTER — OFFICE VISIT (OUTPATIENT)
Dept: FAMILY MEDICINE | Facility: CLINIC | Age: 75
End: 2020-06-17
Attending: FAMILY MEDICINE
Payer: MEDICARE

## 2020-06-17 VITALS
WEIGHT: 250 LBS | HEIGHT: 73 IN | DIASTOLIC BLOOD PRESSURE: 79 MMHG | SYSTOLIC BLOOD PRESSURE: 117 MMHG | RESPIRATION RATE: 16 BRPM | BODY MASS INDEX: 33.13 KG/M2 | TEMPERATURE: 98 F

## 2020-06-17 DIAGNOSIS — I10 ESSENTIAL HYPERTENSION: ICD-10-CM

## 2020-06-17 DIAGNOSIS — E78.2 MIXED HYPERLIPIDEMIA: ICD-10-CM

## 2020-06-17 PROCEDURE — 3044F PR MOST RECENT HEMOGLOBIN A1C LEVEL <7.0%: ICD-10-PCS | Mod: CPTII,95,, | Performed by: FAMILY MEDICINE

## 2020-06-17 PROCEDURE — 1126F PR PAIN SEVERITY QUANTIFIED, NO PAIN PRESENT: ICD-10-PCS | Mod: 95,,, | Performed by: FAMILY MEDICINE

## 2020-06-17 PROCEDURE — 99214 PR OFFICE/OUTPT VISIT, EST, LEVL IV, 30-39 MIN: ICD-10-PCS | Mod: 95,,, | Performed by: FAMILY MEDICINE

## 2020-06-17 PROCEDURE — 1101F PR PT FALLS ASSESS DOC 0-1 FALLS W/OUT INJ PAST YR: ICD-10-PCS | Mod: CPTII,95,, | Performed by: FAMILY MEDICINE

## 2020-06-17 PROCEDURE — 3074F PR MOST RECENT SYSTOLIC BLOOD PRESSURE < 130 MM HG: ICD-10-PCS | Mod: CPTII,95,, | Performed by: FAMILY MEDICINE

## 2020-06-17 PROCEDURE — 3074F SYST BP LT 130 MM HG: CPT | Mod: CPTII,95,, | Performed by: FAMILY MEDICINE

## 2020-06-17 PROCEDURE — 1159F PR MEDICATION LIST DOCUMENTED IN MEDICAL RECORD: ICD-10-PCS | Mod: 95,,, | Performed by: FAMILY MEDICINE

## 2020-06-17 PROCEDURE — 1101F PT FALLS ASSESS-DOCD LE1/YR: CPT | Mod: CPTII,95,, | Performed by: FAMILY MEDICINE

## 2020-06-17 PROCEDURE — 99214 OFFICE O/P EST MOD 30 MIN: CPT | Mod: 95,,, | Performed by: FAMILY MEDICINE

## 2020-06-17 PROCEDURE — 1159F MED LIST DOCD IN RCRD: CPT | Mod: 95,,, | Performed by: FAMILY MEDICINE

## 2020-06-17 PROCEDURE — 1126F AMNT PAIN NOTED NONE PRSNT: CPT | Mod: 95,,, | Performed by: FAMILY MEDICINE

## 2020-06-17 PROCEDURE — 3078F DIAST BP <80 MM HG: CPT | Mod: CPTII,95,, | Performed by: FAMILY MEDICINE

## 2020-06-17 PROCEDURE — 3044F HG A1C LEVEL LT 7.0%: CPT | Mod: CPTII,95,, | Performed by: FAMILY MEDICINE

## 2020-06-17 PROCEDURE — 3078F PR MOST RECENT DIASTOLIC BLOOD PRESSURE < 80 MM HG: ICD-10-PCS | Mod: CPTII,95,, | Performed by: FAMILY MEDICINE

## 2020-06-18 ENCOUNTER — TELEPHONE (OUTPATIENT)
Dept: FAMILY MEDICINE | Facility: CLINIC | Age: 75
End: 2020-06-18

## 2020-06-23 NOTE — PROGRESS NOTES
Pt contacted me needing more test strips.  Denies any issues.  Believes things are going quite well.    Provided HME phone number.  Readings are well controlled.

## 2020-06-24 NOTE — PROGRESS NOTES
The patient location is: home  The chief complaint leading to consultation is: follow up    Visit type: televisual    Face to Face time with patient: 40 minutes of total time spent on the encounter, which includes face to face time and non-face to face time preparing to see the patient (eg, review of tests), Obtaining and/or reviewing separately obtained history, Documenting clinical information in the electronic or other health record, Independently interpreting results (not separately reported) and communicating results to the patient/family/caregiver, or Care coordination (not separately reported).         Each patient to whom he or she provides medical services by telemedicine is:  (1) informed of the relationship between the physician and patient and the respective role of any other health care provider with respect to management of the patient; and (2) notified that he or she may decline to receive medical services by telemedicine and may withdraw from such care at any time.    Notes:   Subjective:       Patient ID: Janak Booker is a 74 y.o. male.    Chief Complaint: Diabetes    HPI   Pt of dr cancino in virtual visit for follow up of dm on metformin no adverse gi side effects fbs in the low to mid 100's  Pt has htn on b blocker no excessive fatigue and ace no cough bp fine at home  Pt has hypercholesterolemia on statin no muscle aches  Pt denies covid symptoms no n/v/f/c/d/c no cough chest congestion no sore throat  Pt denies depression/anxiety with covid restrictions no panic attacks no si/hi  Review of Systems   Constitutional: Negative for chills, fatigue and fever.   HENT: Negative for congestion, postnasal drip, sinus pain and sore throat.    Respiratory: Negative for cough and chest tightness.    Cardiovascular: Negative for chest pain and palpitations.   Gastrointestinal: Negative for abdominal distention, abdominal pain and blood in stool.   Endocrine: Negative for polydipsia and polyuria.  "  Psychiatric/Behavioral: Negative for behavioral problems, dysphoric mood and suicidal ideas. The patient is not nervous/anxious.        Objective:     /79   Temp 98.2 °F (36.8 °C)   Resp 16   Ht 6' 1" (1.854 m)   Wt 113.4 kg (250 lb)   BMI 32.98 kg/m²   Physical Exam  Constitutional:       Appearance: Normal appearance. He is not ill-appearing.   HENT:      Head: Normocephalic and atraumatic.      Nose: Nose normal. No congestion.      Comments: Pt is able to breath through his nose  Eyes:      General:         Right eye: No discharge.         Left eye: No discharge.      Extraocular Movements: Extraocular movements intact.      Pupils: Pupils are equal, round, and reactive to light.   Pulmonary:      Effort: Pulmonary effort is normal. No respiratory distress.      Comments: Pt speaking effortlessly   Neurological:      General: No focal deficit present.      Mental Status: He is alert and oriented to person, place, and time.      Cranial Nerves: No cranial nerve deficit.   Psychiatric:         Mood and Affect: Mood normal.         Behavior: Behavior normal.         Thought Content: Thought content normal.         Judgment: Judgment normal.       hgb a1c 6.0 on 6/9/2020  Assessment:       1. Diabetes mellitus type 2, uncontrolled, with complications    2. Essential hypertension    3. Mixed hyperlipidemia        Plan:     orders cmp lipd hgb a1c cone pta  Ada diet  Cont meds  Graded exercise  rtc quarterly        "This note will not be shared with the patient."     "

## 2020-07-08 ENCOUNTER — LAB VISIT (OUTPATIENT)
Dept: LAB | Facility: HOSPITAL | Age: 75
End: 2020-07-08
Attending: INTERNAL MEDICINE
Payer: MEDICARE

## 2020-07-08 DIAGNOSIS — N18.9 CHRONIC KIDNEY DISEASE, UNSPECIFIED: Primary | ICD-10-CM

## 2020-07-08 LAB
ALBUMIN SERPL BCP-MCNC: 3.8 G/DL (ref 3.5–5.2)
ANION GAP SERPL CALC-SCNC: 10 MMOL/L (ref 8–16)
BACTERIA #/AREA URNS HPF: NEGATIVE /HPF
BASOPHILS # BLD AUTO: 0.06 K/UL (ref 0–0.2)
BASOPHILS NFR BLD: 0.8 % (ref 0–1.9)
BILIRUB UR QL STRIP: NEGATIVE
BUN SERPL-MCNC: 30 MG/DL (ref 8–23)
CALCIUM SERPL-MCNC: 9.1 MG/DL (ref 8.7–10.5)
CHLORIDE SERPL-SCNC: 105 MMOL/L (ref 95–110)
CLARITY UR: ABNORMAL
CO2 SERPL-SCNC: 23 MMOL/L (ref 23–29)
COLOR UR: YELLOW
CREAT SERPL-MCNC: 1.7 MG/DL (ref 0.5–1.4)
CREAT UR-MCNC: 212 MG/DL (ref 23–375)
DIFFERENTIAL METHOD: ABNORMAL
EOSINOPHIL # BLD AUTO: 0.5 K/UL (ref 0–0.5)
EOSINOPHIL NFR BLD: 7.2 % (ref 0–8)
ERYTHROCYTE [DISTWIDTH] IN BLOOD BY AUTOMATED COUNT: 16.4 % (ref 11.5–14.5)
EST. GFR  (AFRICAN AMERICAN): 45.2 ML/MIN/1.73 M^2
EST. GFR  (NON AFRICAN AMERICAN): 39.1 ML/MIN/1.73 M^2
FERRITIN SERPL-MCNC: 161 NG/ML (ref 20–300)
GLUCOSE SERPL-MCNC: 135 MG/DL (ref 70–110)
GLUCOSE UR QL STRIP: NEGATIVE
HCT VFR BLD AUTO: 42.6 % (ref 40–54)
HGB BLD-MCNC: 13.8 G/DL (ref 14–18)
HGB UR QL STRIP: NEGATIVE
HYALINE CASTS #/AREA URNS LPF: 4 /LPF
IMM GRANULOCYTES # BLD AUTO: 0.01 K/UL (ref 0–0.04)
IMM GRANULOCYTES NFR BLD AUTO: 0.1 % (ref 0–0.5)
IRON SERPL-MCNC: 97 UG/DL (ref 45–160)
KETONES UR QL STRIP: NEGATIVE
LEUKOCYTE ESTERASE UR QL STRIP: NEGATIVE
LYMPHOCYTES # BLD AUTO: 2.2 K/UL (ref 1–4.8)
LYMPHOCYTES NFR BLD: 30.3 % (ref 18–48)
MCH RBC QN AUTO: 28.5 PG (ref 27–31)
MCHC RBC AUTO-ENTMCNC: 32.4 G/DL (ref 32–36)
MCV RBC AUTO: 88 FL (ref 82–98)
MICROSCOPIC COMMENT: ABNORMAL
MONOCYTES # BLD AUTO: 0.7 K/UL (ref 0.3–1)
MONOCYTES NFR BLD: 9.1 % (ref 4–15)
NEUTROPHILS # BLD AUTO: 3.8 K/UL (ref 1.8–7.7)
NEUTROPHILS NFR BLD: 52.5 % (ref 38–73)
NITRITE UR QL STRIP: NEGATIVE
NRBC BLD-RTO: 0 /100 WBC
PH UR STRIP: 6 [PH] (ref 5–8)
PHOSPHATE SERPL-MCNC: 3.2 MG/DL (ref 2.7–4.5)
PLATELET # BLD AUTO: 192 K/UL (ref 150–350)
PMV BLD AUTO: 11.9 FL (ref 9.2–12.9)
POTASSIUM SERPL-SCNC: 5.4 MMOL/L (ref 3.5–5.1)
PROT UR QL STRIP: ABNORMAL
PROT UR-MCNC: 24 MG/DL (ref 6–15)
PROT/CREAT UR: 0.11 MG/G{CREAT} (ref 0–0.2)
RBC # BLD AUTO: 4.84 M/UL (ref 4.6–6.2)
RBC #/AREA URNS HPF: 0 /HPF (ref 0–4)
SATURATED IRON: 22 % (ref 20–50)
SODIUM SERPL-SCNC: 138 MMOL/L (ref 136–145)
SP GR UR STRIP: 1.02 (ref 1–1.03)
SQUAMOUS #/AREA URNS HPF: 0 /HPF
TOTAL IRON BINDING CAPACITY: 440 UG/DL (ref 250–450)
TRANSFERRIN SERPL-MCNC: 314 MG/DL (ref 200–375)
URN SPEC COLLECT METH UR: ABNORMAL
UROBILINOGEN UR STRIP-ACNC: ABNORMAL EU/DL
WBC # BLD AUTO: 7.23 K/UL (ref 3.9–12.7)
WBC #/AREA URNS HPF: 1 /HPF (ref 0–5)

## 2020-07-08 PROCEDURE — 84156 ASSAY OF PROTEIN URINE: CPT

## 2020-07-08 PROCEDURE — 81001 URINALYSIS AUTO W/SCOPE: CPT

## 2020-07-08 PROCEDURE — 83540 ASSAY OF IRON: CPT

## 2020-07-08 PROCEDURE — 80069 RENAL FUNCTION PANEL: CPT

## 2020-07-08 PROCEDURE — 82728 ASSAY OF FERRITIN: CPT

## 2020-07-08 PROCEDURE — 85025 COMPLETE CBC W/AUTO DIFF WBC: CPT

## 2020-07-08 PROCEDURE — 36415 COLL VENOUS BLD VENIPUNCTURE: CPT

## 2020-07-14 ENCOUNTER — PATIENT OUTREACH (OUTPATIENT)
Dept: OTHER | Facility: OTHER | Age: 75
End: 2020-07-14

## 2020-07-14 NOTE — PROGRESS NOTES
Digital Medicine: Health  Follow-Up    The history is provided by the patient.   Follow Up  Follow-up reason(s): reading review and goal follow-up          INTERVENTION(S)  encouragement/support and denied questions    PLAN  patient verbalizes understanding and continue monitoring    Patient's readings are controlled and near goal.     Plan to follow up in 6 weeks.           Topic    Eye Exam        Last 5 Patient Entered Readings                                      Current 30 Day Average: 122/79     Recent Readings 7/13/2020 7/10/2020 7/9/2020 7/8/2020 7/6/2020    SBP (mmHg) 136 130 111 110 128    DBP (mmHg) 78 80 76 73 82    Pulse 61 62 67 70 67        Last 6 Patient Entered Readings                                          Most Recent A1c: 6% on 6/9/2020  (Goal: 8%)     Recent Readings 7/13/2020 7/10/2020 7/9/2020 7/8/2020 7/6/2020    Blood Glucose (mg/dL) 116 137 152 183 115                    Diet Screening       Reviewed before breakfast readings with patient. He's had some recent elevations in BG before breakfast.   Patient couldn't quite recall what he ate for dinner last week. He did state that one day him and his wife had some fried fish and french fries, but that type of meal is rare for them.   Encouraged him to be careful with meals for dinner that are rich in carbohydrates since they will cause spikes in BG in the morning.     Overall readings are within or close to goal. Average BG is 138.     Physical Activity Screening   No change to exercise routine.          SDOH

## 2020-07-30 ENCOUNTER — PATIENT OUTREACH (OUTPATIENT)
Dept: ADMINISTRATIVE | Facility: HOSPITAL | Age: 75
End: 2020-07-30

## 2020-08-18 ENCOUNTER — PATIENT OUTREACH (OUTPATIENT)
Dept: ADMINISTRATIVE | Facility: HOSPITAL | Age: 75
End: 2020-08-18

## 2020-08-26 ENCOUNTER — PATIENT OUTREACH (OUTPATIENT)
Dept: OTHER | Facility: OTHER | Age: 75
End: 2020-08-26

## 2020-08-26 NOTE — PROGRESS NOTES
Digital Medicine: Health  Follow-Up    The history is provided by the patient.             Reason for review: Blood glucose at goal and Blood pressure at goal      Additional Follow-up details: Patient reports he's doing well. Denies any lifestyle changes and questions/concerns.         Lifestyle    Continue current diet/physical activity routine.       Addressed any questions or concerns and patient has my contact information if needed prior to next outreach. Patient verbalizes understanding.      Explained the importance of self-monitoring and medication adherence. Encouraged the patient to communicate with their health  for lifestyle modifications to help improve or maintain a healthy lifestyle.                Topic    Eye Exam        Last 5 Patient Entered Readings                                      Current 30 Day Average: 123/80     Recent Readings 8/25/2020 8/20/2020 8/20/2020 8/16/2020 8/15/2020    SBP (mmHg) 123 138 143 134 129    DBP (mmHg) 69 79 96 86 84    Pulse 62 70 78 57 77        Last 6 Patient Entered Readings                                          Most Recent A1c: 6% on 6/9/2020  (Goal: 8%)     Recent Readings 8/25/2020 8/20/2020 8/16/2020 8/15/2020 8/11/2020    Blood Glucose (mg/dL) 133 131 136 124 118

## 2020-08-31 RX ORDER — DABIGATRAN ETEXILATE 150 MG/1
150 CAPSULE ORAL 2 TIMES DAILY
Qty: 180 CAPSULE | Refills: 3 | Status: SHIPPED | OUTPATIENT
Start: 2020-08-31 | End: 2021-09-01

## 2020-08-31 NOTE — PROGRESS NOTES
Subjective:       Patient ID: Janak Booker is a 75 y.o. male.    Chief Complaint: Annual Exam    HPI  He presents to the office today requesting a routine periodic health examination.    Patient Active Problem List   Diagnosis    Family history of prostate cancer    Disc displacement, lumbar    GERD (gastroesophageal reflux disease)    Coronary artery disease, occlusive    Solitary kidney    CKD (chronic kidney disease) stage 3, GFR 30-59 ml/min    Controlled type 2 diabetes mellitus with stage 3 chronic kidney disease, without long-term current use of insulin    S/P coronary artery stent placement    Lumbar spondylosis    Facet arthritis of lumbar region    Hypertension associated with diabetes    Hyperlipidemia associated with type 2 diabetes mellitus    Facet arthritis of cervical region    Atrial fibrillation    DDD (degenerative disc disease), cervical    Hx of colonic polyps    Lumbar radiculitis    Chronic pain    Lumbar radiculopathy       Past Surgical History:   Procedure Laterality Date    BACK SURGERY      CARDIAC SURGERY      stents     CARPAL TUNNEL RELEASE Right     CATARACT EXTRACTION W/  INTRAOCULAR LENS IMPLANT Bilateral     COLONOSCOPY N/A 3/27/2018    Procedure: COLONOSCOPY;  Surgeon: Rishi Garcia MD;  Location: [facility];  Service: Endoscopy;  Laterality: N/A;    CORONARY ANGIOPLASTY WITH STENT PLACEMENT      x 3    EYE SURGERY      INJECTION OF FACET JOINT N/A 5/14/2019    Procedure: INJECTION, FACET JOINT INJECTION (LUMBAR BLOCK) PLEASE INJECT L3/4;  Surgeon: Catrachito Harley MD;  Location: [facility];  Service: Pain Management;  Laterality: N/A;  NEEDS CONSENT, PRADAXA CLEARANCE IN CHART    KNEE ARTHROSCOPY W/ MENISCECTOMY  12/22/2008    right    LUMBAR DISCECTOMY  12/2011    L4-L5 Fusion    LUMBAR EPIDURAL INJECTION  02/04/2019    ROTATOR CUFF REPAIR Left 7/2012    SHOULDER OPEN ROTATOR CUFF REPAIR      SKIN CANCER FOREHEAD 2019      SPINE  "SURGERY      TIBIA FRACTURE SURGERY      TRANSFORAMINAL EPIDURAL INJECTION OF STEROID Left 9/23/2019    Procedure: INJECTION, STEROID, EPIDURAL, TRANSFORAMINAL APPROACH;  Surgeon: Jose Guadalupe Linn MD;  Location: [facility];  Service: Pain Management;  Laterality: Left;  Left TF ADI L4 and L5  OK to hold Pradaxa    TRANSFORAMINAL EPIDURAL INJECTION OF STEROID Left 11/14/2019    Procedure: INJECTION, STEROID, EPIDURAL, TRANSFORAMINAL APPROACH;  Surgeon: Jose Guadalupe Linn MD;  Location: [facility];  Service: Pain Management;  Laterality: Left;  Left TF ADI L4-5 and L5-S1         Current Outpatient Medications:     ascorbic acid, vitamin C, (VITAMIN C) 1000 MG tablet, Take 1,000 mg by mouth once daily., Disp: , Rfl:     BD ALCOHOL SWABS PadM, USE AS DIRECTED TO CHECK BLOOD GLUCOSE DAILY, Disp: 100 each, Rfl: 5    BD BARRY 2ND GEN PEN NEEDLE 32 gauge x 5/32" Ndle, USE ONE NEEDLE ONCE DAILY, Disp: 100 each, Rfl: 2    blood sugar diagnostic Strp, Use as directed to check blood sugar daily., Disp: 100 each, Rfl: 3    blood-glucose meter Misc, Use as directed., Disp: 1 each, Rfl: 0    dabigatran etexilate (PRADAXA) 150 mg Cap, Take 1 capsule (150 mg total) by mouth 2 (two) times a day., Disp: 180 capsule, Rfl: 3    fenofibrate micronized (LOFIBRA) 134 MG Cap, , Disp: , Rfl:     lancets (TRUEPLUS LANCETS) 28 gauge Misc, 1 lancet by Misc.(Non-Drug; Combo Route) route once daily., Disp: 100 each, Rfl: 3    lisinopril (PRINIVIL,ZESTRIL) 5 MG tablet, 1 Tablet(s) Oral Every evening., Disp: , Rfl:     metFORMIN (GLUCOPHAGE-XR) 500 MG XR 24hr tablet, TAKE ONE TABLET BY MOUTH ONCE DAILY, Disp: 90 tablet, Rfl: 3    metoprolol tartrate (LOPRESSOR) 25 MG tablet, Take 1 tablet by mouth 2 (two) times a day., Disp: , Rfl:     multivitamin with minerals tablet, , Disp: , Rfl:     NITROSTAT 0.4 mg SL tablet, Take 1 tablet by mouth continuous prn., Disp: , Rfl:     omega-3 fatty acids 1,000 mg Cap, 1 Capsule(s) Oral OTC once a " day., Disp: , Rfl:     omeprazole (PRILOSEC) 40 MG capsule, TAKE ONE CAPSULE BY MOUTH ONCE DAILY, Disp: 90 capsule, Rfl: 1    pioglitazone (ACTOS) 30 MG tablet, TAKE ONE TABLET BY MOUTH ONCE DAILY , Disp: 90 tablet, Rfl: 1    rosuvastatin (CRESTOR) 10 MG tablet, Every day, Disp: , Rfl:     sotalol (BETAPACE) 120 MG Tab, 2 (two) times daily. , Disp: , Rfl:     TRUE METRIX AIR GLUCOSE METER kit, , Disp: , Rfl:     VICTOZA 3-CAITIE 0.6 mg/0.1 mL (18 mg/3 mL) PnIj pen, INJECT 1.8 MGS UNDER THE SKIN DAILY. , Disp: 9 mL, Rfl: 1    Review of patient's allergies indicates:   Allergen Reactions    No known drug allergies        Family History   Problem Relation Age of Onset    Heart failure Father     Heart disease Father         MI    Prostate cancer Father     Heart failure Mother     Heart disease Mother     No Known Problems Sister     No Known Problems Daughter     No Known Problems Son     No Known Problems Daughter        Social History     Socioeconomic History    Marital status:      Spouse name: Santino    Number of children: 3    Years of education: Not on file    Highest education level: Not on file   Occupational History    Not on file   Social Needs    Financial resource strain: Not hard at all    Food insecurity     Worry: Never true     Inability: Never true    Transportation needs     Medical: No     Non-medical: No   Tobacco Use    Smoking status: Never Smoker    Smokeless tobacco: Never Used   Substance and Sexual Activity    Alcohol use: Yes     Alcohol/week: 5.0 standard drinks     Types: 6 Standard drinks or equivalent per week     Frequency: Monthly or less     Drinks per session: 1 or 2     Binge frequency: Never     Comment: occasionally    Drug use: No    Sexual activity: Yes     Partners: Female   Lifestyle    Physical activity     Days per week: 0 days     Minutes per session: Not on file    Stress: Not at all   Relationships    Social connections     Talks on  phone: More than three times a week     Gets together: Twice a week     Attends [employer] witness service: More than 4 times per year     Active member of club or organization: Yes     Attends meetings of clubs or organizations: More than 4 times per year     Relationship status:    Other Topics Concern    Not on file   Social History Narrative    The patient does not exercise regularly ().      He is not satisfied with weight.    Rates diet as fair.    He does drink at least 1/2 gallon water daily.    He drinks 2 coffee/tea/caffeine-containing soft drinks daily.    Total sleep time at night is 7 hours.    He does wear seat belts.    Hobbies include off-road, camping.       Patient Care Team:  John Claire Jr., MD as PCP - General (Family Medicine)  Sharif Phipps MD as Consulting Physician (Cardiology)  Mahesh Martinez MD as Consulting Physician (Neurosurgery)  Jamari Cortes MD as Consulting Physician (Nephrology)  Zechariah Dillard OD (Optometry)  Karli Kaur as Digital Medicine Health   John Claire Jr., MD as Hypertension Digital Medicine Responsible Provider (Family Medicine)  John Claire Jr., MD as Diabetes Digital Medicine Responsible Provider (Family Medicine)  Nabil Araujo PharmD as Diabetes Digital Medicine Clinician  Arleth FloresD as Hypertension Digital Medicine Clinician  Jeannine Rose LPN as Care Coordinator  [employer] as Hypertension Digital Medicine Contract  [employer] as Diabetes Digital Medicine Contract    Health Maintenance   Topic Date Due    Eye Exam  06/17/2020    Foot Exam  10/22/2020    Hemoglobin A1c  12/09/2020    High Dose Statin  05/29/2021    Lipid Panel  06/09/2021    TETANUS VACCINE  08/29/2026    Hepatitis C Screening  Completed    Pneumococcal Vaccine (65+ Low/Medium Risk)  Completed          Review of Systems   Constitutional: Negative for fatigue and unexpected weight change.   HENT: Negative for ear  "discharge, ear pain, hearing loss, tinnitus and voice change.    Eyes: Negative for pain.   Respiratory: Negative for cough and shortness of breath.    Cardiovascular: Negative for chest pain, palpitations and leg swelling.   Gastrointestinal: Negative for abdominal pain, blood in stool, constipation, diarrhea, nausea and vomiting.   Genitourinary: Negative for decreased urine volume, difficulty urinating, dysuria, enuresis, frequency, hematuria and urgency.   Musculoskeletal: Negative for arthralgias, back pain and myalgias.   Skin: Negative for rash.   Neurological: Negative for dizziness, weakness, light-headedness and headaches.   Hematological: Does not bruise/bleed easily.   Psychiatric/Behavioral: Negative for dysphoric mood and sleep disturbance. The patient is not nervous/anxious.        I reviewed laboratory investigation from 06/09/2020 and 7/8/2020..    Objective:      /78   Pulse (!) 57   Ht 6' 1" (1.854 m)   Wt 111.1 kg (245 lb)   SpO2 97%   BMI 32.32 kg/m²     Physical Exam  Vitals signs reviewed.   Constitutional:       Appearance: He is well-developed.   HENT:      Head: Normocephalic and atraumatic.      Right Ear: External ear normal.      Left Ear: External ear normal.      Nose: Nose normal.      Mouth/Throat:      Pharynx: No oropharyngeal exudate.   Eyes:      General: No scleral icterus.     Conjunctiva/sclera: Conjunctivae normal.   Neck:      Musculoskeletal: Neck supple.      Thyroid: No thyromegaly.      Vascular: No carotid bruit or JVD.   Cardiovascular:      Rate and Rhythm: Normal rate and regular rhythm.      Pulses: Normal pulses.           Dorsalis pedis pulses are 2+ on the right side and 2+ on the left side.        Posterior tibial pulses are 2+ on the right side and 2+ on the left side.      Heart sounds: Murmur present. Systolic murmur present with a grade of 3/6. No friction rub. No gallop.    Pulmonary:      Effort: Pulmonary effort is normal.      Breath sounds: " "Normal breath sounds. No wheezing, rhonchi or rales.   Abdominal:      General: Bowel sounds are normal. There is no distension.      Palpations: Abdomen is soft. There is no hepatomegaly, splenomegaly or mass.      Tenderness: There is no abdominal tenderness.   Musculoskeletal: Normal range of motion.         General: No tenderness.      Right foot: Normal range of motion. No deformity.      Left foot: Normal range of motion. No deformity.   Feet:      Right foot:      Protective Sensation: 9 sites tested. 9 sites sensed.      Skin integrity: No erythema, warmth or dry skin.      Left foot:      Protective Sensation: 9 sites tested. 9 sites sensed.      Skin integrity: No erythema, callus or dry skin.   Lymphadenopathy:      Cervical: No cervical adenopathy.   Skin:     General: Skin is warm and dry.   Neurological:      Mental Status: He is alert and oriented to person, place, and time.      Cranial Nerves: No cranial nerve deficit.      Sensory: No sensory deficit.      Coordination: Coordination normal.      Deep Tendon Reflexes: Reflexes are normal and symmetric.   Psychiatric:         Behavior: Behavior is cooperative.           Assessment:       1. Routine general medical examination at a health care facility    2. Controlled type 2 diabetes mellitus with stage 3 chronic kidney disease, without long-term current use of insulin    3. CKD (chronic kidney disease) stage 3, GFR 30-59 ml/min    4. Hypertension associated with diabetes    5. Hyperlipidemia associated with type 2 diabetes mellitus    6. Coronary artery disease, occlusive    7. Atrial fibrillation, unspecified type    8. Gastroesophageal reflux disease without esophagitis    9. Hx of colonic polyps    10. Solitary kidney        Plan:       Obtain copy of most recent eye exam from optometrist.  Diabetic foot exam performed today.  Remainder of Health Maintenance reviewed - up to date.    RTC 6 months.          "This note will not be shared with the " "patient."    "

## 2020-09-01 ENCOUNTER — OFFICE VISIT (OUTPATIENT)
Dept: FAMILY MEDICINE | Facility: CLINIC | Age: 75
End: 2020-09-01
Attending: FAMILY MEDICINE
Payer: MEDICARE

## 2020-09-01 VITALS
WEIGHT: 245 LBS | SYSTOLIC BLOOD PRESSURE: 120 MMHG | BODY MASS INDEX: 32.47 KG/M2 | DIASTOLIC BLOOD PRESSURE: 78 MMHG | HEART RATE: 57 BPM | OXYGEN SATURATION: 97 % | HEIGHT: 73 IN

## 2020-09-01 DIAGNOSIS — I25.10 CORONARY ARTERY DISEASE, OCCLUSIVE: ICD-10-CM

## 2020-09-01 DIAGNOSIS — Z00.00 ROUTINE GENERAL MEDICAL EXAMINATION AT A HEALTH CARE FACILITY: Primary | ICD-10-CM

## 2020-09-01 DIAGNOSIS — N18.30 CKD (CHRONIC KIDNEY DISEASE) STAGE 3, GFR 30-59 ML/MIN: ICD-10-CM

## 2020-09-01 DIAGNOSIS — I48.91 ATRIAL FIBRILLATION, UNSPECIFIED TYPE: ICD-10-CM

## 2020-09-01 DIAGNOSIS — E78.5 HYPERLIPIDEMIA ASSOCIATED WITH TYPE 2 DIABETES MELLITUS: ICD-10-CM

## 2020-09-01 DIAGNOSIS — K21.9 GASTROESOPHAGEAL REFLUX DISEASE WITHOUT ESOPHAGITIS: ICD-10-CM

## 2020-09-01 DIAGNOSIS — E11.69 HYPERLIPIDEMIA ASSOCIATED WITH TYPE 2 DIABETES MELLITUS: ICD-10-CM

## 2020-09-01 DIAGNOSIS — E11.59 HYPERTENSION ASSOCIATED WITH DIABETES: ICD-10-CM

## 2020-09-01 DIAGNOSIS — E11.22 CONTROLLED TYPE 2 DIABETES MELLITUS WITH STAGE 3 CHRONIC KIDNEY DISEASE, WITHOUT LONG-TERM CURRENT USE OF INSULIN: ICD-10-CM

## 2020-09-01 DIAGNOSIS — N18.30 CONTROLLED TYPE 2 DIABETES MELLITUS WITH STAGE 3 CHRONIC KIDNEY DISEASE, WITHOUT LONG-TERM CURRENT USE OF INSULIN: ICD-10-CM

## 2020-09-01 DIAGNOSIS — I15.2 HYPERTENSION ASSOCIATED WITH DIABETES: ICD-10-CM

## 2020-09-01 DIAGNOSIS — Z86.010 HX OF COLONIC POLYPS: ICD-10-CM

## 2020-09-01 PROCEDURE — 3074F SYST BP LT 130 MM HG: CPT | Mod: HCNC,CPTII,S$GLB, | Performed by: FAMILY MEDICINE

## 2020-09-01 PROCEDURE — 3078F PR MOST RECENT DIASTOLIC BLOOD PRESSURE < 80 MM HG: ICD-10-PCS | Mod: HCNC,CPTII,S$GLB, | Performed by: FAMILY MEDICINE

## 2020-09-01 PROCEDURE — 99499 UNLISTED E&M SERVICE: CPT | Mod: HCNC,S$GLB,, | Performed by: FAMILY MEDICINE

## 2020-09-01 PROCEDURE — 3044F HG A1C LEVEL LT 7.0%: CPT | Mod: HCNC,CPTII,S$GLB, | Performed by: FAMILY MEDICINE

## 2020-09-01 PROCEDURE — 99999 PR PBB SHADOW E&M-EST. PATIENT-LVL IV: CPT | Mod: PBBFAC,HCNC,, | Performed by: FAMILY MEDICINE

## 2020-09-01 PROCEDURE — 99999 PR PBB SHADOW E&M-EST. PATIENT-LVL IV: ICD-10-PCS | Mod: PBBFAC,HCNC,, | Performed by: FAMILY MEDICINE

## 2020-09-01 PROCEDURE — 3078F DIAST BP <80 MM HG: CPT | Mod: HCNC,CPTII,S$GLB, | Performed by: FAMILY MEDICINE

## 2020-09-01 PROCEDURE — 99397 PER PM REEVAL EST PAT 65+ YR: CPT | Mod: HCNC,S$GLB,, | Performed by: FAMILY MEDICINE

## 2020-09-01 PROCEDURE — 3074F PR MOST RECENT SYSTOLIC BLOOD PRESSURE < 130 MM HG: ICD-10-PCS | Mod: HCNC,CPTII,S$GLB, | Performed by: FAMILY MEDICINE

## 2020-09-01 PROCEDURE — 99499 RISK ADDL DX/OHS AUDIT: ICD-10-PCS | Mod: HCNC,S$GLB,, | Performed by: FAMILY MEDICINE

## 2020-09-01 PROCEDURE — 99397 PR PREVENTIVE VISIT,EST,65 & OVER: ICD-10-PCS | Mod: HCNC,S$GLB,, | Performed by: FAMILY MEDICINE

## 2020-09-01 PROCEDURE — 3044F PR MOST RECENT HEMOGLOBIN A1C LEVEL <7.0%: ICD-10-PCS | Mod: HCNC,CPTII,S$GLB, | Performed by: FAMILY MEDICINE

## 2020-09-02 RX ORDER — LIRAGLUTIDE 6 MG/ML
INJECTION SUBCUTANEOUS
Qty: 9 ML | Refills: 1 | Status: SHIPPED | OUTPATIENT
Start: 2020-09-02 | End: 2020-10-29

## 2020-09-02 NOTE — TELEPHONE ENCOUNTER
Encounter details (provider/department) have been updated by OR staff.   Of note, HF details may not display.     As of this time CDM: Does not populate or display     Updated: Provider    Of note, CDM will not display. PCP not live with [facility].    Will resend refill request encounter to  Centralized Refill Staff Pool.     [facility]     Note composed:9:37 AM 09/02/2020

## 2020-09-02 NOTE — PROGRESS NOTES
Refill Routing Note   Medication(s) are not appropriate for processing by [facility]:       - Non-participating provider           Medication reconciliation completed: No      Automatic Epic Generated Protocol Data:        Requested Prescriptions   Pending Prescriptions Disp Refills    VICTOZA 3-CAITIE 0.6 mg/0.1 mL (18 mg/3 mL) PnIj pen [Pharmacy Med Name: VICTOZA 18 MG/3ML        INJ] 9 mL 1     Sig: INJECT 1.8 MGS UNDER THE SKIN DAILY.       Antihyperglycemics Protocol Failed - 9/2/2020  9:37 AM        Failed - Normal creatinine in past 6 months     Lab Results   Component Value Date    CREATININE 1.7 (H) 07/08/2020              Passed - Recent visit in the past year        Passed - Had HBA1C in past 6 months     Hemoglobin A1C   Date Value Ref Range Status   06/09/2020 6.0 (H) 4.0 - 5.6 % Final     Comment:     ADA Screening Guidelines:  5.7-6.4%  Consistent with prediabetes  >or=6.5%  Consistent with diabetes  High levels of fetal hemoglobin interfere with the HbA1C  assay. Heterozygous hemoglobin variants (HbS, HgC, etc)do  not significantly interfere with this assay.   However, presence of multiple variants may affect accuracy.     10/22/2019 6.4 (H) 4.0 - 5.6 % Final     Comment:     ADA Screening Guidelines:  5.7-6.4%  Consistent with prediabetes  >or=6.5%  Consistent with diabetes  High levels of fetal hemoglobin interfere with the HbA1C  assay. Heterozygous hemoglobin variants (HbS, HgC, etc)do  not significantly interfere with this assay.   However, presence of multiple variants may affect accuracy.     04/11/2019 6.5 (H) 4.0 - 5.6 % Final     Comment:     ADA Screening Guidelines:  5.7-6.4%  Consistent with prediabetes  >or=6.5%  Consistent with diabetes  High levels of fetal hemoglobin interfere with the HbA1C  assay. Heterozygous hemoglobin variants (HbS, HgC, etc)do  not significantly interfere with this assay.   However, presence of multiple variants may affect accuracy.                      Appointments  past 12m or future 3m with PCP    Date Provider   Last Visit   9/1/2020 John Claire Jr., MD   Next Visit   Visit date not found John Claire Jr., MD   ED visits in past 90 days: 0     Note composed:1:43 PM 09/02/2020

## 2020-09-03 ENCOUNTER — PATIENT OUTREACH (OUTPATIENT)
Dept: OTHER | Facility: OTHER | Age: 75
End: 2020-09-03

## 2020-09-03 NOTE — PROGRESS NOTES
Digital Medicine: Clinician Follow-Up    Patient reports doing well without concern. Just had annual physical with PCP.      Follow-up reason(s): routine follow up.     Hypertension    Readings are trending up   Diabetes    Readings are trending up       Last 5 Patient Entered Readings                                      Current 30 Day Average: 126/80     Recent Readings 9/3/2020 9/1/2020 8/31/2020 8/30/2020 8/25/2020    SBP (mmHg) 121 133 141 121 123    DBP (mmHg) 70 82 75 85 69    Pulse 65 60 55 53 62        Last 6 Patient Entered Readings                                          Most Recent A1c: 6% on 6/9/2020  (Goal: 8%)     Recent Readings 9/3/2020 9/1/2020 8/31/2020 8/30/2020 8/25/2020    Blood Glucose (mg/dL) 141 165 184 163 133           Screenings    ASSESSMENT(S)  Patients BP average is 126/80 mmHg, which is at goal. Patient's BP goal is less than or equal to 130/80 per 2017 ACC/AHA Hypertension Guidelines.  Patient's A1C goal is less than or equal to 8 per 2020 ADA guidelines. Patient's most recent A1C result is at goal. Lab Results    Component                Value               Date                     HGBA1C                   6.0 (H)             06/09/2020          .         Hypertension Plan  Continue current therapy.  Continue current diet/physical activity routine.    Diabetes Plan  Labs ordered. A1c Expected Lab Date: 12/3/2020  Continue current therapy.  Continue current diet/physical activity routine.       Addressed any questions or concerns and patient has my contact information if needed prior to next outreach. Patient verbalizes understanding.                Topic    Eye Exam          Hypertension Medications             lisinopril (PRINIVIL,ZESTRIL) 5 MG tablet 1 Tablet(s) Oral Every evening.    metoprolol tartrate (LOPRESSOR) 25 MG tablet Take 1 tablet by mouth 2 (two) times a day.    NITROSTAT 0.4 mg SL tablet Take 1 tablet by mouth continuous prn.    sotalol (BETAPACE) 120 MG Tab 2  (two) times daily.         Diabetes Medications             metFORMIN (GLUCOPHAGE-XR) 500 MG XR 24hr tablet TAKE ONE TABLET BY MOUTH ONCE DAILY    pioglitazone (ACTOS) 30 MG tablet TAKE ONE TABLET BY MOUTH ONCE DAILY     VICTOZA 3-CAITIE 0.6 mg/0.1 mL (18 mg/3 mL) PnIj pen INJECT 1.8 MGS UNDER THE SKIN DAILY.

## 2020-09-17 ENCOUNTER — TELEPHONE (OUTPATIENT)
Dept: INTERNAL MEDICINE | Facility: CLINIC | Age: 75
End: 2020-09-17

## 2020-09-17 NOTE — TELEPHONE ENCOUNTER
iKm enriquez I speak to Janak Booker,    My name is Amie calling for [facility]. I am calling to schedule you for your yearly Enhanced Annual Wellness Visit that's requested by your insurance.    The call is being recorded for quality and training purposes.     The Enhanced Annual Wellness Visit benefit is in addition to your annual visit with your doctor. You will meet with a Isak Stapleton NP who is on John Claire Jr, MD time and she will review your medications and ask questions regarding your past medical history as well as your current medical status. This is a proactive benefit that allows your care team to identify any health concerns that you may not be aware of. Also Isak Stapleton NP will also be identify additional health screenings such as hearing, mobility, or fall screening if you are at risk. This is a benefit afforded once a year with no copay or out of pocket expense to you.     We understand during this time you may have questions about what we are doing to ensure the safety of our patients and our employees for clinic visits and in-home visits. We would like you to know if there are apprehensions about going into the clinic Janak Booker What [facility] is also practicing clinic precautions such as socially distancing stickers throughout the clinics, temperature check, and hand sanitizing stations upon entering the clinic, and more.     Is there a data and time frame you would like to come in?    Your appointment have been schedule for 9/21/2020 for 3 pm. . Your appointment will be virtual visit , this is a video visit with you and Isak Stapleton NP. Please log on 15 min's before appointment time. Please response back to the my chart message that will be sent if you need to cancel, reschedule, or if you have any questions.      You can call [phone] (Carin) or [phone] and ask to send a message to Amie if you have any questions or need to reschedule your  appointment.     You will receive your appointment reminders as usual.   May I assist you with anything else.     Thank you, Janak Booker and have a great day!    Sent my chart message.

## 2020-10-02 ENCOUNTER — PATIENT OUTREACH (OUTPATIENT)
Dept: INTERNAL MEDICINE | Facility: CLINIC | Age: 75
End: 2020-10-02

## 2020-10-06 ENCOUNTER — PATIENT OUTREACH (OUTPATIENT)
Dept: OTHER | Facility: OTHER | Age: 75
End: 2020-10-06
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
## 2020-11-09 ENCOUNTER — OFFICE VISIT (OUTPATIENT)
Dept: CARDIOLOGY | Facility: CLINIC | Age: 75
End: 2020-11-09
Payer: MEDICARE

## 2020-11-09 VITALS
DIASTOLIC BLOOD PRESSURE: 70 MMHG | OXYGEN SATURATION: 99 % | BODY MASS INDEX: 32.34 KG/M2 | SYSTOLIC BLOOD PRESSURE: 120 MMHG | TEMPERATURE: 74 F | WEIGHT: 244 LBS | HEIGHT: 73 IN | HEART RATE: 97 BPM

## 2020-11-09 DIAGNOSIS — E11.59 HYPERTENSION ASSOCIATED WITH DIABETES: ICD-10-CM

## 2020-11-09 DIAGNOSIS — I35.0 NONRHEUMATIC AORTIC VALVE STENOSIS: Chronic | ICD-10-CM

## 2020-11-09 DIAGNOSIS — N18.30 CONTROLLED TYPE 2 DIABETES MELLITUS WITH STAGE 3 CHRONIC KIDNEY DISEASE, WITHOUT LONG-TERM CURRENT USE OF INSULIN: ICD-10-CM

## 2020-11-09 DIAGNOSIS — E78.5 HYPERLIPIDEMIA ASSOCIATED WITH TYPE 2 DIABETES MELLITUS: ICD-10-CM

## 2020-11-09 DIAGNOSIS — E11.69 HYPERLIPIDEMIA ASSOCIATED WITH TYPE 2 DIABETES MELLITUS: ICD-10-CM

## 2020-11-09 DIAGNOSIS — I25.10 CORONARY ARTERY DISEASE, OCCLUSIVE: ICD-10-CM

## 2020-11-09 DIAGNOSIS — E11.22 CONTROLLED TYPE 2 DIABETES MELLITUS WITH STAGE 3 CHRONIC KIDNEY DISEASE, WITHOUT LONG-TERM CURRENT USE OF INSULIN: ICD-10-CM

## 2020-11-09 DIAGNOSIS — I15.2 HYPERTENSION ASSOCIATED WITH DIABETES: ICD-10-CM

## 2020-11-09 PROCEDURE — 99213 PR OFFICE/OUTPT VISIT, EST, LEVL III, 20-29 MIN: ICD-10-PCS | Mod: S$GLB,,, | Performed by: INTERNAL MEDICINE

## 2020-11-09 PROCEDURE — 3288F PR FALLS RISK ASSESSMENT DOCUMENTED: ICD-10-PCS | Mod: CPTII,S$GLB,, | Performed by: INTERNAL MEDICINE

## 2020-11-09 PROCEDURE — 3074F PR MOST RECENT SYSTOLIC BLOOD PRESSURE < 130 MM HG: ICD-10-PCS | Mod: CPTII,S$GLB,, | Performed by: INTERNAL MEDICINE

## 2020-11-09 PROCEDURE — 3078F PR MOST RECENT DIASTOLIC BLOOD PRESSURE < 80 MM HG: ICD-10-PCS | Mod: CPTII,S$GLB,, | Performed by: INTERNAL MEDICINE

## 2020-11-09 PROCEDURE — 3008F BODY MASS INDEX DOCD: CPT | Mod: CPTII,S$GLB,, | Performed by: INTERNAL MEDICINE

## 2020-11-09 PROCEDURE — 3078F DIAST BP <80 MM HG: CPT | Mod: CPTII,S$GLB,, | Performed by: INTERNAL MEDICINE

## 2020-11-09 PROCEDURE — 1159F PR MEDICATION LIST DOCUMENTED IN MEDICAL RECORD: ICD-10-PCS | Mod: S$GLB,,, | Performed by: INTERNAL MEDICINE

## 2020-11-09 PROCEDURE — 1159F MED LIST DOCD IN RCRD: CPT | Mod: S$GLB,,, | Performed by: INTERNAL MEDICINE

## 2020-11-09 PROCEDURE — 99499 UNLISTED E&M SERVICE: CPT | Mod: S$GLB,,, | Performed by: INTERNAL MEDICINE

## 2020-11-09 PROCEDURE — 1126F PR PAIN SEVERITY QUANTIFIED, NO PAIN PRESENT: ICD-10-PCS | Mod: S$GLB,,, | Performed by: INTERNAL MEDICINE

## 2020-11-09 PROCEDURE — 1101F PT FALLS ASSESS-DOCD LE1/YR: CPT | Mod: CPTII,S$GLB,, | Performed by: INTERNAL MEDICINE

## 2020-11-09 PROCEDURE — 1126F AMNT PAIN NOTED NONE PRSNT: CPT | Mod: S$GLB,,, | Performed by: INTERNAL MEDICINE

## 2020-11-09 PROCEDURE — 1101F PR PT FALLS ASSESS DOC 0-1 FALLS W/OUT INJ PAST YR: ICD-10-PCS | Mod: CPTII,S$GLB,, | Performed by: INTERNAL MEDICINE

## 2020-11-09 PROCEDURE — 3044F PR MOST RECENT HEMOGLOBIN A1C LEVEL <7.0%: ICD-10-PCS | Mod: CPTII,S$GLB,, | Performed by: INTERNAL MEDICINE

## 2020-11-09 PROCEDURE — 3074F SYST BP LT 130 MM HG: CPT | Mod: CPTII,S$GLB,, | Performed by: INTERNAL MEDICINE

## 2020-11-09 PROCEDURE — 99213 OFFICE O/P EST LOW 20 MIN: CPT | Mod: S$GLB,,, | Performed by: INTERNAL MEDICINE

## 2020-11-09 PROCEDURE — 3008F PR BODY MASS INDEX (BMI) DOCUMENTED: ICD-10-PCS | Mod: CPTII,S$GLB,, | Performed by: INTERNAL MEDICINE

## 2020-11-09 PROCEDURE — 99499 RISK ADDL DX/OHS AUDIT: ICD-10-PCS | Mod: S$GLB,,, | Performed by: INTERNAL MEDICINE

## 2020-11-09 PROCEDURE — 3288F FALL RISK ASSESSMENT DOCD: CPT | Mod: CPTII,S$GLB,, | Performed by: INTERNAL MEDICINE

## 2020-11-09 PROCEDURE — 3044F HG A1C LEVEL LT 7.0%: CPT | Mod: CPTII,S$GLB,, | Performed by: INTERNAL MEDICINE

## 2020-11-09 RX ORDER — NITROGLYCERIN 0.4 MG/1
0.4 TABLET SUBLINGUAL EVERY 5 MIN PRN
Qty: 30 TABLET | Refills: 3 | Status: ON HOLD | OUTPATIENT
Start: 2020-11-09 | End: 2022-09-07 | Stop reason: SDUPTHER

## 2020-11-09 NOTE — PROGRESS NOTES
Patient ID:  Janak Booker is a 75 y.o. male who presents for follow-up of Atrial Fibrillation, Coronary Artery Disease, and Hypertension      He has been doing well he denies any chest pains any shortness of breath.  His hemoglobin A1c has been 6.4.  The echocardiogram performed on 11/07/2019 showed an ejection fraction of 63% he had mild aortic stenosis with an aortic valve area of 1.4 he had mild mitral regurgitation.      Past Medical History:   Diagnosis Date    Anticoagulant long-term use     Aortic stenosis     Arthritis     Atrial fibrillation     CAD (coronary artery disease)     Colon polyps     per colonoscopy 9/11/2014    Diabetes mellitus, type 2     Disc displacement, lumbar     L3    Family history of prostate cancer     GERD (gastroesophageal reflux disease)     Heel bone fracture     left foot    Hyperlipidemia LDL goal < 70     Hypertension     Renal manifestation of secondary diabetes mellitus     Spinal stenosis, lumbar         Past Surgical History:   Procedure Laterality Date    BACK SURGERY      CARDIAC SURGERY      stents     CARPAL TUNNEL RELEASE Right     CATARACT EXTRACTION W/  INTRAOCULAR LENS IMPLANT Bilateral     COLONOSCOPY N/A 3/27/2018    Procedure: COLONOSCOPY;  Surgeon: Rishi Garcia MD;  Location: [facility];  Service: Endoscopy;  Laterality: N/A;    CORONARY ANGIOPLASTY WITH STENT PLACEMENT      x 3    EYE SURGERY      INJECTION OF FACET JOINT N/A 5/14/2019    Procedure: INJECTION, FACET JOINT INJECTION (LUMBAR BLOCK) PLEASE INJECT L3/4;  Surgeon: Catrachito Harley MD;  Location: [facility];  Service: Pain Management;  Laterality: N/A;  NEEDS CONSENT, PRADAXA CLEARANCE IN CHART    KNEE ARTHROSCOPY W/ MENISCECTOMY  12/22/2008    right    LUMBAR DISCECTOMY  12/2011    L4-L5 Fusion    LUMBAR EPIDURAL INJECTION  02/04/2019    ROTATOR CUFF REPAIR Left 7/2012    SHOULDER OPEN ROTATOR CUFF REPAIR      SKIN CANCER FOREHEAD 2019      SPINE  "SURGERY      TIBIA FRACTURE SURGERY      TRANSFORAMINAL EPIDURAL INJECTION OF STEROID Left 9/23/2019    Procedure: INJECTION, STEROID, EPIDURAL, TRANSFORAMINAL APPROACH;  Surgeon: Jose Guadalupe Linn MD;  Location: [facility];  Service: Pain Management;  Laterality: Left;  Left TF ADI L4 and L5  OK to hold Pradaxa    TRANSFORAMINAL EPIDURAL INJECTION OF STEROID Left 11/14/2019    Procedure: INJECTION, STEROID, EPIDURAL, TRANSFORAMINAL APPROACH;  Surgeon: Jose Guadalupe iLnn MD;  Location: [facility];  Service: Pain Management;  Laterality: Left;  Left TF ADI L4-5 and L5-S1          Current Outpatient Medications   Medication Instructions    ascorbic acid (vitamin C) (VITAMIN C) 1,000 mg, Oral, Daily    BD ALCOHOL SWABS PadM USE AS DIRECTED TO CHECK BLOOD GLUCOSE DAILY    BD BARRY 2ND GEN PEN NEEDLE 32 gauge x 5/32" Ndle USE ONE NEEDLE ONCE DAILY    blood sugar diagnostic Strp Use as directed to check blood sugar daily.    blood-glucose meter Misc Use as directed.    dabigatran etexilate (PRADAXA) 150 mg, Oral, 2 times daily    fenofibrate micronized (LOFIBRA) 134 MG Cap TAKE ONE CAPSULE BY MOUTH ONCE DAILY     lancets (TRUEPLUS LANCETS) 28 gauge Misc 1 lancet, Misc.(Non-Drug; Combo Route), Daily    lisinopriL (PRINIVIL,ZESTRIL) 5 MG tablet TAKE ONE TABLET BY MOUTH ONCE DAILY     metFORMIN (GLUCOPHAGE-XR) 500 MG XR 24hr tablet TAKE ONE TABLET BY MOUTH ONCE DAILY    metoprolol tartrate (LOPRESSOR) 25 MG tablet 1 tablet, Oral, 2 times daily    multivitamin with minerals tablet No dose, route, or frequency recorded.    NITROSTAT 0.4 mg, Oral, Every 5 min PRN    omega-3 fatty acids 1,000 mg Cap 1 Capsule(s) Oral OTC once a day.    omeprazole (PRILOSEC) 40 MG capsule TAKE ONE CAPSULE BY MOUTH ONCE DAILY     pioglitazone (ACTOS) 30 MG tablet TAKE ONE TABLET BY MOUTH ONCE DAILY     rosuvastatin (CRESTOR) 10 MG tablet TAKE ONE TABLET BY MOUTH ONCE DAILY     sotalol (BETAPACE) 120 MG Tab 2 times daily    TRUE " "METRIX AIR GLUCOSE METER kit No dose, route, or frequency recorded.    VICTOZA 3-CAITIE 0.6 mg/0.1 mL (18 mg/3 mL) PnIj pen INJECT 1.8 MGS UNDER THE SKIN DAILY.         Review of patient's allergies indicates:   Allergen Reactions    Levetiracetam         Review of Systems   Constitution: Negative for chills and fever.   HENT: Negative for congestion and sore throat.    Eyes: Negative for blurred vision and double vision.   Cardiovascular: Negative.  Negative for chest pain and leg swelling.   Respiratory: Negative.  Negative for cough and shortness of breath.    Hematologic/Lymphatic: Negative for bleeding problem. Does not bruise/bleed easily.   Skin: Negative for itching and rash.   Musculoskeletal: Positive for back pain and joint pain.   Gastrointestinal: Positive for heartburn. Negative for abdominal pain.   Genitourinary: Positive for nocturia.   Neurological: Negative.  Negative for dizziness and weakness.   Psychiatric/Behavioral: Negative.  Negative for altered mental status. The patient does not have insomnia.         Objective:     Vitals:    11/09/20 1225   BP: 120/70   BP Location: Left arm   Patient Position: Sitting   BP Method: Medium (Manual)   Pulse: 97   Temp: (!) 74 °F (23.3 °C)   SpO2: 99%   Weight: 110.7 kg (244 lb)   Height: 6' 1" (1.854 m)       Physical Exam   Constitutional: He is oriented to person, place, and time.   HENT:   Head: Normocephalic and atraumatic.   Eyes: Conjunctivae and EOM are normal.   Cardiovascular: Normal rate and regular rhythm.   Murmur (Grade 3/6 systolic murmur at the base and grade 2/6 systolic murmur at the apex) heard.  Pulmonary/Chest: Effort normal and breath sounds normal.   Abdominal: Soft. Bowel sounds are normal.   Musculoskeletal: Normal range of motion.   Neurological: He is alert and oriented to person, place, and time.   Skin: Skin is warm and dry.   Psychiatric: He has a normal mood and affect. His behavior is normal. Judgment and thought content " normal.   Nursing note and vitals reviewed.    CMP  Sodium   Date Value Ref Range Status   07/08/2020 138 136 - 145 mmol/L Final     Potassium   Date Value Ref Range Status   07/08/2020 5.4 (H) 3.5 - 5.1 mmol/L Final     Chloride   Date Value Ref Range Status   07/08/2020 105 95 - 110 mmol/L Final     CO2   Date Value Ref Range Status   07/08/2020 23 23 - 29 mmol/L Final     Glucose   Date Value Ref Range Status   07/08/2020 135 (H) 70 - 110 mg/dL Final     BUN   Date Value Ref Range Status   07/08/2020 30 (H) 8 - 23 mg/dL Final     Creatinine   Date Value Ref Range Status   07/08/2020 1.7 (H) 0.5 - 1.4 mg/dL Final     Calcium   Date Value Ref Range Status   07/08/2020 9.1 8.7 - 10.5 mg/dL Final     Total Protein   Date Value Ref Range Status   06/09/2020 6.7 6.0 - 8.4 g/dL Final     Albumin   Date Value Ref Range Status   07/08/2020 3.8 3.5 - 5.2 g/dL Final     Total Bilirubin   Date Value Ref Range Status   06/09/2020 0.8 0.1 - 1.0 mg/dL Final     Comment:     For infants and newborns, interpretation of results should be based  on gestational age, weight and in agreement with clinical  observations.  Premature Infant recommended reference ranges:  Up to 24 hours.............<8.0 mg/dL  Up to 48 hours............<12.0 mg/dL  3-5 days..................<15.0 mg/dL  6-29 days.................<15.0 mg/dL       Alkaline Phosphatase   Date Value Ref Range Status   06/09/2020 34 (L) 55 - 135 U/L Final     AST   Date Value Ref Range Status   06/09/2020 17 10 - 40 U/L Final     ALT   Date Value Ref Range Status   06/09/2020 13 10 - 44 U/L Final     Anion Gap   Date Value Ref Range Status   07/08/2020 10 8 - 16 mmol/L Final     eGFR if    Date Value Ref Range Status   07/08/2020 45.2 (A) >60 mL/min/1.73 m^2 Final     eGFR if non    Date Value Ref Range Status   07/08/2020 39.1 (A) >60 mL/min/1.73 m^2 Final     Comment:     Calculation used to obtain the estimated glomerular filtration  rate  (eGFR) is the CKD-EPI equation.         BMP  Lab Results   Component Value Date     07/08/2020    K 5.4 (H) 07/08/2020     07/08/2020    CO2 23 07/08/2020    BUN 30 (H) 07/08/2020    CREATININE 1.7 (H) 07/08/2020    CALCIUM 9.1 07/08/2020    ANIONGAP 10 07/08/2020    ESTGFRAFRICA 45.2 (A) 07/08/2020    EGFRNONAA 39.1 (A) 07/08/2020      BNP  @LABRCNTIP(BNP,BNPTRIAGEBLO)@   Lab Results   Component Value Date    CHOL 130 06/09/2020    CHOL 133 04/11/2019    CHOL 148 03/06/2018     Lab Results   Component Value Date    HDL 41 06/09/2020    HDL 39 (L) 04/11/2019    HDL 34 (L) 03/06/2018     Lab Results   Component Value Date    LDLCALC 67.2 06/09/2020    LDLCALC 66.8 04/11/2019    LDLCALC 79.4 03/06/2018     Lab Results   Component Value Date    TRIG 109 06/09/2020    TRIG 136 04/11/2019    TRIG 173 (H) 03/06/2018     Lab Results   Component Value Date    CHOLHDL 31.5 06/09/2020    CHOLHDL 29.3 04/11/2019    CHOLHDL 23.0 03/06/2018      Lab Results   Component Value Date    TSH 1.32 07/20/2011     Lab Results   Component Value Date    HGBA1C 6.0 (H) 06/09/2020     Lab Results   Component Value Date    WBC 7.23 07/08/2020    HGB 13.8 (L) 07/08/2020    HCT 42.6 07/08/2020    MCV 88 07/08/2020     07/08/2020         No results found for this or any previous visit.   No results found for this or any previous visit.       Assessment:       Aortic stenosis  Asymptomatic at present.  Will continue to monitor.    Coronary artery disease, occlusive  With previous percutaneous revascularization.  There are no symptoms    Hyperlipidemia associated with type 2 diabetes mellitus  Controlled on current medical therapy.  His LDL is 67    Hypertension associated with diabetes  Controlled on current medical therapy.    Controlled type 2 diabetes mellitus with stage 3 chronic kidney disease, without long-term current use of insulin  Aware.  Follow by his primary care physician       Plan:     continue the current  medical therapy.  He will be seen in the office in 6 months

## 2020-11-30 ENCOUNTER — PATIENT OUTREACH (OUTPATIENT)
Dept: OTHER | Facility: OTHER | Age: 75
End: 2020-11-30

## 2020-11-30 NOTE — PROGRESS NOTES
Digital Medicine: Clinician Follow-Up    Patient reports doing well without concern.  Reports he needs some diabetes testing supplies.    The history is provided by the patient.   Follow-up reason(s): routine follow up.     Hypertension    Readings are trending down   Diabetes    Patient's blood glucose is stable.         Last 5 Patient Entered Readings                                      Current 30 Day Average: 119/76     Recent Readings 11/24/2020 11/22/2020 11/20/2020 11/19/2020 11/17/2020    SBP (mmHg) 128 106 115 121 127    DBP (mmHg) 77 73 71 77 97    Pulse 54 78 66 56 52        Last 6 Patient Entered Readings                                          Most Recent A1c: 6% on 6/9/2020  (Goal: 7%)     Recent Readings 11/22/2020 11/20/2020 11/19/2020 11/17/2020 11/12/2020    Blood Glucose (mg/dL) 137 125 135 118 125               Depression Screening  Did not address depression screening.    Sleep Apnea Screening    Did not address sleep apnea screening.     Medication Affordability Screening  Did not address medication affordability screening.     Medication Adherence-Medication adherence was assessed.          ASSESSMENT(S)  Patients BP average is 119/76 mmHg, which is at goal. Patient's BP goal is less than or equal to 130/80.  Patient's A1C goal is less than or equal to 7. Patient's most recent A1C result is at goal. Lab Results    Component                Value               Date                     HGBA1C                   6.0 (H)             06/09/2020          .       Hypertension Plan  Continue current therapy.  Continue current diet/physical activity routine.  Instructed to charge device.    Diabetes Plan  Continue current therapy.  Continue current diet/physical activity routine.  Provided patient education. HME number       Addressed patient questions and patient has my contact information if needed prior to next outreach. Patient verbalizes understanding.      Explained the importance of  self-monitoring and medication adherence. Encouraged the patient to communicate with their health  for lifestyle modifications to help improve or maintain a healthy lifestyle.               There are no preventive care reminders to display for this patient.  There are no preventive care reminders to display for this patient.      Hypertension Medications             lisinopriL (PRINIVIL,ZESTRIL) 5 MG tablet TAKE ONE TABLET BY MOUTH ONCE DAILY     metoprolol tartrate (LOPRESSOR) 25 MG tablet Take 1 tablet by mouth 2 (two) times a day.    NITROSTAT 0.4 mg SL tablet Take 1 tablet (0.4 mg total) by mouth every 5 (five) minutes as needed for Chest pain.    sotalol (BETAPACE) 120 MG Tab 2 (two) times daily.         Diabetes Medications             metFORMIN (GLUCOPHAGE-XR) 500 MG XR 24hr tablet TAKE ONE TABLET BY MOUTH ONCE DAILY    pioglitazone (ACTOS) 30 MG tablet TAKE ONE TABLET BY MOUTH ONCE DAILY     VICTOZA 3-CAITIE 0.6 mg/0.1 mL (18 mg/3 mL) PnIj pen INJECT 1.8 MGS UNDER THE SKIN DAILY.

## 2020-12-11 ENCOUNTER — PATIENT OUTREACH (OUTPATIENT)
Dept: OTHER | Facility: OTHER | Age: 75
End: 2020-12-11

## 2020-12-14 ENCOUNTER — PES CALL (OUTPATIENT)
Dept: ADMINISTRATIVE | Facility: CLINIC | Age: 75
End: 2020-12-14

## 2020-12-29 NOTE — PROGRESS NOTES
Digital Medicine: Health  Follow-Up    The history is provided by the patient.                   Additional Follow-up details: Patient states he's been doing well.     Discussed with patient he has to open [employer] yohannes regularly to ensure the data is being transmitted properly. While we were on the phone he opened the yohannes and I was able to receive all of his most recent readings.     No issues today, will follow up in 6 weeks            Diet-Not assessed          Physical Activity-Not assessed    Medication Adherence-Medication Adherence not addressed.      Substance, Sleep, Stress-Not assessed      Continue current diet/physical activity routine.       Addressed patient questions and patient has my contact information if needed prior to next outreach. Patient verbalizes understanding.      Explained the importance of self-monitoring and medication adherence. Encouraged the patient to communicate with their health  for lifestyle modifications to help improve or maintain a healthy lifestyle.                   Topic    Hemoglobin A1C          Last 5 Patient Entered Readings                                      Current 30 Day Average: 119/86     Recent Readings 12/3/2020 11/24/2020 11/22/2020 11/20/2020 11/19/2020    SBP (mmHg) 119 128 106 115 121    DBP (mmHg) 86 77 73 71 77    Pulse 66 54 78 66 56        Last 6 Patient Entered Readings                                          Most Recent A1c: 6% on 6/9/2020  (Goal: 7%)     Recent Readings 12/3/2020 11/22/2020 11/20/2020 11/19/2020 11/17/2020    Blood Glucose (mg/dL) 116 137 125 135 118

## 2020-12-31 ENCOUNTER — PATIENT OUTREACH (OUTPATIENT)
Dept: ADMINISTRATIVE | Facility: HOSPITAL | Age: 75
End: 2020-12-31

## 2021-01-04 ENCOUNTER — LAB VISIT (OUTPATIENT)
Dept: LAB | Facility: HOSPITAL | Age: 76
End: 2021-01-04
Attending: INTERNAL MEDICINE
Payer: MEDICARE

## 2021-01-04 DIAGNOSIS — Q87.89: Primary | ICD-10-CM

## 2021-01-04 LAB
ALBUMIN SERPL BCP-MCNC: 3.9 G/DL (ref 3.5–5.2)
ANION GAP SERPL CALC-SCNC: 11 MMOL/L (ref 8–16)
BASOPHILS # BLD AUTO: 0.04 K/UL (ref 0–0.2)
BASOPHILS NFR BLD: 0.5 % (ref 0–1.9)
BUN SERPL-MCNC: 35 MG/DL (ref 8–23)
CALCIUM SERPL-MCNC: 9.4 MG/DL (ref 8.7–10.5)
CHLORIDE SERPL-SCNC: 102 MMOL/L (ref 95–110)
CO2 SERPL-SCNC: 23 MMOL/L (ref 23–29)
CREAT SERPL-MCNC: 2.1 MG/DL (ref 0.5–1.4)
CREAT UR-MCNC: 97 MG/DL (ref 23–375)
DIFFERENTIAL METHOD: ABNORMAL
EOSINOPHIL # BLD AUTO: 0.3 K/UL (ref 0–0.5)
EOSINOPHIL NFR BLD: 4.3 % (ref 0–8)
ERYTHROCYTE [DISTWIDTH] IN BLOOD BY AUTOMATED COUNT: 15.6 % (ref 11.5–14.5)
EST. GFR  (AFRICAN AMERICAN): 34.8 ML/MIN/1.73 M^2
EST. GFR  (NON AFRICAN AMERICAN): 30.1 ML/MIN/1.73 M^2
GLUCOSE SERPL-MCNC: 123 MG/DL (ref 70–110)
HCT VFR BLD AUTO: 43.8 % (ref 40–54)
HGB BLD-MCNC: 13.9 G/DL (ref 14–18)
IMM GRANULOCYTES # BLD AUTO: 0.02 K/UL (ref 0–0.04)
IMM GRANULOCYTES NFR BLD AUTO: 0.3 % (ref 0–0.5)
LYMPHOCYTES # BLD AUTO: 2.1 K/UL (ref 1–4.8)
LYMPHOCYTES NFR BLD: 28.4 % (ref 18–48)
MCH RBC QN AUTO: 28.3 PG (ref 27–31)
MCHC RBC AUTO-ENTMCNC: 31.7 G/DL (ref 32–36)
MCV RBC AUTO: 89 FL (ref 82–98)
MONOCYTES # BLD AUTO: 0.5 K/UL (ref 0.3–1)
MONOCYTES NFR BLD: 6.7 % (ref 4–15)
NEUTROPHILS # BLD AUTO: 4.5 K/UL (ref 1.8–7.7)
NEUTROPHILS NFR BLD: 59.8 % (ref 38–73)
NRBC BLD-RTO: 0 /100 WBC
PHOSPHATE SERPL-MCNC: 3.8 MG/DL (ref 2.7–4.5)
PLATELET # BLD AUTO: 195 K/UL (ref 150–350)
PMV BLD AUTO: 11.2 FL (ref 9.2–12.9)
POTASSIUM SERPL-SCNC: 4.9 MMOL/L (ref 3.5–5.1)
PROT UR-MCNC: 6 MG/DL (ref 6–15)
PROT/CREAT UR: 0.06 MG/G{CREAT} (ref 0–0.2)
RBC # BLD AUTO: 4.91 M/UL (ref 4.6–6.2)
SODIUM SERPL-SCNC: 136 MMOL/L (ref 136–145)
WBC # BLD AUTO: 7.47 K/UL (ref 3.9–12.7)

## 2021-01-04 PROCEDURE — 85025 COMPLETE CBC W/AUTO DIFF WBC: CPT

## 2021-01-04 PROCEDURE — 80069 RENAL FUNCTION PANEL: CPT

## 2021-01-04 PROCEDURE — 36415 COLL VENOUS BLD VENIPUNCTURE: CPT

## 2021-01-04 PROCEDURE — 82570 ASSAY OF URINE CREATININE: CPT

## 2021-01-09 ENCOUNTER — IMMUNIZATION (OUTPATIENT)
Dept: PRIMARY CARE CLINIC | Facility: CLINIC | Age: 76
End: 2021-01-09
Payer: MEDICARE

## 2021-01-09 DIAGNOSIS — Z23 NEED FOR VACCINATION: ICD-10-CM

## 2021-01-09 PROCEDURE — 91300 COVID-19, MRNA, LNP-S, PF, 30 MCG/0.3 ML DOSE VACCINE: ICD-10-PCS | Mod: S$GLB,,, | Performed by: FAMILY MEDICINE

## 2021-01-09 PROCEDURE — 0001A COVID-19, MRNA, LNP-S, PF, 30 MCG/0.3 ML DOSE VACCINE: ICD-10-PCS | Mod: S$GLB,,, | Performed by: FAMILY MEDICINE

## 2021-01-09 PROCEDURE — 91300 COVID-19, MRNA, LNP-S, PF, 30 MCG/0.3 ML DOSE VACCINE: CPT | Mod: S$GLB,,, | Performed by: FAMILY MEDICINE

## 2021-01-09 PROCEDURE — 0001A COVID-19, MRNA, LNP-S, PF, 30 MCG/0.3 ML DOSE VACCINE: CPT | Mod: S$GLB,,, | Performed by: FAMILY MEDICINE

## 2021-01-14 ENCOUNTER — TELEPHONE (OUTPATIENT)
Dept: FAMILY MEDICINE | Facility: CLINIC | Age: 76
End: 2021-01-14

## 2021-01-14 ENCOUNTER — TELEPHONE (OUTPATIENT)
Dept: CARDIOLOGY | Facility: CLINIC | Age: 76
End: 2021-01-14

## 2021-01-30 ENCOUNTER — IMMUNIZATION (OUTPATIENT)
Dept: PRIMARY CARE CLINIC | Facility: CLINIC | Age: 76
End: 2021-01-30
Payer: MEDICARE

## 2021-01-30 DIAGNOSIS — Z23 NEED FOR VACCINATION: Primary | ICD-10-CM

## 2021-01-30 PROCEDURE — 0002A COVID-19, MRNA, LNP-S, PF, 30 MCG/0.3 ML DOSE VACCINE: ICD-10-PCS | Mod: S$GLB,,, | Performed by: FAMILY MEDICINE

## 2021-01-30 PROCEDURE — 91300 COVID-19, MRNA, LNP-S, PF, 30 MCG/0.3 ML DOSE VACCINE: ICD-10-PCS | Mod: S$GLB,,, | Performed by: FAMILY MEDICINE

## 2021-01-30 PROCEDURE — 0002A COVID-19, MRNA, LNP-S, PF, 30 MCG/0.3 ML DOSE VACCINE: CPT | Mod: S$GLB,,, | Performed by: FAMILY MEDICINE

## 2021-01-30 PROCEDURE — 91300 COVID-19, MRNA, LNP-S, PF, 30 MCG/0.3 ML DOSE VACCINE: CPT | Mod: S$GLB,,, | Performed by: FAMILY MEDICINE

## 2021-02-01 PROBLEM — G47.33 OSA ON CPAP: Status: ACTIVE | Noted: 2021-02-01

## 2021-02-03 ENCOUNTER — PATIENT MESSAGE (OUTPATIENT)
Dept: GASTROENTEROLOGY | Facility: CLINIC | Age: 76
End: 2021-02-03

## 2021-02-03 ENCOUNTER — TELEPHONE (OUTPATIENT)
Dept: GASTROENTEROLOGY | Facility: CLINIC | Age: 76
End: 2021-02-03

## 2021-02-04 DIAGNOSIS — Z01.818 PRE-OP TESTING: ICD-10-CM

## 2021-02-04 DIAGNOSIS — Z86.010 HX OF COLONIC POLYPS: Primary | ICD-10-CM

## 2021-02-08 ENCOUNTER — TELEPHONE (OUTPATIENT)
Dept: GASTROENTEROLOGY | Facility: CLINIC | Age: 76
End: 2021-02-08

## 2021-02-08 ENCOUNTER — PATIENT MESSAGE (OUTPATIENT)
Dept: GASTROENTEROLOGY | Facility: CLINIC | Age: 76
End: 2021-02-08

## 2021-02-12 ENCOUNTER — LAB VISIT (OUTPATIENT)
Dept: PRIMARY CARE CLINIC | Facility: CLINIC | Age: 76
End: 2021-02-12
Payer: MEDICARE

## 2021-02-12 DIAGNOSIS — Z01.818 PRE-OP TESTING: ICD-10-CM

## 2021-02-12 PROCEDURE — U0005 INFEC AGEN DETEC AMPLI PROBE: HCPCS

## 2021-02-12 PROCEDURE — U0003 INFECTIOUS AGENT DETECTION BY NUCLEIC ACID (DNA OR RNA); SEVERE ACUTE RESPIRATORY SYNDROME CORONAVIRUS 2 (SARS-COV-2) (CORONAVIRUS DISEASE [COVID-19]), AMPLIFIED PROBE TECHNIQUE, MAKING USE OF HIGH THROUGHPUT TECHNOLOGIES AS DESCRIBED BY CMS-2020-01-R: HCPCS

## 2021-02-13 LAB — SARS-COV-2 RNA RESP QL NAA+PROBE: NOT DETECTED

## 2021-02-15 ENCOUNTER — ANESTHESIA (OUTPATIENT)
Dept: ENDOSCOPY | Facility: HOSPITAL | Age: 76
End: 2021-02-15
Payer: MEDICARE

## 2021-02-15 ENCOUNTER — ANESTHESIA EVENT (OUTPATIENT)
Dept: ENDOSCOPY | Facility: HOSPITAL | Age: 76
End: 2021-02-15
Payer: MEDICARE

## 2021-02-15 ENCOUNTER — HOSPITAL ENCOUNTER (OUTPATIENT)
Facility: HOSPITAL | Age: 76
Discharge: HOME | End: 2021-02-15
Attending: INTERNAL MEDICINE | Admitting: INTERNAL MEDICINE
Payer: MEDICARE

## 2021-02-15 VITALS
RESPIRATION RATE: 18 BRPM | OXYGEN SATURATION: 99 % | TEMPERATURE: 98 F | DIASTOLIC BLOOD PRESSURE: 68 MMHG | SYSTOLIC BLOOD PRESSURE: 126 MMHG | WEIGHT: 240 LBS | HEIGHT: 73 IN | BODY MASS INDEX: 31.81 KG/M2 | HEART RATE: 64 BPM

## 2021-02-15 DIAGNOSIS — Z86.010 HISTORY OF COLON POLYPS: ICD-10-CM

## 2021-02-15 DIAGNOSIS — K64.8 INTERNAL HEMORRHOIDS: Primary | ICD-10-CM

## 2021-02-15 DIAGNOSIS — K57.90 DIVERTICULOSIS: ICD-10-CM

## 2021-02-15 PROBLEM — Z86.0100 HISTORY OF COLON POLYPS: Status: ACTIVE | Noted: 2021-02-15

## 2021-02-15 PROCEDURE — G0105 COLORECTAL SCRN; HI RISK IND: HCPCS | Performed by: INTERNAL MEDICINE

## 2021-02-15 PROCEDURE — G0105 COLORECTAL SCRN; HI RISK IND: HCPCS | Mod: ,,, | Performed by: INTERNAL MEDICINE

## 2021-02-15 PROCEDURE — 63600175 PHARM REV CODE 636 W HCPCS: Performed by: NURSE ANESTHETIST, CERTIFIED REGISTERED

## 2021-02-15 PROCEDURE — 25000003 PHARM REV CODE 250: Performed by: NURSE ANESTHETIST, CERTIFIED REGISTERED

## 2021-02-15 PROCEDURE — D9220A PRA ANESTHESIA: Mod: CRNA,,, | Performed by: NURSE ANESTHETIST, CERTIFIED REGISTERED

## 2021-02-15 PROCEDURE — D9220A PRA ANESTHESIA: Mod: ANES,,, | Performed by: ANESTHESIOLOGY

## 2021-02-15 PROCEDURE — D9220A PRA ANESTHESIA: ICD-10-PCS | Mod: ANES,,, | Performed by: ANESTHESIOLOGY

## 2021-02-15 PROCEDURE — 37000009 HC ANESTHESIA EA ADD 15 MINS: Performed by: INTERNAL MEDICINE

## 2021-02-15 PROCEDURE — D9220A PRA ANESTHESIA: ICD-10-PCS | Mod: CRNA,,, | Performed by: NURSE ANESTHETIST, CERTIFIED REGISTERED

## 2021-02-15 PROCEDURE — G0105 COLORECTAL SCRN; HI RISK IND: ICD-10-PCS | Mod: ,,, | Performed by: INTERNAL MEDICINE

## 2021-02-15 PROCEDURE — 37000008 HC ANESTHESIA 1ST 15 MINUTES: Performed by: INTERNAL MEDICINE

## 2021-02-15 PROCEDURE — 25000003 PHARM REV CODE 250: Performed by: INTERNAL MEDICINE

## 2021-02-15 RX ORDER — SODIUM CHLORIDE 9 MG/ML
INJECTION, SOLUTION INTRAVENOUS CONTINUOUS
Status: DISCONTINUED | OUTPATIENT
Start: 2021-02-15 | End: 2021-02-15 | Stop reason: HOSPADM

## 2021-02-15 RX ORDER — PROPOFOL 10 MG/ML
VIAL (ML) INTRAVENOUS
Status: DISCONTINUED | OUTPATIENT
Start: 2021-02-15 | End: 2021-02-15

## 2021-02-15 RX ORDER — LIDOCAINE HCL/PF 100 MG/5ML
SYRINGE (ML) INTRAVENOUS
Status: DISCONTINUED | OUTPATIENT
Start: 2021-02-15 | End: 2021-02-15

## 2021-02-15 RX ADMIN — PROPOFOL 150 MG: 10 INJECTION, EMULSION INTRAVENOUS at 11:02

## 2021-02-15 RX ADMIN — PROPOFOL 50 MG: 10 INJECTION, EMULSION INTRAVENOUS at 11:02

## 2021-02-15 RX ADMIN — LIDOCAINE HYDROCHLORIDE 50 MG: 20 INJECTION INTRAVENOUS at 11:02

## 2021-02-15 RX ADMIN — SODIUM CHLORIDE: 0.9 INJECTION, SOLUTION INTRAVENOUS at 11:02

## 2021-02-25 ENCOUNTER — TELEPHONE (OUTPATIENT)
Dept: FAMILY MEDICINE | Facility: CLINIC | Age: 76
End: 2021-02-25

## 2021-03-01 ENCOUNTER — PATIENT MESSAGE (OUTPATIENT)
Dept: FAMILY MEDICINE | Facility: CLINIC | Age: 76
End: 2021-03-01

## 2021-03-08 ENCOUNTER — PES CALL (OUTPATIENT)
Dept: ADMINISTRATIVE | Facility: CLINIC | Age: 76
End: 2021-03-08

## 2021-03-09 ENCOUNTER — TELEPHONE (OUTPATIENT)
Dept: CARDIOLOGY | Facility: CLINIC | Age: 76
End: 2021-03-09

## 2021-03-15 ENCOUNTER — TELEPHONE (OUTPATIENT)
Dept: CARDIOLOGY | Facility: CLINIC | Age: 76
End: 2021-03-15

## 2021-03-30 ENCOUNTER — PATIENT MESSAGE (OUTPATIENT)
Dept: FAMILY MEDICINE | Facility: CLINIC | Age: 76
End: 2021-03-30

## 2021-04-12 NOTE — PATIENT INSTRUCTIONS
Your test results will be communicated to you via : [facility], Telephone or Letter.   If you have not received your test results in one week, please contact the clinic at [phone].  
yes

## 2021-04-20 ENCOUNTER — PES CALL (OUTPATIENT)
Dept: ADMINISTRATIVE | Facility: CLINIC | Age: 76
End: 2021-04-20

## 2021-05-04 ENCOUNTER — PATIENT OUTREACH (OUTPATIENT)
Dept: ADMINISTRATIVE | Facility: HOSPITAL | Age: 76
End: 2021-05-04

## 2021-05-05 ENCOUNTER — LAB VISIT (OUTPATIENT)
Dept: LAB | Facility: HOSPITAL | Age: 76
End: 2021-05-05
Attending: FAMILY MEDICINE
Payer: MEDICARE

## 2021-05-05 DIAGNOSIS — E11.22 CONTROLLED TYPE 2 DIABETES MELLITUS WITH STAGE 3 CHRONIC KIDNEY DISEASE, WITHOUT LONG-TERM CURRENT USE OF INSULIN: ICD-10-CM

## 2021-05-05 DIAGNOSIS — N18.30 CONTROLLED TYPE 2 DIABETES MELLITUS WITH STAGE 3 CHRONIC KIDNEY DISEASE, WITHOUT LONG-TERM CURRENT USE OF INSULIN: ICD-10-CM

## 2021-05-05 LAB
ALBUMIN/CREAT UR: 18.6 UG/MG (ref 0–30)
CREAT UR-MCNC: 145 MG/DL (ref 23–375)
ESTIMATED AVG GLUCOSE: 151 MG/DL (ref 68–131)
HBA1C MFR BLD: 6.9 % (ref 4.5–6.2)
MICROALBUMIN UR DL<=1MG/L-MCNC: 26.9 UG/ML

## 2021-05-05 PROCEDURE — 82570 ASSAY OF URINE CREATININE: CPT | Performed by: FAMILY MEDICINE

## 2021-05-05 PROCEDURE — 82043 UR ALBUMIN QUANTITATIVE: CPT | Performed by: FAMILY MEDICINE

## 2021-05-05 PROCEDURE — 83036 HEMOGLOBIN GLYCOSYLATED A1C: CPT | Performed by: FAMILY MEDICINE

## 2021-05-05 PROCEDURE — 36415 COLL VENOUS BLD VENIPUNCTURE: CPT | Performed by: FAMILY MEDICINE

## 2021-05-10 ENCOUNTER — OFFICE VISIT (OUTPATIENT)
Dept: CARDIOLOGY | Facility: CLINIC | Age: 76
End: 2021-05-10
Payer: MEDICARE

## 2021-05-10 VITALS
BODY MASS INDEX: 33.4 KG/M2 | HEIGHT: 73 IN | OXYGEN SATURATION: 98 % | RESPIRATION RATE: 18 BRPM | HEART RATE: 65 BPM | WEIGHT: 252 LBS | DIASTOLIC BLOOD PRESSURE: 62 MMHG | SYSTOLIC BLOOD PRESSURE: 128 MMHG

## 2021-05-10 DIAGNOSIS — I35.0 NONRHEUMATIC AORTIC VALVE STENOSIS: Primary | ICD-10-CM

## 2021-05-10 DIAGNOSIS — E11.59 HYPERTENSION ASSOCIATED WITH DIABETES: ICD-10-CM

## 2021-05-10 DIAGNOSIS — I21.19 ST ELEVATION (STEMI) MYOCARDIAL INFARCTION INVOLVING OTHER CORONARY ARTERY OF INFERIOR WALL: ICD-10-CM

## 2021-05-10 DIAGNOSIS — I15.2 HYPERTENSION ASSOCIATED WITH DIABETES: ICD-10-CM

## 2021-05-10 DIAGNOSIS — N18.30 CONTROLLED TYPE 2 DIABETES MELLITUS WITH STAGE 3 CHRONIC KIDNEY DISEASE, WITHOUT LONG-TERM CURRENT USE OF INSULIN: ICD-10-CM

## 2021-05-10 DIAGNOSIS — G47.33 OSA ON CPAP: ICD-10-CM

## 2021-05-10 DIAGNOSIS — E11.69 HYPERLIPIDEMIA ASSOCIATED WITH TYPE 2 DIABETES MELLITUS: ICD-10-CM

## 2021-05-10 DIAGNOSIS — I25.10 CORONARY ARTERY DISEASE, OCCLUSIVE: ICD-10-CM

## 2021-05-10 DIAGNOSIS — I48.11 LONGSTANDING PERSISTENT ATRIAL FIBRILLATION: ICD-10-CM

## 2021-05-10 DIAGNOSIS — E11.22 CONTROLLED TYPE 2 DIABETES MELLITUS WITH STAGE 3 CHRONIC KIDNEY DISEASE, WITHOUT LONG-TERM CURRENT USE OF INSULIN: ICD-10-CM

## 2021-05-10 DIAGNOSIS — E78.5 HYPERLIPIDEMIA ASSOCIATED WITH TYPE 2 DIABETES MELLITUS: ICD-10-CM

## 2021-05-10 PROCEDURE — 1101F PR PT FALLS ASSESS DOC 0-1 FALLS W/OUT INJ PAST YR: ICD-10-PCS | Mod: CPTII,S$GLB,, | Performed by: INTERNAL MEDICINE

## 2021-05-10 PROCEDURE — 1159F PR MEDICATION LIST DOCUMENTED IN MEDICAL RECORD: ICD-10-PCS | Mod: S$GLB,,, | Performed by: INTERNAL MEDICINE

## 2021-05-10 PROCEDURE — 3008F BODY MASS INDEX DOCD: CPT | Mod: CPTII,S$GLB,, | Performed by: INTERNAL MEDICINE

## 2021-05-10 PROCEDURE — 99214 PR OFFICE/OUTPT VISIT, EST, LEVL IV, 30-39 MIN: ICD-10-PCS | Mod: S$GLB,,, | Performed by: INTERNAL MEDICINE

## 2021-05-10 PROCEDURE — 3288F PR FALLS RISK ASSESSMENT DOCUMENTED: ICD-10-PCS | Mod: CPTII,S$GLB,, | Performed by: INTERNAL MEDICINE

## 2021-05-10 PROCEDURE — 3008F PR BODY MASS INDEX (BMI) DOCUMENTED: ICD-10-PCS | Mod: CPTII,S$GLB,, | Performed by: INTERNAL MEDICINE

## 2021-05-10 PROCEDURE — 99499 RISK ADDL DX/OHS AUDIT: ICD-10-PCS | Mod: S$GLB,,, | Performed by: INTERNAL MEDICINE

## 2021-05-10 PROCEDURE — 99214 OFFICE O/P EST MOD 30 MIN: CPT | Mod: S$GLB,,, | Performed by: INTERNAL MEDICINE

## 2021-05-10 PROCEDURE — 1159F MED LIST DOCD IN RCRD: CPT | Mod: S$GLB,,, | Performed by: INTERNAL MEDICINE

## 2021-05-10 PROCEDURE — 3288F FALL RISK ASSESSMENT DOCD: CPT | Mod: CPTII,S$GLB,, | Performed by: INTERNAL MEDICINE

## 2021-05-10 PROCEDURE — 1126F AMNT PAIN NOTED NONE PRSNT: CPT | Mod: S$GLB,,, | Performed by: INTERNAL MEDICINE

## 2021-05-10 PROCEDURE — 1126F PR PAIN SEVERITY QUANTIFIED, NO PAIN PRESENT: ICD-10-PCS | Mod: S$GLB,,, | Performed by: INTERNAL MEDICINE

## 2021-05-10 PROCEDURE — 1101F PT FALLS ASSESS-DOCD LE1/YR: CPT | Mod: CPTII,S$GLB,, | Performed by: INTERNAL MEDICINE

## 2021-05-10 PROCEDURE — 99499 UNLISTED E&M SERVICE: CPT | Mod: S$GLB,,, | Performed by: INTERNAL MEDICINE

## 2021-05-11 ENCOUNTER — TELEPHONE (OUTPATIENT)
Dept: FAMILY MEDICINE | Facility: CLINIC | Age: 76
End: 2021-05-11

## 2021-05-18 ENCOUNTER — OFFICE VISIT (OUTPATIENT)
Dept: FAMILY MEDICINE | Facility: CLINIC | Age: 76
End: 2021-05-18
Payer: MEDICARE

## 2021-05-18 VITALS
BODY MASS INDEX: 33.11 KG/M2 | WEIGHT: 249.81 LBS | OXYGEN SATURATION: 96 % | DIASTOLIC BLOOD PRESSURE: 78 MMHG | RESPIRATION RATE: 18 BRPM | HEIGHT: 73 IN | SYSTOLIC BLOOD PRESSURE: 116 MMHG | HEART RATE: 52 BPM

## 2021-05-18 DIAGNOSIS — Z00.00 ROUTINE GENERAL MEDICAL EXAMINATION AT A HEALTH CARE FACILITY: Primary | ICD-10-CM

## 2021-05-18 DIAGNOSIS — N18.30 CONTROLLED TYPE 2 DIABETES MELLITUS WITH STAGE 3 CHRONIC KIDNEY DISEASE, WITHOUT LONG-TERM CURRENT USE OF INSULIN: ICD-10-CM

## 2021-05-18 DIAGNOSIS — N18.32 STAGE 3B CHRONIC KIDNEY DISEASE: ICD-10-CM

## 2021-05-18 DIAGNOSIS — I48.91 ATRIAL FIBRILLATION, UNSPECIFIED TYPE: ICD-10-CM

## 2021-05-18 DIAGNOSIS — E11.22 CONTROLLED TYPE 2 DIABETES MELLITUS WITH STAGE 3 CHRONIC KIDNEY DISEASE, WITHOUT LONG-TERM CURRENT USE OF INSULIN: ICD-10-CM

## 2021-05-18 DIAGNOSIS — I35.0 NONRHEUMATIC AORTIC VALVE STENOSIS: Chronic | ICD-10-CM

## 2021-05-18 PROCEDURE — 3288F FALL RISK ASSESSMENT DOCD: CPT | Mod: CPTII,S$GLB,, | Performed by: STUDENT IN AN ORGANIZED HEALTH CARE EDUCATION/TRAINING PROGRAM

## 2021-05-18 PROCEDURE — 99214 OFFICE O/P EST MOD 30 MIN: CPT | Mod: S$GLB,,, | Performed by: STUDENT IN AN ORGANIZED HEALTH CARE EDUCATION/TRAINING PROGRAM

## 2021-05-18 PROCEDURE — 99999 PR PBB SHADOW E&M-EST. PATIENT-LVL V: CPT | Mod: PBBFAC,,, | Performed by: STUDENT IN AN ORGANIZED HEALTH CARE EDUCATION/TRAINING PROGRAM

## 2021-05-18 PROCEDURE — 3008F BODY MASS INDEX DOCD: CPT | Mod: CPTII,S$GLB,, | Performed by: STUDENT IN AN ORGANIZED HEALTH CARE EDUCATION/TRAINING PROGRAM

## 2021-05-18 PROCEDURE — 1126F PR PAIN SEVERITY QUANTIFIED, NO PAIN PRESENT: ICD-10-PCS | Mod: S$GLB,,, | Performed by: STUDENT IN AN ORGANIZED HEALTH CARE EDUCATION/TRAINING PROGRAM

## 2021-05-18 PROCEDURE — 3008F PR BODY MASS INDEX (BMI) DOCUMENTED: ICD-10-PCS | Mod: CPTII,S$GLB,, | Performed by: STUDENT IN AN ORGANIZED HEALTH CARE EDUCATION/TRAINING PROGRAM

## 2021-05-18 PROCEDURE — 1101F PT FALLS ASSESS-DOCD LE1/YR: CPT | Mod: CPTII,S$GLB,, | Performed by: STUDENT IN AN ORGANIZED HEALTH CARE EDUCATION/TRAINING PROGRAM

## 2021-05-18 PROCEDURE — 1159F PR MEDICATION LIST DOCUMENTED IN MEDICAL RECORD: ICD-10-PCS | Mod: S$GLB,,, | Performed by: STUDENT IN AN ORGANIZED HEALTH CARE EDUCATION/TRAINING PROGRAM

## 2021-05-18 PROCEDURE — 1101F PR PT FALLS ASSESS DOC 0-1 FALLS W/OUT INJ PAST YR: ICD-10-PCS | Mod: CPTII,S$GLB,, | Performed by: STUDENT IN AN ORGANIZED HEALTH CARE EDUCATION/TRAINING PROGRAM

## 2021-05-18 PROCEDURE — 1126F AMNT PAIN NOTED NONE PRSNT: CPT | Mod: S$GLB,,, | Performed by: STUDENT IN AN ORGANIZED HEALTH CARE EDUCATION/TRAINING PROGRAM

## 2021-05-18 PROCEDURE — 99999 PR PBB SHADOW E&M-EST. PATIENT-LVL V: ICD-10-PCS | Mod: PBBFAC,,, | Performed by: STUDENT IN AN ORGANIZED HEALTH CARE EDUCATION/TRAINING PROGRAM

## 2021-05-18 PROCEDURE — 3288F PR FALLS RISK ASSESSMENT DOCUMENTED: ICD-10-PCS | Mod: CPTII,S$GLB,, | Performed by: STUDENT IN AN ORGANIZED HEALTH CARE EDUCATION/TRAINING PROGRAM

## 2021-05-18 PROCEDURE — 99214 PR OFFICE/OUTPT VISIT, EST, LEVL IV, 30-39 MIN: ICD-10-PCS | Mod: S$GLB,,, | Performed by: STUDENT IN AN ORGANIZED HEALTH CARE EDUCATION/TRAINING PROGRAM

## 2021-05-18 PROCEDURE — 1159F MED LIST DOCD IN RCRD: CPT | Mod: S$GLB,,, | Performed by: STUDENT IN AN ORGANIZED HEALTH CARE EDUCATION/TRAINING PROGRAM

## 2021-06-07 ENCOUNTER — HOSPITAL ENCOUNTER (OUTPATIENT)
Dept: RADIOLOGY | Facility: CLINIC | Age: 76
Discharge: HOME | End: 2021-06-07
Attending: INTERNAL MEDICINE
Payer: MEDICARE

## 2021-06-07 ENCOUNTER — HOSPITAL ENCOUNTER (OUTPATIENT)
Dept: CARDIOLOGY | Facility: CLINIC | Age: 76
Discharge: HOME | End: 2021-06-07
Attending: INTERNAL MEDICINE
Payer: MEDICARE

## 2021-06-07 VITALS — WEIGHT: 249 LBS | HEIGHT: 73 IN | BODY MASS INDEX: 33 KG/M2

## 2021-06-07 DIAGNOSIS — E78.5 HYPERLIPIDEMIA ASSOCIATED WITH TYPE 2 DIABETES MELLITUS: ICD-10-CM

## 2021-06-07 DIAGNOSIS — E11.59 HYPERTENSION ASSOCIATED WITH DIABETES: ICD-10-CM

## 2021-06-07 DIAGNOSIS — E11.69 HYPERLIPIDEMIA ASSOCIATED WITH TYPE 2 DIABETES MELLITUS: ICD-10-CM

## 2021-06-07 DIAGNOSIS — I25.10 CORONARY ARTERY DISEASE, OCCLUSIVE: ICD-10-CM

## 2021-06-07 DIAGNOSIS — I35.0 NONRHEUMATIC AORTIC VALVE STENOSIS: ICD-10-CM

## 2021-06-07 DIAGNOSIS — I21.19 ST ELEVATION (STEMI) MYOCARDIAL INFARCTION INVOLVING OTHER CORONARY ARTERY OF INFERIOR WALL: ICD-10-CM

## 2021-06-07 DIAGNOSIS — I15.2 HYPERTENSION ASSOCIATED WITH DIABETES: ICD-10-CM

## 2021-06-07 PROCEDURE — 93306 TTE W/DOPPLER COMPLETE: CPT | Mod: S$GLB,,, | Performed by: INTERNAL MEDICINE

## 2021-06-07 PROCEDURE — A9502 TC99M TETROFOSMIN: HCPCS | Mod: S$GLB,,, | Performed by: INTERNAL MEDICINE

## 2021-06-07 PROCEDURE — 78452 STRESS TEST WITH MYOCARDIAL PERFUSION (CUPID ONLY): ICD-10-PCS | Mod: S$GLB,,, | Performed by: INTERNAL MEDICINE

## 2021-06-07 PROCEDURE — A9502 STRESS TEST WITH MYOCARDIAL PERFUSION (CUPID ONLY): ICD-10-PCS | Mod: S$GLB,,, | Performed by: INTERNAL MEDICINE

## 2021-06-07 PROCEDURE — 78452 HT MUSCLE IMAGE SPECT MULT: CPT | Mod: S$GLB,,, | Performed by: INTERNAL MEDICINE

## 2021-06-07 PROCEDURE — 93306 ECHO (CUPID ONLY): ICD-10-PCS | Mod: S$GLB,,, | Performed by: INTERNAL MEDICINE

## 2021-06-07 PROCEDURE — 93015 CV STRESS TEST SUPVJ I&R: CPT | Mod: S$GLB,,, | Performed by: INTERNAL MEDICINE

## 2021-06-07 PROCEDURE — 93015 STRESS TEST WITH MYOCARDIAL PERFUSION (CUPID ONLY): ICD-10-PCS | Mod: S$GLB,,, | Performed by: INTERNAL MEDICINE

## 2021-06-07 RX ORDER — REGADENOSON 0.08 MG/ML
0.4 INJECTION, SOLUTION INTRAVENOUS ONCE
Status: COMPLETED | OUTPATIENT
Start: 2021-06-07 | End: 2021-06-07

## 2021-06-07 RX ADMIN — REGADENOSON 0.4 MG: 0.08 INJECTION, SOLUTION INTRAVENOUS at 08:06

## 2021-06-10 LAB
AORTIC ROOT ANNULUS: 2.8 CM
AORTIC VALVE CUSP SEPERATION: 1.1 CM
AV INDEX (PROSTH): 0.36
AV MEAN GRADIENT: 20 MMHG
AV PEAK GRADIENT: 36 MMHG
AV REGURGITATION PRESSURE HALF TIME: 832 MS
AV VALVE AREA: 1.25 CM2
AV VELOCITY RATIO: 0.32
BSA FOR ECHO PROCEDURE: 2.41 M2
CV ECHO LV RWT: 0.49 CM
CV PHARM DOSE: 0.4 MG
CV STRESS BASE HR: 55 BPM
DIASTOLIC BLOOD PRESSURE: 80 MMHG
DOP CALC AO PEAK VEL: 3 M/S
DOP CALC AO VTI: 70.9 CM
DOP CALC LVOT AREA: 3.5 CM2
DOP CALC LVOT DIAMETER: 2.1 CM
DOP CALC LVOT PEAK VEL: 0.95 M/S
DOP CALC LVOT STROKE VOLUME: 88.62 CM3
DOP CALCLVOT PEAK VEL VTI: 25.6 CM
E WAVE DECELERATION TIME: 195 MS
E/E' RATIO: 8.8 M/S
ECHO LV POSTERIOR WALL: 1.2 CM (ref 0.6–1.1)
EJECTION FRACTION- HIGH: 65 %
EJECTION FRACTION: 65 %
END DIASTOLIC INDEX-HIGH: 158 ML/M2
END DIASTOLIC INDEX-LOW: 94 ML/M2
END SYSTOLIC INDEX-HIGH: 71 ML/M2
END SYSTOLIC INDEX-LOW: 33 ML/M2
FRACTIONAL SHORTENING: 37 % (ref 28–44)
INTERVENTRICULAR SEPTUM: 1.36 CM (ref 0.6–1.1)
IVRT: 84 MS
LA MAJOR: 4.7 CM
LEFT ATRIUM SIZE: 5.2 CM
LEFT INTERNAL DIMENSION IN SYSTOLE: 3.09 CM (ref 2.1–4)
LEFT VENTRICLE MASS INDEX: 105 G/M2
LEFT VENTRICULAR INTERNAL DIMENSION IN DIASTOLE: 4.91 CM (ref 3.5–6)
LEFT VENTRICULAR MASS: 248.92 G
LV LATERAL E/E' RATIO: 7.33 M/S
LV SEPTAL E/E' RATIO: 11 M/S
MV PEAK E VEL: 0.88 M/S
NUC STRESS DIASTOLIC VOLUME INDEX: 105
NUC STRESS EJECTION FRACTION: 65 %
NUC STRESS SYSTOLIC VOLUME INDEX: 37
OHS CV CPX 1 MINUTE RECOVERY HEART RATE: 68 BPM
OHS CV CPX 85 PERCENT MAX PREDICTED HEART RATE MALE: 124
OHS CV CPX MAX PREDICTED HEART RATE: 146
OHS CV CPX PATIENT IS FEMALE: 0
OHS CV CPX PATIENT IS MALE: 1
OHS CV CPX PEAK DIASTOLIC BLOOD PRESSURE: 80 MMHG
OHS CV CPX PEAK HEAR RATE: 80 BPM
OHS CV CPX PEAK RATE PRESSURE PRODUCT: 9440
OHS CV CPX PEAK SYSTOLIC BLOOD PRESSURE: 118 MMHG
OHS CV CPX PERCENT MAX PREDICTED HEART RATE ACHIEVED: 55
OHS CV CPX RATE PRESSURE PRODUCT PRESENTING: 6160
PISA TR MAX VEL: 2.53 M/S
RA PRESSURE: 3 MMHG
RETIRED EF AND QEF - SEE NOTES: 53 %
RIGHT VENTRICULAR END-DIASTOLIC DIMENSION: 2.64 CM
STRESS ECHO POST EXERCISE DUR MIN: 3 MINUTES
STRESS ECHO POST EXERCISE DUR SEC: 0 SECONDS
SYSTOLIC BLOOD PRESSURE: 112 MMHG
TDI LATERAL: 0.12 M/S
TDI SEPTAL: 0.08 M/S
TDI: 0.1 M/S
TR MAX PG: 26 MMHG
TV REST PULMONARY ARTERY PRESSURE: 29 MMHG

## 2021-06-11 ENCOUNTER — OFFICE VISIT (OUTPATIENT)
Dept: CARDIOLOGY | Facility: CLINIC | Age: 76
End: 2021-06-11
Payer: MEDICARE

## 2021-06-11 VITALS
DIASTOLIC BLOOD PRESSURE: 72 MMHG | HEIGHT: 73 IN | BODY MASS INDEX: 32.74 KG/M2 | SYSTOLIC BLOOD PRESSURE: 122 MMHG | WEIGHT: 247 LBS | OXYGEN SATURATION: 97 % | HEART RATE: 61 BPM

## 2021-06-11 DIAGNOSIS — I25.10 CORONARY ARTERY DISEASE, OCCLUSIVE: ICD-10-CM

## 2021-06-11 DIAGNOSIS — Z95.5 S/P CORONARY ARTERY STENT PLACEMENT: ICD-10-CM

## 2021-06-11 DIAGNOSIS — E11.59 HYPERTENSION ASSOCIATED WITH DIABETES: ICD-10-CM

## 2021-06-11 DIAGNOSIS — I48.11 LONGSTANDING PERSISTENT ATRIAL FIBRILLATION: ICD-10-CM

## 2021-06-11 DIAGNOSIS — I15.2 HYPERTENSION ASSOCIATED WITH DIABETES: ICD-10-CM

## 2021-06-11 PROCEDURE — 3288F PR FALLS RISK ASSESSMENT DOCUMENTED: ICD-10-PCS | Mod: CPTII,S$GLB,, | Performed by: INTERNAL MEDICINE

## 2021-06-11 PROCEDURE — 3008F PR BODY MASS INDEX (BMI) DOCUMENTED: ICD-10-PCS | Mod: CPTII,S$GLB,, | Performed by: INTERNAL MEDICINE

## 2021-06-11 PROCEDURE — 3288F FALL RISK ASSESSMENT DOCD: CPT | Mod: CPTII,S$GLB,, | Performed by: INTERNAL MEDICINE

## 2021-06-11 PROCEDURE — 99213 PR OFFICE/OUTPT VISIT, EST, LEVL III, 20-29 MIN: ICD-10-PCS | Mod: S$GLB,,, | Performed by: INTERNAL MEDICINE

## 2021-06-11 PROCEDURE — 1159F PR MEDICATION LIST DOCUMENTED IN MEDICAL RECORD: ICD-10-PCS | Mod: S$GLB,,, | Performed by: INTERNAL MEDICINE

## 2021-06-11 PROCEDURE — 1159F MED LIST DOCD IN RCRD: CPT | Mod: S$GLB,,, | Performed by: INTERNAL MEDICINE

## 2021-06-11 PROCEDURE — 3008F BODY MASS INDEX DOCD: CPT | Mod: CPTII,S$GLB,, | Performed by: INTERNAL MEDICINE

## 2021-06-11 PROCEDURE — 1101F PT FALLS ASSESS-DOCD LE1/YR: CPT | Mod: CPTII,S$GLB,, | Performed by: INTERNAL MEDICINE

## 2021-06-11 PROCEDURE — 99213 OFFICE O/P EST LOW 20 MIN: CPT | Mod: S$GLB,,, | Performed by: INTERNAL MEDICINE

## 2021-06-11 PROCEDURE — 1101F PR PT FALLS ASSESS DOC 0-1 FALLS W/OUT INJ PAST YR: ICD-10-PCS | Mod: CPTII,S$GLB,, | Performed by: INTERNAL MEDICINE

## 2021-07-07 ENCOUNTER — LAB VISIT (OUTPATIENT)
Dept: LAB | Facility: HOSPITAL | Age: 76
End: 2021-07-07
Attending: INTERNAL MEDICINE
Payer: MEDICARE

## 2021-07-07 DIAGNOSIS — N18.30 CHRONIC KIDNEY DISEASE, STAGE III (MODERATE): Primary | ICD-10-CM

## 2021-07-07 DIAGNOSIS — D64.9 ANEMIA, UNSPECIFIED: ICD-10-CM

## 2021-07-07 LAB
ALBUMIN SERPL BCP-MCNC: 3.8 G/DL (ref 3.5–5.2)
ANION GAP SERPL CALC-SCNC: 9 MMOL/L (ref 8–16)
BACTERIA #/AREA URNS HPF: NEGATIVE /HPF
BASOPHILS # BLD AUTO: 0.05 K/UL (ref 0–0.2)
BASOPHILS NFR BLD: 0.8 % (ref 0–1.9)
BILIRUB UR QL STRIP: NEGATIVE
BUN SERPL-MCNC: 27 MG/DL (ref 8–23)
CALCIUM SERPL-MCNC: 9.3 MG/DL (ref 8.7–10.5)
CHLORIDE SERPL-SCNC: 104 MMOL/L (ref 95–110)
CLARITY UR: ABNORMAL
CO2 SERPL-SCNC: 23 MMOL/L (ref 23–29)
COLOR UR: YELLOW
CREAT SERPL-MCNC: 2.1 MG/DL (ref 0.5–1.4)
CREAT UR-MCNC: 238 MG/DL (ref 23–375)
DIFFERENTIAL METHOD: ABNORMAL
EOSINOPHIL # BLD AUTO: 0.6 K/UL (ref 0–0.5)
EOSINOPHIL NFR BLD: 8.6 % (ref 0–8)
ERYTHROCYTE [DISTWIDTH] IN BLOOD BY AUTOMATED COUNT: 16.5 % (ref 11.5–14.5)
EST. GFR  (AFRICAN AMERICAN): 34.8 ML/MIN/1.73 M^2
EST. GFR  (NON AFRICAN AMERICAN): 30.1 ML/MIN/1.73 M^2
FERRITIN SERPL-MCNC: 138 NG/ML (ref 20–300)
GLUCOSE SERPL-MCNC: 130 MG/DL (ref 70–110)
GLUCOSE UR QL STRIP: NEGATIVE
HCT VFR BLD AUTO: 43.6 % (ref 40–54)
HGB BLD-MCNC: 14.3 G/DL (ref 14–18)
HGB UR QL STRIP: NEGATIVE
HYALINE CASTS #/AREA URNS LPF: 1 /LPF
IMM GRANULOCYTES # BLD AUTO: 0.04 K/UL (ref 0–0.04)
IMM GRANULOCYTES NFR BLD AUTO: 0.6 % (ref 0–0.5)
IRON SERPL-MCNC: 65 UG/DL (ref 45–160)
KETONES UR QL STRIP: NEGATIVE
LEUKOCYTE ESTERASE UR QL STRIP: NEGATIVE
LYMPHOCYTES # BLD AUTO: 2.2 K/UL (ref 1–4.8)
LYMPHOCYTES NFR BLD: 32.8 % (ref 18–48)
MCH RBC QN AUTO: 28.6 PG (ref 27–31)
MCHC RBC AUTO-ENTMCNC: 32.8 G/DL (ref 32–36)
MCV RBC AUTO: 87 FL (ref 82–98)
MICROSCOPIC COMMENT: NORMAL
MONOCYTES # BLD AUTO: 0.5 K/UL (ref 0.3–1)
MONOCYTES NFR BLD: 7.4 % (ref 4–15)
NEUTROPHILS # BLD AUTO: 3.3 K/UL (ref 1.8–7.7)
NEUTROPHILS NFR BLD: 49.8 % (ref 38–73)
NITRITE UR QL STRIP: NEGATIVE
NRBC BLD-RTO: 0 /100 WBC
PH UR STRIP: 6 [PH] (ref 5–8)
PHOSPHATE SERPL-MCNC: 4.1 MG/DL (ref 2.7–4.5)
PLATELET # BLD AUTO: 199 K/UL (ref 150–450)
PMV BLD AUTO: 11.6 FL (ref 9.2–12.9)
POTASSIUM SERPL-SCNC: 4.4 MMOL/L (ref 3.5–5.1)
PROT UR QL STRIP: ABNORMAL
PROT UR-MCNC: 12 MG/DL (ref 6–15)
PROT/CREAT UR: 0.05 MG/G{CREAT} (ref 0–0.2)
RBC # BLD AUTO: 5 M/UL (ref 4.6–6.2)
RBC #/AREA URNS HPF: 0 /HPF (ref 0–4)
SATURATED IRON: 14 % (ref 20–50)
SODIUM SERPL-SCNC: 136 MMOL/L (ref 136–145)
SP GR UR STRIP: 1.02 (ref 1–1.03)
SQUAMOUS #/AREA URNS HPF: 0 /HPF
TOTAL IRON BINDING CAPACITY: 473 UG/DL (ref 250–450)
TRANSFERRIN SERPL-MCNC: 338 MG/DL (ref 200–375)
URN SPEC COLLECT METH UR: ABNORMAL
UROBILINOGEN UR STRIP-ACNC: NEGATIVE EU/DL
WBC # BLD AUTO: 6.62 K/UL (ref 3.9–12.7)
WBC #/AREA URNS HPF: 0 /HPF (ref 0–5)

## 2021-07-07 PROCEDURE — 81001 URINALYSIS AUTO W/SCOPE: CPT | Performed by: INTERNAL MEDICINE

## 2021-07-07 PROCEDURE — 82570 ASSAY OF URINE CREATININE: CPT | Performed by: INTERNAL MEDICINE

## 2021-07-07 PROCEDURE — 85025 COMPLETE CBC W/AUTO DIFF WBC: CPT | Performed by: INTERNAL MEDICINE

## 2021-07-07 PROCEDURE — 80069 RENAL FUNCTION PANEL: CPT | Performed by: INTERNAL MEDICINE

## 2021-07-07 PROCEDURE — 82728 ASSAY OF FERRITIN: CPT | Performed by: INTERNAL MEDICINE

## 2021-07-07 PROCEDURE — 83540 ASSAY OF IRON: CPT | Performed by: INTERNAL MEDICINE

## 2021-07-07 PROCEDURE — 36415 COLL VENOUS BLD VENIPUNCTURE: CPT | Performed by: INTERNAL MEDICINE

## 2021-07-22 ENCOUNTER — TELEPHONE (OUTPATIENT)
Dept: FAMILY MEDICINE | Facility: CLINIC | Age: 76
End: 2021-07-22

## 2021-08-01 RX ORDER — PIOGLITAZONEHYDROCHLORIDE 30 MG/1
30 TABLET ORAL DAILY
Qty: 90 TABLET | Refills: 1 | Status: SHIPPED | OUTPATIENT
Start: 2021-08-01 | End: 2021-08-06 | Stop reason: SDUPTHER

## 2021-08-27 ENCOUNTER — OFFICE VISIT (OUTPATIENT)
Dept: CARDIOLOGY | Facility: CLINIC | Age: 76
End: 2021-08-27
Payer: MEDICARE

## 2021-08-27 ENCOUNTER — TELEPHONE (OUTPATIENT)
Dept: CARDIOLOGY | Facility: CLINIC | Age: 76
End: 2021-08-27

## 2021-08-27 ENCOUNTER — LAB VISIT (OUTPATIENT)
Dept: LAB | Facility: HOSPITAL | Age: 76
End: 2021-08-27
Attending: NURSE PRACTITIONER
Payer: MEDICARE

## 2021-08-27 VITALS
DIASTOLIC BLOOD PRESSURE: 70 MMHG | BODY MASS INDEX: 32.47 KG/M2 | HEART RATE: 62 BPM | HEIGHT: 73 IN | WEIGHT: 245 LBS | OXYGEN SATURATION: 98 % | SYSTOLIC BLOOD PRESSURE: 122 MMHG

## 2021-08-27 DIAGNOSIS — I15.2 HYPERTENSION ASSOCIATED WITH DIABETES: ICD-10-CM

## 2021-08-27 DIAGNOSIS — R07.89 ATYPICAL CHEST PAIN: ICD-10-CM

## 2021-08-27 DIAGNOSIS — I25.10 CORONARY ARTERY DISEASE, OCCLUSIVE: ICD-10-CM

## 2021-08-27 DIAGNOSIS — E11.69 HYPERLIPIDEMIA ASSOCIATED WITH TYPE 2 DIABETES MELLITUS: ICD-10-CM

## 2021-08-27 DIAGNOSIS — E11.59 HYPERTENSION ASSOCIATED WITH DIABETES: ICD-10-CM

## 2021-08-27 DIAGNOSIS — I48.11 LONGSTANDING PERSISTENT ATRIAL FIBRILLATION: ICD-10-CM

## 2021-08-27 DIAGNOSIS — R07.9 CHEST PAIN, UNSPECIFIED TYPE: ICD-10-CM

## 2021-08-27 DIAGNOSIS — E78.5 HYPERLIPIDEMIA ASSOCIATED WITH TYPE 2 DIABETES MELLITUS: ICD-10-CM

## 2021-08-27 DIAGNOSIS — R07.9 CHEST PAIN, UNSPECIFIED TYPE: Primary | ICD-10-CM

## 2021-08-27 LAB — TROPONIN I SERPL DL<=0.01 NG/ML-MCNC: <0.03 NG/ML

## 2021-08-27 PROCEDURE — 1101F PR PT FALLS ASSESS DOC 0-1 FALLS W/OUT INJ PAST YR: ICD-10-PCS | Mod: CPTII,S$GLB,, | Performed by: NURSE PRACTITIONER

## 2021-08-27 PROCEDURE — 99499 RISK ADDL DX/OHS AUDIT: ICD-10-PCS | Mod: S$GLB,,, | Performed by: NURSE PRACTITIONER

## 2021-08-27 PROCEDURE — 1125F PR PAIN SEVERITY QUANTIFIED, PAIN PRESENT: ICD-10-PCS | Mod: CPTII,S$GLB,, | Performed by: NURSE PRACTITIONER

## 2021-08-27 PROCEDURE — 99499 UNLISTED E&M SERVICE: CPT | Mod: S$GLB,,, | Performed by: NURSE PRACTITIONER

## 2021-08-27 PROCEDURE — 93000 EKG 12-LEAD: ICD-10-PCS | Mod: S$GLB,,, | Performed by: INTERNAL MEDICINE

## 2021-08-27 PROCEDURE — 99204 PR OFFICE/OUTPT VISIT, NEW, LEVL IV, 45-59 MIN: ICD-10-PCS | Mod: S$GLB,,, | Performed by: NURSE PRACTITIONER

## 2021-08-27 PROCEDURE — 1160F PR REVIEW ALL MEDS BY PRESCRIBER/CLIN PHARMACIST DOCUMENTED: ICD-10-PCS | Mod: CPTII,S$GLB,, | Performed by: NURSE PRACTITIONER

## 2021-08-27 PROCEDURE — 1160F RVW MEDS BY RX/DR IN RCRD: CPT | Mod: CPTII,S$GLB,, | Performed by: NURSE PRACTITIONER

## 2021-08-27 PROCEDURE — 3074F PR MOST RECENT SYSTOLIC BLOOD PRESSURE < 130 MM HG: ICD-10-PCS | Mod: CPTII,S$GLB,, | Performed by: NURSE PRACTITIONER

## 2021-08-27 PROCEDURE — 3288F PR FALLS RISK ASSESSMENT DOCUMENTED: ICD-10-PCS | Mod: CPTII,S$GLB,, | Performed by: NURSE PRACTITIONER

## 2021-08-27 PROCEDURE — 1159F MED LIST DOCD IN RCRD: CPT | Mod: CPTII,S$GLB,, | Performed by: NURSE PRACTITIONER

## 2021-08-27 PROCEDURE — 3288F FALL RISK ASSESSMENT DOCD: CPT | Mod: CPTII,S$GLB,, | Performed by: NURSE PRACTITIONER

## 2021-08-27 PROCEDURE — 84484 ASSAY OF TROPONIN QUANT: CPT | Performed by: NURSE PRACTITIONER

## 2021-08-27 PROCEDURE — 99204 OFFICE O/P NEW MOD 45 MIN: CPT | Mod: S$GLB,,, | Performed by: NURSE PRACTITIONER

## 2021-08-27 PROCEDURE — 1101F PT FALLS ASSESS-DOCD LE1/YR: CPT | Mod: CPTII,S$GLB,, | Performed by: NURSE PRACTITIONER

## 2021-08-27 PROCEDURE — 3044F PR MOST RECENT HEMOGLOBIN A1C LEVEL <7.0%: ICD-10-PCS | Mod: CPTII,S$GLB,, | Performed by: NURSE PRACTITIONER

## 2021-08-27 PROCEDURE — 3078F PR MOST RECENT DIASTOLIC BLOOD PRESSURE < 80 MM HG: ICD-10-PCS | Mod: CPTII,S$GLB,, | Performed by: NURSE PRACTITIONER

## 2021-08-27 PROCEDURE — 93000 ELECTROCARDIOGRAM COMPLETE: CPT | Mod: S$GLB,,, | Performed by: INTERNAL MEDICINE

## 2021-08-27 PROCEDURE — 36415 COLL VENOUS BLD VENIPUNCTURE: CPT | Performed by: NURSE PRACTITIONER

## 2021-08-27 PROCEDURE — 3074F SYST BP LT 130 MM HG: CPT | Mod: CPTII,S$GLB,, | Performed by: NURSE PRACTITIONER

## 2021-08-27 PROCEDURE — 1125F AMNT PAIN NOTED PAIN PRSNT: CPT | Mod: CPTII,S$GLB,, | Performed by: NURSE PRACTITIONER

## 2021-08-27 PROCEDURE — 1159F PR MEDICATION LIST DOCUMENTED IN MEDICAL RECORD: ICD-10-PCS | Mod: CPTII,S$GLB,, | Performed by: NURSE PRACTITIONER

## 2021-08-27 PROCEDURE — 3044F HG A1C LEVEL LT 7.0%: CPT | Mod: CPTII,S$GLB,, | Performed by: NURSE PRACTITIONER

## 2021-08-27 PROCEDURE — 3078F DIAST BP <80 MM HG: CPT | Mod: CPTII,S$GLB,, | Performed by: NURSE PRACTITIONER

## 2021-08-27 RX ORDER — FENOFIBRATE 134 MG/1
CAPSULE ORAL
Qty: 30 CAPSULE | Refills: 11 | Status: SHIPPED | OUTPATIENT
Start: 2021-08-27 | End: 2022-08-25 | Stop reason: SDUPTHER

## 2021-08-28 RX ORDER — LIRAGLUTIDE 6 MG/ML
INJECTION SUBCUTANEOUS
Qty: 9 ML | Refills: 5 | Status: SHIPPED | OUTPATIENT
Start: 2021-08-28 | End: 2022-02-27

## 2021-09-14 ENCOUNTER — PES CALL (OUTPATIENT)
Dept: ADMINISTRATIVE | Facility: CLINIC | Age: 76
End: 2021-09-14

## 2021-09-14 RX ORDER — OMEPRAZOLE 40 MG/1
40 CAPSULE, DELAYED RELEASE ORAL DAILY
Qty: 90 CAPSULE | Refills: 0 | Status: SHIPPED | OUTPATIENT
Start: 2021-09-14 | End: 2021-12-27

## 2021-09-14 RX ORDER — PEN NEEDLE, DIABETIC 30 GX3/16"
NEEDLE, DISPOSABLE MISCELLANEOUS
Qty: 100 EACH | Refills: 2 | Status: SHIPPED | OUTPATIENT
Start: 2021-09-14 | End: 2022-08-11 | Stop reason: SDUPTHER

## 2021-09-27 ENCOUNTER — IMMUNIZATION (OUTPATIENT)
Dept: PRIMARY CARE CLINIC | Facility: CLINIC | Age: 76
End: 2021-09-27
Payer: MEDICARE

## 2021-09-27 DIAGNOSIS — Z23 NEED FOR VACCINATION: Primary | ICD-10-CM

## 2021-09-27 PROCEDURE — 0003A COVID-19, MRNA, LNP-S, PF, 30 MCG/0.3 ML DOSE VACCINE: ICD-10-PCS | Mod: S$GLB,,, | Performed by: FAMILY MEDICINE

## 2021-09-27 PROCEDURE — 0003A COVID-19, MRNA, LNP-S, PF, 30 MCG/0.3 ML DOSE VACCINE: CPT | Mod: S$GLB,,, | Performed by: FAMILY MEDICINE

## 2021-09-27 PROCEDURE — 91300 COVID-19, MRNA, LNP-S, PF, 30 MCG/0.3 ML DOSE VACCINE: CPT | Mod: S$GLB,,, | Performed by: FAMILY MEDICINE

## 2021-09-27 PROCEDURE — 91300 COVID-19, MRNA, LNP-S, PF, 30 MCG/0.3 ML DOSE VACCINE: ICD-10-PCS | Mod: S$GLB,,, | Performed by: FAMILY MEDICINE

## 2021-11-02 ENCOUNTER — TELEPHONE (OUTPATIENT)
Dept: FAMILY MEDICINE | Facility: CLINIC | Age: 76
End: 2021-11-02
Payer: MEDICARE

## 2021-11-02 ENCOUNTER — PATIENT MESSAGE (OUTPATIENT)
Dept: FAMILY MEDICINE | Facility: CLINIC | Age: 76
End: 2021-11-02
Payer: MEDICARE

## 2021-11-02 DIAGNOSIS — N18.30 CONTROLLED TYPE 2 DIABETES MELLITUS WITH STAGE 3 CHRONIC KIDNEY DISEASE, WITHOUT LONG-TERM CURRENT USE OF INSULIN: ICD-10-CM

## 2021-11-02 DIAGNOSIS — E11.22 CONTROLLED TYPE 2 DIABETES MELLITUS WITH STAGE 3 CHRONIC KIDNEY DISEASE, WITHOUT LONG-TERM CURRENT USE OF INSULIN: ICD-10-CM

## 2021-11-02 RX ORDER — METFORMIN HYDROCHLORIDE 500 MG/1
500 TABLET, EXTENDED RELEASE ORAL DAILY
Qty: 90 TABLET | Refills: 1 | Status: SHIPPED | OUTPATIENT
Start: 2021-11-02 | End: 2022-05-02

## 2021-11-08 ENCOUNTER — LAB VISIT (OUTPATIENT)
Dept: LAB | Facility: HOSPITAL | Age: 76
End: 2021-11-08
Attending: STUDENT IN AN ORGANIZED HEALTH CARE EDUCATION/TRAINING PROGRAM
Payer: MEDICARE

## 2021-11-08 DIAGNOSIS — E11.22 CONTROLLED TYPE 2 DIABETES MELLITUS WITH STAGE 3 CHRONIC KIDNEY DISEASE, WITHOUT LONG-TERM CURRENT USE OF INSULIN: ICD-10-CM

## 2021-11-08 DIAGNOSIS — N18.30 CONTROLLED TYPE 2 DIABETES MELLITUS WITH STAGE 3 CHRONIC KIDNEY DISEASE, WITHOUT LONG-TERM CURRENT USE OF INSULIN: ICD-10-CM

## 2021-11-08 LAB
ESTIMATED AVG GLUCOSE: 128 MG/DL (ref 68–131)
HBA1C MFR BLD: 6.1 % (ref 4–5.6)

## 2021-11-08 PROCEDURE — 80061 LIPID PANEL: CPT | Mod: HCNC | Performed by: STUDENT IN AN ORGANIZED HEALTH CARE EDUCATION/TRAINING PROGRAM

## 2021-11-08 PROCEDURE — 83036 HEMOGLOBIN GLYCOSYLATED A1C: CPT | Mod: HCNC | Performed by: STUDENT IN AN ORGANIZED HEALTH CARE EDUCATION/TRAINING PROGRAM

## 2021-11-08 PROCEDURE — 36415 COLL VENOUS BLD VENIPUNCTURE: CPT | Mod: HCNC,PO | Performed by: STUDENT IN AN ORGANIZED HEALTH CARE EDUCATION/TRAINING PROGRAM

## 2021-11-08 PROCEDURE — 80053 COMPREHEN METABOLIC PANEL: CPT | Mod: HCNC | Performed by: STUDENT IN AN ORGANIZED HEALTH CARE EDUCATION/TRAINING PROGRAM

## 2021-11-09 LAB
ALBUMIN SERPL BCP-MCNC: 3.4 G/DL (ref 3.5–5.2)
ALP SERPL-CCNC: 44 U/L (ref 55–135)
ALT SERPL W/O P-5'-P-CCNC: 15 U/L (ref 10–44)
ANION GAP SERPL CALC-SCNC: 6 MMOL/L (ref 8–16)
AST SERPL-CCNC: 34 U/L (ref 10–40)
BILIRUB SERPL-MCNC: 0.7 MG/DL (ref 0.1–1)
BUN SERPL-MCNC: 26 MG/DL (ref 8–23)
CALCIUM SERPL-MCNC: 9.4 MG/DL (ref 8.7–10.5)
CHLORIDE SERPL-SCNC: 106 MMOL/L (ref 95–110)
CHOLEST SERPL-MCNC: 140 MG/DL (ref 120–199)
CHOLEST/HDLC SERPL: 3.3 {RATIO} (ref 2–5)
CO2 SERPL-SCNC: 24 MMOL/L (ref 23–29)
CREAT SERPL-MCNC: 1.9 MG/DL (ref 0.5–1.4)
EST. GFR  (AFRICAN AMERICAN): 39 ML/MIN/1.73 M^2
EST. GFR  (NON AFRICAN AMERICAN): 33.7 ML/MIN/1.73 M^2
GLUCOSE SERPL-MCNC: 144 MG/DL (ref 70–110)
HDLC SERPL-MCNC: 42 MG/DL (ref 40–75)
HDLC SERPL: 30 % (ref 20–50)
LDLC SERPL CALC-MCNC: 64.2 MG/DL (ref 63–159)
NONHDLC SERPL-MCNC: 98 MG/DL
POTASSIUM SERPL-SCNC: 4.6 MMOL/L (ref 3.5–5.1)
PROT SERPL-MCNC: 6.9 G/DL (ref 6–8.4)
SODIUM SERPL-SCNC: 136 MMOL/L (ref 136–145)
TRIGL SERPL-MCNC: 169 MG/DL (ref 30–150)

## 2021-11-19 ENCOUNTER — OFFICE VISIT (OUTPATIENT)
Dept: FAMILY MEDICINE | Facility: CLINIC | Age: 76
End: 2021-11-19
Payer: MEDICARE

## 2021-11-19 VITALS
BODY MASS INDEX: 33.04 KG/M2 | HEART RATE: 55 BPM | DIASTOLIC BLOOD PRESSURE: 78 MMHG | OXYGEN SATURATION: 98 % | SYSTOLIC BLOOD PRESSURE: 128 MMHG | WEIGHT: 249.31 LBS | HEIGHT: 73 IN

## 2021-11-19 DIAGNOSIS — N18.32 STAGE 3B CHRONIC KIDNEY DISEASE: ICD-10-CM

## 2021-11-19 DIAGNOSIS — I48.11 LONGSTANDING PERSISTENT ATRIAL FIBRILLATION: ICD-10-CM

## 2021-11-19 DIAGNOSIS — E11.22 CONTROLLED TYPE 2 DIABETES MELLITUS WITH STAGE 3 CHRONIC KIDNEY DISEASE, WITHOUT LONG-TERM CURRENT USE OF INSULIN: Primary | ICD-10-CM

## 2021-11-19 DIAGNOSIS — N18.30 CONTROLLED TYPE 2 DIABETES MELLITUS WITH STAGE 3 CHRONIC KIDNEY DISEASE, WITHOUT LONG-TERM CURRENT USE OF INSULIN: Primary | ICD-10-CM

## 2021-11-19 DIAGNOSIS — Z95.5 S/P CORONARY ARTERY STENT PLACEMENT: ICD-10-CM

## 2021-11-19 PROCEDURE — 1126F PR PAIN SEVERITY QUANTIFIED, NO PAIN PRESENT: ICD-10-PCS | Mod: HCNC,CPTII,S$GLB, | Performed by: STUDENT IN AN ORGANIZED HEALTH CARE EDUCATION/TRAINING PROGRAM

## 2021-11-19 PROCEDURE — 3044F HG A1C LEVEL LT 7.0%: CPT | Mod: HCNC,CPTII,S$GLB, | Performed by: STUDENT IN AN ORGANIZED HEALTH CARE EDUCATION/TRAINING PROGRAM

## 2021-11-19 PROCEDURE — 3061F PR NEG MICROALBUMINURIA RESULT DOCUMENTED/REVIEW: ICD-10-PCS | Mod: HCNC,CPTII,S$GLB, | Performed by: STUDENT IN AN ORGANIZED HEALTH CARE EDUCATION/TRAINING PROGRAM

## 2021-11-19 PROCEDURE — 4010F ACE/ARB THERAPY RXD/TAKEN: CPT | Mod: HCNC,CPTII,S$GLB, | Performed by: STUDENT IN AN ORGANIZED HEALTH CARE EDUCATION/TRAINING PROGRAM

## 2021-11-19 PROCEDURE — 3078F DIAST BP <80 MM HG: CPT | Mod: HCNC,CPTII,S$GLB, | Performed by: STUDENT IN AN ORGANIZED HEALTH CARE EDUCATION/TRAINING PROGRAM

## 2021-11-19 PROCEDURE — 99999 PR PBB SHADOW E&M-EST. PATIENT-LVL V: ICD-10-PCS | Mod: PBBFAC,HCNC,, | Performed by: STUDENT IN AN ORGANIZED HEALTH CARE EDUCATION/TRAINING PROGRAM

## 2021-11-19 PROCEDURE — 1126F AMNT PAIN NOTED NONE PRSNT: CPT | Mod: HCNC,CPTII,S$GLB, | Performed by: STUDENT IN AN ORGANIZED HEALTH CARE EDUCATION/TRAINING PROGRAM

## 2021-11-19 PROCEDURE — 1159F MED LIST DOCD IN RCRD: CPT | Mod: HCNC,CPTII,S$GLB, | Performed by: STUDENT IN AN ORGANIZED HEALTH CARE EDUCATION/TRAINING PROGRAM

## 2021-11-19 PROCEDURE — 3288F PR FALLS RISK ASSESSMENT DOCUMENTED: ICD-10-PCS | Mod: HCNC,CPTII,S$GLB, | Performed by: STUDENT IN AN ORGANIZED HEALTH CARE EDUCATION/TRAINING PROGRAM

## 2021-11-19 PROCEDURE — 3288F FALL RISK ASSESSMENT DOCD: CPT | Mod: HCNC,CPTII,S$GLB, | Performed by: STUDENT IN AN ORGANIZED HEALTH CARE EDUCATION/TRAINING PROGRAM

## 2021-11-19 PROCEDURE — 3074F SYST BP LT 130 MM HG: CPT | Mod: HCNC,CPTII,S$GLB, | Performed by: STUDENT IN AN ORGANIZED HEALTH CARE EDUCATION/TRAINING PROGRAM

## 2021-11-19 PROCEDURE — 99214 PR OFFICE/OUTPT VISIT, EST, LEVL IV, 30-39 MIN: ICD-10-PCS | Mod: HCNC,S$GLB,, | Performed by: STUDENT IN AN ORGANIZED HEALTH CARE EDUCATION/TRAINING PROGRAM

## 2021-11-19 PROCEDURE — 4010F PR ACE/ARB THEARPY RXD/TAKEN: ICD-10-PCS | Mod: HCNC,CPTII,S$GLB, | Performed by: STUDENT IN AN ORGANIZED HEALTH CARE EDUCATION/TRAINING PROGRAM

## 2021-11-19 PROCEDURE — 1101F PR PT FALLS ASSESS DOC 0-1 FALLS W/OUT INJ PAST YR: ICD-10-PCS | Mod: HCNC,CPTII,S$GLB, | Performed by: STUDENT IN AN ORGANIZED HEALTH CARE EDUCATION/TRAINING PROGRAM

## 2021-11-19 PROCEDURE — 1159F PR MEDICATION LIST DOCUMENTED IN MEDICAL RECORD: ICD-10-PCS | Mod: HCNC,CPTII,S$GLB, | Performed by: STUDENT IN AN ORGANIZED HEALTH CARE EDUCATION/TRAINING PROGRAM

## 2021-11-19 PROCEDURE — 1160F RVW MEDS BY RX/DR IN RCRD: CPT | Mod: HCNC,CPTII,S$GLB, | Performed by: STUDENT IN AN ORGANIZED HEALTH CARE EDUCATION/TRAINING PROGRAM

## 2021-11-19 PROCEDURE — 3066F NEPHROPATHY DOC TX: CPT | Mod: HCNC,CPTII,S$GLB, | Performed by: STUDENT IN AN ORGANIZED HEALTH CARE EDUCATION/TRAINING PROGRAM

## 2021-11-19 PROCEDURE — 99214 OFFICE O/P EST MOD 30 MIN: CPT | Mod: HCNC,S$GLB,, | Performed by: STUDENT IN AN ORGANIZED HEALTH CARE EDUCATION/TRAINING PROGRAM

## 2021-11-19 PROCEDURE — 3061F NEG MICROALBUMINURIA REV: CPT | Mod: HCNC,CPTII,S$GLB, | Performed by: STUDENT IN AN ORGANIZED HEALTH CARE EDUCATION/TRAINING PROGRAM

## 2021-11-19 PROCEDURE — 99499 UNLISTED E&M SERVICE: CPT | Mod: S$GLB,,, | Performed by: STUDENT IN AN ORGANIZED HEALTH CARE EDUCATION/TRAINING PROGRAM

## 2021-11-19 PROCEDURE — 3078F PR MOST RECENT DIASTOLIC BLOOD PRESSURE < 80 MM HG: ICD-10-PCS | Mod: HCNC,CPTII,S$GLB, | Performed by: STUDENT IN AN ORGANIZED HEALTH CARE EDUCATION/TRAINING PROGRAM

## 2021-11-19 PROCEDURE — 1160F PR REVIEW ALL MEDS BY PRESCRIBER/CLIN PHARMACIST DOCUMENTED: ICD-10-PCS | Mod: HCNC,CPTII,S$GLB, | Performed by: STUDENT IN AN ORGANIZED HEALTH CARE EDUCATION/TRAINING PROGRAM

## 2021-11-19 PROCEDURE — 1101F PT FALLS ASSESS-DOCD LE1/YR: CPT | Mod: HCNC,CPTII,S$GLB, | Performed by: STUDENT IN AN ORGANIZED HEALTH CARE EDUCATION/TRAINING PROGRAM

## 2021-11-19 PROCEDURE — 3044F PR MOST RECENT HEMOGLOBIN A1C LEVEL <7.0%: ICD-10-PCS | Mod: HCNC,CPTII,S$GLB, | Performed by: STUDENT IN AN ORGANIZED HEALTH CARE EDUCATION/TRAINING PROGRAM

## 2021-11-19 PROCEDURE — 99999 PR PBB SHADOW E&M-EST. PATIENT-LVL V: CPT | Mod: PBBFAC,HCNC,, | Performed by: STUDENT IN AN ORGANIZED HEALTH CARE EDUCATION/TRAINING PROGRAM

## 2021-11-19 PROCEDURE — 3074F PR MOST RECENT SYSTOLIC BLOOD PRESSURE < 130 MM HG: ICD-10-PCS | Mod: HCNC,CPTII,S$GLB, | Performed by: STUDENT IN AN ORGANIZED HEALTH CARE EDUCATION/TRAINING PROGRAM

## 2021-11-19 PROCEDURE — 99499 RISK ADDL DX/OHS AUDIT: ICD-10-PCS | Mod: S$GLB,,, | Performed by: STUDENT IN AN ORGANIZED HEALTH CARE EDUCATION/TRAINING PROGRAM

## 2021-11-19 PROCEDURE — 3066F PR DOCUMENTATION OF TREATMENT FOR NEPHROPATHY: ICD-10-PCS | Mod: HCNC,CPTII,S$GLB, | Performed by: STUDENT IN AN ORGANIZED HEALTH CARE EDUCATION/TRAINING PROGRAM

## 2021-12-13 ENCOUNTER — OFFICE VISIT (OUTPATIENT)
Dept: CARDIOLOGY | Facility: CLINIC | Age: 76
End: 2021-12-13
Payer: MEDICARE

## 2021-12-13 VITALS
SYSTOLIC BLOOD PRESSURE: 120 MMHG | HEIGHT: 73 IN | OXYGEN SATURATION: 98 % | DIASTOLIC BLOOD PRESSURE: 82 MMHG | WEIGHT: 249 LBS | BODY MASS INDEX: 33 KG/M2 | HEART RATE: 60 BPM

## 2021-12-13 DIAGNOSIS — I15.2 HYPERTENSION ASSOCIATED WITH DIABETES: ICD-10-CM

## 2021-12-13 DIAGNOSIS — N18.30 CONTROLLED TYPE 2 DIABETES MELLITUS WITH STAGE 3 CHRONIC KIDNEY DISEASE, WITHOUT LONG-TERM CURRENT USE OF INSULIN: ICD-10-CM

## 2021-12-13 DIAGNOSIS — I25.10 CORONARY ARTERY DISEASE, OCCLUSIVE: ICD-10-CM

## 2021-12-13 DIAGNOSIS — I35.0 NONRHEUMATIC AORTIC VALVE STENOSIS: Chronic | ICD-10-CM

## 2021-12-13 DIAGNOSIS — E11.59 HYPERTENSION ASSOCIATED WITH DIABETES: ICD-10-CM

## 2021-12-13 DIAGNOSIS — I48.0 PAROXYSMAL ATRIAL FIBRILLATION: ICD-10-CM

## 2021-12-13 DIAGNOSIS — N18.32 STAGE 3B CHRONIC KIDNEY DISEASE: ICD-10-CM

## 2021-12-13 DIAGNOSIS — E11.22 CONTROLLED TYPE 2 DIABETES MELLITUS WITH STAGE 3 CHRONIC KIDNEY DISEASE, WITHOUT LONG-TERM CURRENT USE OF INSULIN: ICD-10-CM

## 2021-12-13 PROCEDURE — 4010F ACE/ARB THERAPY RXD/TAKEN: CPT | Mod: CPTII,S$GLB,, | Performed by: INTERNAL MEDICINE

## 2021-12-13 PROCEDURE — 3061F NEG MICROALBUMINURIA REV: CPT | Mod: CPTII,S$GLB,, | Performed by: INTERNAL MEDICINE

## 2021-12-13 PROCEDURE — 3066F PR DOCUMENTATION OF TREATMENT FOR NEPHROPATHY: ICD-10-PCS | Mod: CPTII,S$GLB,, | Performed by: INTERNAL MEDICINE

## 2021-12-13 PROCEDURE — 4010F PR ACE/ARB THEARPY RXD/TAKEN: ICD-10-PCS | Mod: CPTII,S$GLB,, | Performed by: INTERNAL MEDICINE

## 2021-12-13 PROCEDURE — 3066F NEPHROPATHY DOC TX: CPT | Mod: CPTII,S$GLB,, | Performed by: INTERNAL MEDICINE

## 2021-12-13 PROCEDURE — 3061F PR NEG MICROALBUMINURIA RESULT DOCUMENTED/REVIEW: ICD-10-PCS | Mod: CPTII,S$GLB,, | Performed by: INTERNAL MEDICINE

## 2021-12-13 PROCEDURE — 99213 OFFICE O/P EST LOW 20 MIN: CPT | Mod: S$GLB,,, | Performed by: INTERNAL MEDICINE

## 2021-12-13 PROCEDURE — 99213 PR OFFICE/OUTPT VISIT, EST, LEVL III, 20-29 MIN: ICD-10-PCS | Mod: S$GLB,,, | Performed by: INTERNAL MEDICINE

## 2021-12-17 NOTE — TELEPHONE ENCOUNTER
No new care gaps identified.  Powered by [email or web address]. Reference number: [id number].   12/17/2021 4:26:35 PM CST

## 2021-12-17 NOTE — TELEPHONE ENCOUNTER
Encounter details require adjustment(s)/ updating by [facility] Staff  As of this time Protocols and CDM: did not populate or display   Adjustment(s) made: Department  CDM should display. Medication(s) delegated by the OR.  Will resend refill request encounter to P Centralized Refill Staff Pool.   [facility]   Note composed:4:25 PM 12/17/2021

## 2021-12-28 ENCOUNTER — LAB VISIT (OUTPATIENT)
Dept: LAB | Facility: HOSPITAL | Age: 76
End: 2021-12-28
Attending: INTERNAL MEDICINE
Payer: MEDICARE

## 2021-12-28 DIAGNOSIS — N18.30 CHRONIC KIDNEY DISEASE, STAGE III (MODERATE): Primary | ICD-10-CM

## 2021-12-28 LAB
ALBUMIN SERPL BCP-MCNC: 4.2 G/DL (ref 3.5–5.2)
ANION GAP SERPL CALC-SCNC: 11 MMOL/L (ref 8–16)
BASOPHILS # BLD AUTO: 0.04 K/UL (ref 0–0.2)
BASOPHILS NFR BLD: 0.7 % (ref 0–1.9)
BUN SERPL-MCNC: 28 MG/DL (ref 8–23)
CALCIUM SERPL-MCNC: 9.6 MG/DL (ref 8.7–10.5)
CHLORIDE SERPL-SCNC: 104 MMOL/L (ref 95–110)
CO2 SERPL-SCNC: 23 MMOL/L (ref 23–29)
CREAT SERPL-MCNC: 1.8 MG/DL (ref 0.5–1.4)
CREAT UR-MCNC: 106 MG/DL (ref 23–375)
DIFFERENTIAL METHOD: ABNORMAL
EOSINOPHIL # BLD AUTO: 0.3 K/UL (ref 0–0.5)
EOSINOPHIL NFR BLD: 5.2 % (ref 0–8)
ERYTHROCYTE [DISTWIDTH] IN BLOOD BY AUTOMATED COUNT: 15.8 % (ref 11.5–14.5)
EST. GFR  (AFRICAN AMERICAN): 41.6 ML/MIN/1.73 M^2
EST. GFR  (NON AFRICAN AMERICAN): 36 ML/MIN/1.73 M^2
GLUCOSE SERPL-MCNC: 142 MG/DL (ref 70–110)
HCT VFR BLD AUTO: 44.8 % (ref 40–54)
HGB BLD-MCNC: 14.6 G/DL (ref 14–18)
IMM GRANULOCYTES # BLD AUTO: 0.02 K/UL (ref 0–0.04)
IMM GRANULOCYTES NFR BLD AUTO: 0.3 % (ref 0–0.5)
LYMPHOCYTES # BLD AUTO: 1.9 K/UL (ref 1–4.8)
LYMPHOCYTES NFR BLD: 32 % (ref 18–48)
MCH RBC QN AUTO: 28.8 PG (ref 27–31)
MCHC RBC AUTO-ENTMCNC: 32.6 G/DL (ref 32–36)
MCV RBC AUTO: 88 FL (ref 82–98)
MONOCYTES # BLD AUTO: 0.4 K/UL (ref 0.3–1)
MONOCYTES NFR BLD: 7.3 % (ref 4–15)
NEUTROPHILS # BLD AUTO: 3.3 K/UL (ref 1.8–7.7)
NEUTROPHILS NFR BLD: 54.5 % (ref 38–73)
NRBC BLD-RTO: 0 /100 WBC
PHOSPHATE SERPL-MCNC: 3.4 MG/DL (ref 2.7–4.5)
PLATELET # BLD AUTO: 180 K/UL (ref 150–450)
PMV BLD AUTO: 10.5 FL (ref 9.2–12.9)
POTASSIUM SERPL-SCNC: 4.6 MMOL/L (ref 3.5–5.1)
PROT UR-MCNC: 11 MG/DL (ref 6–15)
PROT/CREAT UR: 0.1 MG/G{CREAT} (ref 0–0.2)
PTH-INTACT SERPL-MCNC: 24.1 PG/ML (ref 9–77)
RBC # BLD AUTO: 5.07 M/UL (ref 4.6–6.2)
SODIUM SERPL-SCNC: 138 MMOL/L (ref 136–145)
WBC # BLD AUTO: 6 K/UL (ref 3.9–12.7)

## 2021-12-28 PROCEDURE — 85025 COMPLETE CBC W/AUTO DIFF WBC: CPT | Performed by: INTERNAL MEDICINE

## 2021-12-28 PROCEDURE — 82570 ASSAY OF URINE CREATININE: CPT | Performed by: INTERNAL MEDICINE

## 2021-12-28 PROCEDURE — 83970 ASSAY OF PARATHORMONE: CPT | Performed by: INTERNAL MEDICINE

## 2021-12-28 PROCEDURE — 36415 COLL VENOUS BLD VENIPUNCTURE: CPT | Performed by: INTERNAL MEDICINE

## 2021-12-28 PROCEDURE — 80069 RENAL FUNCTION PANEL: CPT | Performed by: INTERNAL MEDICINE

## 2021-12-28 NOTE — TELEPHONE ENCOUNTER
Refill Routing Note   Medication(s) are not appropriate for processing by [facility] for the following reason(s):      - Medication not previously prescribed by PCP    [facility] action(s):  Defer          --->Care Gap information included in message below if applicable.   Medication reconciliation completed: No   Automatic Epic Generated Protocol Data:        Requested Prescriptions   Pending Prescriptions Disp Refills    omeprazole (PRILOSEC) 40 MG capsule [Pharmacy Med Name: OMEPRAZOLE 40 MG         CAP] 90 capsule 3     Sig: TAKE ONE CAPSULE BY MOUTH ONCE DAILY       Proton Pump Inhibitors Protocol Passed - 12/17/2021  2:11 PM        Passed - No positive pregnancy test in past 12 months         Passed - Not on Clopidogrel (Plavix) or if on, refill is for Pantoprazole (Protonix)        Passed - No osteoporosis diagnosis on problem list        Passed - Visit with authorizing provider in past 12 months or upcoming 90 days        Gastroenterology: Proton Pump Inhibitors Passed - 12/17/2021  4:26 PM        Passed - Patient is at least 18 years old        Passed - Osteoporosis is not on problem list        Passed - Plavix is not on active medication list        Passed - An appropriate indication is on the problem list        Passed - Office visit in past 12 months.     Recent Visits  Date Type Provider Dept   11/19/21 Office Visit See Quach MD [facility] Family Medicine   05/18/21 Office Visit See Quach MD [facility] Family Medicine   09/01/20 Office Visit John Claire Jr., MD [facility] Family Medicine   Showing recent visits within past 720 days and meeting all other requirements  Future Appointments  No visits were found meeting these conditions.  Showing future appointments within next 150 days and meeting all other requirements      Future Appointments              In 4 months [facility]IE GUERRA [facility] - Lab, [place]    In 5 months Sharif Phipps MD [facility] - Cardiology, [place] at [facility]    In 10 months  MD [facility] - Family Medicine, [place]                      Appointments  past 12m or future 3m with PCP    Date Provider   Last Visit   11/19/2021 See Quach MD   Next Visit   Visit date not found See Quach MD   ED visits in past 90 days: 0        Note composed:6:08 PM 12/27/2021

## 2021-12-29 ENCOUNTER — PATIENT MESSAGE (OUTPATIENT)
Dept: FAMILY MEDICINE | Facility: CLINIC | Age: 76
End: 2021-12-29
Payer: MEDICARE

## 2022-01-03 ENCOUNTER — PATIENT MESSAGE (OUTPATIENT)
Dept: FAMILY MEDICINE | Facility: CLINIC | Age: 77
End: 2022-01-03
Payer: MEDICARE

## 2022-01-03 RX ORDER — OMEPRAZOLE 40 MG/1
CAPSULE, DELAYED RELEASE ORAL
Qty: 90 CAPSULE | Refills: 3 | Status: SHIPPED | OUTPATIENT
Start: 2022-01-03 | End: 2022-12-21

## 2022-01-07 ENCOUNTER — PATIENT MESSAGE (OUTPATIENT)
Dept: PHARMACY | Facility: CLINIC | Age: 77
End: 2022-01-07
Payer: MEDICARE

## 2022-03-29 NOTE — TELEPHONE ENCOUNTER
No new care gaps identified.  Powered by [employer]. Reference number: [id number].   3/29/2022 8:30:38 AM CDT

## 2022-03-30 RX ORDER — LIRAGLUTIDE 6 MG/ML
INJECTION SUBCUTANEOUS
Qty: 27 ML | Refills: 0 | Status: SHIPPED | OUTPATIENT
Start: 2022-03-30 | End: 2022-07-12

## 2022-03-30 NOTE — TELEPHONE ENCOUNTER
Refill Authorization Note   Janak Booker  is requesting a refill authorization.  Brief Assessment and Rationale for Refill:  Approve     Medication Therapy Plan:       Medication Reconciliation Completed: No   Comments:   --->Care Gap information included below if applicable.       Requested Prescriptions   Pending Prescriptions Disp Refills    VICTOZA 3-CAITIE 0.6 mg/0.1 mL (18 mg/3 mL) PnIj pen [Pharmacy Med Name: VICTOZA 18 MG/3ML        INJ] 27 mL 0     Sig: INJECT 1.8 MGS UNDER THE SKIN DAILY.       Endocrinology:  Diabetes - GLP-1 Receptor Agonists Passed - 3/29/2022  8:29 AM        Passed - Patient is at least 18 years old        Passed - Valid encounter within last 15 months     Recent Visits  Date Type Provider Dept   11/19/21 Office Visit See Quach MD [facility] Family Medicine   05/18/21 Office Visit See Quach MD [facility] Family Medicine   09/01/20 Office Visit John Claire Jr., MD [facility] Family Medicine   Showing recent visits within past 720 days and meeting all other requirements  Future Appointments  No visits were found meeting these conditions.  Showing future appointments within next 150 days and meeting all other requirements      Future Appointments              In 1 month CHARLIE, [facility]IE [facility] - [facility], [place]    In 2 months Sharif Phipps MD [facility] - Cardiology, [facility] at [facility]    In 7 months MD [facility] - [facility] Medicine, [place]                Passed - Matches previous order       Previous Authorizing Provider: See Quach MD (liraglutide 0.6 mg/0.1 mL, 18 mg/3 mL, subq PNIJ (VICTOZA 3-CAITIE) 0.6 mg/0.1 mL (18 mg/3 mL) PnIj pen)  Previous Pharmacy: [id number] - [place], [place] - [address]            Passed - No ED/Hospital visits since last PCP visit     Last PCP Visit: 11/19/2021 Last Admission: 2/15/2021 Last ED Visit:           Passed - HBA1C within 180 days     Lab Results   Component Value Date    HGBA1C 6.1 (H) 11/08/2021    HGBA1C 6.9 (H)  05/05/2021    HGBA1C 6.0 (H) 06/09/2020                  Appointments  past 12m or future 3m with PCP    Date Provider   Last Visit   11/19/2021 See Quach MD   Next Visit   Visit date not found See Quach MD   ED visits in past 90 days: 0     Note composed:10:15 AM 03/30/2022

## 2022-04-30 DIAGNOSIS — N18.30 CONTROLLED TYPE 2 DIABETES MELLITUS WITH STAGE 3 CHRONIC KIDNEY DISEASE, WITHOUT LONG-TERM CURRENT USE OF INSULIN: ICD-10-CM

## 2022-04-30 DIAGNOSIS — E11.22 CONTROLLED TYPE 2 DIABETES MELLITUS WITH STAGE 3 CHRONIC KIDNEY DISEASE, WITHOUT LONG-TERM CURRENT USE OF INSULIN: ICD-10-CM

## 2022-04-30 NOTE — TELEPHONE ENCOUNTER
Care Due:                  Date            Visit Type   Department     Provider  --------------------------------------------------------------------------------                                EP -                              PRIMARY      [facility]    See Cortez  Last Visit: 11-      CARE ([facility])   MEDICINE       Naccari                              EP -                              PRIMARY      [facility]    See Cortez  Next Visit: 11-      CARE ([facility])   MEDICINE       Naccari                                                            Last  Test          Frequency    Reason                     Performed    Due Date  --------------------------------------------------------------------------------    HBA1C.......  6 months...  VICTOZA, metFORMIN,        11- 05-                             pioglitazone.............    Powered by [employer]. Reference number: [id number].   4/30/2022 10:42:40 AM CDT

## 2022-05-02 RX ORDER — METFORMIN HYDROCHLORIDE 500 MG/1
TABLET, EXTENDED RELEASE ORAL
Qty: 90 TABLET | Refills: 1 | Status: SHIPPED | OUTPATIENT
Start: 2022-05-02 | End: 2022-10-24

## 2022-05-02 NOTE — TELEPHONE ENCOUNTER
Refill Routing Note   Medication(s) are not appropriate for processing by [facility] for the following reason(s):      - Required laboratory values are abnormal    [facility] action(s):  Defer          Medication reconciliation completed: No     Appointments  past 12m or future 3m with PCP    Date Provider   Last Visit   11/19/2021 See Quach MD   Next Visit   Visit date not found See Quach MD   ED visits in past 90 days: 0        Note composed:1:01 PM 05/02/2022

## 2022-05-18 ENCOUNTER — PATIENT MESSAGE (OUTPATIENT)
Dept: OTHER | Facility: OTHER | Age: 77
End: 2022-05-18
Payer: MEDICARE

## 2022-05-19 ENCOUNTER — LAB VISIT (OUTPATIENT)
Dept: LAB | Facility: HOSPITAL | Age: 77
End: 2022-05-19
Attending: STUDENT IN AN ORGANIZED HEALTH CARE EDUCATION/TRAINING PROGRAM
Payer: MEDICARE

## 2022-05-19 DIAGNOSIS — N18.30 CONTROLLED TYPE 2 DIABETES MELLITUS WITH STAGE 3 CHRONIC KIDNEY DISEASE, WITHOUT LONG-TERM CURRENT USE OF INSULIN: ICD-10-CM

## 2022-05-19 DIAGNOSIS — E11.22 CONTROLLED TYPE 2 DIABETES MELLITUS WITH STAGE 3 CHRONIC KIDNEY DISEASE, WITHOUT LONG-TERM CURRENT USE OF INSULIN: ICD-10-CM

## 2022-05-19 LAB
ALBUMIN SERPL BCP-MCNC: 4 G/DL (ref 3.5–5.2)
ALP SERPL-CCNC: 37 U/L (ref 55–135)
ALT SERPL W/O P-5'-P-CCNC: 14 U/L (ref 10–44)
ANION GAP SERPL CALC-SCNC: 10 MMOL/L (ref 8–16)
AST SERPL-CCNC: 20 U/L (ref 10–40)
BILIRUB SERPL-MCNC: 1.1 MG/DL (ref 0.1–1)
BUN SERPL-MCNC: 29 MG/DL (ref 8–23)
CALCIUM SERPL-MCNC: 9.3 MG/DL (ref 8.7–10.5)
CHLORIDE SERPL-SCNC: 104 MMOL/L (ref 95–110)
CO2 SERPL-SCNC: 24 MMOL/L (ref 23–29)
CREAT SERPL-MCNC: 1.8 MG/DL (ref 0.5–1.4)
EST. GFR  (AFRICAN AMERICAN): 41.6 ML/MIN/1.73 M^2
EST. GFR  (NON AFRICAN AMERICAN): 36 ML/MIN/1.73 M^2
ESTIMATED AVG GLUCOSE: 151 MG/DL (ref 68–131)
GLUCOSE SERPL-MCNC: 124 MG/DL (ref 70–110)
HBA1C MFR BLD: 6.9 % (ref 4.5–6.2)
POTASSIUM SERPL-SCNC: 4.8 MMOL/L (ref 3.5–5.1)
PROT SERPL-MCNC: 7.1 G/DL (ref 6–8.4)
SODIUM SERPL-SCNC: 138 MMOL/L (ref 136–145)

## 2022-05-19 PROCEDURE — 80053 COMPREHEN METABOLIC PANEL: CPT | Performed by: STUDENT IN AN ORGANIZED HEALTH CARE EDUCATION/TRAINING PROGRAM

## 2022-05-19 PROCEDURE — 83036 HEMOGLOBIN GLYCOSYLATED A1C: CPT | Performed by: STUDENT IN AN ORGANIZED HEALTH CARE EDUCATION/TRAINING PROGRAM

## 2022-05-19 PROCEDURE — 36415 COLL VENOUS BLD VENIPUNCTURE: CPT | Performed by: STUDENT IN AN ORGANIZED HEALTH CARE EDUCATION/TRAINING PROGRAM

## 2022-06-01 ENCOUNTER — PATIENT MESSAGE (OUTPATIENT)
Dept: CARDIOLOGY | Facility: CLINIC | Age: 77
End: 2022-06-01
Payer: MEDICARE

## 2022-06-13 ENCOUNTER — OFFICE VISIT (OUTPATIENT)
Dept: CARDIOLOGY | Facility: CLINIC | Age: 77
End: 2022-06-13
Payer: MEDICARE

## 2022-06-13 VITALS
BODY MASS INDEX: 32.79 KG/M2 | OXYGEN SATURATION: 97 % | HEART RATE: 75 BPM | HEIGHT: 73 IN | DIASTOLIC BLOOD PRESSURE: 60 MMHG | WEIGHT: 247.44 LBS | SYSTOLIC BLOOD PRESSURE: 120 MMHG

## 2022-06-13 DIAGNOSIS — E11.69 HYPERLIPIDEMIA ASSOCIATED WITH TYPE 2 DIABETES MELLITUS: ICD-10-CM

## 2022-06-13 DIAGNOSIS — E11.59 HYPERTENSION ASSOCIATED WITH DIABETES: ICD-10-CM

## 2022-06-13 DIAGNOSIS — E78.5 HYPERLIPIDEMIA ASSOCIATED WITH TYPE 2 DIABETES MELLITUS: ICD-10-CM

## 2022-06-13 DIAGNOSIS — I25.10 CORONARY ARTERY DISEASE, OCCLUSIVE: ICD-10-CM

## 2022-06-13 DIAGNOSIS — I15.2 HYPERTENSION ASSOCIATED WITH DIABETES: ICD-10-CM

## 2022-06-13 DIAGNOSIS — I35.0 NONRHEUMATIC AORTIC VALVE STENOSIS: ICD-10-CM

## 2022-06-13 DIAGNOSIS — N18.32 STAGE 3B CHRONIC KIDNEY DISEASE: Primary | ICD-10-CM

## 2022-06-13 PROCEDURE — 3288F PR FALLS RISK ASSESSMENT DOCUMENTED: ICD-10-PCS | Mod: CPTII,S$GLB,, | Performed by: INTERNAL MEDICINE

## 2022-06-13 PROCEDURE — 3044F HG A1C LEVEL LT 7.0%: CPT | Mod: CPTII,S$GLB,, | Performed by: INTERNAL MEDICINE

## 2022-06-13 PROCEDURE — 1126F AMNT PAIN NOTED NONE PRSNT: CPT | Mod: CPTII,S$GLB,, | Performed by: INTERNAL MEDICINE

## 2022-06-13 PROCEDURE — 3288F FALL RISK ASSESSMENT DOCD: CPT | Mod: CPTII,S$GLB,, | Performed by: INTERNAL MEDICINE

## 2022-06-13 PROCEDURE — 1126F PR PAIN SEVERITY QUANTIFIED, NO PAIN PRESENT: ICD-10-PCS | Mod: CPTII,S$GLB,, | Performed by: INTERNAL MEDICINE

## 2022-06-13 PROCEDURE — 1159F MED LIST DOCD IN RCRD: CPT | Mod: CPTII,S$GLB,, | Performed by: INTERNAL MEDICINE

## 2022-06-13 PROCEDURE — 3074F PR MOST RECENT SYSTOLIC BLOOD PRESSURE < 130 MM HG: ICD-10-PCS | Mod: CPTII,S$GLB,, | Performed by: INTERNAL MEDICINE

## 2022-06-13 PROCEDURE — 99499 RISK ADDL DX/OHS AUDIT: ICD-10-PCS | Mod: S$GLB,,, | Performed by: INTERNAL MEDICINE

## 2022-06-13 PROCEDURE — 1159F PR MEDICATION LIST DOCUMENTED IN MEDICAL RECORD: ICD-10-PCS | Mod: CPTII,S$GLB,, | Performed by: INTERNAL MEDICINE

## 2022-06-13 PROCEDURE — 1160F PR REVIEW ALL MEDS BY PRESCRIBER/CLIN PHARMACIST DOCUMENTED: ICD-10-PCS | Mod: CPTII,S$GLB,, | Performed by: INTERNAL MEDICINE

## 2022-06-13 PROCEDURE — 99213 OFFICE O/P EST LOW 20 MIN: CPT | Mod: S$GLB,,, | Performed by: INTERNAL MEDICINE

## 2022-06-13 PROCEDURE — 3078F DIAST BP <80 MM HG: CPT | Mod: CPTII,S$GLB,, | Performed by: INTERNAL MEDICINE

## 2022-06-13 PROCEDURE — 3044F PR MOST RECENT HEMOGLOBIN A1C LEVEL <7.0%: ICD-10-PCS | Mod: CPTII,S$GLB,, | Performed by: INTERNAL MEDICINE

## 2022-06-13 PROCEDURE — 1101F PR PT FALLS ASSESS DOC 0-1 FALLS W/OUT INJ PAST YR: ICD-10-PCS | Mod: CPTII,S$GLB,, | Performed by: INTERNAL MEDICINE

## 2022-06-13 PROCEDURE — 99499 UNLISTED E&M SERVICE: CPT | Mod: S$GLB,,, | Performed by: INTERNAL MEDICINE

## 2022-06-13 PROCEDURE — 99213 PR OFFICE/OUTPT VISIT, EST, LEVL III, 20-29 MIN: ICD-10-PCS | Mod: S$GLB,,, | Performed by: INTERNAL MEDICINE

## 2022-06-13 PROCEDURE — 3078F PR MOST RECENT DIASTOLIC BLOOD PRESSURE < 80 MM HG: ICD-10-PCS | Mod: CPTII,S$GLB,, | Performed by: INTERNAL MEDICINE

## 2022-06-13 PROCEDURE — 1101F PT FALLS ASSESS-DOCD LE1/YR: CPT | Mod: CPTII,S$GLB,, | Performed by: INTERNAL MEDICINE

## 2022-06-13 PROCEDURE — 1160F RVW MEDS BY RX/DR IN RCRD: CPT | Mod: CPTII,S$GLB,, | Performed by: INTERNAL MEDICINE

## 2022-06-13 PROCEDURE — 3074F SYST BP LT 130 MM HG: CPT | Mod: CPTII,S$GLB,, | Performed by: INTERNAL MEDICINE

## 2022-06-13 NOTE — PROGRESS NOTES
Patient ID:  Janak Booker is a 76 y.o. male who presents for follow-up of Atrial Fibrillation, Coronary Artery Disease, and Fatigue      In general he has been doing okay what he has noticed is that he gets tire easily.  Prior to the stent he was having cold sweats and shortness of breath.  He had a stent placed at [facility] and subsequently had a cutting balloon of the RCA by me on 06/07/2012.  Prior to the cutting balloon of his RCA he had a positive stress test.  He had a stress test done last August that was negative for ischemia his last echocardiogram showed aortic valve area of 1.25 with a V max of 3.0.  If he has aortic valve replacement he would like to have it done at [facility] where his wife had it done and she did very well.        Past Medical History:   Diagnosis Date    Anticoagulant long-term use     Aortic stenosis     Arthritis     Atrial fibrillation     CAD (coronary artery disease)     Colon polyps     per colonoscopy 9/11/2014    Diabetes mellitus, type 2     Disc displacement, lumbar     L3    Family history of prostate cancer     GERD (gastroesophageal reflux disease)     Heel bone fracture     left foot    Hyperlipidemia LDL goal < 70     Hypertension     Renal manifestation of secondary diabetes mellitus     Spinal stenosis, lumbar         Past Surgical History:   Procedure Laterality Date    BACK SURGERY      CARDIAC SURGERY      stents     CARPAL TUNNEL RELEASE Right     CATARACT EXTRACTION W/  INTRAOCULAR LENS IMPLANT Bilateral     COLONOSCOPY N/A 3/27/2018    Procedure: COLONOSCOPY;  Surgeon: Rishi Garcia MD;  Location: [facility] ENDO;  Service: Endoscopy;  Laterality: N/A;    COLONOSCOPY N/A 2/15/2021    Procedure: COLONOSCOPY;  Surgeon: Rishi Garcia MD;  Location: [facility] ENDO;  Service: Endoscopy;  Laterality: N/A;    CORONARY ANGIOPLASTY WITH STENT PLACEMENT      x 3    EYE SURGERY      INJECTION OF FACET JOINT N/A 5/14/2019     Procedure: INJECTION, FACET JOINT INJECTION (LUMBAR BLOCK) PLEASE INJECT L3/4;  Surgeon: Catrachito Harley MD;  Location: [facility];  Service: Pain Management;  Laterality: N/A;  NEEDS CONSENT, PRADAXA CLEARANCE IN CHART    KNEE ARTHROSCOPY W/ MENISCECTOMY  12/22/2008    right    LUMBAR DISCECTOMY  12/2011    L4-L5 Fusion    LUMBAR EPIDURAL INJECTION  02/04/2019    ROTATOR CUFF REPAIR Left 7/2012    SHOULDER OPEN ROTATOR CUFF REPAIR      SKIN CANCER FOREHEAD 2019      SPINE SURGERY      TIBIA FRACTURE SURGERY      TRANSFORAMINAL EPIDURAL INJECTION OF STEROID Left 9/23/2019    Procedure: INJECTION, STEROID, EPIDURAL, TRANSFORAMINAL APPROACH;  Surgeon: Jose Guadalupe Linn MD;  Location: [facility];  Service: Pain Management;  Laterality: Left;  Left TF ADI L4 and L5  OK to hold Pradaxa    TRANSFORAMINAL EPIDURAL INJECTION OF STEROID Left 11/14/2019    Procedure: INJECTION, STEROID, EPIDURAL, TRANSFORAMINAL APPROACH;  Surgeon: Jose Guadalupe Linn MD;  Location: [facility];  Service: Pain Management;  Laterality: Left;  Left TF ADI L4-5 and L5-S1          Current Outpatient Medications   Medication Instructions    ascorbic acid (vitamin C) (VITAMIN C) 1,000 mg, Oral, Daily    BD ALCOHOL SWABS PadM USE AS DIRECTED TO CHECK BLOOD GLUCOSE DAILY    fenofibrate micronized (LOFIBRA) 134 MG Cap TAKE ONE CAPSULE BY MOUTH ONCE DAILY    lancets (TRUEPLUS LANCETS) 28 gauge Misc 1 lancet, Misc.(Non-Drug; Combo Route), Daily    lisinopriL (PRINIVIL,ZESTRIL) 5 MG tablet TAKE ONE TABLET BY MOUTH ONCE DAILY    metFORMIN (GLUCOPHAGE-XR) 500 MG ER 24hr tablet TAKE ONE TABLET BY MOUTH ONCE DAILY    metoprolol tartrate (LOPRESSOR) 25 MG tablet TAKE ONE TABLET BY MOUTH TWICE DAILY    multivitamin with minerals tablet No dose, route, or frequency recorded.    NITROSTAT 0.4 mg, Oral, Every 5 min PRN    omega-3 fatty acids 1,000 mg Cap 1 Capsule(s) Oral OTC once a day.    omeprazole (PRILOSEC) 40 MG capsule TAKE ONE CAPSULE  "BY MOUTH ONCE DAILY    pen needle, diabetic (BD BARRY 2ND GEN PEN NEEDLE) 32 gauge x 5/32" Ndle USE ONE NEEDLE ONCE DAILY     pioglitazone (ACTOS) 30 mg, Oral, Daily    PRADAXA 150 mg Cap TAKE ONE CAPSULE BY MOUTH TWICE DAILY    rosuvastatin (CRESTOR) 10 MG tablet TAKE ONE TABLET BY MOUTH ONCE DAILY    sotaloL (BETAPACE) 120 MG Tab TAKE ONE TABLET BY MOUTH TWICE DAILY    VICTOZA 3-CAITIE 0.6 mg/0.1 mL (18 mg/3 mL) PnIj pen INJECT 1.8 MGS UNDER THE SKIN DAILY.        Review of patient's allergies indicates:   Allergen Reactions    Levetiracetam         Review of Systems   Constitutional: Negative for chills and fever.   Cardiovascular: Positive for dyspnea on exertion. Negative for chest pain.   Respiratory: Negative for cough and shortness of breath.    Hematologic/Lymphatic: Negative for bleeding problem.   Gastrointestinal: Negative for melena.   Genitourinary: Negative for hematuria.        Objective:     Vitals:    06/13/22 1123   BP: 120/60   BP Location: Left arm   Patient Position: Sitting   BP Method: Medium (Manual)   Pulse: 75   SpO2: 97%   Weight: 112.2 kg (247 lb 7.5 oz)   Height: 6' 1" (1.854 m)       Physical Exam  Vitals and nursing note reviewed.   Constitutional:       Appearance: He is well-developed.   HENT:      Head: Normocephalic and atraumatic.   Eyes:      Conjunctiva/sclera: Conjunctivae normal.   Cardiovascular:      Rate and Rhythm: Normal rate and regular rhythm.      Heart sounds: Murmur (Grade 3/6 systolic murmur at the base) heard.   Pulmonary:      Effort: Pulmonary effort is normal.      Breath sounds: Normal breath sounds.   Abdominal:      General: Bowel sounds are normal.      Palpations: Abdomen is soft.   Musculoskeletal:         General: Normal range of motion.   Skin:     General: Skin is warm and dry.   Neurological:      Mental Status: He is alert and oriented to person, place, and time.   Psychiatric:         Behavior: Behavior normal.         Thought Content: Thought " content normal.         Judgment: Judgment normal.       CMP  Sodium   Date Value Ref Range Status   05/19/2022 138 136 - 145 mmol/L Final     Potassium   Date Value Ref Range Status   05/19/2022 4.8 3.5 - 5.1 mmol/L Final     Chloride   Date Value Ref Range Status   05/19/2022 104 95 - 110 mmol/L Final     CO2   Date Value Ref Range Status   05/19/2022 24 23 - 29 mmol/L Final     Glucose   Date Value Ref Range Status   05/19/2022 124 (H) 70 - 110 mg/dL Final     BUN   Date Value Ref Range Status   05/19/2022 29 (H) 8 - 23 mg/dL Final     Creatinine   Date Value Ref Range Status   05/19/2022 1.8 (H) 0.5 - 1.4 mg/dL Final     Calcium   Date Value Ref Range Status   05/19/2022 9.3 8.7 - 10.5 mg/dL Final     Total Protein   Date Value Ref Range Status   05/19/2022 7.1 6.0 - 8.4 g/dL Final     Albumin   Date Value Ref Range Status   05/19/2022 4.0 3.5 - 5.2 g/dL Final     Total Bilirubin   Date Value Ref Range Status   05/19/2022 1.1 (H) 0.1 - 1.0 mg/dL Final     Comment:     For infants and newborns, interpretation of results should be based  on gestational age, weight and in agreement with clinical  observations.    Premature Infant recommended reference ranges:  Up to 24 hours.............<8.0 mg/dL  Up to 48 hours............<12.0 mg/dL  3-5 days..................<15.0 mg/dL  6-29 days.................<15.0 mg/dL       Alkaline Phosphatase   Date Value Ref Range Status   05/19/2022 37 (L) 55 - 135 U/L Final     AST   Date Value Ref Range Status   05/19/2022 20 10 - 40 U/L Final     ALT   Date Value Ref Range Status   05/19/2022 14 10 - 44 U/L Final     Anion Gap   Date Value Ref Range Status   05/19/2022 10 8 - 16 mmol/L Final     eGFR if    Date Value Ref Range Status   05/19/2022 41.6 (A) >60 mL/min/1.73 m^2 Final     eGFR if non    Date Value Ref Range Status   05/19/2022 36.0 (A) >60 mL/min/1.73 m^2 Final     Comment:     Calculation used to obtain the estimated glomerular  filtration  rate (eGFR) is the CKD-EPI equation.         BMP  Lab Results   Component Value Date     05/19/2022    K 4.8 05/19/2022     05/19/2022    CO2 24 05/19/2022    BUN 29 (H) 05/19/2022    CREATININE 1.8 (H) 05/19/2022    CALCIUM 9.3 05/19/2022    ANIONGAP 10 05/19/2022    ESTGFRAFRICA 41.6 (A) 05/19/2022    EGFRNONAA 36.0 (A) 05/19/2022      BNP  @LABRCNTIP(BNP,BNPTRIAGEBLO)@   Lab Results   Component Value Date    CHOL 140 11/08/2021    CHOL 130 06/09/2020    CHOL 133 04/11/2019     Lab Results   Component Value Date    HDL 42 11/08/2021    HDL 41 06/09/2020    HDL 39 (L) 04/11/2019     Lab Results   Component Value Date    LDLCALC 64.2 11/08/2021    LDLCALC 67.2 06/09/2020    LDLCALC 66.8 04/11/2019     Lab Results   Component Value Date    TRIG 169 (H) 11/08/2021    TRIG 109 06/09/2020    TRIG 136 04/11/2019     Lab Results   Component Value Date    CHOLHDL 30.0 11/08/2021    CHOLHDL 31.5 06/09/2020    CHOLHDL 29.3 04/11/2019      Lab Results   Component Value Date    TSH 1.32 07/20/2011     Lab Results   Component Value Date    HGBA1C 6.9 (H) 05/19/2022     Lab Results   Component Value Date    WBC 6.00 12/28/2021    HGB 14.6 12/28/2021    HCT 44.8 12/28/2021    MCV 88 12/28/2021     12/28/2021         Results for orders placed during the hospital encounter of 06/07/21    Echo Color Flow Doppler? Yes    Interpretation Summary  · The left ventricle is normal in size with concentric remodeling and normal systolic function.  · The estimated ejection fraction is 65%.  · Atrial fibrillation observed.  · Normal right ventricular size with normal right ventricular systolic function.  · Moderate left atrial enlargement.  · Mild right atrial enlargement.  · Mild aortic regurgitation.  · There is moderate aortic valve stenosis.  · Aortic valve area is 1.25 cm2; peak velocity is 3.00 m/s; mean gradient is 20 mmHg.  · Mild mitral regurgitation.  · Mild tricuspid regurgitation.  · Normal central  venous pressure (3 mmHg).  · The estimated PA systolic pressure is 29 mmHg.     No results found for this or any previous visit.         Assessment:       Aortic stenosis  Will repeat the echocardiogram to assess his aortic valve    Hypertension associated with diabetes  Controlled on current medical therapy    Hyperlipidemia associated with type 2 diabetes mellitus  Controlled on current medical therapy    CKD (chronic kidney disease) stage 3, GFR 30-59 ml/min  Stable       Plan:       Obtain an echocardiogram.  Return to the office in 3 months with a BMP as well as a CBC

## 2022-06-23 ENCOUNTER — HOSPITAL ENCOUNTER (OUTPATIENT)
Dept: CARDIOLOGY | Facility: HOSPITAL | Age: 77
Discharge: HOME | End: 2022-06-23
Attending: INTERNAL MEDICINE
Payer: MEDICARE

## 2022-06-23 VITALS — WEIGHT: 247 LBS | BODY MASS INDEX: 32.74 KG/M2 | HEIGHT: 73 IN

## 2022-06-23 DIAGNOSIS — N18.32 STAGE 3B CHRONIC KIDNEY DISEASE: ICD-10-CM

## 2022-06-23 DIAGNOSIS — I25.10 CORONARY ARTERY DISEASE, OCCLUSIVE: ICD-10-CM

## 2022-06-23 DIAGNOSIS — I35.0 NONRHEUMATIC AORTIC VALVE STENOSIS: ICD-10-CM

## 2022-06-23 PROCEDURE — 93306 TTE W/DOPPLER COMPLETE: CPT

## 2022-06-23 PROCEDURE — 93306 TTE W/DOPPLER COMPLETE: CPT | Mod: 26,,, | Performed by: INTERNAL MEDICINE

## 2022-06-23 PROCEDURE — 93306 ECHO (CUPID ONLY): ICD-10-PCS | Mod: 26,,, | Performed by: INTERNAL MEDICINE

## 2022-06-28 ENCOUNTER — TELEPHONE (OUTPATIENT)
Dept: CARDIOLOGY | Facility: CLINIC | Age: 77
End: 2022-06-28
Payer: MEDICARE

## 2022-06-28 NOTE — TELEPHONE ENCOUNTER
----- Message from Alexander Olivarez sent at 6/28/2022  1:40 PM CDT -----  Regarding: test results  Contact: patient  Type:  Test Results    Who Called:  Patient   Name of Test (Lab/Mammo/Etc):  Echo  Date of Test:  June 23rd  Ordering Provider:  Dr Phipps  Where the test was performed:  [facility] Call Back Number:  [phone] (home)

## 2022-06-29 ENCOUNTER — PATIENT MESSAGE (OUTPATIENT)
Dept: CARDIOLOGY | Facility: CLINIC | Age: 77
End: 2022-06-29
Payer: MEDICARE

## 2022-07-05 ENCOUNTER — LAB VISIT (OUTPATIENT)
Dept: LAB | Facility: HOSPITAL | Age: 77
End: 2022-07-05
Attending: INTERNAL MEDICINE
Payer: MEDICARE

## 2022-07-05 DIAGNOSIS — N18.30 CHRONIC KIDNEY DISEASE, STAGE III (MODERATE): Primary | ICD-10-CM

## 2022-07-05 LAB
ALBUMIN SERPL BCP-MCNC: 3.8 G/DL (ref 3.5–5.2)
ANION GAP SERPL CALC-SCNC: 5 MMOL/L (ref 8–16)
BASOPHILS # BLD AUTO: 0.05 K/UL (ref 0–0.2)
BASOPHILS NFR BLD: 0.8 % (ref 0–1.9)
BILIRUB UR QL STRIP: NEGATIVE
BUN SERPL-MCNC: 28 MG/DL (ref 8–23)
CALCIUM SERPL-MCNC: 8.9 MG/DL (ref 8.7–10.5)
CHLORIDE SERPL-SCNC: 106 MMOL/L (ref 95–110)
CLARITY UR: CLEAR
CO2 SERPL-SCNC: 23 MMOL/L (ref 23–29)
COLOR UR: YELLOW
CREAT SERPL-MCNC: 2.1 MG/DL (ref 0.5–1.4)
CREAT UR-MCNC: 165 MG/DL (ref 23–375)
DIFFERENTIAL METHOD: ABNORMAL
EOSINOPHIL # BLD AUTO: 0.4 K/UL (ref 0–0.5)
EOSINOPHIL NFR BLD: 6.9 % (ref 0–8)
ERYTHROCYTE [DISTWIDTH] IN BLOOD BY AUTOMATED COUNT: 16.8 % (ref 11.5–14.5)
EST. GFR  (AFRICAN AMERICAN): 34.6 ML/MIN/1.73 M^2
EST. GFR  (NON AFRICAN AMERICAN): 29.9 ML/MIN/1.73 M^2
GLUCOSE SERPL-MCNC: 141 MG/DL (ref 70–110)
GLUCOSE UR QL STRIP: NEGATIVE
HCT VFR BLD AUTO: 44 % (ref 40–54)
HGB BLD-MCNC: 14.2 G/DL (ref 14–18)
HGB UR QL STRIP: NEGATIVE
IMM GRANULOCYTES # BLD AUTO: 0.04 K/UL (ref 0–0.04)
IMM GRANULOCYTES NFR BLD AUTO: 0.6 % (ref 0–0.5)
KETONES UR QL STRIP: NEGATIVE
LEUKOCYTE ESTERASE UR QL STRIP: NEGATIVE
LYMPHOCYTES # BLD AUTO: 1.7 K/UL (ref 1–4.8)
LYMPHOCYTES NFR BLD: 28 % (ref 18–48)
MCH RBC QN AUTO: 28.4 PG (ref 27–31)
MCHC RBC AUTO-ENTMCNC: 32.3 G/DL (ref 32–36)
MCV RBC AUTO: 88 FL (ref 82–98)
MONOCYTES # BLD AUTO: 0.5 K/UL (ref 0.3–1)
MONOCYTES NFR BLD: 8.4 % (ref 4–15)
NEUTROPHILS # BLD AUTO: 3.4 K/UL (ref 1.8–7.7)
NEUTROPHILS NFR BLD: 55.3 % (ref 38–73)
NITRITE UR QL STRIP: NEGATIVE
NRBC BLD-RTO: 0 /100 WBC
PH UR STRIP: 6 [PH] (ref 5–8)
PHOSPHATE SERPL-MCNC: 3.6 MG/DL (ref 2.7–4.5)
PLATELET # BLD AUTO: 191 K/UL (ref 150–450)
PMV BLD AUTO: 11.2 FL (ref 9.2–12.9)
POTASSIUM SERPL-SCNC: 4.8 MMOL/L (ref 3.5–5.1)
PROT UR QL STRIP: NEGATIVE
PROT UR-MCNC: 15 MG/DL (ref 6–15)
PROT/CREAT UR: 0.09 MG/G{CREAT} (ref 0–0.2)
RBC # BLD AUTO: 5 M/UL (ref 4.6–6.2)
SODIUM SERPL-SCNC: 134 MMOL/L (ref 136–145)
SP GR UR STRIP: 1.02 (ref 1–1.03)
URN SPEC COLLECT METH UR: ABNORMAL
UROBILINOGEN UR STRIP-ACNC: ABNORMAL EU/DL
WBC # BLD AUTO: 6.21 K/UL (ref 3.9–12.7)

## 2022-07-05 PROCEDURE — 85025 COMPLETE CBC W/AUTO DIFF WBC: CPT | Performed by: INTERNAL MEDICINE

## 2022-07-05 PROCEDURE — 81003 URINALYSIS AUTO W/O SCOPE: CPT | Performed by: INTERNAL MEDICINE

## 2022-07-05 PROCEDURE — 80069 RENAL FUNCTION PANEL: CPT | Performed by: INTERNAL MEDICINE

## 2022-07-05 PROCEDURE — 82570 ASSAY OF URINE CREATININE: CPT | Performed by: INTERNAL MEDICINE

## 2022-07-05 PROCEDURE — 36415 COLL VENOUS BLD VENIPUNCTURE: CPT | Performed by: INTERNAL MEDICINE

## 2022-07-12 RX ORDER — LIRAGLUTIDE 6 MG/ML
INJECTION SUBCUTANEOUS
Qty: 27 ML | Refills: 0 | Status: SHIPPED | OUTPATIENT
Start: 2022-07-12 | End: 2022-08-11 | Stop reason: SDUPTHER

## 2022-07-12 NOTE — TELEPHONE ENCOUNTER
Refill Decision Note   Janak Booker  is requesting a refill authorization.  Brief Assessment and Rationale for Refill:  Approve     Medication Therapy Plan:       Medication Reconciliation Completed: No   Comments:     No Care Gaps recommended.     Note composed:5:38 PM 07/12/2022

## 2022-07-12 NOTE — TELEPHONE ENCOUNTER
No new care gaps identified.  [facility] Embedded Care Gaps. Reference number: [id number]. 7/11/2022   7:43:53 PM CDT

## 2022-07-15 LAB
AORTIC VALVE CUSP SEPERATION: 1 CM
AV INDEX (PROSTH): 0.24
AV MEAN GRADIENT: 33 MMHG
AV PEAK GRADIENT: 58 MMHG
AV VALVE AREA: 0.74 CM2
AV VELOCITY RATIO: 0.24
BSA FOR ECHO PROCEDURE: 2.4 M2
CV ECHO LV RWT: 0.49 CM
DOP CALC AO PEAK VEL: 3.8 M/S
DOP CALC AO VTI: 88.4 CM
DOP CALC LVOT AREA: 3.1 CM2
DOP CALC LVOT DIAMETER: 2 CM
DOP CALC LVOT PEAK VEL: 0.9 M/S
DOP CALC LVOT STROKE VOLUME: 65.63 CM3
DOP CALCLVOT PEAK VEL VTI: 20.9 CM
E WAVE DECELERATION TIME: 225 MSEC
E/A RATIO: 3.33
E/E' RATIO: 10.53 M/S
ECHO LV POSTERIOR WALL: 1.2 CM (ref 0.6–1.1)
EJECTION FRACTION: 60 %
FRACTIONAL SHORTENING: 31 % (ref 28–44)
INTERVENTRICULAR SEPTUM: 1.2 CM (ref 0.6–1.1)
IVRT: 81 MSEC
LEFT ATRIUM SIZE: 5.8 CM
LEFT INTERNAL DIMENSION IN SYSTOLE: 3.4 CM (ref 2.1–4)
LEFT VENTRICLE MASS INDEX: 96 G/M2
LEFT VENTRICULAR INTERNAL DIMENSION IN DIASTOLE: 4.9 CM (ref 3.5–6)
LEFT VENTRICULAR MASS: 226.38 G
LV LATERAL E/E' RATIO: 9.09 M/S
LV SEPTAL E/E' RATIO: 12.5 M/S
LVOT MG: 2 MMHG
LVOT MV: 0.6 CM/S
MV PEAK A VEL: 0.3 M/S
MV PEAK E VEL: 1 M/S
MV STENOSIS PRESSURE HALF TIME: 69 MS
MV VALVE AREA P 1/2 METHOD: 3.19 CM2
PISA TR MAX VEL: 3.35 M/S
RA PRESSURE: 3 MMHG
RIGHT VENTRICULAR END-DIASTOLIC DIMENSION: 2.2 CM
TDI LATERAL: 0.11 M/S
TDI SEPTAL: 0.08 M/S
TDI: 0.1 M/S
TR MAX PG: 45 MMHG
TV REST PULMONARY ARTERY PRESSURE: 48 MMHG

## 2022-07-25 ENCOUNTER — TELEPHONE (OUTPATIENT)
Dept: CARDIOLOGY | Facility: CLINIC | Age: 77
End: 2022-07-25
Payer: MEDICARE

## 2022-07-27 ENCOUNTER — TELEPHONE (OUTPATIENT)
Dept: CARDIOLOGY | Facility: CLINIC | Age: 77
End: 2022-07-27
Payer: MEDICARE

## 2022-07-29 ENCOUNTER — PATIENT MESSAGE (OUTPATIENT)
Dept: CARDIOLOGY | Facility: CLINIC | Age: 77
End: 2022-07-29
Payer: MEDICARE

## 2022-07-29 ENCOUNTER — TELEPHONE (OUTPATIENT)
Dept: CARDIOLOGY | Facility: CLINIC | Age: 77
End: 2022-07-29
Payer: MEDICARE

## 2022-07-29 NOTE — TELEPHONE ENCOUNTER
----- Message from Destiny Love MA sent at 7/29/2022 10:11 AM CDT -----  Contact: KIERRA WILSON [[id number]]  Type: Needs Medical Advice    Who Called: KIERRA WILSON [[id number]]  Best Call Back Number: [phone]  Inquiry/Question: Please call KIERRA WILSON [[id number]] regarding missed call from the clinic      Thank you~

## 2022-08-10 ENCOUNTER — OFFICE VISIT (OUTPATIENT)
Dept: CARDIOLOGY | Facility: CLINIC | Age: 77
End: 2022-08-10
Payer: MEDICARE

## 2022-08-10 VITALS
BODY MASS INDEX: 33.06 KG/M2 | DIASTOLIC BLOOD PRESSURE: 70 MMHG | SYSTOLIC BLOOD PRESSURE: 130 MMHG | HEART RATE: 66 BPM | OXYGEN SATURATION: 100 % | WEIGHT: 249.44 LBS | HEIGHT: 73 IN

## 2022-08-10 DIAGNOSIS — Q60.0 SOLITARY KIDNEY, CONGENITAL: ICD-10-CM

## 2022-08-10 DIAGNOSIS — I48.20 ATRIAL FIBRILLATION, CHRONIC: ICD-10-CM

## 2022-08-10 DIAGNOSIS — I25.10 CORONARY ARTERY DISEASE, OCCLUSIVE: ICD-10-CM

## 2022-08-10 DIAGNOSIS — N18.32 STAGE 3B CHRONIC KIDNEY DISEASE: Primary | ICD-10-CM

## 2022-08-10 DIAGNOSIS — E11.59 HYPERTENSION ASSOCIATED WITH DIABETES: ICD-10-CM

## 2022-08-10 DIAGNOSIS — I35.0 AORTIC VALVE STENOSIS, ETIOLOGY OF CARDIAC VALVE DISEASE UNSPECIFIED: ICD-10-CM

## 2022-08-10 DIAGNOSIS — I15.2 HYPERTENSION ASSOCIATED WITH DIABETES: ICD-10-CM

## 2022-08-10 PROCEDURE — 1126F PR PAIN SEVERITY QUANTIFIED, NO PAIN PRESENT: ICD-10-PCS | Mod: CPTII,S$GLB,, | Performed by: INTERNAL MEDICINE

## 2022-08-10 PROCEDURE — 3075F PR MOST RECENT SYSTOLIC BLOOD PRESS GE 130-139MM HG: ICD-10-PCS | Mod: CPTII,S$GLB,, | Performed by: INTERNAL MEDICINE

## 2022-08-10 PROCEDURE — 3075F SYST BP GE 130 - 139MM HG: CPT | Mod: CPTII,S$GLB,, | Performed by: INTERNAL MEDICINE

## 2022-08-10 PROCEDURE — 1101F PT FALLS ASSESS-DOCD LE1/YR: CPT | Mod: CPTII,S$GLB,, | Performed by: INTERNAL MEDICINE

## 2022-08-10 PROCEDURE — 3078F PR MOST RECENT DIASTOLIC BLOOD PRESSURE < 80 MM HG: ICD-10-PCS | Mod: CPTII,S$GLB,, | Performed by: INTERNAL MEDICINE

## 2022-08-10 PROCEDURE — 3288F FALL RISK ASSESSMENT DOCD: CPT | Mod: CPTII,S$GLB,, | Performed by: INTERNAL MEDICINE

## 2022-08-10 PROCEDURE — 1126F AMNT PAIN NOTED NONE PRSNT: CPT | Mod: CPTII,S$GLB,, | Performed by: INTERNAL MEDICINE

## 2022-08-10 PROCEDURE — 99499 UNLISTED E&M SERVICE: CPT | Mod: S$GLB,,, | Performed by: INTERNAL MEDICINE

## 2022-08-10 PROCEDURE — 1101F PR PT FALLS ASSESS DOC 0-1 FALLS W/OUT INJ PAST YR: ICD-10-PCS | Mod: CPTII,S$GLB,, | Performed by: INTERNAL MEDICINE

## 2022-08-10 PROCEDURE — 3078F DIAST BP <80 MM HG: CPT | Mod: CPTII,S$GLB,, | Performed by: INTERNAL MEDICINE

## 2022-08-10 PROCEDURE — 3288F PR FALLS RISK ASSESSMENT DOCUMENTED: ICD-10-PCS | Mod: CPTII,S$GLB,, | Performed by: INTERNAL MEDICINE

## 2022-08-10 PROCEDURE — 99213 OFFICE O/P EST LOW 20 MIN: CPT | Mod: S$GLB,,, | Performed by: INTERNAL MEDICINE

## 2022-08-10 PROCEDURE — 99213 PR OFFICE/OUTPT VISIT, EST, LEVL III, 20-29 MIN: ICD-10-PCS | Mod: S$GLB,,, | Performed by: INTERNAL MEDICINE

## 2022-08-10 PROCEDURE — 99499 RISK ADDL DX/OHS AUDIT: ICD-10-PCS | Mod: S$GLB,,, | Performed by: INTERNAL MEDICINE

## 2022-08-10 NOTE — TELEPHONE ENCOUNTER
No new care gaps identified.  [facility] Embedded Care Gaps. Reference number: [id number]. 8/10/2022   2:46:10 PM CDT

## 2022-08-10 NOTE — PROGRESS NOTES
Patient ID:  Janak Booker is a 77 y.o. male who presents for follow-up of Coronary Artery Disease, Aortic Stenosis, Fatigue, and Results (echo)      He is having progressive tiredness shortness of breath.  Getting up the stairs has been very difficult lately.  His last echocardiogram re than 2 nor the ends veals severe aortic stenosis with a V max of 3.8 m/sec.  His wife had TAVR done by Dr. Quinones and they were very satisfied high.  The rather go to [place]      Past Medical History:   Diagnosis Date    Anticoagulant long-term use     Aortic stenosis     Arthritis     Atrial fibrillation     CAD (coronary artery disease)     Colon polyps     per colonoscopy 9/11/2014    Diabetes mellitus, type 2     Disc displacement, lumbar     L3    Family history of prostate cancer     GERD (gastroesophageal reflux disease)     Heel bone fracture     left foot    Hyperlipidemia LDL goal < 70     Hypertension     Renal manifestation of secondary diabetes mellitus     Spinal stenosis, lumbar         Past Surgical History:   Procedure Laterality Date    BACK SURGERY      CARDIAC SURGERY      stents     CARPAL TUNNEL RELEASE Right     CATARACT EXTRACTION W/  INTRAOCULAR LENS IMPLANT Bilateral     COLONOSCOPY N/A 3/27/2018    Procedure: COLONOSCOPY;  Surgeon: Rishi Garcia MD;  Location: [facility] ENDO;  Service: Endoscopy;  Laterality: N/A;    COLONOSCOPY N/A 2/15/2021    Procedure: COLONOSCOPY;  Surgeon: Rishi Garcia MD;  Location: [facility] ENDO;  Service: Endoscopy;  Laterality: N/A;    CORONARY ANGIOPLASTY WITH STENT PLACEMENT      x 3    EYE SURGERY      INJECTION OF FACET JOINT N/A 5/14/2019    Procedure: INJECTION, FACET JOINT INJECTION (LUMBAR BLOCK) PLEASE INJECT L3/4;  Surgeon: Catrachito Harley MD;  [facility];  Service: Pain Management;  Laterality: N/A;  NEEDS CONSENT, PRADAXA CLEARANCE IN CHART    KNEE ARTHROSCOPY W/ MENISCECTOMY  12/22/2008    right    LUMBAR DISCECTOMY  12/2011    L4-L5 Fusion     "LUMBAR EPIDURAL INJECTION  02/04/2019    ROTATOR CUFF REPAIR Left 7/2012    SHOULDER OPEN ROTATOR CUFF REPAIR      SKIN CANCER FOREHEAD 2019      SPINE SURGERY      TIBIA FRACTURE SURGERY      TRANSFORAMINAL EPIDURAL INJECTION OF STEROID Left 9/23/2019    Procedure: INJECTION, STEROID, EPIDURAL, TRANSFORAMINAL APPROACH;  Surgeon: Jose Guadalupe Linn MD;  Location: [facility];  Service: Pain Management;  Laterality: Left;  Left TF ADI L4 and L5  OK to hold Pradaxa    TRANSFORAMINAL EPIDURAL INJECTION OF STEROID Left 11/14/2019    Procedure: INJECTION, STEROID, EPIDURAL, TRANSFORAMINAL APPROACH;  Surgeon: Jose Guadalupe Linn MD;  Location: [facility];  Service: Pain Management;  Laterality: Left;  Left TF ADI L4-5 and L5-S1          Current Outpatient Medications   Medication Instructions    ascorbic acid (vitamin C) (VITAMIN C) 1,000 mg, Oral, Daily    BD ALCOHOL SWABS PadM USE AS DIRECTED TO CHECK BLOOD GLUCOSE DAILY    fenofibrate micronized (LOFIBRA) 134 mg, Oral, Daily    lancets (TRUEPLUS LANCETS) 28 gauge Misc 1 lancet, Misc.(Non-Drug; Combo Route), Daily    lisinopriL (PRINIVIL,ZESTRIL) 5 mg, Oral, Daily    metFORMIN (GLUCOPHAGE-XR) 500 MG ER 24hr tablet TAKE ONE TABLET BY MOUTH ONCE DAILY    metoprolol tartrate (LOPRESSOR) 25 MG tablet TAKE ONE TABLET BY MOUTH TWICE DAILY    multivitamin with minerals tablet No dose, route, or frequency recorded.    NITROSTAT 0.4 mg, Oral, Every 5 min PRN    omega-3 fatty acids 1,000 mg Cap 1 Capsule(s) Oral OTC once a day.    omeprazole (PRILOSEC) 40 MG capsule TAKE ONE CAPSULE BY MOUTH ONCE DAILY    pen needle, diabetic (BD BARRY 2ND GEN PEN NEEDLE) 32 gauge x 5/32" Ndle USE ONE NEEDLE ONCE DAILY     pioglitazone (ACTOS) 30 mg, Oral, Daily    PRADAXA 150 mg Cap TAKE ONE CAPSULE BY MOUTH TWICE DAILY    rosuvastatin (CRESTOR) 10 MG tablet TAKE ONE TABLET BY MOUTH ONCE DAILY    sotaloL (BETAPACE) 120 MG Tab TAKE ONE TABLET BY MOUTH TWICE DAILY    VICTOZA 3-CAITIE 1.8 mg, Subcutaneous, " "Daily        Review of patient's allergies indicates:   Allergen Reactions    Levetiracetam         Review of Systems   Constitutional: Positive for malaise/fatigue.   Cardiovascular:  Positive for dyspnea on exertion and orthopnea.   Respiratory:  Positive for shortness of breath.       Objective:     Vitals:    08/10/22 1511   BP: 130/70   BP Location: Left arm   Patient Position: Sitting   BP Method: Medium (Manual)   Pulse: 66   SpO2: 100%   Weight: 113.2 kg (249 lb 7.2 oz)   Height: 6' 1" (1.854 m)       Physical Exam  Vitals and nursing note reviewed.   HENT:      Head: Normocephalic and atraumatic.   Eyes:      Conjunctiva/sclera: Conjunctivae normal.   Cardiovascular:      Rate and Rhythm: Normal rate. Rhythm irregular.      Heart sounds: Murmur (Grade 4/6 systolic murmur at the base) heard.   Pulmonary:      Effort: Pulmonary effort is normal.      Breath sounds: Normal breath sounds.   Abdominal:      General: Bowel sounds are normal.      Palpations: Abdomen is soft.   Musculoskeletal:         General: Normal range of motion.   Skin:     General: Skin is warm and dry.   Neurological:      Mental Status: He is alert and oriented to person, place, and time.   Psychiatric:         Behavior: Behavior normal.         Thought Content: Thought content normal.         Judgment: Judgment normal.     CMP  Sodium   Date Value Ref Range Status   07/05/2022 134 (L) 136 - 145 mmol/L Final     Potassium   Date Value Ref Range Status   07/05/2022 4.8 3.5 - 5.1 mmol/L Final     Chloride   Date Value Ref Range Status   07/05/2022 106 95 - 110 mmol/L Final     CO2   Date Value Ref Range Status   07/05/2022 23 23 - 29 mmol/L Final     Glucose   Date Value Ref Range Status   07/05/2022 141 (H) 70 - 110 mg/dL Final     BUN   Date Value Ref Range Status   07/05/2022 28 (H) 8 - 23 mg/dL Final     Creatinine   Date Value Ref Range Status   07/05/2022 2.1 (H) 0.5 - 1.4 mg/dL Final     Calcium   Date Value Ref Range Status "   07/05/2022 8.9 8.7 - 10.5 mg/dL Final     Total Protein   Date Value Ref Range Status   05/19/2022 7.1 6.0 - 8.4 g/dL Final     Albumin   Date Value Ref Range Status   07/05/2022 3.8 3.5 - 5.2 g/dL Final     Total Bilirubin   Date Value Ref Range Status   05/19/2022 1.1 (H) 0.1 - 1.0 mg/dL Final     Comment:     For infants and newborns, interpretation of results should be based  on gestational age, weight and in agreement with clinical  observations.    Premature Infant recommended reference ranges:  Up to 24 hours.............<8.0 mg/dL  Up to 48 hours............<12.0 mg/dL  3-5 days..................<15.0 mg/dL  6-29 days.................<15.0 mg/dL       Alkaline Phosphatase   Date Value Ref Range Status   05/19/2022 37 (L) 55 - 135 U/L Final     AST   Date Value Ref Range Status   05/19/2022 20 10 - 40 U/L Final     ALT   Date Value Ref Range Status   05/19/2022 14 10 - 44 U/L Final     Anion Gap   Date Value Ref Range Status   07/05/2022 5 (L) 8 - 16 mmol/L Final     eGFR if    Date Value Ref Range Status   07/05/2022 34.6 (A) >60 mL/min/1.73 m^2 Final     eGFR if non    Date Value Ref Range Status   07/05/2022 29.9 (A) >60 mL/min/1.73 m^2 Final     Comment:     Calculation used to obtain the estimated glomerular filtration  rate (eGFR) is the CKD-EPI equation.         BMP  Lab Results   Component Value Date     (L) 07/05/2022    K 4.8 07/05/2022     07/05/2022    CO2 23 07/05/2022    BUN 28 (H) 07/05/2022    CREATININE 2.1 (H) 07/05/2022    CALCIUM 8.9 07/05/2022    ANIONGAP 5 (L) 07/05/2022    ESTGFRAFRICA 34.6 (A) 07/05/2022    EGFRNONAA 29.9 (A) 07/05/2022      BNP  @LABRCNTIP(BNP,BNPTRIAGEBLO)@   Lab Results   Component Value Date    CHOL 140 11/08/2021    CHOL 130 06/09/2020    CHOL 133 04/11/2019     Lab Results   Component Value Date    HDL 42 11/08/2021    HDL 41 06/09/2020    HDL 39 (L) 04/11/2019     Lab Results   Component Value Date    LDLCALC 64.2  11/08/2021    LDLCALC 67.2 06/09/2020    LDLCALC 66.8 04/11/2019     Lab Results   Component Value Date    TRIG 169 (H) 11/08/2021    TRIG 109 06/09/2020    TRIG 136 04/11/2019     Lab Results   Component Value Date    CHOLHDL 30.0 11/08/2021    CHOLHDL 31.5 06/09/2020    CHOLHDL 29.3 04/11/2019      Lab Results   Component Value Date    TSH 1.32 07/20/2011     Lab Results   Component Value Date    HGBA1C 6.9 (H) 05/19/2022     Lab Results   Component Value Date    WBC 6.21 07/05/2022    HGB 14.2 07/05/2022    HCT 44.0 07/05/2022    MCV 88 07/05/2022     07/05/2022         Results for orders placed during the hospital encounter of 06/23/22    Echo Saline Bubble? No    Interpretation Summary  · The left ventricle is normal in size with concentric remodeling and normal systolic function.  · The estimated ejection fraction is 60%.  · Normal right ventricular size with normal right ventricular systolic function.  · Moderate left atrial enlargement.  · Mild aortic regurgitation.  · There is severe aortic valve stenosis.  · Aortic valve area is 0.74 cm2; peak velocity is 3.8 m/s; mean gradient is 33 mmHg.  · Mild tricuspid regurgitation.  · Normal central venous pressure (3 mmHg).  · The estimated PA systolic pressure is 48 mmHg.  · There is pulmonary hypertension.  · Atrial fibrillation observed.     No results found for this or any previous visit.         Assessment:       Aortic stenosis  Severe aortic stenosis    Coronary artery disease, occlusive  Stable will need re-evaluation prior aortic valve treatment    Hypertension associated with diabetes  Controlled on current medical therapy    Atrial fibrillation, chronic  Chronic atrial fibrillation rate controlled on anticoagulation    Solitary kidney, congenital  Aware congenital       Plan:       Symptomatic aortic stenosis severe.  He needs evaluation for potential TAVR

## 2022-08-11 ENCOUNTER — PATIENT MESSAGE (OUTPATIENT)
Dept: FAMILY MEDICINE | Facility: CLINIC | Age: 77
End: 2022-08-11
Payer: MEDICARE

## 2022-08-11 RX ORDER — PEN NEEDLE, DIABETIC 30 GX3/16"
NEEDLE, DISPOSABLE MISCELLANEOUS
Qty: 100 EACH | Refills: 2 | Status: SHIPPED | OUTPATIENT
Start: 2022-08-11 | End: 2022-10-25 | Stop reason: SDUPTHER

## 2022-08-11 RX ORDER — LIRAGLUTIDE 6 MG/ML
INJECTION SUBCUTANEOUS
Qty: 27 ML | Refills: 0 | OUTPATIENT
Start: 2022-08-11

## 2022-08-11 RX ORDER — LIRAGLUTIDE 6 MG/ML
1.8 INJECTION SUBCUTANEOUS DAILY
Qty: 27 ML | Refills: 3 | Status: SHIPPED | OUTPATIENT
Start: 2022-08-11 | End: 2023-08-14

## 2022-08-11 NOTE — TELEPHONE ENCOUNTER
Patient states he is taking 1.8 mg daily. Also needs refill for pen needles.   Advised patient he is due for an OV.

## 2022-08-12 RX ORDER — LISINOPRIL 5 MG/1
5 TABLET ORAL DAILY
Qty: 30 TABLET | Refills: 11 | Status: SHIPPED | OUTPATIENT
Start: 2022-08-12 | End: 2023-08-18

## 2022-08-25 RX ORDER — FENOFIBRATE 134 MG/1
134 CAPSULE ORAL DAILY
Qty: 30 CAPSULE | Refills: 11 | Status: SHIPPED | OUTPATIENT
Start: 2022-08-25 | End: 2023-09-23 | Stop reason: SDUPTHER

## 2022-09-30 PROBLEM — R06.02 SOB (SHORTNESS OF BREATH): Status: ACTIVE | Noted: 2022-09-30

## 2022-10-12 ENCOUNTER — OFFICE VISIT (OUTPATIENT)
Dept: VASCULAR SURGERY | Facility: CLINIC | Age: 77
End: 2022-10-12
Payer: MEDICARE

## 2022-10-12 VITALS
HEIGHT: 73 IN | DIASTOLIC BLOOD PRESSURE: 77 MMHG | SYSTOLIC BLOOD PRESSURE: 128 MMHG | HEART RATE: 56 BPM | BODY MASS INDEX: 32.98 KG/M2

## 2022-10-12 DIAGNOSIS — I35.0 NONRHEUMATIC AORTIC VALVE STENOSIS: Primary | Chronic | ICD-10-CM

## 2022-10-12 PROCEDURE — 1101F PR PT FALLS ASSESS DOC 0-1 FALLS W/OUT INJ PAST YR: ICD-10-PCS | Mod: CPTII,S$GLB,, | Performed by: THORACIC SURGERY (CARDIOTHORACIC VASCULAR SURGERY)

## 2022-10-12 PROCEDURE — 3288F FALL RISK ASSESSMENT DOCD: CPT | Mod: CPTII,S$GLB,, | Performed by: THORACIC SURGERY (CARDIOTHORACIC VASCULAR SURGERY)

## 2022-10-12 PROCEDURE — 1126F PR PAIN SEVERITY QUANTIFIED, NO PAIN PRESENT: ICD-10-PCS | Mod: CPTII,S$GLB,, | Performed by: THORACIC SURGERY (CARDIOTHORACIC VASCULAR SURGERY)

## 2022-10-12 PROCEDURE — 1159F PR MEDICATION LIST DOCUMENTED IN MEDICAL RECORD: ICD-10-PCS | Mod: CPTII,S$GLB,, | Performed by: THORACIC SURGERY (CARDIOTHORACIC VASCULAR SURGERY)

## 2022-10-12 PROCEDURE — 99204 PR OFFICE/OUTPT VISIT, NEW, LEVL IV, 45-59 MIN: ICD-10-PCS | Mod: S$GLB,,, | Performed by: THORACIC SURGERY (CARDIOTHORACIC VASCULAR SURGERY)

## 2022-10-12 PROCEDURE — 1160F PR REVIEW ALL MEDS BY PRESCRIBER/CLIN PHARMACIST DOCUMENTED: ICD-10-PCS | Mod: CPTII,S$GLB,, | Performed by: THORACIC SURGERY (CARDIOTHORACIC VASCULAR SURGERY)

## 2022-10-12 PROCEDURE — 3074F PR MOST RECENT SYSTOLIC BLOOD PRESSURE < 130 MM HG: ICD-10-PCS | Mod: CPTII,S$GLB,, | Performed by: THORACIC SURGERY (CARDIOTHORACIC VASCULAR SURGERY)

## 2022-10-12 PROCEDURE — 3074F SYST BP LT 130 MM HG: CPT | Mod: CPTII,S$GLB,, | Performed by: THORACIC SURGERY (CARDIOTHORACIC VASCULAR SURGERY)

## 2022-10-12 PROCEDURE — 1126F AMNT PAIN NOTED NONE PRSNT: CPT | Mod: CPTII,S$GLB,, | Performed by: THORACIC SURGERY (CARDIOTHORACIC VASCULAR SURGERY)

## 2022-10-12 PROCEDURE — 99204 OFFICE O/P NEW MOD 45 MIN: CPT | Mod: S$GLB,,, | Performed by: THORACIC SURGERY (CARDIOTHORACIC VASCULAR SURGERY)

## 2022-10-12 PROCEDURE — 1101F PT FALLS ASSESS-DOCD LE1/YR: CPT | Mod: CPTII,S$GLB,, | Performed by: THORACIC SURGERY (CARDIOTHORACIC VASCULAR SURGERY)

## 2022-10-12 PROCEDURE — 3288F PR FALLS RISK ASSESSMENT DOCUMENTED: ICD-10-PCS | Mod: CPTII,S$GLB,, | Performed by: THORACIC SURGERY (CARDIOTHORACIC VASCULAR SURGERY)

## 2022-10-12 PROCEDURE — 1159F MED LIST DOCD IN RCRD: CPT | Mod: CPTII,S$GLB,, | Performed by: THORACIC SURGERY (CARDIOTHORACIC VASCULAR SURGERY)

## 2022-10-12 PROCEDURE — 1160F RVW MEDS BY RX/DR IN RCRD: CPT | Mod: CPTII,S$GLB,, | Performed by: THORACIC SURGERY (CARDIOTHORACIC VASCULAR SURGERY)

## 2022-10-12 PROCEDURE — 3078F PR MOST RECENT DIASTOLIC BLOOD PRESSURE < 80 MM HG: ICD-10-PCS | Mod: CPTII,S$GLB,, | Performed by: THORACIC SURGERY (CARDIOTHORACIC VASCULAR SURGERY)

## 2022-10-12 PROCEDURE — 3078F DIAST BP <80 MM HG: CPT | Mod: CPTII,S$GLB,, | Performed by: THORACIC SURGERY (CARDIOTHORACIC VASCULAR SURGERY)

## 2022-10-12 NOTE — PROGRESS NOTES
This patient has aortic stenosis.  He is symptomatic with shortness of breath.  He was referred for consideration of TAVR procedure.    Past history is well documented.  He has a history of coronary artery disease and coronary stents.  Medicines are part of the epic record.    He has no other pertinent family social history.  On exam vital signs are stable.  Pupils are equal and round reactive to light.  Neck is supple.  Chest is equal breath sounds.  Heart is in a regular rate and rhythm.  Abdomen is benign.  Perfusion to the legs and feet seems to be satisfactory.    I discussed with the patient the options of TAVR versus surgical aortic valve replacement.  He seems to be a reasonable candidate for TAVR.  I explained the risks and potential complications.  He seems to be understanding and agreeable.  This can be arranged at his convenience.

## 2022-10-18 ENCOUNTER — OFFICE VISIT (OUTPATIENT)
Dept: FAMILY MEDICINE | Facility: CLINIC | Age: 77
End: 2022-10-18
Payer: MEDICARE

## 2022-10-18 VITALS
SYSTOLIC BLOOD PRESSURE: 126 MMHG | HEART RATE: 78 BPM | OXYGEN SATURATION: 95 % | DIASTOLIC BLOOD PRESSURE: 84 MMHG | WEIGHT: 252 LBS | HEIGHT: 73 IN | BODY MASS INDEX: 33.4 KG/M2

## 2022-10-18 DIAGNOSIS — I35.0 NONRHEUMATIC AORTIC VALVE STENOSIS: Chronic | ICD-10-CM

## 2022-10-18 DIAGNOSIS — N18.32 STAGE 3B CHRONIC KIDNEY DISEASE: ICD-10-CM

## 2022-10-18 DIAGNOSIS — N18.30 CONTROLLED TYPE 2 DIABETES MELLITUS WITH STAGE 3 CHRONIC KIDNEY DISEASE, WITHOUT LONG-TERM CURRENT USE OF INSULIN: Primary | ICD-10-CM

## 2022-10-18 DIAGNOSIS — E11.22 CONTROLLED TYPE 2 DIABETES MELLITUS WITH STAGE 3 CHRONIC KIDNEY DISEASE, WITHOUT LONG-TERM CURRENT USE OF INSULIN: Primary | ICD-10-CM

## 2022-10-18 PROCEDURE — 3074F SYST BP LT 130 MM HG: CPT | Mod: CPTII,S$GLB,, | Performed by: STUDENT IN AN ORGANIZED HEALTH CARE EDUCATION/TRAINING PROGRAM

## 2022-10-18 PROCEDURE — 3288F PR FALLS RISK ASSESSMENT DOCUMENTED: ICD-10-PCS | Mod: CPTII,S$GLB,, | Performed by: STUDENT IN AN ORGANIZED HEALTH CARE EDUCATION/TRAINING PROGRAM

## 2022-10-18 PROCEDURE — 99214 PR OFFICE/OUTPT VISIT, EST, LEVL IV, 30-39 MIN: ICD-10-PCS | Mod: S$GLB,,, | Performed by: STUDENT IN AN ORGANIZED HEALTH CARE EDUCATION/TRAINING PROGRAM

## 2022-10-18 PROCEDURE — 1101F PR PT FALLS ASSESS DOC 0-1 FALLS W/OUT INJ PAST YR: ICD-10-PCS | Mod: CPTII,S$GLB,, | Performed by: STUDENT IN AN ORGANIZED HEALTH CARE EDUCATION/TRAINING PROGRAM

## 2022-10-18 PROCEDURE — 1159F MED LIST DOCD IN RCRD: CPT | Mod: CPTII,S$GLB,, | Performed by: STUDENT IN AN ORGANIZED HEALTH CARE EDUCATION/TRAINING PROGRAM

## 2022-10-18 PROCEDURE — 1101F PT FALLS ASSESS-DOCD LE1/YR: CPT | Mod: CPTII,S$GLB,, | Performed by: STUDENT IN AN ORGANIZED HEALTH CARE EDUCATION/TRAINING PROGRAM

## 2022-10-18 PROCEDURE — 3079F PR MOST RECENT DIASTOLIC BLOOD PRESSURE 80-89 MM HG: ICD-10-PCS | Mod: CPTII,S$GLB,, | Performed by: STUDENT IN AN ORGANIZED HEALTH CARE EDUCATION/TRAINING PROGRAM

## 2022-10-18 PROCEDURE — 3079F DIAST BP 80-89 MM HG: CPT | Mod: CPTII,S$GLB,, | Performed by: STUDENT IN AN ORGANIZED HEALTH CARE EDUCATION/TRAINING PROGRAM

## 2022-10-18 PROCEDURE — 1159F PR MEDICATION LIST DOCUMENTED IN MEDICAL RECORD: ICD-10-PCS | Mod: CPTII,S$GLB,, | Performed by: STUDENT IN AN ORGANIZED HEALTH CARE EDUCATION/TRAINING PROGRAM

## 2022-10-18 PROCEDURE — 3074F PR MOST RECENT SYSTOLIC BLOOD PRESSURE < 130 MM HG: ICD-10-PCS | Mod: CPTII,S$GLB,, | Performed by: STUDENT IN AN ORGANIZED HEALTH CARE EDUCATION/TRAINING PROGRAM

## 2022-10-18 PROCEDURE — 99214 OFFICE O/P EST MOD 30 MIN: CPT | Mod: S$GLB,,, | Performed by: STUDENT IN AN ORGANIZED HEALTH CARE EDUCATION/TRAINING PROGRAM

## 2022-10-18 PROCEDURE — 99999 PR PBB SHADOW E&M-EST. PATIENT-LVL IV: CPT | Mod: PBBFAC,,, | Performed by: STUDENT IN AN ORGANIZED HEALTH CARE EDUCATION/TRAINING PROGRAM

## 2022-10-18 PROCEDURE — 1160F RVW MEDS BY RX/DR IN RCRD: CPT | Mod: CPTII,S$GLB,, | Performed by: STUDENT IN AN ORGANIZED HEALTH CARE EDUCATION/TRAINING PROGRAM

## 2022-10-18 PROCEDURE — 3288F FALL RISK ASSESSMENT DOCD: CPT | Mod: CPTII,S$GLB,, | Performed by: STUDENT IN AN ORGANIZED HEALTH CARE EDUCATION/TRAINING PROGRAM

## 2022-10-18 PROCEDURE — 99999 PR PBB SHADOW E&M-EST. PATIENT-LVL IV: ICD-10-PCS | Mod: PBBFAC,,, | Performed by: STUDENT IN AN ORGANIZED HEALTH CARE EDUCATION/TRAINING PROGRAM

## 2022-10-18 PROCEDURE — 1160F PR REVIEW ALL MEDS BY PRESCRIBER/CLIN PHARMACIST DOCUMENTED: ICD-10-PCS | Mod: CPTII,S$GLB,, | Performed by: STUDENT IN AN ORGANIZED HEALTH CARE EDUCATION/TRAINING PROGRAM

## 2022-10-18 NOTE — PROGRESS NOTES
"[facility] CLINIC NOTE    Patient Name: Janak Booker  YOB: 1945    PRESENTING HISTORY     History of Present Illness:  Mr. Janak Booker is a 77 y.o. male here for scheduled f/u.     He is undergoing TAVR tomorrow.   Cardiology has adjusted his medications pre-operatively.     T2DM is well controlled. Due for A1c but he already had blood drawn yesterday, will defer for now.     Reviewed renal function, stable.       ROS      OBJECTIVE:   Vital Signs:  Vitals:    10/18/22 1307   BP: 126/84   Pulse: 78   SpO2: 95%   Weight: 114.3 kg (251 lb 15.8 oz)   Height: 6' 1" (1.854 m)          Physical Exam: Normal, no change.     Physical Exam  Vitals and nursing note reviewed.   Constitutional:       General: He is not in acute distress.     Appearance: He is not toxic-appearing or diaphoretic.   HENT:      Head: Normocephalic and atraumatic.      Right Ear: External ear normal.      Left Ear: External ear normal.   Eyes:      General: No scleral icterus.     Conjunctiva/sclera: Conjunctivae normal.      Pupils: Pupils are equal, round, and reactive to light.   Neck:      Thyroid: No thyromegaly.      Vascular: No carotid bruit.   Cardiovascular:      Rate and Rhythm: Normal rate. Rhythm irregular.      Heart sounds: Murmur (2+ IRMA, no radiation) heard.   Pulmonary:      Effort: Pulmonary effort is normal. No respiratory distress.      Breath sounds: Normal breath sounds. No wheezing or rales.   Musculoskeletal:         General: No tenderness or deformity. Normal range of motion.      Cervical back: Normal range of motion and neck supple.   Lymphadenopathy:      Cervical: No cervical adenopathy.   Skin:     General: Skin is warm and dry.      Findings: No erythema or rash.   Neurological:      General: No focal deficit present.      Mental Status: He is alert and oriented to person, place, and time.      Gait: Gait is intact.   Psychiatric:         Mood and Affect: Mood and affect normal.       "   Cognition and Memory: Memory normal.         Judgment: Judgment normal.       ASSESSMENT & PLAN:     Controlled type 2 diabetes mellitus with stage 3 chronic kidney disease, without long-term current use of insulin  -     Microalbumin/Creatinine Ratio, Urine; Future; Expected date: 10/18/2022  -     Hemoglobin A1C; Future; Expected date: 10/18/2022    Stage 3b chronic kidney disease  Continue current medications    Nonrheumatic aortic valve stenosis   Continue current medications          See Quach MD   Internal Medicine

## 2022-10-19 PROBLEM — I50.30 NYHA CLASS 3 HEART FAILURE WITH PRESERVED EJECTION FRACTION: Status: ACTIVE | Noted: 2022-10-19

## 2022-10-19 PROBLEM — I50.30 NYHA CLASS 2 HEART FAILURE WITH PRESERVED EJECTION FRACTION: Status: ACTIVE | Noted: 2022-10-19

## 2022-10-19 PROBLEM — R53.83 FATIGUE: Status: ACTIVE | Noted: 2022-10-19

## 2022-10-20 ENCOUNTER — TELEPHONE (OUTPATIENT)
Dept: CARDIOLOGY | Facility: CLINIC | Age: 77
End: 2022-10-20
Payer: MEDICARE

## 2022-10-20 ENCOUNTER — TELEPHONE (OUTPATIENT)
Dept: FAMILY MEDICINE | Facility: CLINIC | Age: 77
End: 2022-10-20
Payer: MEDICARE

## 2022-10-20 PROBLEM — Z95.3 S/P TAVR (TRANSCATHETER AORTIC VALVE REPLACEMENT): Status: ACTIVE | Noted: 2022-10-20

## 2022-10-20 PROBLEM — Z95.2 S/P TAVR (TRANSCATHETER AORTIC VALVE REPLACEMENT): Status: ACTIVE | Noted: 2022-10-20

## 2022-10-20 NOTE — TELEPHONE ENCOUNTER
----- Message from Gualberto Arreguin sent at 10/20/2022 12:07 PM CDT -----  Type:  Sooner Appointment Request    Caller is requesting a sooner appointment.  Caller declined first available appointment listed below.  Caller will not accept being placed on the waitlist and is requesting a message be sent to doctor.    Name of Caller:  [facility]  When is the first available appointment?     Symptoms:  1 week Hosp F/U Discharge 10/20  Best Call Back Number:  [phone]  Additional Information:  Please advise -- thank you

## 2022-10-20 NOTE — TELEPHONE ENCOUNTER
----- Message from Nilo Knowles sent at 10/20/2022 11:55 AM CDT -----  Contact: Adelina  Type:  Patient Call          Who Called: [facility]         Does the patient know what this is regarding?: requesting a call back Pt is being discharged today and will need a one week follow up appt ; please advise           Best Call Back Number:[phone]             Additional Information:

## 2022-10-20 NOTE — TELEPHONE ENCOUNTER
Hospital follow up appointment scheduled for the date of 10-28-22 with NICOLE May. Patient's wife (Santino) agreed to appointment date, time, location, and provider.

## 2022-10-31 ENCOUNTER — OFFICE VISIT (OUTPATIENT)
Dept: FAMILY MEDICINE | Facility: CLINIC | Age: 77
End: 2022-10-31
Payer: MEDICARE

## 2022-10-31 ENCOUNTER — LAB VISIT (OUTPATIENT)
Dept: LAB | Facility: HOSPITAL | Age: 77
End: 2022-10-31
Attending: STUDENT IN AN ORGANIZED HEALTH CARE EDUCATION/TRAINING PROGRAM
Payer: MEDICARE

## 2022-10-31 VITALS
BODY MASS INDEX: 33.04 KG/M2 | HEIGHT: 73 IN | WEIGHT: 249.31 LBS | RESPIRATION RATE: 18 BRPM | OXYGEN SATURATION: 97 % | SYSTOLIC BLOOD PRESSURE: 130 MMHG | HEART RATE: 86 BPM | DIASTOLIC BLOOD PRESSURE: 82 MMHG

## 2022-10-31 DIAGNOSIS — Z95.2 S/P TAVR (TRANSCATHETER AORTIC VALVE REPLACEMENT): ICD-10-CM

## 2022-10-31 DIAGNOSIS — Z09 HOSPITAL DISCHARGE FOLLOW-UP: Primary | ICD-10-CM

## 2022-10-31 LAB
ALBUMIN SERPL BCP-MCNC: 3.7 G/DL (ref 3.5–5.2)
ALP SERPL-CCNC: 47 U/L (ref 55–135)
ALT SERPL W/O P-5'-P-CCNC: 11 U/L (ref 10–44)
ANION GAP SERPL CALC-SCNC: 8 MMOL/L (ref 8–16)
AST SERPL-CCNC: 19 U/L (ref 10–40)
BASOPHILS # BLD AUTO: 0.04 K/UL (ref 0–0.2)
BASOPHILS NFR BLD: 0.6 % (ref 0–1.9)
BILIRUB SERPL-MCNC: 0.7 MG/DL (ref 0.1–1)
BUN SERPL-MCNC: 27 MG/DL (ref 8–23)
CALCIUM SERPL-MCNC: 9.7 MG/DL (ref 8.7–10.5)
CHLORIDE SERPL-SCNC: 106 MMOL/L (ref 95–110)
CO2 SERPL-SCNC: 25 MMOL/L (ref 23–29)
CREAT SERPL-MCNC: 1.7 MG/DL (ref 0.5–1.4)
DIFFERENTIAL METHOD: ABNORMAL
EOSINOPHIL # BLD AUTO: 0.4 K/UL (ref 0–0.5)
EOSINOPHIL NFR BLD: 5.8 % (ref 0–8)
ERYTHROCYTE [DISTWIDTH] IN BLOOD BY AUTOMATED COUNT: 15.9 % (ref 11.5–14.5)
EST. GFR  (NO RACE VARIABLE): 41.3 ML/MIN/1.73 M^2
GLUCOSE SERPL-MCNC: 164 MG/DL (ref 70–110)
HCT VFR BLD AUTO: 42.2 % (ref 40–54)
HGB BLD-MCNC: 13.4 G/DL (ref 14–18)
IMM GRANULOCYTES # BLD AUTO: 0.02 K/UL (ref 0–0.04)
IMM GRANULOCYTES NFR BLD AUTO: 0.3 % (ref 0–0.5)
LYMPHOCYTES # BLD AUTO: 2.1 K/UL (ref 1–4.8)
LYMPHOCYTES NFR BLD: 30 % (ref 18–48)
MCH RBC QN AUTO: 28.9 PG (ref 27–31)
MCHC RBC AUTO-ENTMCNC: 31.8 G/DL (ref 32–36)
MCV RBC AUTO: 91 FL (ref 82–98)
MONOCYTES # BLD AUTO: 0.5 K/UL (ref 0.3–1)
MONOCYTES NFR BLD: 7.4 % (ref 4–15)
NEUTROPHILS # BLD AUTO: 3.8 K/UL (ref 1.8–7.7)
NEUTROPHILS NFR BLD: 55.9 % (ref 38–73)
NRBC BLD-RTO: 0 /100 WBC
PLATELET # BLD AUTO: 227 K/UL (ref 150–450)
PMV BLD AUTO: 11.4 FL (ref 9.2–12.9)
POTASSIUM SERPL-SCNC: 4.7 MMOL/L (ref 3.5–5.1)
PROT SERPL-MCNC: 7.4 G/DL (ref 6–8.4)
RBC # BLD AUTO: 4.63 M/UL (ref 4.6–6.2)
SODIUM SERPL-SCNC: 139 MMOL/L (ref 136–145)
WBC # BLD AUTO: 6.87 K/UL (ref 3.9–12.7)

## 2022-10-31 PROCEDURE — 1159F PR MEDICATION LIST DOCUMENTED IN MEDICAL RECORD: ICD-10-PCS | Mod: CPTII,S$GLB,, | Performed by: STUDENT IN AN ORGANIZED HEALTH CARE EDUCATION/TRAINING PROGRAM

## 2022-10-31 PROCEDURE — 1159F MED LIST DOCD IN RCRD: CPT | Mod: CPTII,S$GLB,, | Performed by: STUDENT IN AN ORGANIZED HEALTH CARE EDUCATION/TRAINING PROGRAM

## 2022-10-31 PROCEDURE — 3288F FALL RISK ASSESSMENT DOCD: CPT | Mod: CPTII,S$GLB,, | Performed by: STUDENT IN AN ORGANIZED HEALTH CARE EDUCATION/TRAINING PROGRAM

## 2022-10-31 PROCEDURE — 1160F PR REVIEW ALL MEDS BY PRESCRIBER/CLIN PHARMACIST DOCUMENTED: ICD-10-PCS | Mod: CPTII,S$GLB,, | Performed by: STUDENT IN AN ORGANIZED HEALTH CARE EDUCATION/TRAINING PROGRAM

## 2022-10-31 PROCEDURE — 3075F PR MOST RECENT SYSTOLIC BLOOD PRESS GE 130-139MM HG: ICD-10-PCS | Mod: CPTII,S$GLB,, | Performed by: STUDENT IN AN ORGANIZED HEALTH CARE EDUCATION/TRAINING PROGRAM

## 2022-10-31 PROCEDURE — 1126F PR PAIN SEVERITY QUANTIFIED, NO PAIN PRESENT: ICD-10-PCS | Mod: CPTII,S$GLB,, | Performed by: STUDENT IN AN ORGANIZED HEALTH CARE EDUCATION/TRAINING PROGRAM

## 2022-10-31 PROCEDURE — 1111F PR DISCHARGE MEDS RECONCILED W/ CURRENT OUTPATIENT MED LIST: ICD-10-PCS | Mod: CPTII,S$GLB,, | Performed by: STUDENT IN AN ORGANIZED HEALTH CARE EDUCATION/TRAINING PROGRAM

## 2022-10-31 PROCEDURE — 1101F PT FALLS ASSESS-DOCD LE1/YR: CPT | Mod: CPTII,S$GLB,, | Performed by: STUDENT IN AN ORGANIZED HEALTH CARE EDUCATION/TRAINING PROGRAM

## 2022-10-31 PROCEDURE — 99499 UNLISTED E&M SERVICE: CPT | Mod: HCNC,S$GLB,, | Performed by: STUDENT IN AN ORGANIZED HEALTH CARE EDUCATION/TRAINING PROGRAM

## 2022-10-31 PROCEDURE — 99999 PR PBB SHADOW E&M-EST. PATIENT-LVL V: ICD-10-PCS | Mod: PBBFAC,,, | Performed by: STUDENT IN AN ORGANIZED HEALTH CARE EDUCATION/TRAINING PROGRAM

## 2022-10-31 PROCEDURE — 36415 COLL VENOUS BLD VENIPUNCTURE: CPT | Mod: PO | Performed by: STUDENT IN AN ORGANIZED HEALTH CARE EDUCATION/TRAINING PROGRAM

## 2022-10-31 PROCEDURE — 80053 COMPREHEN METABOLIC PANEL: CPT | Performed by: STUDENT IN AN ORGANIZED HEALTH CARE EDUCATION/TRAINING PROGRAM

## 2022-10-31 PROCEDURE — 83880 ASSAY OF NATRIURETIC PEPTIDE: CPT | Performed by: STUDENT IN AN ORGANIZED HEALTH CARE EDUCATION/TRAINING PROGRAM

## 2022-10-31 PROCEDURE — 1101F PR PT FALLS ASSESS DOC 0-1 FALLS W/OUT INJ PAST YR: ICD-10-PCS | Mod: CPTII,S$GLB,, | Performed by: STUDENT IN AN ORGANIZED HEALTH CARE EDUCATION/TRAINING PROGRAM

## 2022-10-31 PROCEDURE — 99213 PR OFFICE/OUTPT VISIT, EST, LEVL III, 20-29 MIN: ICD-10-PCS | Mod: S$GLB,,, | Performed by: STUDENT IN AN ORGANIZED HEALTH CARE EDUCATION/TRAINING PROGRAM

## 2022-10-31 PROCEDURE — 85025 COMPLETE CBC W/AUTO DIFF WBC: CPT | Performed by: STUDENT IN AN ORGANIZED HEALTH CARE EDUCATION/TRAINING PROGRAM

## 2022-10-31 PROCEDURE — 1111F DSCHRG MED/CURRENT MED MERGE: CPT | Mod: CPTII,S$GLB,, | Performed by: STUDENT IN AN ORGANIZED HEALTH CARE EDUCATION/TRAINING PROGRAM

## 2022-10-31 PROCEDURE — 99999 PR PBB SHADOW E&M-EST. PATIENT-LVL V: CPT | Mod: PBBFAC,,, | Performed by: STUDENT IN AN ORGANIZED HEALTH CARE EDUCATION/TRAINING PROGRAM

## 2022-10-31 PROCEDURE — 99499 RISK ADDL DX/OHS AUDIT: ICD-10-PCS | Mod: HCNC,S$GLB,, | Performed by: STUDENT IN AN ORGANIZED HEALTH CARE EDUCATION/TRAINING PROGRAM

## 2022-10-31 PROCEDURE — 1160F RVW MEDS BY RX/DR IN RCRD: CPT | Mod: CPTII,S$GLB,, | Performed by: STUDENT IN AN ORGANIZED HEALTH CARE EDUCATION/TRAINING PROGRAM

## 2022-10-31 PROCEDURE — 99213 OFFICE O/P EST LOW 20 MIN: CPT | Mod: S$GLB,,, | Performed by: STUDENT IN AN ORGANIZED HEALTH CARE EDUCATION/TRAINING PROGRAM

## 2022-10-31 PROCEDURE — 3288F PR FALLS RISK ASSESSMENT DOCUMENTED: ICD-10-PCS | Mod: CPTII,S$GLB,, | Performed by: STUDENT IN AN ORGANIZED HEALTH CARE EDUCATION/TRAINING PROGRAM

## 2022-10-31 PROCEDURE — 3079F DIAST BP 80-89 MM HG: CPT | Mod: CPTII,S$GLB,, | Performed by: STUDENT IN AN ORGANIZED HEALTH CARE EDUCATION/TRAINING PROGRAM

## 2022-10-31 PROCEDURE — 1126F AMNT PAIN NOTED NONE PRSNT: CPT | Mod: CPTII,S$GLB,, | Performed by: STUDENT IN AN ORGANIZED HEALTH CARE EDUCATION/TRAINING PROGRAM

## 2022-10-31 PROCEDURE — 3079F PR MOST RECENT DIASTOLIC BLOOD PRESSURE 80-89 MM HG: ICD-10-PCS | Mod: CPTII,S$GLB,, | Performed by: STUDENT IN AN ORGANIZED HEALTH CARE EDUCATION/TRAINING PROGRAM

## 2022-10-31 PROCEDURE — 3075F SYST BP GE 130 - 139MM HG: CPT | Mod: CPTII,S$GLB,, | Performed by: STUDENT IN AN ORGANIZED HEALTH CARE EDUCATION/TRAINING PROGRAM

## 2022-10-31 RX ORDER — INFLUENZA A VIRUS A/VICTORIA/2570/2019 IVR-215 (H1N1) ANTIGEN (FORMALDEHYDE INACTIVATED), INFLUENZA A VIRUS A/DARWIN/6/2021 IVR-227 (H3N2) ANTIGEN (FORMALDEHYDE INACTIVATED), INFLUENZA B VIRUS B/AUSTRIA/1359417/2021 BVR-26 ANTIGEN (FORMALDEHYDE INACTIVATED), INFLUENZA B VIRUS B/PHUKET/3073/2013 BVR-1B ANTIGEN (FORMALDEHYDE INACTIVATED) 15; 15; 15; 15 UG/.5ML; UG/.5ML; UG/.5ML; UG/.5ML
INJECTION, SUSPENSION INTRAMUSCULAR
COMMUNITY
Start: 2022-09-15 | End: 2023-11-27

## 2022-10-31 NOTE — PROGRESS NOTES
Patient, Janak Booker (MRN #[id number]), presented with a recent Platelet count less than 150 K/uL consistent with the definition of thrombocytopenia (ICD10 - D69.6).     Platelets   Date Value Ref Range Status   10/20/2022 132 (L) 150 - 450 K/uL Final     Low platelets in hospital reviewed. Repeat CBC ordered, will be monitored.     The patient's thrombocytopenia was monitored, evaluated, addressed and/or treated. This addendum to the medical record is made on 10/31/2022.

## 2022-10-31 NOTE — PROGRESS NOTES
"[facility] MEDICINE CLINIC NOTE    Patient Name: Janak Booker  YOB: 1945    PRESENTING HISTORY     History of Present Illness:  Mr. Janak Booker is a 77 y.o. male here for hospital discharge f/u.     He underwent TAVR which went well with no sig complications.   Metoprolol stopped, sotalol adjusted.   He has f/u with his cardiologist on 11/10.     Since discharge he has felt well.   In the discharge summary, cardiac rehab was consulted but he wasn't sure of the details.   Recommend he proceed with this if recommended by Cardiology.       ROS      OBJECTIVE:   Vital Signs:  Vitals:    10/31/22 1029   BP: 130/82   Pulse: 86   Resp: 18   SpO2: 97%   Weight: 113.1 kg (249 lb 5.4 oz)   Height: 6' 1" (1.854 m)            Physical Exam  Vitals and nursing note reviewed.   Constitutional:       Appearance: He is not diaphoretic.   HENT:      Head: Normocephalic and atraumatic.      Right Ear: External ear normal.      Left Ear: External ear normal.   Eyes:      General: No scleral icterus.     Conjunctiva/sclera: Conjunctivae normal.      Pupils: Pupils are equal, round, and reactive to light.   Neck:      Thyroid: No thyromegaly.   Cardiovascular:      Rate and Rhythm: Normal rate. Rhythm irregular.      Heart sounds: Normal heart sounds. No murmur heard.     Comments: No murmur present  Pulmonary:      Effort: Pulmonary effort is normal. No respiratory distress.      Breath sounds: Normal breath sounds. No wheezing or rales.   Musculoskeletal:         General: No tenderness or deformity. Normal range of motion.      Cervical back: Normal range of motion and neck supple.      Right lower leg: No edema.      Left lower leg: No edema.   Lymphadenopathy:      Cervical: No cervical adenopathy.   Skin:     General: Skin is warm and dry.      Findings: No erythema or rash.   Neurological:      Mental Status: He is alert and oriented to person, place, and time.      Gait: Gait is intact.   Psychiatric:    "      Mood and Affect: Mood and affect normal.         Cognition and Memory: Memory normal.         Judgment: Judgment normal.       ASSESSMENT & PLAN:     Hospital discharge follow-up  -     Ambulatory referral/consult to Cardiac Rehab; Future; Expected date: 11/07/2022    S/P TAVR (transcatheter aortic valve replacement)  -     CBC Auto Differential; Future; Expected date: 10/31/2022  -     Comprehensive Metabolic Panel; Future; Expected date: 10/31/2022  -     NT-Pro Natriuretic Peptide; Future; Expected date: 10/31/2022    I spent a total of 20 minutes on the day of the visit.  This includes face-to-face time and non face-to-face time preparing to see the patient (e.g., review of tests) , obtaining and/or reviewing separately obtain history, documenting clinical information in the electronic or other health record, independently interpreting results and communicating results to the patient/family/caregiver, or care coordinator.     He has already had his flu shot at [facility].     See Quach MD   Internal Medicine

## 2022-11-02 LAB — NT-PROBNP SERPL IA-MCNC: 488 PG/ML

## 2022-11-08 ENCOUNTER — PES CALL (OUTPATIENT)
Dept: ADMINISTRATIVE | Facility: CLINIC | Age: 77
End: 2022-11-08
Payer: MEDICARE

## 2022-11-10 ENCOUNTER — OFFICE VISIT (OUTPATIENT)
Dept: CARDIOLOGY | Facility: CLINIC | Age: 77
End: 2022-11-10
Payer: MEDICARE

## 2022-11-10 VITALS
HEART RATE: 60 BPM | DIASTOLIC BLOOD PRESSURE: 80 MMHG | OXYGEN SATURATION: 98 % | BODY MASS INDEX: 33.31 KG/M2 | SYSTOLIC BLOOD PRESSURE: 122 MMHG | WEIGHT: 251.31 LBS | HEIGHT: 73 IN | RESPIRATION RATE: 16 BRPM

## 2022-11-10 DIAGNOSIS — I48.20 ATRIAL FIBRILLATION, CHRONIC: ICD-10-CM

## 2022-11-10 DIAGNOSIS — Z95.5 S/P CORONARY ARTERY STENT PLACEMENT: ICD-10-CM

## 2022-11-10 DIAGNOSIS — I25.10 CORONARY ARTERY DISEASE, OCCLUSIVE: ICD-10-CM

## 2022-11-10 DIAGNOSIS — E11.69 HYPERLIPIDEMIA ASSOCIATED WITH TYPE 2 DIABETES MELLITUS: ICD-10-CM

## 2022-11-10 DIAGNOSIS — Z95.2 S/P TAVR (TRANSCATHETER AORTIC VALVE REPLACEMENT): Primary | ICD-10-CM

## 2022-11-10 DIAGNOSIS — E78.5 HYPERLIPIDEMIA ASSOCIATED WITH TYPE 2 DIABETES MELLITUS: ICD-10-CM

## 2022-11-10 DIAGNOSIS — I50.30 NYHA CLASS 3 HEART FAILURE WITH PRESERVED EJECTION FRACTION: ICD-10-CM

## 2022-11-10 DIAGNOSIS — Q60.0 SOLITARY KIDNEY, CONGENITAL: ICD-10-CM

## 2022-11-10 DIAGNOSIS — E11.59 HYPERTENSION ASSOCIATED WITH DIABETES: ICD-10-CM

## 2022-11-10 DIAGNOSIS — I15.2 HYPERTENSION ASSOCIATED WITH DIABETES: ICD-10-CM

## 2022-11-10 PROCEDURE — 3074F SYST BP LT 130 MM HG: CPT | Mod: CPTII,S$GLB,, | Performed by: INTERNAL MEDICINE

## 2022-11-10 PROCEDURE — 3288F FALL RISK ASSESSMENT DOCD: CPT | Mod: CPTII,S$GLB,, | Performed by: INTERNAL MEDICINE

## 2022-11-10 PROCEDURE — 3288F PR FALLS RISK ASSESSMENT DOCUMENTED: ICD-10-PCS | Mod: CPTII,S$GLB,, | Performed by: INTERNAL MEDICINE

## 2022-11-10 PROCEDURE — 1126F PR PAIN SEVERITY QUANTIFIED, NO PAIN PRESENT: ICD-10-PCS | Mod: CPTII,S$GLB,, | Performed by: INTERNAL MEDICINE

## 2022-11-10 PROCEDURE — 1101F PR PT FALLS ASSESS DOC 0-1 FALLS W/OUT INJ PAST YR: ICD-10-PCS | Mod: CPTII,S$GLB,, | Performed by: INTERNAL MEDICINE

## 2022-11-10 PROCEDURE — 1111F PR DISCHARGE MEDS RECONCILED W/ CURRENT OUTPATIENT MED LIST: ICD-10-PCS | Mod: CPTII,S$GLB,, | Performed by: INTERNAL MEDICINE

## 2022-11-10 PROCEDURE — 99213 OFFICE O/P EST LOW 20 MIN: CPT | Mod: S$GLB,,, | Performed by: INTERNAL MEDICINE

## 2022-11-10 PROCEDURE — 3074F PR MOST RECENT SYSTOLIC BLOOD PRESSURE < 130 MM HG: ICD-10-PCS | Mod: CPTII,S$GLB,, | Performed by: INTERNAL MEDICINE

## 2022-11-10 PROCEDURE — 99999 PR PBB SHADOW E&M-EST. PATIENT-LVL V: ICD-10-PCS | Mod: PBBFAC,,, | Performed by: INTERNAL MEDICINE

## 2022-11-10 PROCEDURE — 1160F PR REVIEW ALL MEDS BY PRESCRIBER/CLIN PHARMACIST DOCUMENTED: ICD-10-PCS | Mod: CPTII,S$GLB,, | Performed by: INTERNAL MEDICINE

## 2022-11-10 PROCEDURE — 3079F DIAST BP 80-89 MM HG: CPT | Mod: CPTII,S$GLB,, | Performed by: INTERNAL MEDICINE

## 2022-11-10 PROCEDURE — 1159F PR MEDICATION LIST DOCUMENTED IN MEDICAL RECORD: ICD-10-PCS | Mod: CPTII,S$GLB,, | Performed by: INTERNAL MEDICINE

## 2022-11-10 PROCEDURE — 1160F RVW MEDS BY RX/DR IN RCRD: CPT | Mod: CPTII,S$GLB,, | Performed by: INTERNAL MEDICINE

## 2022-11-10 PROCEDURE — 1159F MED LIST DOCD IN RCRD: CPT | Mod: CPTII,S$GLB,, | Performed by: INTERNAL MEDICINE

## 2022-11-10 PROCEDURE — 1126F AMNT PAIN NOTED NONE PRSNT: CPT | Mod: CPTII,S$GLB,, | Performed by: INTERNAL MEDICINE

## 2022-11-10 PROCEDURE — 99999 PR PBB SHADOW E&M-EST. PATIENT-LVL V: CPT | Mod: PBBFAC,,, | Performed by: INTERNAL MEDICINE

## 2022-11-10 PROCEDURE — 3079F PR MOST RECENT DIASTOLIC BLOOD PRESSURE 80-89 MM HG: ICD-10-PCS | Mod: CPTII,S$GLB,, | Performed by: INTERNAL MEDICINE

## 2022-11-10 PROCEDURE — 1111F DSCHRG MED/CURRENT MED MERGE: CPT | Mod: CPTII,S$GLB,, | Performed by: INTERNAL MEDICINE

## 2022-11-10 PROCEDURE — 1101F PT FALLS ASSESS-DOCD LE1/YR: CPT | Mod: CPTII,S$GLB,, | Performed by: INTERNAL MEDICINE

## 2022-11-10 PROCEDURE — 99213 PR OFFICE/OUTPT VISIT, EST, LEVL III, 20-29 MIN: ICD-10-PCS | Mod: S$GLB,,, | Performed by: INTERNAL MEDICINE

## 2022-11-10 NOTE — PROGRESS NOTES
Patient ID:  Janak Booker is a 77 y.o. male who presents for follow-up of Hospital Follow Up (Tavr follow up, has not started cardio rehab. Order was not placed. (Resubmitted))      He had the TAVR on 10/19/2022 he has been doing fine his breathing has improved going up the stairs.  A coronary arteriogram was performed that showed a 50% stenosis of the mid LAD the FFR was 0.87.  The doses of sotalol were decreased.  The possibility of cardioversion was discussed with him although his left atrial cavity is markedly dilated.      Past Medical History:   Diagnosis Date    Anticoagulant long-term use     Aortic stenosis     Arthritis     Atrial fibrillation     CAD (coronary artery disease)     Colon polyps     per colonoscopy 9/11/2014    Diabetes mellitus, type 2     Disc displacement, lumbar     L3    Family history of prostate cancer     GERD (gastroesophageal reflux disease)     Heel bone fracture     left foot    Hyperlipidemia LDL goal < 70     Hypertension     NYHA class 3 heart failure with preserved ejection fraction 10/19/2022    Renal manifestation of secondary diabetes mellitus     Sleep apnea     USES MACHINE    Spinal stenosis, lumbar         Past Surgical History:   Procedure Laterality Date    BACK SURGERY      CARDIAC SURGERY      stents     CARPAL TUNNEL RELEASE Right     CATARACT EXTRACTION W/  INTRAOCULAR LENS IMPLANT Bilateral     COLONOSCOPY N/A 3/27/2018    Procedure: COLONOSCOPY;  Surgeon: Rishi Garcia MD;  Location: [facility] ENDO;  Service: Endoscopy;  Laterality: N/A;    COLONOSCOPY N/A 2/15/2021    Procedure: COLONOSCOPY;  Surgeon: Rishi Garcia MD;  Location: [facility] ENDO;  Service: Endoscopy;  Laterality: N/A;    CORONARY ANGIOPLASTY WITH STENT PLACEMENT      x 3    EYE SURGERY      INJECTION OF FACET JOINT N/A 5/14/2019    Procedure: INJECTION, FACET JOINT INJECTION (LUMBAR BLOCK) PLEASE INJECT L3/4;  Surgeon: Catrachito Harley MD;  Location: [facility] [place], operated by [facility] PAIN MGT;  Service: Pain  Management;  Laterality: N/A;  NEEDS CONSENT, PRADAXA CLEARANCE IN CHART    KNEE ARTHROSCOPY W/ MENISCECTOMY  12/22/2008    right    LEFT HEART CATHETERIZATION Left 9/7/2022    Procedure: Left heart cath;  Surgeon: Adonay Quinones MD;  Location: [facility] CATH;  Service: Cardiology;  Laterality: Left;    LUMBAR DISCECTOMY  12/2011    L4-L5 Fusion    LUMBAR EPIDURAL INJECTION  02/04/2019    ROTATOR CUFF REPAIR Left 7/2012    SHOULDER OPEN ROTATOR CUFF REPAIR      SKIN CANCER FOREHEAD 2019      SPINE SURGERY      TIBIA FRACTURE SURGERY      TRANSCATHETER AORTIC VALVE REPLACEMENT (TAVR) Bilateral 10/19/2022    Procedure: REPLACEMENT, AORTIC VALVE, TRANSCATHETER (TAVR);  Surgeon: Adonay Quinones MD;  Location: [facility] CATH;  Service: Cardiology;  Laterality: Bilateral;    TRANSCATHETER AORTIC VALVE REPLACEMENT (TAVR) Bilateral 10/19/2022    Procedure: REPLACEMENT, AORTIC VALVE, TRANSCATHETER (TAVR);  Surgeon: Denver Osborne MD;  Location: [facility] CATH;  Service: Cardiothoracic;  Laterality: Bilateral;    TRANSFORAMINAL EPIDURAL INJECTION OF STEROID Left 9/23/2019    Procedure: INJECTION, STEROID, EPIDURAL, TRANSFORAMINAL APPROACH;  Surgeon: Jose Guadalupe Linn MD;  Location: [facility] PAIN MGT;  Service: Pain Management;  Laterality: Left;  Left TF ADI L4 and L5  OK to hold Pradaxa    TRANSFORAMINAL EPIDURAL INJECTION OF STEROID Left 11/14/2019    Procedure: INJECTION, STEROID, EPIDURAL, TRANSFORAMINAL APPROACH;  Surgeon: Jose Guadalupe Linn MD;  Location: [facility] PAIN MGT;  Service: Pain Management;  Laterality: Left;  Left TF ADI L4-5 and L5-S1          Current Outpatient Medications   Medication Instructions    ascorbic acid (vitamin C) (VITAMIN C) 1,000 mg, Oral, Daily    aspirin (ECOTRIN) 81 mg, Oral, Daily    BD ALCOHOL SWABS PadM USE AS DIRECTED TO CHECK BLOOD GLUCOSE DAILY    fenofibrate micronized (LOFIBRA) 134 mg, Oral, Daily    FLUAD QUAD 2022-23,65Y UP,,PF, 60 mcg (15 mcg x 4)/0.5 mL Syrg No dose, route, or frequency recorded.    lancets  "(TRUEPLUS LANCETS) 28 gauge Misc 1 lancet, Misc.(Non-Drug; Combo Route), Daily    lisinopriL (PRINIVIL,ZESTRIL) 5 mg, Oral, Daily    metFORMIN (GLUCOPHAGE-XR) 500 MG ER 24hr tablet TAKE ONE TABLET BY MOUTH ONCE DAILY    multivitamin with minerals tablet No dose, route, or frequency recorded.    NITROSTAT 0.4 mg, Oral, Every 5 min PRN, Do not take more than 3 tabs a day    omega-3 fatty acids 1,000 mg Cap 1 Capsule(s) Oral OTC once a day.    omeprazole (PRILOSEC) 40 MG capsule TAKE ONE CAPSULE BY MOUTH ONCE DAILY    pen needle, diabetic (BD BARRY 2ND GEN PEN NEEDLE) 32 gauge x 5/32" Ndle USE ONE NEEDLE ONCE DAILY     pioglitazone (ACTOS) 30 mg, Oral, Daily    PRADAXA 150 mg Cap TAKE ONE CAPSULE BY MOUTH TWICE DAILY    rosuvastatin (CRESTOR) 10 MG tablet TAKE ONE TABLET BY MOUTH ONCE DAILY    sotaloL (BETAPACE) 40 mg, Oral, Daily    VICTOZA 3-CAITIE 1.8 mg, Subcutaneous, Daily        Review of patient's allergies indicates:   Allergen Reactions    Neurontin [gabapentin] Swelling     Leg swelling        Review of Systems   Cardiovascular:  Negative for chest pain, dyspnea on exertion, leg swelling and palpitations.   Respiratory:  Negative for cough and shortness of breath.       Objective:     Vitals:    11/10/22 1146   BP: 122/80   BP Location: Left arm   Patient Position: Sitting   BP Method: Medium (Manual)   Pulse: 60   Resp: 16   SpO2: 98%   Weight: 114 kg (251 lb 5.2 oz)   Height: 6' 1" (1.854 m)       Physical Exam  Vitals and nursing note reviewed.   HENT:      Head: Normocephalic and atraumatic.   Eyes:      Conjunctiva/sclera: Conjunctivae normal.   Cardiovascular:      Rate and Rhythm: Normal rate. Rhythm irregular.      Heart sounds: Normal heart sounds.   Pulmonary:      Effort: Pulmonary effort is normal.      Breath sounds: Normal breath sounds.   Abdominal:      General: Bowel sounds are normal.      Palpations: Abdomen is soft.   Musculoskeletal:         General: Normal range of motion.   Skin:     " General: Skin is warm and dry.   Neurological:      Mental Status: He is alert and oriented to person, place, and time.   Psychiatric:         Behavior: Behavior normal.         Thought Content: Thought content normal.         Judgment: Judgment normal.     CMP  Sodium   Date Value Ref Range Status   10/31/2022 139 136 - 145 mmol/L Final     Potassium   Date Value Ref Range Status   10/31/2022 4.7 3.5 - 5.1 mmol/L Final     Chloride   Date Value Ref Range Status   10/31/2022 106 95 - 110 mmol/L Final     CO2   Date Value Ref Range Status   10/31/2022 25 23 - 29 mmol/L Final     Glucose   Date Value Ref Range Status   10/31/2022 164 (H) 70 - 110 mg/dL Final     BUN   Date Value Ref Range Status   10/31/2022 27 (H) 8 - 23 mg/dL Final     Creatinine   Date Value Ref Range Status   10/31/2022 1.7 (H) 0.5 - 1.4 mg/dL Final     Calcium   Date Value Ref Range Status   10/31/2022 9.7 8.7 - 10.5 mg/dL Final     Total Protein   Date Value Ref Range Status   10/31/2022 7.4 6.0 - 8.4 g/dL Final     Albumin   Date Value Ref Range Status   10/31/2022 3.7 3.5 - 5.2 g/dL Final     Total Bilirubin   Date Value Ref Range Status   10/31/2022 0.7 0.1 - 1.0 mg/dL Final     Comment:     For infants and newborns, interpretation of results should be based  on gestational age, weight and in agreement with clinical  observations.    Premature Infant recommended reference ranges:  Up to 24 hours.............<8.0 mg/dL  Up to 48 hours............<12.0 mg/dL  3-5 days..................<15.0 mg/dL  6-29 days.................<15.0 mg/dL       Alkaline Phosphatase   Date Value Ref Range Status   10/31/2022 47 (L) 55 - 135 U/L Final     AST   Date Value Ref Range Status   10/31/2022 19 10 - 40 U/L Final     ALT   Date Value Ref Range Status   10/31/2022 11 10 - 44 U/L Final     Anion Gap   Date Value Ref Range Status   10/31/2022 8 8 - 16 mmol/L Final     eGFR if    Date Value Ref Range Status   07/05/2022 34.6 (A) >60 mL/min/1.73  m^2 Final     eGFR if non    Date Value Ref Range Status   07/05/2022 29.9 (A) >60 mL/min/1.73 m^2 Final     Comment:     Calculation used to obtain the estimated glomerular filtration  rate (eGFR) is the CKD-EPI equation.         BMP  Lab Results   Component Value Date     10/31/2022    K 4.7 10/31/2022     10/31/2022    CO2 25 10/31/2022    BUN 27 (H) 10/31/2022    CREATININE 1.7 (H) 10/31/2022    CALCIUM 9.7 10/31/2022    ANIONGAP 8 10/31/2022    ESTGFRAFRICA 34.6 (A) 07/05/2022    EGFRNONAA 29.9 (A) 07/05/2022      BNP  @LABRCNTIP(BNP,BNPTRIAGEBLO)@   Lab Results   Component Value Date    CHOL 140 11/08/2021    CHOL 130 06/09/2020    CHOL 133 04/11/2019     Lab Results   Component Value Date    HDL 42 11/08/2021    HDL 41 06/09/2020    HDL 39 (L) 04/11/2019     Lab Results   Component Value Date    LDLCALC 64.2 11/08/2021    LDLCALC 67.2 06/09/2020    LDLCALC 66.8 04/11/2019     Lab Results   Component Value Date    TRIG 169 (H) 11/08/2021    TRIG 109 06/09/2020    TRIG 136 04/11/2019     Lab Results   Component Value Date    CHOLHDL 30.0 11/08/2021    CHOLHDL 31.5 06/09/2020    CHOLHDL 29.3 04/11/2019      Lab Results   Component Value Date    TSH 1.32 07/20/2011     Lab Results   Component Value Date    HGBA1C 6.3 (H) 10/19/2022     Lab Results   Component Value Date    WBC 6.87 10/31/2022    HGB 13.4 (L) 10/31/2022    HCT 42.2 10/31/2022    MCV 91 10/31/2022     10/31/2022         Results for orders placed during the hospital encounter of 10/19/22    Echo    Interpretation Summary  · The left ventricle is normal in size with normal systolic function.  · Severe left atrial enlargement.  · The estimated ejection fraction is 65%.  · Atrial fibrillation observed.  · Normal right ventricular size with normal right ventricular systolic function.  · There is a transcutaneously-placed aortic bioprosthesis present. There is no aortic insufficiency present. Prosthetic aortic valve is  normal.  · The aortic valve mean gradient is 9 mmHg with a dimensionless index of 0.61.  · Mild tricuspid regurgitation.  · Normal central venous pressure (3 mmHg).  · The estimated PA systolic pressure is 28 mmHg.     Results for orders placed during the hospital encounter of 10/19/22    Cardiac catheterization  9/7/2022           Angiographic findings:   Left main coronary artery-large vessel normal appearance for age.    Left anterior descending-normal vessel proximal to mid small vessel mid distal with diffuse spasm.  Four small diagonals all less than 1.5 mm in diameter.  There is a 50% short discrete lesions seen the mid LAD.    Circumflex-normal size vessel with a large branching 1st obtuse marginal diffuse disease seen throughout the circumflex system all less than 30% short discrete 40% lesions in the 1st obtuse marginal.    Right coronary artery-large dominant vessel diffuse disease seen throughout its entire course all less than 30%.  There is a patent stents in its proximal portion less than 35% InStent restenoses.      Fraction flow reserve the LAD was 0.83.     Assessment:       Coronary artery disease, occlusive  No symptoms of angina    Hypertension associated with diabetes  Controlled on sotalol and 5 mg of lisinopril    Hyperlipidemia associated with type 2 diabetes mellitus  Controlled on 10 mg of rosuvastatin    Atrial fibrillation, chronic  On chronic anticoagulation and sotalol 80 mg p.o. b.i.d..  The option of cardioversion has been discussed with the patient    S/P TAVR (transcatheter aortic valve replacement)  Stable    Solitary kidney, congenital  Aware       Plan:       Continue the current medical therapy.  Consider TEEcardioversion.  He will be seen in the office in approximately 2 months

## 2022-11-10 NOTE — ASSESSMENT & PLAN NOTE
On chronic anticoagulation and sotalol 80 mg p.o. b.i.d..  The option of cardioversion has been discussed with the patient

## 2022-11-29 ENCOUNTER — CLINICAL SUPPORT (OUTPATIENT)
Dept: CARDIAC REHAB | Facility: HOSPITAL | Age: 77
End: 2022-11-29
Payer: MEDICARE

## 2022-11-29 DIAGNOSIS — Z95.2 S/P TAVR (TRANSCATHETER AORTIC VALVE REPLACEMENT): ICD-10-CM

## 2022-11-29 DIAGNOSIS — Z95.5 S/P CORONARY ARTERY STENT PLACEMENT: ICD-10-CM

## 2022-11-29 PROCEDURE — 93797 PHYS/QHP OP CAR RHAB WO ECG: CPT

## 2022-12-05 ENCOUNTER — CLINICAL SUPPORT (OUTPATIENT)
Dept: CARDIAC REHAB | Facility: HOSPITAL | Age: 77
End: 2022-12-05
Payer: MEDICARE

## 2022-12-05 PROCEDURE — 93798 PHYS/QHP OP CAR RHAB W/ECG: CPT

## 2022-12-07 ENCOUNTER — CLINICAL SUPPORT (OUTPATIENT)
Dept: CARDIAC REHAB | Facility: HOSPITAL | Age: 77
End: 2022-12-07
Payer: MEDICARE

## 2022-12-07 DIAGNOSIS — Z95.5 S/P CORONARY ARTERY STENT PLACEMENT: Primary | ICD-10-CM

## 2022-12-07 PROCEDURE — 93798 PHYS/QHP OP CAR RHAB W/ECG: CPT

## 2022-12-09 ENCOUNTER — CLINICAL SUPPORT (OUTPATIENT)
Dept: CARDIAC REHAB | Facility: HOSPITAL | Age: 77
End: 2022-12-09
Payer: MEDICARE

## 2022-12-09 DIAGNOSIS — Z95.5 S/P CORONARY ARTERY STENT PLACEMENT: Primary | ICD-10-CM

## 2022-12-09 PROCEDURE — 93798 PHYS/QHP OP CAR RHAB W/ECG: CPT

## 2022-12-12 ENCOUNTER — CLINICAL SUPPORT (OUTPATIENT)
Dept: CARDIAC REHAB | Facility: HOSPITAL | Age: 77
End: 2022-12-12
Payer: MEDICARE

## 2022-12-12 ENCOUNTER — OFFICE VISIT (OUTPATIENT)
Dept: CARDIOLOGY | Facility: CLINIC | Age: 77
End: 2022-12-12
Payer: MEDICARE

## 2022-12-12 VITALS
OXYGEN SATURATION: 96 % | SYSTOLIC BLOOD PRESSURE: 122 MMHG | HEIGHT: 73 IN | HEART RATE: 89 BPM | BODY MASS INDEX: 33.59 KG/M2 | WEIGHT: 253.44 LBS | DIASTOLIC BLOOD PRESSURE: 70 MMHG

## 2022-12-12 DIAGNOSIS — E11.69 HYPERLIPIDEMIA ASSOCIATED WITH TYPE 2 DIABETES MELLITUS: ICD-10-CM

## 2022-12-12 DIAGNOSIS — I25.10 CORONARY ARTERY DISEASE, OCCLUSIVE: ICD-10-CM

## 2022-12-12 DIAGNOSIS — N18.30 CONTROLLED TYPE 2 DIABETES MELLITUS WITH STAGE 3 CHRONIC KIDNEY DISEASE, WITHOUT LONG-TERM CURRENT USE OF INSULIN: ICD-10-CM

## 2022-12-12 DIAGNOSIS — Z95.2 S/P TAVR (TRANSCATHETER AORTIC VALVE REPLACEMENT): ICD-10-CM

## 2022-12-12 DIAGNOSIS — Z95.5 S/P CORONARY ARTERY STENT PLACEMENT: Primary | ICD-10-CM

## 2022-12-12 DIAGNOSIS — E78.5 HYPERLIPIDEMIA ASSOCIATED WITH TYPE 2 DIABETES MELLITUS: ICD-10-CM

## 2022-12-12 DIAGNOSIS — Q60.0 SOLITARY KIDNEY, CONGENITAL: ICD-10-CM

## 2022-12-12 DIAGNOSIS — I48.20 ATRIAL FIBRILLATION, CHRONIC: ICD-10-CM

## 2022-12-12 DIAGNOSIS — E11.22 CONTROLLED TYPE 2 DIABETES MELLITUS WITH STAGE 3 CHRONIC KIDNEY DISEASE, WITHOUT LONG-TERM CURRENT USE OF INSULIN: ICD-10-CM

## 2022-12-12 PROCEDURE — 3078F DIAST BP <80 MM HG: CPT | Mod: CPTII,S$GLB,, | Performed by: INTERNAL MEDICINE

## 2022-12-12 PROCEDURE — 1160F RVW MEDS BY RX/DR IN RCRD: CPT | Mod: CPTII,S$GLB,, | Performed by: INTERNAL MEDICINE

## 2022-12-12 PROCEDURE — 1126F PR PAIN SEVERITY QUANTIFIED, NO PAIN PRESENT: ICD-10-PCS | Mod: CPTII,S$GLB,, | Performed by: INTERNAL MEDICINE

## 2022-12-12 PROCEDURE — 3078F PR MOST RECENT DIASTOLIC BLOOD PRESSURE < 80 MM HG: ICD-10-PCS | Mod: CPTII,S$GLB,, | Performed by: INTERNAL MEDICINE

## 2022-12-12 PROCEDURE — 1159F MED LIST DOCD IN RCRD: CPT | Mod: CPTII,S$GLB,, | Performed by: INTERNAL MEDICINE

## 2022-12-12 PROCEDURE — 99212 OFFICE O/P EST SF 10 MIN: CPT | Mod: S$GLB,,, | Performed by: INTERNAL MEDICINE

## 2022-12-12 PROCEDURE — 99999 PR PBB SHADOW E&M-EST. PATIENT-LVL IV: CPT | Mod: PBBFAC,,, | Performed by: INTERNAL MEDICINE

## 2022-12-12 PROCEDURE — 3288F PR FALLS RISK ASSESSMENT DOCUMENTED: ICD-10-PCS | Mod: CPTII,S$GLB,, | Performed by: INTERNAL MEDICINE

## 2022-12-12 PROCEDURE — 3074F PR MOST RECENT SYSTOLIC BLOOD PRESSURE < 130 MM HG: ICD-10-PCS | Mod: CPTII,S$GLB,, | Performed by: INTERNAL MEDICINE

## 2022-12-12 PROCEDURE — 93798 PHYS/QHP OP CAR RHAB W/ECG: CPT

## 2022-12-12 PROCEDURE — 1126F AMNT PAIN NOTED NONE PRSNT: CPT | Mod: CPTII,S$GLB,, | Performed by: INTERNAL MEDICINE

## 2022-12-12 PROCEDURE — 3074F SYST BP LT 130 MM HG: CPT | Mod: CPTII,S$GLB,, | Performed by: INTERNAL MEDICINE

## 2022-12-12 PROCEDURE — 99212 PR OFFICE/OUTPT VISIT, EST, LEVL II, 10-19 MIN: ICD-10-PCS | Mod: S$GLB,,, | Performed by: INTERNAL MEDICINE

## 2022-12-12 PROCEDURE — 3288F FALL RISK ASSESSMENT DOCD: CPT | Mod: CPTII,S$GLB,, | Performed by: INTERNAL MEDICINE

## 2022-12-12 PROCEDURE — 1159F PR MEDICATION LIST DOCUMENTED IN MEDICAL RECORD: ICD-10-PCS | Mod: CPTII,S$GLB,, | Performed by: INTERNAL MEDICINE

## 2022-12-12 PROCEDURE — 99999 PR PBB SHADOW E&M-EST. PATIENT-LVL IV: ICD-10-PCS | Mod: PBBFAC,,, | Performed by: INTERNAL MEDICINE

## 2022-12-12 PROCEDURE — 1160F PR REVIEW ALL MEDS BY PRESCRIBER/CLIN PHARMACIST DOCUMENTED: ICD-10-PCS | Mod: CPTII,S$GLB,, | Performed by: INTERNAL MEDICINE

## 2022-12-12 PROCEDURE — 1101F PT FALLS ASSESS-DOCD LE1/YR: CPT | Mod: CPTII,S$GLB,, | Performed by: INTERNAL MEDICINE

## 2022-12-12 PROCEDURE — 1101F PR PT FALLS ASSESS DOC 0-1 FALLS W/OUT INJ PAST YR: ICD-10-PCS | Mod: CPTII,S$GLB,, | Performed by: INTERNAL MEDICINE

## 2022-12-12 NOTE — PROGRESS NOTES
Patient ID:  Janak Booker is a 77 y.o. male who presents for follow-up of Coronary Artery Disease, Atrial Fibrillation, Results (echo), and Cough      He is going to cardiac rehab.  He has been doing great the heart rate is doing fine in rehab.  During the last office visit we talked about the possibility of doing PAULY cardioversion.  His left atrial cavities is markedly dilated it is unlikely that he will remain in sinus rhythm.  He discussed with Dr. Quinones that suggested continuing chronic AFib without a cardioversion attempt.  He has had an upper respiratory infection he is having a lingering cough that is been going on for approximately 2 weeks.  I discussed with him the possibility of lisinopril causing the cough.  At this point he is going to weigh few more weeks if there is persistent cough then will change the lisinopril to losartan.      Past Medical History:   Diagnosis Date    Anticoagulant long-term use     Aortic stenosis     Arthritis     Atrial fibrillation     CAD (coronary artery disease)     Colon polyps     per colonoscopy 9/11/2014    Diabetes mellitus, type 2     Disc displacement, lumbar     L3    Family history of prostate cancer     GERD (gastroesophageal reflux disease)     Heel bone fracture     left foot    Hyperlipidemia LDL goal < 70     Hypertension     NYHA class 3 heart failure with preserved ejection fraction 10/19/2022    Renal manifestation of secondary diabetes mellitus     Sleep apnea     USES MACHINE    Spinal stenosis, lumbar         Past Surgical History:   Procedure Laterality Date    BACK SURGERY      CARDIAC SURGERY      stents     CARPAL TUNNEL RELEASE Right     CATARACT EXTRACTION W/  INTRAOCULAR LENS IMPLANT Bilateral     COLONOSCOPY N/A 3/27/2018    Procedure: COLONOSCOPY;  Surgeon: Rishi Garcia MD;  Location: [facility];  Service: Endoscopy;  Laterality: N/A;    COLONOSCOPY N/A 2/15/2021    Procedure: COLONOSCOPY;  Surgeon: Rishi Garcia MD;  Location: [facility]  ENDO;  Service: Endoscopy;  Laterality: N/A;    CORONARY ANGIOPLASTY WITH STENT PLACEMENT      x 3    EYE SURGERY      INJECTION OF FACET JOINT N/A 5/14/2019    Procedure: INJECTION, FACET JOINT INJECTION (LUMBAR BLOCK) PLEASE INJECT L3/4;  Surgeon: Catrachito Harley MD;  Location: [facility] PAIN MGT;  Service: Pain Management;  Laterality: N/A;  NEEDS CONSENT, PRADAXA CLEARANCE IN CHART    KNEE ARTHROSCOPY W/ MENISCECTOMY  12/22/2008    right    LEFT HEART CATHETERIZATION Left 9/7/2022    Procedure: Left heart cath;  Surgeon: Adonay Quinones MD;  [facility];  Service: Cardiology;  Laterality: Left;    LUMBAR DISCECTOMY  12/2011    L4-L5 Fusion    LUMBAR EPIDURAL INJECTION  02/04/2019    ROTATOR CUFF REPAIR Left 7/2012    SHOULDER OPEN ROTATOR CUFF REPAIR      SKIN CANCER FOREHEAD 2019      SPINE SURGERY      TIBIA FRACTURE SURGERY      TRANSCATHETER AORTIC VALVE REPLACEMENT (TAVR) Bilateral 10/19/2022    Procedure: REPLACEMENT, AORTIC VALVE, TRANSCATHETER (TAVR);  Surgeon: Adonay Quinones MD;  Location: [facility] CATH;  Service: Cardiology;  Laterality: Bilateral;    TRANSCATHETER AORTIC VALVE REPLACEMENT (TAVR) Bilateral 10/19/2022    Procedure: REPLACEMENT, AORTIC VALVE, TRANSCATHETER (TAVR);  Surgeon: Denver Osborne MD;  [facility];  Service: Cardiothoracic;  Laterality: Bilateral;    TRANSFORAMINAL EPIDURAL INJECTION OF STEROID Left 9/23/2019    Procedure: INJECTION, STEROID, EPIDURAL, TRANSFORAMINAL APPROACH;  Surgeon: Jose Guadalupe Linn MD;  Location: [facility] PAIN MGT;  Service: Pain Management;  Laterality: Left;  Left TF ADI L4 and L5  OK to hold Pradaxa    TRANSFORAMINAL EPIDURAL INJECTION OF STEROID Left 11/14/2019    Procedure: INJECTION, STEROID, EPIDURAL, TRANSFORAMINAL APPROACH;  Surgeon: Jose Guadalupe Linn MD;  Location: [facility] PAIN MGT;  Service: Pain Management;  Laterality: Left;  Left TF ADI L4-5 and L5-S1          Current Outpatient Medications   Medication Instructions    ascorbic acid (vitamin C)  "(VITAMIN C) 1,000 mg, Oral, Daily    aspirin (ECOTRIN) 81 mg, Oral, Daily    BD ALCOHOL SWABS PadM USE AS DIRECTED TO CHECK BLOOD GLUCOSE DAILY    fenofibrate micronized (LOFIBRA) 134 mg, Oral, Daily    FLUAD QUAD 2022-23,65Y UP,,PF, 60 mcg (15 mcg x 4)/0.5 mL Syrg No dose, route, or frequency recorded.    lancets (TRUEPLUS LANCETS) 28 gauge Misc 1 lancet, Misc.(Non-Drug; Combo Route), Daily    lisinopriL (PRINIVIL,ZESTRIL) 5 mg, Oral, Daily    metFORMIN (GLUCOPHAGE-XR) 500 MG ER 24hr tablet TAKE ONE TABLET BY MOUTH ONCE DAILY    multivitamin with minerals tablet No dose, route, or frequency recorded.    NITROSTAT 0.4 mg, Oral, Every 5 min PRN, Do not take more than 3 tabs a day    omega-3 fatty acids 1,000 mg Cap 1 Capsule(s) Oral OTC once a day.    omeprazole (PRILOSEC) 40 MG capsule TAKE ONE CAPSULE BY MOUTH ONCE DAILY    pen needle, diabetic (BD BARRY 2ND GEN PEN NEEDLE) 32 gauge x 5/32" Ndle USE ONE NEEDLE ONCE DAILY     pioglitazone (ACTOS) 30 mg, Oral, Daily    PRADAXA 150 mg Cap TAKE ONE CAPSULE BY MOUTH TWICE DAILY    rosuvastatin (CRESTOR) 10 MG tablet TAKE ONE TABLET BY MOUTH ONCE DAILY    sotaloL (BETAPACE) 40 mg, Oral, Daily    VICTOZA 3-CAITIE 1.8 mg, Subcutaneous, Daily        Review of patient's allergies indicates:   Allergen Reactions    Neurontin [gabapentin] Swelling     Leg swelling        Review of Systems   HENT:  Positive for congestion.    Cardiovascular:  Negative for chest pain, dyspnea on exertion, irregular heartbeat and palpitations.   Respiratory:  Positive for cough. Negative for shortness of breath.    Hematologic/Lymphatic: Negative for bleeding problem.   Gastrointestinal:  Negative for heartburn.      Objective:     Vitals:    12/12/22 1355   BP: 122/70   BP Location: Left arm   Patient Position: Sitting   BP Method: Medium (Manual)   Pulse: 89   SpO2: 96%   Weight: 114.9 kg (253 lb 6.7 oz)   Height: 6' 1" (1.854 m)       Physical Exam  Vitals and nursing note reviewed.   HENT:      " Head: Normocephalic and atraumatic.   Eyes:      Conjunctiva/sclera: Conjunctivae normal.   Cardiovascular:      Rate and Rhythm: Normal rate. Rhythm irregular.      Heart sounds: Normal heart sounds.   Pulmonary:      Effort: Pulmonary effort is normal.      Breath sounds: Normal breath sounds.   Abdominal:      General: Bowel sounds are normal.      Palpations: Abdomen is soft.   Musculoskeletal:         General: Normal range of motion.   Skin:     General: Skin is warm and dry.   Neurological:      Mental Status: He is alert and oriented to person, place, and time.   Psychiatric:         Behavior: Behavior normal.         Thought Content: Thought content normal.         Judgment: Judgment normal.     CMP  Sodium   Date Value Ref Range Status   11/14/2022 140 136 - 145 mmol/L Final     Potassium   Date Value Ref Range Status   11/14/2022 4.9 3.5 - 5.1 mmol/L Final     Chloride   Date Value Ref Range Status   11/14/2022 108 95 - 110 mmol/L Final     CO2   Date Value Ref Range Status   11/14/2022 26 22 - 31 mmol/L Final     Glucose   Date Value Ref Range Status   11/14/2022 107 70 - 110 mg/dL Final     Comment:     The ADA recommends the following guidelines for fasting glucose:    Normal:       less than 100 mg/dL    Prediabetes:  100 mg/dL to 125 mg/dL    Diabetes:     126 mg/dL or higher       BUN   Date Value Ref Range Status   11/14/2022 26 (H) 9 - 21 mg/dL Final     Creatinine   Date Value Ref Range Status   11/14/2022 1.53 (H) 0.50 - 1.40 mg/dL Final     Calcium   Date Value Ref Range Status   11/14/2022 9.2 8.4 - 10.2 mg/dL Final     Total Protein   Date Value Ref Range Status   11/14/2022 6.9 6.0 - 8.4 g/dL Final     Albumin   Date Value Ref Range Status   11/14/2022 4.1 3.5 - 5.2 g/dL Final     Total Bilirubin   Date Value Ref Range Status   11/14/2022 0.8 0.2 - 1.3 mg/dL Final     Alkaline Phosphatase   Date Value Ref Range Status   11/14/2022 50 38 - 145 U/L Final     AST   Date Value Ref Range Status    11/14/2022 39 17 - 59 U/L Final     ALT   Date Value Ref Range Status   11/14/2022 17 0 - 50 U/L Final     Anion Gap   Date Value Ref Range Status   11/14/2022 6 (L) 8 - 16 mmol/L Final     eGFR if    Date Value Ref Range Status   07/05/2022 34.6 (A) >60 mL/min/1.73 m^2 Final     eGFR if non    Date Value Ref Range Status   07/05/2022 29.9 (A) >60 mL/min/1.73 m^2 Final     Comment:     Calculation used to obtain the estimated glomerular filtration  rate (eGFR) is the CKD-EPI equation.         BMP  Lab Results   Component Value Date     11/14/2022    K 4.9 11/14/2022     11/14/2022    CO2 26 11/14/2022    BUN 26 (H) 11/14/2022    CREATININE 1.53 (H) 11/14/2022    CALCIUM 9.2 11/14/2022    ANIONGAP 6 (L) 11/14/2022    ESTGFRAFRICA 34.6 (A) 07/05/2022    EGFRNONAA 29.9 (A) 07/05/2022      BNP  @LABRCNTIP(BNP,BNPTRIAGEBLO)@   Lab Results   Component Value Date    CHOL 140 11/08/2021    CHOL 130 06/09/2020    CHOL 133 04/11/2019     Lab Results   Component Value Date    HDL 42 11/08/2021    HDL 41 06/09/2020    HDL 39 (L) 04/11/2019     Lab Results   Component Value Date    LDLCALC 64.2 11/08/2021    LDLCALC 67.2 06/09/2020    LDLCALC 66.8 04/11/2019     Lab Results   Component Value Date    TRIG 169 (H) 11/08/2021    TRIG 109 06/09/2020    TRIG 136 04/11/2019     Lab Results   Component Value Date    CHOLHDL 30.0 11/08/2021    CHOLHDL 31.5 06/09/2020    CHOLHDL 29.3 04/11/2019      Lab Results   Component Value Date    TSH 1.32 07/20/2011     Lab Results   Component Value Date    HGBA1C 6.3 (H) 10/19/2022     Lab Results   Component Value Date    WBC 7.13 11/14/2022    HGB 13.7 (L) 11/14/2022    HCT 42.5 11/14/2022    MCV 88 11/14/2022     11/14/2022         Results for orders placed during the hospital encounter of 11/14/22    Echo    Interpretation Summary  · Atrial fibrillation observed.  · The left ventricle is normal in size with concentric remodeling and normal  systolic function. The estimated ejection fraction is 65%.  · Severe left atrial enlargement.  · Normal right ventricular size with normal right ventricular systolic function.  · Mild right atrial enlargement.  · Mild mitral regurgitation.  · There is a 34 mm Medtronic Evolut PRO+ transcutaneously-placed aortic bioprosthesis present. There is mild paravalvular aortic insufficiency present. Prosthetic aortic valve is normal. The aortic valve mean gradient is 7 mmHg with a dimensionless index of 0.62.     Results for orders placed during the hospital encounter of 10/19/22    Cardiac catheterization    Narrative  Procedure performed in the Invasive Lab  - See Procedure Log link below for nursing documentation  - See OpNote on Surgeries Tab for physician findings  - See Imaging Tab for radiologist dictation         Assessment:       Coronary artery disease, occlusive  Stable no symptoms of angina    Hyperlipidemia associated with type 2 diabetes mellitus  Well controlled on fenofibrate and rosuvastatin    S/P TAVR (transcatheter aortic valve replacement)  Stable doing well    Solitary kidney, congenital  Aware    Controlled type 2 diabetes mellitus with stage 3 chronic kidney disease, without long-term current use of insulin  Well controlled    Atrial fibrillation, chronic  Chronic atrial fibrillation on chronic anticoagulation rate control       Plan:       Continue current medical therapy return to the office in 6 months.  Blood work will be obtained from his primary care physician.  If he continues to have cough in 1-2 weeks he is to call back and will change the lisinopril to 25 mg of losartan

## 2022-12-14 ENCOUNTER — CLINICAL SUPPORT (OUTPATIENT)
Dept: CARDIAC REHAB | Facility: HOSPITAL | Age: 77
End: 2022-12-14
Payer: MEDICARE

## 2022-12-14 DIAGNOSIS — Z95.5 S/P CORONARY ARTERY STENT PLACEMENT: Primary | ICD-10-CM

## 2022-12-14 PROCEDURE — 93798 PHYS/QHP OP CAR RHAB W/ECG: CPT

## 2022-12-16 ENCOUNTER — CLINICAL SUPPORT (OUTPATIENT)
Dept: CARDIAC REHAB | Facility: HOSPITAL | Age: 77
End: 2022-12-16
Payer: MEDICARE

## 2022-12-16 DIAGNOSIS — Z95.5 S/P CORONARY ARTERY STENT PLACEMENT: Primary | ICD-10-CM

## 2022-12-16 PROCEDURE — 93798 PHYS/QHP OP CAR RHAB W/ECG: CPT

## 2022-12-19 ENCOUNTER — CLINICAL SUPPORT (OUTPATIENT)
Dept: CARDIAC REHAB | Facility: HOSPITAL | Age: 77
End: 2022-12-19
Payer: MEDICARE

## 2022-12-19 DIAGNOSIS — Z95.5 S/P CORONARY ARTERY STENT PLACEMENT: Primary | ICD-10-CM

## 2022-12-19 PROCEDURE — 93798 PHYS/QHP OP CAR RHAB W/ECG: CPT

## 2022-12-21 RX ORDER — OMEPRAZOLE 40 MG/1
CAPSULE, DELAYED RELEASE ORAL
Qty: 90 CAPSULE | Refills: 3 | Status: SHIPPED | OUTPATIENT
Start: 2022-12-21 | End: 2023-09-19

## 2022-12-21 NOTE — TELEPHONE ENCOUNTER
Care Due:                  Date            Visit Type   Department     Provider  --------------------------------------------------------------------------------    Last Visit: None Found      None         None Found  Next Visit: None Scheduled  None         None Found                                                            Last  Test          Frequency    Reason                     Performed    Due Date  --------------------------------------------------------------------------------    Office Visit  12 months..  liraglutide, omeprazole,   Not Found    Overdue                             pioglitazone.............    [employer] Embedded Care Gaps. Reference number: [id number]. 12/21/2022   2:41:31 PM CST

## 2022-12-22 NOTE — TELEPHONE ENCOUNTER
Refill Decision Note   Janak Booker  is requesting a refill authorization.  Brief Assessment and Rationale for Refill:  Approve    -Medication-Related Problems Identified: Requires appointment  Medication Therapy Plan:       Medication Reconciliation Completed: No   Comments:     Provider Staff:     Action is required for this patient.   Please see care gap opportunities below in Care Due Message.     Thanks!  [facility]     Appointments      Date Provider   Last Visit   10/31/2022 See Quach MD   Next Visit   4/26/2023 See Quach MD     Note composed:7:58 PM 12/21/2022           Note composed:7:58 PM 12/21/2022

## 2022-12-28 ENCOUNTER — CLINICAL SUPPORT (OUTPATIENT)
Dept: CARDIAC REHAB | Facility: HOSPITAL | Age: 77
End: 2022-12-28
Payer: MEDICARE

## 2022-12-28 DIAGNOSIS — Z95.5 S/P CORONARY ARTERY STENT PLACEMENT: Primary | ICD-10-CM

## 2022-12-28 PROCEDURE — 93798 PHYS/QHP OP CAR RHAB W/ECG: CPT

## 2023-01-04 ENCOUNTER — LAB VISIT (OUTPATIENT)
Dept: LAB | Facility: HOSPITAL | Age: 78
End: 2023-01-04
Attending: INTERNAL MEDICINE
Payer: MEDICARE

## 2023-01-04 ENCOUNTER — CLINICAL SUPPORT (OUTPATIENT)
Dept: CARDIAC REHAB | Facility: HOSPITAL | Age: 78
End: 2023-01-04
Payer: MEDICARE

## 2023-01-04 DIAGNOSIS — E87.1 HYPOSMOLALITY SYNDROME: ICD-10-CM

## 2023-01-04 DIAGNOSIS — Z95.5 STATUS POST CORONARY ARTERY STENT PLACEMENT: Primary | ICD-10-CM

## 2023-01-04 DIAGNOSIS — N18.30 CHRONIC KIDNEY DISEASE, STAGE III (MODERATE): Primary | ICD-10-CM

## 2023-01-04 DIAGNOSIS — E66.3 SEVERELY OVERWEIGHT: ICD-10-CM

## 2023-01-04 LAB
ALBUMIN SERPL BCP-MCNC: 3.9 G/DL (ref 3.5–5.2)
ALP SERPL-CCNC: 45 U/L (ref 55–135)
ALT SERPL W/O P-5'-P-CCNC: 12 U/L (ref 10–44)
ANION GAP SERPL CALC-SCNC: 10 MMOL/L (ref 8–16)
AST SERPL-CCNC: 20 U/L (ref 10–40)
BASOPHILS # BLD AUTO: 0.05 K/UL (ref 0–0.2)
BASOPHILS NFR BLD: 0.8 % (ref 0–1.9)
BILIRUB SERPL-MCNC: 0.8 MG/DL (ref 0.1–1)
BUN SERPL-MCNC: 24 MG/DL (ref 8–23)
CALCIUM SERPL-MCNC: 9.2 MG/DL (ref 8.7–10.5)
CHLORIDE SERPL-SCNC: 103 MMOL/L (ref 95–110)
CO2 SERPL-SCNC: 23 MMOL/L (ref 23–29)
CREAT SERPL-MCNC: 1.7 MG/DL (ref 0.5–1.4)
DIFFERENTIAL METHOD: ABNORMAL
EOSINOPHIL # BLD AUTO: 0.3 K/UL (ref 0–0.5)
EOSINOPHIL NFR BLD: 5.3 % (ref 0–8)
ERYTHROCYTE [DISTWIDTH] IN BLOOD BY AUTOMATED COUNT: 16.5 % (ref 11.5–14.5)
EST. GFR  (NO RACE VARIABLE): 41.3 ML/MIN/1.73 M^2
GLUCOSE SERPL-MCNC: 210 MG/DL (ref 70–110)
HCT VFR BLD AUTO: 42.9 % (ref 40–54)
HGB BLD-MCNC: 13.9 G/DL (ref 14–18)
IMM GRANULOCYTES # BLD AUTO: 0.01 K/UL (ref 0–0.04)
IMM GRANULOCYTES NFR BLD AUTO: 0.2 % (ref 0–0.5)
LYMPHOCYTES # BLD AUTO: 2 K/UL (ref 1–4.8)
LYMPHOCYTES NFR BLD: 32.4 % (ref 18–48)
MCH RBC QN AUTO: 28.7 PG (ref 27–31)
MCHC RBC AUTO-ENTMCNC: 32.4 G/DL (ref 32–36)
MCV RBC AUTO: 89 FL (ref 82–98)
MONOCYTES # BLD AUTO: 0.5 K/UL (ref 0.3–1)
MONOCYTES NFR BLD: 7.4 % (ref 4–15)
NEUTROPHILS # BLD AUTO: 3.4 K/UL (ref 1.8–7.7)
NEUTROPHILS NFR BLD: 53.9 % (ref 38–73)
NRBC BLD-RTO: 0 /100 WBC
OSMOLALITY UR: 688 MOSM/KG (ref 50–1200)
PHOSPHATE SERPL-MCNC: 3 MG/DL (ref 2.7–4.5)
PLATELET # BLD AUTO: 195 K/UL (ref 150–450)
PMV BLD AUTO: 11.3 FL (ref 9.2–12.9)
POTASSIUM SERPL-SCNC: 4.5 MMOL/L (ref 3.5–5.1)
PROT SERPL-MCNC: 7 G/DL (ref 6–8.4)
PTH-INTACT SERPL-MCNC: 13.3 PG/ML (ref 9–77)
RBC # BLD AUTO: 4.85 M/UL (ref 4.6–6.2)
SODIUM SERPL-SCNC: 136 MMOL/L (ref 136–145)
WBC # BLD AUTO: 6.23 K/UL (ref 3.9–12.7)

## 2023-01-04 PROCEDURE — 80053 COMPREHEN METABOLIC PANEL: CPT | Performed by: INTERNAL MEDICINE

## 2023-01-04 PROCEDURE — 93798 PHYS/QHP OP CAR RHAB W/ECG: CPT

## 2023-01-04 PROCEDURE — 85025 COMPLETE CBC W/AUTO DIFF WBC: CPT | Performed by: INTERNAL MEDICINE

## 2023-01-04 PROCEDURE — 84100 ASSAY OF PHOSPHORUS: CPT | Performed by: INTERNAL MEDICINE

## 2023-01-04 PROCEDURE — 83970 ASSAY OF PARATHORMONE: CPT | Performed by: INTERNAL MEDICINE

## 2023-01-04 PROCEDURE — 83935 ASSAY OF URINE OSMOLALITY: CPT | Performed by: INTERNAL MEDICINE

## 2023-01-04 PROCEDURE — 36415 COLL VENOUS BLD VENIPUNCTURE: CPT | Performed by: INTERNAL MEDICINE

## 2023-01-06 ENCOUNTER — CLINICAL SUPPORT (OUTPATIENT)
Dept: CARDIAC REHAB | Facility: HOSPITAL | Age: 78
End: 2023-01-06
Payer: MEDICARE

## 2023-01-06 DIAGNOSIS — Z95.5 STATUS POST INSERTION OF DRUG ELUTING CORONARY ARTERY STENT: Primary | ICD-10-CM

## 2023-01-06 PROCEDURE — 93798 PHYS/QHP OP CAR RHAB W/ECG: CPT

## 2023-01-09 ENCOUNTER — CLINICAL SUPPORT (OUTPATIENT)
Dept: CARDIAC REHAB | Facility: HOSPITAL | Age: 78
End: 2023-01-09
Payer: MEDICARE

## 2023-01-09 DIAGNOSIS — Z95.5 S/P INSERTION OF NON-DRUG ELUTING CORONARY ARTERY STENT: Primary | ICD-10-CM

## 2023-01-09 PROCEDURE — 93798 PHYS/QHP OP CAR RHAB W/ECG: CPT

## 2023-01-11 ENCOUNTER — CLINICAL SUPPORT (OUTPATIENT)
Dept: CARDIAC REHAB | Facility: HOSPITAL | Age: 78
End: 2023-01-11
Payer: MEDICARE

## 2023-01-11 DIAGNOSIS — Z95.5 S/P INSERTION OF NON-DRUG ELUTING CORONARY ARTERY STENT: Primary | ICD-10-CM

## 2023-01-11 PROCEDURE — 93798 PHYS/QHP OP CAR RHAB W/ECG: CPT

## 2023-01-13 ENCOUNTER — CLINICAL SUPPORT (OUTPATIENT)
Dept: CARDIAC REHAB | Facility: HOSPITAL | Age: 78
End: 2023-01-13
Payer: MEDICARE

## 2023-01-13 DIAGNOSIS — Z95.5 S/P INSERTION OF NON-DRUG ELUTING CORONARY ARTERY STENT: Primary | ICD-10-CM

## 2023-01-13 PROCEDURE — 93798 PHYS/QHP OP CAR RHAB W/ECG: CPT

## 2023-01-18 ENCOUNTER — CLINICAL SUPPORT (OUTPATIENT)
Dept: CARDIAC REHAB | Facility: HOSPITAL | Age: 78
End: 2023-01-18
Payer: MEDICARE

## 2023-01-18 DIAGNOSIS — Z95.5 S/P INSERTION OF NON-DRUG ELUTING CORONARY ARTERY STENT: Primary | ICD-10-CM

## 2023-01-18 PROCEDURE — 93798 PHYS/QHP OP CAR RHAB W/ECG: CPT

## 2023-01-23 ENCOUNTER — CLINICAL SUPPORT (OUTPATIENT)
Dept: CARDIAC REHAB | Facility: HOSPITAL | Age: 78
End: 2023-01-23
Payer: MEDICARE

## 2023-01-23 DIAGNOSIS — Z95.5 S/P INSERTION OF NON-DRUG ELUTING CORONARY ARTERY STENT: Primary | ICD-10-CM

## 2023-01-23 PROCEDURE — 93798 PHYS/QHP OP CAR RHAB W/ECG: CPT

## 2023-01-25 ENCOUNTER — CLINICAL SUPPORT (OUTPATIENT)
Dept: CARDIAC REHAB | Facility: HOSPITAL | Age: 78
End: 2023-01-25
Payer: MEDICARE

## 2023-01-25 DIAGNOSIS — Z95.5 S/P INSERTION OF NON-DRUG ELUTING CORONARY ARTERY STENT: Primary | ICD-10-CM

## 2023-01-25 PROCEDURE — 93798 PHYS/QHP OP CAR RHAB W/ECG: CPT

## 2023-01-27 ENCOUNTER — CLINICAL SUPPORT (OUTPATIENT)
Dept: CARDIAC REHAB | Facility: HOSPITAL | Age: 78
End: 2023-01-27
Payer: MEDICARE

## 2023-01-27 DIAGNOSIS — Z95.5 S/P INSERTION OF NON-DRUG ELUTING CORONARY ARTERY STENT: Primary | ICD-10-CM

## 2023-01-27 PROCEDURE — 93798 PHYS/QHP OP CAR RHAB W/ECG: CPT

## 2023-01-30 ENCOUNTER — CLINICAL SUPPORT (OUTPATIENT)
Dept: CARDIAC REHAB | Facility: HOSPITAL | Age: 78
End: 2023-01-30
Payer: MEDICARE

## 2023-01-30 DIAGNOSIS — Z95.5 S/P INSERTION OF NON-DRUG ELUTING CORONARY ARTERY STENT: Primary | ICD-10-CM

## 2023-01-30 PROCEDURE — 93798 PHYS/QHP OP CAR RHAB W/ECG: CPT

## 2023-02-01 ENCOUNTER — CLINICAL SUPPORT (OUTPATIENT)
Dept: CARDIAC REHAB | Facility: HOSPITAL | Age: 78
End: 2023-02-01
Payer: MEDICARE

## 2023-02-01 DIAGNOSIS — Z95.5 S/P INSERTION OF NON-DRUG ELUTING CORONARY ARTERY STENT: Primary | ICD-10-CM

## 2023-02-01 PROCEDURE — 93798 PHYS/QHP OP CAR RHAB W/ECG: CPT

## 2023-02-06 ENCOUNTER — CLINICAL SUPPORT (OUTPATIENT)
Dept: CARDIAC REHAB | Facility: HOSPITAL | Age: 78
End: 2023-02-06
Payer: MEDICARE

## 2023-02-06 DIAGNOSIS — Z95.5 S/P INSERTION OF NON-DRUG ELUTING CORONARY ARTERY STENT: Primary | ICD-10-CM

## 2023-02-08 ENCOUNTER — CLINICAL SUPPORT (OUTPATIENT)
Dept: CARDIAC REHAB | Facility: HOSPITAL | Age: 78
End: 2023-02-08
Payer: MEDICARE

## 2023-02-08 DIAGNOSIS — Z95.5 S/P INSERTION OF NON-DRUG ELUTING CORONARY ARTERY STENT: Primary | ICD-10-CM

## 2023-02-08 PROCEDURE — 93798 PHYS/QHP OP CAR RHAB W/ECG: CPT

## 2023-02-09 DIAGNOSIS — Z00.00 ENCOUNTER FOR MEDICARE ANNUAL WELLNESS EXAM: ICD-10-CM

## 2023-02-10 ENCOUNTER — CLINICAL SUPPORT (OUTPATIENT)
Dept: CARDIAC REHAB | Facility: HOSPITAL | Age: 78
End: 2023-02-10
Payer: MEDICARE

## 2023-02-10 DIAGNOSIS — Z95.5 S/P INSERTION OF NON-DRUG ELUTING CORONARY ARTERY STENT: Primary | ICD-10-CM

## 2023-02-10 PROCEDURE — 93798 PHYS/QHP OP CAR RHAB W/ECG: CPT

## 2023-02-13 ENCOUNTER — CLINICAL SUPPORT (OUTPATIENT)
Dept: CARDIAC REHAB | Facility: HOSPITAL | Age: 78
End: 2023-02-13
Payer: MEDICARE

## 2023-02-13 DIAGNOSIS — Z95.5 S/P INSERTION OF NON-DRUG ELUTING CORONARY ARTERY STENT: Primary | ICD-10-CM

## 2023-02-13 PROCEDURE — 93798 PHYS/QHP OP CAR RHAB W/ECG: CPT

## 2023-02-15 ENCOUNTER — CLINICAL SUPPORT (OUTPATIENT)
Dept: CARDIAC REHAB | Facility: HOSPITAL | Age: 78
End: 2023-02-15
Payer: MEDICARE

## 2023-02-15 DIAGNOSIS — Z95.5 S/P INSERTION OF NON-DRUG ELUTING CORONARY ARTERY STENT: Primary | ICD-10-CM

## 2023-02-15 PROCEDURE — 93798 PHYS/QHP OP CAR RHAB W/ECG: CPT

## 2023-02-17 ENCOUNTER — CLINICAL SUPPORT (OUTPATIENT)
Dept: CARDIAC REHAB | Facility: HOSPITAL | Age: 78
End: 2023-02-17
Payer: MEDICARE

## 2023-02-17 DIAGNOSIS — Z95.5 S/P INSERTION OF NON-DRUG ELUTING CORONARY ARTERY STENT: Primary | ICD-10-CM

## 2023-02-17 PROCEDURE — 93798 PHYS/QHP OP CAR RHAB W/ECG: CPT

## 2023-02-23 NOTE — PROGRESS NOTES
Daily report scanned into EMR by Sivan Kilpatrick, RRT  Monitored by Sivan Kilpatrick, RRTDaily report scanned into EMR by Sivan Kilpatrick, RRT  Monitored by Sivan Kilpatrick, RRT

## 2023-03-06 ENCOUNTER — CLINICAL SUPPORT (OUTPATIENT)
Dept: CARDIAC REHAB | Facility: HOSPITAL | Age: 78
End: 2023-03-06
Payer: MEDICARE

## 2023-03-06 DIAGNOSIS — Z95.5 S/P INSERTION OF NON-DRUG ELUTING CORONARY ARTERY STENT: Primary | ICD-10-CM

## 2023-03-06 PROCEDURE — 93798 PHYS/QHP OP CAR RHAB W/ECG: CPT

## 2023-03-08 ENCOUNTER — CLINICAL SUPPORT (OUTPATIENT)
Dept: CARDIAC REHAB | Facility: HOSPITAL | Age: 78
End: 2023-03-08
Payer: MEDICARE

## 2023-03-08 DIAGNOSIS — Z95.5 S/P INSERTION OF NON-DRUG ELUTING CORONARY ARTERY STENT: Primary | ICD-10-CM

## 2023-03-08 PROCEDURE — 93798 PHYS/QHP OP CAR RHAB W/ECG: CPT

## 2023-03-10 ENCOUNTER — CLINICAL SUPPORT (OUTPATIENT)
Dept: CARDIAC REHAB | Facility: HOSPITAL | Age: 78
End: 2023-03-10
Payer: MEDICARE

## 2023-03-10 DIAGNOSIS — Z95.5 S/P INSERTION OF NON-DRUG ELUTING CORONARY ARTERY STENT: Primary | ICD-10-CM

## 2023-03-10 PROCEDURE — 93798 PHYS/QHP OP CAR RHAB W/ECG: CPT

## 2023-04-18 ENCOUNTER — TELEPHONE (OUTPATIENT)
Dept: CARDIOLOGY | Facility: CLINIC | Age: 78
End: 2023-04-18
Payer: MEDICARE

## 2023-04-18 ENCOUNTER — TELEPHONE (OUTPATIENT)
Dept: GASTROENTEROLOGY | Facility: CLINIC | Age: 78
End: 2023-04-18
Payer: MEDICARE

## 2023-04-18 DIAGNOSIS — Z86.010 HX OF COLONIC POLYPS: Primary | ICD-10-CM

## 2023-04-18 NOTE — TELEPHONE ENCOUNTER
----- Message from Kirit Tinoco LPN sent at 4/18/2023  4:46 PM CDT -----  Regarding: Cardiac clearance  This patient is scheduled for a colonoscopy on 6/23/2023. Please send clearance to hold Pradaxa for 3 days before his procedure.

## 2023-04-18 NOTE — TELEPHONE ENCOUNTER
----- Message from Nichole Barr sent at 4/18/2023  3:48 PM CDT -----  Type:  Sooner Appointment Request    Caller is requesting a sooner appointment.  Caller declined first available appointment listed below.  Caller will not accept being placed on the waitlist and is requesting a message be sent to doctor.    Name of Caller:  pt   When is the first available appointment?  unk  Symptoms:  check up   Best Call Back Number:  [phone] (home)     Additional Information:  please advise thank you

## 2023-04-18 NOTE — LETTER
"     2023    Janak RDZ Jhony  [address] [place] [address]             [facility]  [address]  [place] [address]  Phone: [phone]  Fax: [phone] Patient: Janak Booker  : 1945  Referring Doctor: dr link  Type of procedure: colonoscopy    Current Outpatient Medications   Medication Sig    ascorbic acid, vitamin C, (VITAMIN C) 1000 MG tablet Take 1,000 mg by mouth once daily.    aspirin (ECOTRIN) 81 MG EC tablet Take 1 tablet (81 mg total) by mouth once daily.    BD ALCOHOL SWABS PadM USE AS DIRECTED TO CHECK BLOOD GLUCOSE DAILY    fenofibrate micronized (LOFIBRA) 134 MG Cap Take 1 capsule (134 mg total) by mouth once daily.    FLUAD QUAD ,65Y UP,,PF, 60 mcg (15 mcg x 4)/0.5 mL Syrg     lancets (TRUEPLUS LANCETS) 28 gauge Misc 1 lancet by Misc.(Non-Drug; Combo Route) route once daily.    liraglutide 0.6 mg/0.1 mL, 18 mg/3 mL, subq PNIJ (VICTOZA 3-CAITIE) 0.6 mg/0.1 mL (18 mg/3 mL) PnIj pen Inject 1.8 mg into the skin once daily.    lisinopriL (PRINIVIL,ZESTRIL) 5 MG tablet Take 1 tablet (5 mg total) by mouth once daily.    metFORMIN (GLUCOPHAGE-XR) 500 MG ER 24hr tablet TAKE ONE TABLET BY MOUTH ONCE DAILY    multivitamin with minerals tablet     NITROSTAT 0.4 mg SL tablet Take 1 tablet (0.4 mg total) by mouth every 5 (five) minutes as needed for Chest pain. Do not take more than 3 tabs a day    omega-3 fatty acids 1,000 mg Cap 1 Capsule(s) Oral OTC once a day.    omeprazole (PRILOSEC) 40 MG capsule TAKE ONE CAPSULE BY MOUTH ONCE DAILY    pen needle, diabetic (BD BARRY 2ND GEN PEN NEEDLE) 32 gauge x 5/32" Ndle USE ONE NEEDLE ONCE DAILY    pioglitazone (ACTOS) 30 MG tablet Take 1 tablet (30 mg total) by mouth once daily.    PRADAXA 150 mg Cap TAKE ONE CAPSULE BY MOUTH TWICE DAILY    rosuvastatin (CRESTOR) 10 MG tablet Take 1 tablet (10 mg total) by mouth every evening.    sotaloL (BETAPACE) 80 MG tablet " Take 0.5 tablets (40 mg total) by mouth once daily.     No current facility-administered medications for this visit.       This patient has been assessed for risk factors for clearance of surgery with the following stipulations:    ___ No contraindications  _x_ Recommendations for antiplatelet/anticoagulant medications: ok to stop pradaxa for 3 days prior  _x__ Cleared for surgery   ___ Not cleared for surgery due to the following reasons:      If you have any questions regarding the above, please contact my office at [phone].    Sincerely,    .

## 2023-04-18 NOTE — TELEPHONE ENCOUNTER
----- Message from Amanda Carreon sent at 4/18/2023  3:54 PM CDT -----  Contact: Patient  Type:  Patient Needs Advice    Who Called:  Patient  What this is regarding?:  Pt would like to schedule his colonoscopy.   Best Call Back Number:  [phone]  Additional Information:  Please call the patient back at the phone number listed above to advise. Thank you!

## 2023-04-18 NOTE — TELEPHONE ENCOUNTER
Placed call to patient to assist in scheduling colonoscopy. Date set foe 6/23. Prep instructions sent to portal.

## 2023-04-24 ENCOUNTER — TELEPHONE (OUTPATIENT)
Dept: FAMILY MEDICINE | Facility: CLINIC | Age: 78
End: 2023-04-24
Payer: MEDICARE

## 2023-04-24 NOTE — TELEPHONE ENCOUNTER
I spoke with pt via phone. Will await until appt with   Dr. Quach to complete blood work.     ----- Message from Amanda Carreon sent at 4/24/2023 12:07 PM CDT -----  Contact: Patient  Type:  Patient Needs Advice    Who Called:  Patient  What is this regarding?:  Pt needs his A1C checked before his visit on 4/26/23. Please put orders in for him.  Best Call Back Number:  [phone]  Additional Information:  Please call the patient back at the phone number listed above to advise. Thank you!

## 2023-04-26 ENCOUNTER — LAB VISIT (OUTPATIENT)
Dept: LAB | Facility: HOSPITAL | Age: 78
End: 2023-04-26
Attending: STUDENT IN AN ORGANIZED HEALTH CARE EDUCATION/TRAINING PROGRAM
Payer: MEDICARE

## 2023-04-26 ENCOUNTER — OFFICE VISIT (OUTPATIENT)
Dept: FAMILY MEDICINE | Facility: CLINIC | Age: 78
End: 2023-04-26
Payer: MEDICARE

## 2023-04-26 VITALS
HEIGHT: 73 IN | BODY MASS INDEX: 33.11 KG/M2 | RESPIRATION RATE: 18 BRPM | OXYGEN SATURATION: 98 % | DIASTOLIC BLOOD PRESSURE: 82 MMHG | HEART RATE: 73 BPM | SYSTOLIC BLOOD PRESSURE: 118 MMHG | WEIGHT: 249.81 LBS

## 2023-04-26 DIAGNOSIS — E11.22 CONTROLLED TYPE 2 DIABETES MELLITUS WITH STAGE 3 CHRONIC KIDNEY DISEASE, WITHOUT LONG-TERM CURRENT USE OF INSULIN: Primary | ICD-10-CM

## 2023-04-26 DIAGNOSIS — N18.30 CONTROLLED TYPE 2 DIABETES MELLITUS WITH STAGE 3 CHRONIC KIDNEY DISEASE, WITHOUT LONG-TERM CURRENT USE OF INSULIN: ICD-10-CM

## 2023-04-26 DIAGNOSIS — I70.0 AORTIC ATHEROSCLEROSIS: ICD-10-CM

## 2023-04-26 DIAGNOSIS — I48.91 ATRIAL FIBRILLATION, UNSPECIFIED TYPE: ICD-10-CM

## 2023-04-26 DIAGNOSIS — N18.30 CONTROLLED TYPE 2 DIABETES MELLITUS WITH STAGE 3 CHRONIC KIDNEY DISEASE, WITHOUT LONG-TERM CURRENT USE OF INSULIN: Primary | ICD-10-CM

## 2023-04-26 DIAGNOSIS — N18.32 STAGE 3B CHRONIC KIDNEY DISEASE: ICD-10-CM

## 2023-04-26 DIAGNOSIS — E11.22 CONTROLLED TYPE 2 DIABETES MELLITUS WITH STAGE 3 CHRONIC KIDNEY DISEASE, WITHOUT LONG-TERM CURRENT USE OF INSULIN: ICD-10-CM

## 2023-04-26 PROBLEM — I50.30 NYHA CLASS 2 HEART FAILURE WITH PRESERVED EJECTION FRACTION: Status: RESOLVED | Noted: 2022-10-19 | Resolved: 2023-04-26

## 2023-04-26 PROBLEM — I50.30 NYHA CLASS 3 HEART FAILURE WITH PRESERVED EJECTION FRACTION: Status: RESOLVED | Noted: 2022-10-19 | Resolved: 2023-04-26

## 2023-04-26 LAB
ALBUMIN SERPL BCP-MCNC: 3.7 G/DL (ref 3.5–5.2)
ALP SERPL-CCNC: 41 U/L (ref 55–135)
ALT SERPL W/O P-5'-P-CCNC: 13 U/L (ref 10–44)
ANION GAP SERPL CALC-SCNC: 5 MMOL/L (ref 8–16)
AST SERPL-CCNC: 18 U/L (ref 10–40)
BASOPHILS # BLD AUTO: 0.04 K/UL (ref 0–0.2)
BASOPHILS NFR BLD: 0.6 % (ref 0–1.9)
BILIRUB SERPL-MCNC: 0.7 MG/DL (ref 0.1–1)
BUN SERPL-MCNC: 30 MG/DL (ref 8–23)
CALCIUM SERPL-MCNC: 10.1 MG/DL (ref 8.7–10.5)
CHLORIDE SERPL-SCNC: 102 MMOL/L (ref 95–110)
CHOLEST SERPL-MCNC: 142 MG/DL (ref 120–199)
CHOLEST/HDLC SERPL: 3.4 {RATIO} (ref 2–5)
CO2 SERPL-SCNC: 27 MMOL/L (ref 23–29)
CREAT SERPL-MCNC: 1.9 MG/DL (ref 0.5–1.4)
DIFFERENTIAL METHOD: ABNORMAL
EOSINOPHIL # BLD AUTO: 0.3 K/UL (ref 0–0.5)
EOSINOPHIL NFR BLD: 4.7 % (ref 0–8)
ERYTHROCYTE [DISTWIDTH] IN BLOOD BY AUTOMATED COUNT: 16.2 % (ref 11.5–14.5)
EST. GFR  (NO RACE VARIABLE): 36.1 ML/MIN/1.73 M^2
ESTIMATED AVG GLUCOSE: 134 MG/DL (ref 68–131)
GLUCOSE SERPL-MCNC: 120 MG/DL (ref 70–110)
HBA1C MFR BLD: 6.3 % (ref 4–5.6)
HCT VFR BLD AUTO: 44.8 % (ref 40–54)
HDLC SERPL-MCNC: 42 MG/DL (ref 40–75)
HDLC SERPL: 29.6 % (ref 20–50)
HGB BLD-MCNC: 14.1 G/DL (ref 14–18)
IMM GRANULOCYTES # BLD AUTO: 0.03 K/UL (ref 0–0.04)
IMM GRANULOCYTES NFR BLD AUTO: 0.4 % (ref 0–0.5)
LDLC SERPL CALC-MCNC: 75.6 MG/DL (ref 63–159)
LYMPHOCYTES # BLD AUTO: 2.2 K/UL (ref 1–4.8)
LYMPHOCYTES NFR BLD: 31.7 % (ref 18–48)
MCH RBC QN AUTO: 27.8 PG (ref 27–31)
MCHC RBC AUTO-ENTMCNC: 31.5 G/DL (ref 32–36)
MCV RBC AUTO: 88 FL (ref 82–98)
MONOCYTES # BLD AUTO: 0.5 K/UL (ref 0.3–1)
MONOCYTES NFR BLD: 7.4 % (ref 4–15)
NEUTROPHILS # BLD AUTO: 3.8 K/UL (ref 1.8–7.7)
NEUTROPHILS NFR BLD: 55.2 % (ref 38–73)
NONHDLC SERPL-MCNC: 100 MG/DL
NRBC BLD-RTO: 0 /100 WBC
PLATELET # BLD AUTO: 181 K/UL (ref 150–450)
PMV BLD AUTO: 11.1 FL (ref 9.2–12.9)
POTASSIUM SERPL-SCNC: 4.9 MMOL/L (ref 3.5–5.1)
PROT SERPL-MCNC: 7 G/DL (ref 6–8.4)
RBC # BLD AUTO: 5.08 M/UL (ref 4.6–6.2)
SODIUM SERPL-SCNC: 134 MMOL/L (ref 136–145)
TRIGL SERPL-MCNC: 122 MG/DL (ref 30–150)
WBC # BLD AUTO: 6.88 K/UL (ref 3.9–12.7)

## 2023-04-26 PROCEDURE — 80053 COMPREHEN METABOLIC PANEL: CPT | Performed by: STUDENT IN AN ORGANIZED HEALTH CARE EDUCATION/TRAINING PROGRAM

## 2023-04-26 PROCEDURE — 36415 COLL VENOUS BLD VENIPUNCTURE: CPT | Mod: PO | Performed by: STUDENT IN AN ORGANIZED HEALTH CARE EDUCATION/TRAINING PROGRAM

## 2023-04-26 PROCEDURE — 1101F PR PT FALLS ASSESS DOC 0-1 FALLS W/OUT INJ PAST YR: ICD-10-PCS | Mod: CPTII,S$GLB,, | Performed by: STUDENT IN AN ORGANIZED HEALTH CARE EDUCATION/TRAINING PROGRAM

## 2023-04-26 PROCEDURE — 80061 LIPID PANEL: CPT | Performed by: STUDENT IN AN ORGANIZED HEALTH CARE EDUCATION/TRAINING PROGRAM

## 2023-04-26 PROCEDURE — 1126F AMNT PAIN NOTED NONE PRSNT: CPT | Mod: CPTII,S$GLB,, | Performed by: STUDENT IN AN ORGANIZED HEALTH CARE EDUCATION/TRAINING PROGRAM

## 2023-04-26 PROCEDURE — 99214 OFFICE O/P EST MOD 30 MIN: CPT | Mod: S$GLB,,, | Performed by: STUDENT IN AN ORGANIZED HEALTH CARE EDUCATION/TRAINING PROGRAM

## 2023-04-26 PROCEDURE — 99214 PR OFFICE/OUTPT VISIT, EST, LEVL IV, 30-39 MIN: ICD-10-PCS | Mod: S$GLB,,, | Performed by: STUDENT IN AN ORGANIZED HEALTH CARE EDUCATION/TRAINING PROGRAM

## 2023-04-26 PROCEDURE — 3079F DIAST BP 80-89 MM HG: CPT | Mod: CPTII,S$GLB,, | Performed by: STUDENT IN AN ORGANIZED HEALTH CARE EDUCATION/TRAINING PROGRAM

## 2023-04-26 PROCEDURE — 3288F PR FALLS RISK ASSESSMENT DOCUMENTED: ICD-10-PCS | Mod: CPTII,S$GLB,, | Performed by: STUDENT IN AN ORGANIZED HEALTH CARE EDUCATION/TRAINING PROGRAM

## 2023-04-26 PROCEDURE — 1160F PR REVIEW ALL MEDS BY PRESCRIBER/CLIN PHARMACIST DOCUMENTED: ICD-10-PCS | Mod: CPTII,S$GLB,, | Performed by: STUDENT IN AN ORGANIZED HEALTH CARE EDUCATION/TRAINING PROGRAM

## 2023-04-26 PROCEDURE — 3079F PR MOST RECENT DIASTOLIC BLOOD PRESSURE 80-89 MM HG: ICD-10-PCS | Mod: CPTII,S$GLB,, | Performed by: STUDENT IN AN ORGANIZED HEALTH CARE EDUCATION/TRAINING PROGRAM

## 2023-04-26 PROCEDURE — 99999 PR PBB SHADOW E&M-EST. PATIENT-LVL V: ICD-10-PCS | Mod: PBBFAC,,, | Performed by: STUDENT IN AN ORGANIZED HEALTH CARE EDUCATION/TRAINING PROGRAM

## 2023-04-26 PROCEDURE — 99999 PR PBB SHADOW E&M-EST. PATIENT-LVL V: CPT | Mod: PBBFAC,,, | Performed by: STUDENT IN AN ORGANIZED HEALTH CARE EDUCATION/TRAINING PROGRAM

## 2023-04-26 PROCEDURE — 1160F RVW MEDS BY RX/DR IN RCRD: CPT | Mod: CPTII,S$GLB,, | Performed by: STUDENT IN AN ORGANIZED HEALTH CARE EDUCATION/TRAINING PROGRAM

## 2023-04-26 PROCEDURE — 83036 HEMOGLOBIN GLYCOSYLATED A1C: CPT | Performed by: STUDENT IN AN ORGANIZED HEALTH CARE EDUCATION/TRAINING PROGRAM

## 2023-04-26 PROCEDURE — 3074F PR MOST RECENT SYSTOLIC BLOOD PRESSURE < 130 MM HG: ICD-10-PCS | Mod: CPTII,S$GLB,, | Performed by: STUDENT IN AN ORGANIZED HEALTH CARE EDUCATION/TRAINING PROGRAM

## 2023-04-26 PROCEDURE — 3074F SYST BP LT 130 MM HG: CPT | Mod: CPTII,S$GLB,, | Performed by: STUDENT IN AN ORGANIZED HEALTH CARE EDUCATION/TRAINING PROGRAM

## 2023-04-26 PROCEDURE — 1126F PR PAIN SEVERITY QUANTIFIED, NO PAIN PRESENT: ICD-10-PCS | Mod: CPTII,S$GLB,, | Performed by: STUDENT IN AN ORGANIZED HEALTH CARE EDUCATION/TRAINING PROGRAM

## 2023-04-26 PROCEDURE — 1159F MED LIST DOCD IN RCRD: CPT | Mod: CPTII,S$GLB,, | Performed by: STUDENT IN AN ORGANIZED HEALTH CARE EDUCATION/TRAINING PROGRAM

## 2023-04-26 PROCEDURE — 85025 COMPLETE CBC W/AUTO DIFF WBC: CPT | Performed by: STUDENT IN AN ORGANIZED HEALTH CARE EDUCATION/TRAINING PROGRAM

## 2023-04-26 PROCEDURE — 1101F PT FALLS ASSESS-DOCD LE1/YR: CPT | Mod: CPTII,S$GLB,, | Performed by: STUDENT IN AN ORGANIZED HEALTH CARE EDUCATION/TRAINING PROGRAM

## 2023-04-26 PROCEDURE — 3288F FALL RISK ASSESSMENT DOCD: CPT | Mod: CPTII,S$GLB,, | Performed by: STUDENT IN AN ORGANIZED HEALTH CARE EDUCATION/TRAINING PROGRAM

## 2023-04-26 PROCEDURE — 1159F PR MEDICATION LIST DOCUMENTED IN MEDICAL RECORD: ICD-10-PCS | Mod: CPTII,S$GLB,, | Performed by: STUDENT IN AN ORGANIZED HEALTH CARE EDUCATION/TRAINING PROGRAM

## 2023-04-26 NOTE — PROGRESS NOTES
"[facility] CLINIC NOTE    Patient Name: Janak Booker  YOB: 1945    PRESENTING HISTORY     History of Present Illness:  Mr. Janak Booker is a 77 y.o. male here for routine f/u.     Exercising- 'somewhat'. Not significantly.     No issues with TAVR. Completed cardiac rehab.     Afib- on pradaxa, sotalol.     T2DM  BG- reviewed morning logs, always under 130.     CKD- follows with Dr. Higgins. Has been stable.     He asks about PSA testing.   While there have been studies to suggest benefit >75, as far as I know, no guidelines exist recommending routine screening above 75 and USPSTF recommends stopping at 70.   I recommend against.     ROS      OBJECTIVE:   Vital Signs:  Vitals:    04/26/23 0919   BP: 118/82   Pulse: 73   Resp: 18   SpO2: 98%   Weight: 113.3 kg (249 lb 12.5 oz)   Height: 6' 1" (1.854 m)          Physical Exam: Normal, no change.     Physical Exam    ASSESSMENT & PLAN:     Controlled type 2 diabetes mellitus with stage 3 chronic kidney disease, without long-term current use of insulin  -     Microalbumin/Creatinine Ratio, Urine; Future; Expected date: 04/26/2023  -     Hemoglobin A1C; Future; Expected date: 04/26/2023  -     Comprehensive Metabolic Panel; Future; Expected date: 04/26/2023  -     Lipid Panel; Future; Expected date: 04/26/2023  Stable. Continue current medications    Stage 3b chronic kidney disease  Stable. Continue current medications    Atrial fibrillation, unspecified type  -     Lipid Panel; Future; Expected date: 04/26/2023  -     CBC Auto Differential; Future; Expected date: 04/26/2023    Aortic atherosclerosis  Seen on angiography. Continue current medications             See Quach MD   Internal Medicine            "

## 2023-05-16 ENCOUNTER — TELEPHONE (OUTPATIENT)
Dept: GASTROENTEROLOGY | Facility: CLINIC | Age: 78
End: 2023-05-16
Payer: MEDICARE

## 2023-05-16 NOTE — TELEPHONE ENCOUNTER
----- Message from Rishi Garcia MD sent at 5/15/2023  9:20 PM CDT -----  Cardiology clearance      ----- Message -----  From: Sharif Phipps MD  Sent: 5/15/2023   5:36 PM CDT  To: Rishi Garcia MD

## 2023-06-01 ENCOUNTER — OFFICE VISIT (OUTPATIENT)
Dept: CARDIOLOGY | Facility: CLINIC | Age: 78
End: 2023-06-01
Payer: MEDICARE

## 2023-06-01 VITALS
HEART RATE: 66 BPM | SYSTOLIC BLOOD PRESSURE: 122 MMHG | BODY MASS INDEX: 33.91 KG/M2 | HEIGHT: 73 IN | WEIGHT: 255.88 LBS | OXYGEN SATURATION: 97 % | DIASTOLIC BLOOD PRESSURE: 62 MMHG

## 2023-06-01 DIAGNOSIS — I48.20 ATRIAL FIBRILLATION, CHRONIC: ICD-10-CM

## 2023-06-01 DIAGNOSIS — I25.10 CORONARY ARTERY DISEASE, OCCLUSIVE: ICD-10-CM

## 2023-06-01 DIAGNOSIS — Z95.5 S/P CORONARY ARTERY STENT PLACEMENT: ICD-10-CM

## 2023-06-01 DIAGNOSIS — E78.5 HYPERLIPIDEMIA ASSOCIATED WITH TYPE 2 DIABETES MELLITUS: ICD-10-CM

## 2023-06-01 DIAGNOSIS — E11.59 HYPERTENSION ASSOCIATED WITH DIABETES: ICD-10-CM

## 2023-06-01 DIAGNOSIS — E11.69 HYPERLIPIDEMIA ASSOCIATED WITH TYPE 2 DIABETES MELLITUS: ICD-10-CM

## 2023-06-01 DIAGNOSIS — Z95.2 S/P TAVR (TRANSCATHETER AORTIC VALVE REPLACEMENT): ICD-10-CM

## 2023-06-01 DIAGNOSIS — I15.2 HYPERTENSION ASSOCIATED WITH DIABETES: ICD-10-CM

## 2023-06-01 DIAGNOSIS — N18.32 STAGE 3B CHRONIC KIDNEY DISEASE: ICD-10-CM

## 2023-06-01 PROCEDURE — 99999 PR PBB SHADOW E&M-EST. PATIENT-LVL IV: ICD-10-PCS | Mod: PBBFAC,,, | Performed by: INTERNAL MEDICINE

## 2023-06-01 PROCEDURE — 3078F DIAST BP <80 MM HG: CPT | Mod: CPTII,S$GLB,, | Performed by: INTERNAL MEDICINE

## 2023-06-01 PROCEDURE — 3074F SYST BP LT 130 MM HG: CPT | Mod: CPTII,S$GLB,, | Performed by: INTERNAL MEDICINE

## 2023-06-01 PROCEDURE — 3288F FALL RISK ASSESSMENT DOCD: CPT | Mod: CPTII,S$GLB,, | Performed by: INTERNAL MEDICINE

## 2023-06-01 PROCEDURE — 1159F MED LIST DOCD IN RCRD: CPT | Mod: CPTII,S$GLB,, | Performed by: INTERNAL MEDICINE

## 2023-06-01 PROCEDURE — 93000 ELECTROCARDIOGRAM COMPLETE: CPT | Mod: HCNC,S$GLB,, | Performed by: INTERNAL MEDICINE

## 2023-06-01 PROCEDURE — 99214 OFFICE O/P EST MOD 30 MIN: CPT | Mod: S$GLB,,, | Performed by: INTERNAL MEDICINE

## 2023-06-01 PROCEDURE — 1160F RVW MEDS BY RX/DR IN RCRD: CPT | Mod: CPTII,S$GLB,, | Performed by: INTERNAL MEDICINE

## 2023-06-01 PROCEDURE — 1101F PR PT FALLS ASSESS DOC 0-1 FALLS W/OUT INJ PAST YR: ICD-10-PCS | Mod: CPTII,S$GLB,, | Performed by: INTERNAL MEDICINE

## 2023-06-01 PROCEDURE — 1126F PR PAIN SEVERITY QUANTIFIED, NO PAIN PRESENT: ICD-10-PCS | Mod: CPTII,S$GLB,, | Performed by: INTERNAL MEDICINE

## 2023-06-01 PROCEDURE — 1126F AMNT PAIN NOTED NONE PRSNT: CPT | Mod: CPTII,S$GLB,, | Performed by: INTERNAL MEDICINE

## 2023-06-01 PROCEDURE — 3288F PR FALLS RISK ASSESSMENT DOCUMENTED: ICD-10-PCS | Mod: CPTII,S$GLB,, | Performed by: INTERNAL MEDICINE

## 2023-06-01 PROCEDURE — 3078F PR MOST RECENT DIASTOLIC BLOOD PRESSURE < 80 MM HG: ICD-10-PCS | Mod: CPTII,S$GLB,, | Performed by: INTERNAL MEDICINE

## 2023-06-01 PROCEDURE — 3074F PR MOST RECENT SYSTOLIC BLOOD PRESSURE < 130 MM HG: ICD-10-PCS | Mod: CPTII,S$GLB,, | Performed by: INTERNAL MEDICINE

## 2023-06-01 PROCEDURE — 93000 EKG 12-LEAD: ICD-10-PCS | Mod: HCNC,S$GLB,, | Performed by: INTERNAL MEDICINE

## 2023-06-01 PROCEDURE — 99999 PR PBB SHADOW E&M-EST. PATIENT-LVL IV: CPT | Mod: PBBFAC,,, | Performed by: INTERNAL MEDICINE

## 2023-06-01 PROCEDURE — 1101F PT FALLS ASSESS-DOCD LE1/YR: CPT | Mod: CPTII,S$GLB,, | Performed by: INTERNAL MEDICINE

## 2023-06-01 PROCEDURE — 1159F PR MEDICATION LIST DOCUMENTED IN MEDICAL RECORD: ICD-10-PCS | Mod: CPTII,S$GLB,, | Performed by: INTERNAL MEDICINE

## 2023-06-01 PROCEDURE — 99214 PR OFFICE/OUTPT VISIT, EST, LEVL IV, 30-39 MIN: ICD-10-PCS | Mod: S$GLB,,, | Performed by: INTERNAL MEDICINE

## 2023-06-01 PROCEDURE — 1160F PR REVIEW ALL MEDS BY PRESCRIBER/CLIN PHARMACIST DOCUMENTED: ICD-10-PCS | Mod: CPTII,S$GLB,, | Performed by: INTERNAL MEDICINE

## 2023-06-01 NOTE — PROGRESS NOTES
Patient ID:  Janak Booker is a 77 y.o. male who presents for follow-up of Coronary Artery Disease, Atrial Fibrillation, and Results (labs)      He has been doing well.  He is taking half a tablet of sotalol 120 mg twice a day.  A total of 60 mg twice a day.  He denies any chest pains any shortness of breath.  It was noted that his GFR has gone up.  Will decrease the doses of sotalol to 60 mg p.o. daily.  He has an appointment to see his nephrologist in 2 weeks      Past Medical History:   Diagnosis Date    Anticoagulant long-term use     Aortic stenosis     Arthritis     Atrial fibrillation     CAD (coronary artery disease)     Colon polyps     per colonoscopy 9/11/2014    Diabetes mellitus, type 2     Disc displacement, lumbar     L3    Family history of prostate cancer     GERD (gastroesophageal reflux disease)     Heel bone fracture     left foot    Hyperlipidemia LDL goal < 70     Hypertension     NYHA class 3 heart failure with preserved ejection fraction 10/19/2022    Renal manifestation of secondary diabetes mellitus     Sleep apnea     USES MACHINE    Spinal stenosis, lumbar         Past Surgical History:   Procedure Laterality Date    BACK SURGERY      CARDIAC SURGERY      stents     CARPAL TUNNEL RELEASE Right     CATARACT EXTRACTION W/  INTRAOCULAR LENS IMPLANT Bilateral     COLONOSCOPY N/A 3/27/2018    Procedure: COLONOSCOPY;  Surgeon: Rishi Garcia MD;  Location: [facility] ENDO;  Service: Endoscopy;  Laterality: N/A;    COLONOSCOPY N/A 2/15/2021    Procedure: COLONOSCOPY;  Surgeon: Rishi Garcia MD;  Location: [facility];  Service: Endoscopy;  Laterality: N/A;    CORONARY ANGIOPLASTY WITH STENT PLACEMENT      x 3    EYE SURGERY      INJECTION OF FACET JOINT N/A 5/14/2019    Procedure: INJECTION, FACET JOINT INJECTION (LUMBAR BLOCK) PLEASE INJECT L3/4;  Surgeon: Catrachito Harley MD;  Location: [facility] PAIN MGT;  Service: Pain Management;  Laterality: N/A;  NEEDS CONSENT, PRADAXA CLEARANCE IN  CHART    KNEE ARTHROSCOPY W/ MENISCECTOMY  12/22/2008    right    LEFT HEART CATHETERIZATION Left 9/7/2022    Procedure: Left heart cath;  Surgeon: Adonay Quinones MD;  Location: [facility] CATH;  Service: Cardiology;  Laterality: Left;    LUMBAR DISCECTOMY  12/2011    L4-L5 Fusion    LUMBAR EPIDURAL INJECTION  02/04/2019    ROTATOR CUFF REPAIR Left 7/2012    SHOULDER OPEN ROTATOR CUFF REPAIR      SKIN CANCER FOREHEAD 2019      SPINE SURGERY      TIBIA FRACTURE SURGERY      TRANSCATHETER AORTIC VALVE REPLACEMENT (TAVR) Bilateral 10/19/2022    Procedure: REPLACEMENT, AORTIC VALVE, TRANSCATHETER (TAVR);  Surgeon: Adonay Quinones MD;  Location: [facility] CATH;  Service: Cardiology;  Laterality: Bilateral;    TRANSCATHETER AORTIC VALVE REPLACEMENT (TAVR) Bilateral 10/19/2022    Procedure: REPLACEMENT, AORTIC VALVE, TRANSCATHETER (TAVR);  Surgeon: Denver Osborne MD;  Location: [facility] CATH;  Service: Cardiothoracic;  Laterality: Bilateral;    TRANSFORAMINAL EPIDURAL INJECTION OF STEROID Left 9/23/2019    Procedure: INJECTION, STEROID, EPIDURAL, TRANSFORAMINAL APPROACH;  Surgeon: Jose Guadalupe Linn MD;  Location: [facility] PAIN MGT;  Service: Pain Management;  Laterality: Left;  Left TF ADI L4 and L5  OK to hold Pradaxa    TRANSFORAMINAL EPIDURAL INJECTION OF STEROID Left 11/14/2019    Procedure: INJECTION, STEROID, EPIDURAL, TRANSFORAMINAL APPROACH;  Surgeon: Jose Guadalupe Linn MD;  Location: [facility] PAIN MGT;  Service: Pain Management;  Laterality: Left;  Left TF ADI L4-5 and L5-S1          Current Outpatient Medications   Medication Instructions    ascorbic acid (vitamin C) (VITAMIN C) 1,000 mg, Oral, Daily    aspirin (ECOTRIN) 81 mg, Oral, Daily    BD ALCOHOL SWABS PadM USE AS DIRECTED TO CHECK BLOOD GLUCOSE DAILY    fenofibrate micronized (LOFIBRA) 134 mg, Oral, Daily    FLUAD QUAD 2022-23,65Y UP,,PF, 60 mcg (15 mcg x 4)/0.5 mL Syrg No dose, route, or frequency recorded.    lancets (TRUEPLUS LANCETS) 28 gauge Misc 1 lancet, Misc.(Non-Drug; Combo  "Route), Daily    lisinopriL (PRINIVIL,ZESTRIL) 5 mg, Oral, Daily    metFORMIN (GLUCOPHAGE-XR) 500 MG ER 24hr tablet TAKE ONE TABLET BY MOUTH ONCE DAILY    multivitamin with minerals tablet No dose, route, or frequency recorded.    NITROSTAT 0.4 mg, Oral, Every 5 min PRN, Do not take more than 3 tabs a day    omega-3 fatty acids 1,000 mg Cap 1 Capsule(s) Oral OTC once a day.    omeprazole (PRILOSEC) 40 MG capsule TAKE ONE CAPSULE BY MOUTH ONCE DAILY    pen needle, diabetic (BD BARRY 2ND GEN PEN NEEDLE) 32 gauge x 5/32" Ndle USE ONE NEEDLE ONCE DAILY     pioglitazone (ACTOS) 30 mg, Oral, Daily    PRADAXA 150 mg Cap TAKE ONE CAPSULE BY MOUTH TWICE DAILY    rosuvastatin (CRESTOR) 10 mg, Oral, Nightly    sotaloL (BETAPACE) 40 mg, Oral, Daily    VICTOZA 3-CAITIE 1.8 mg, Subcutaneous, Daily        Review of patient's allergies indicates:   Allergen Reactions    Neurontin [gabapentin] Swelling     Leg swelling        Review of Systems   Cardiovascular:  Negative for chest pain, dyspnea on exertion and leg swelling.   Respiratory:  Negative for cough and shortness of breath.       Objective:     Vitals:    06/01/23 1355   BP: 122/62   BP Location: Left arm   Patient Position: Sitting   BP Method: Medium (Manual)   Pulse: 66   SpO2: 97%   Weight: 116 kg (255 lb 13.5 oz)   Height: 6' 1" (1.854 m)       Physical Exam  Vitals and nursing note reviewed.   Constitutional:       Appearance: He is well-developed.   HENT:      Head: Normocephalic and atraumatic.   Eyes:      Conjunctiva/sclera: Conjunctivae normal.   Cardiovascular:      Rate and Rhythm: Normal rate and regular rhythm.      Heart sounds: Normal heart sounds.   Pulmonary:      Effort: Pulmonary effort is normal.      Breath sounds: Normal breath sounds.   Abdominal:      General: Bowel sounds are normal.      Palpations: Abdomen is soft.   Musculoskeletal:         General: Normal range of motion.   Skin:     General: Skin is warm and dry.   Neurological:      Mental " Status: He is alert and oriented to person, place, and time.   Psychiatric:         Behavior: Behavior normal.         Thought Content: Thought content normal.         Judgment: Judgment normal.     CMP  Sodium   Date Value Ref Range Status   04/26/2023 134 (L) 136 - 145 mmol/L Final     Potassium   Date Value Ref Range Status   04/26/2023 4.9 3.5 - 5.1 mmol/L Final     Chloride   Date Value Ref Range Status   04/26/2023 102 95 - 110 mmol/L Final     CO2   Date Value Ref Range Status   04/26/2023 27 23 - 29 mmol/L Final     Glucose   Date Value Ref Range Status   04/26/2023 120 (H) 70 - 110 mg/dL Final     BUN   Date Value Ref Range Status   04/26/2023 30 (H) 8 - 23 mg/dL Final     Creatinine   Date Value Ref Range Status   04/26/2023 1.9 (H) 0.5 - 1.4 mg/dL Final     Calcium   Date Value Ref Range Status   04/26/2023 10.1 8.7 - 10.5 mg/dL Final     Total Protein   Date Value Ref Range Status   04/26/2023 7.0 6.0 - 8.4 g/dL Final     Albumin   Date Value Ref Range Status   04/26/2023 3.7 3.5 - 5.2 g/dL Final     Total Bilirubin   Date Value Ref Range Status   04/26/2023 0.7 0.1 - 1.0 mg/dL Final     Comment:     For infants and newborns, interpretation of results should be based  on gestational age, weight and in agreement with clinical  observations.    Premature Infant recommended reference ranges:  Up to 24 hours.............<8.0 mg/dL  Up to 48 hours............<12.0 mg/dL  3-5 days..................<15.0 mg/dL  6-29 days.................<15.0 mg/dL       Alkaline Phosphatase   Date Value Ref Range Status   04/26/2023 41 (L) 55 - 135 U/L Final     AST   Date Value Ref Range Status   04/26/2023 18 10 - 40 U/L Final     ALT   Date Value Ref Range Status   04/26/2023 13 10 - 44 U/L Final     Anion Gap   Date Value Ref Range Status   04/26/2023 5 (L) 8 - 16 mmol/L Final     eGFR if    Date Value Ref Range Status   07/05/2022 34.6 (A) >60 mL/min/1.73 m^2 Final     eGFR if non    Date  Value Ref Range Status   07/05/2022 29.9 (A) >60 mL/min/1.73 m^2 Final     Comment:     Calculation used to obtain the estimated glomerular filtration  rate (eGFR) is the CKD-EPI equation.         BMP  Lab Results   Component Value Date     (L) 04/26/2023    K 4.9 04/26/2023     04/26/2023    CO2 27 04/26/2023    BUN 30 (H) 04/26/2023    CREATININE 1.9 (H) 04/26/2023    CALCIUM 10.1 04/26/2023    ANIONGAP 5 (L) 04/26/2023    ESTGFRAFRICA 34.6 (A) 07/05/2022    EGFRNONAA 29.9 (A) 07/05/2022      BNP  @LABRCNTIP(BNP,BNPTRIAGEBLO)@   Lab Results   Component Value Date    CHOL 142 04/26/2023    CHOL 140 11/08/2021    CHOL 130 06/09/2020     Lab Results   Component Value Date    HDL 42 04/26/2023    HDL 42 11/08/2021    HDL 41 06/09/2020     Lab Results   Component Value Date    LDLCALC 75.6 04/26/2023    LDLCALC 64.2 11/08/2021    LDLCALC 67.2 06/09/2020     Lab Results   Component Value Date    TRIG 122 04/26/2023    TRIG 169 (H) 11/08/2021    TRIG 109 06/09/2020     Lab Results   Component Value Date    CHOLHDL 29.6 04/26/2023    CHOLHDL 30.0 11/08/2021    CHOLHDL 31.5 06/09/2020      Lab Results   Component Value Date    TSH 1.32 07/20/2011     Lab Results   Component Value Date    HGBA1C 6.3 (H) 04/26/2023     Lab Results   Component Value Date    WBC 6.88 04/26/2023    HGB 14.1 04/26/2023    HCT 44.8 04/26/2023    MCV 88 04/26/2023     04/26/2023         Results for orders placed during the hospital encounter of 11/14/22    Echo    Interpretation Summary  · Atrial fibrillation observed.  · The left ventricle is normal in size with concentric remodeling and normal systolic function. The estimated ejection fraction is 65%.  · Severe left atrial enlargement.  · Normal right ventricular size with normal right ventricular systolic function.  · Mild right atrial enlargement.  · Mild mitral regurgitation.  · There is a 34 mm Medtronic Evolut PRO+ transcutaneously-placed aortic bioprosthesis present.  There is mild paravalvular aortic insufficiency present. Prosthetic aortic valve is normal. The aortic valve mean gradient is 7 mmHg with a dimensionless index of 0.62.     Results for orders placed during the hospital encounter of 10/19/22    Cardiac catheterization    Narrative  Procedure performed in the Invasive Lab  - See Procedure Log link below for nursing documentation  - See OpNote on Surgeries Tab for physician findings  - See Imaging Tab for radiologist dictation         Assessment:       Coronary artery disease, occlusive  In terms of angina    Hyperlipidemia associated with type 2 diabetes mellitus  Controlled rosuvastatin 10 mg p.o. daily    Hypertension associated with diabetes  Lisinopril,    S/P TAVR (transcatheter aortic valve replacement)  Stable    CKD (chronic kidney disease) stage 3, GFR 30-59 ml/min  His GFR has decreased some will decrease the doses of sotalol    Atrial fibrillation, chronic   anticoagulation rate controlled on sotalol       Plan:       Decrease the doses of sotalol to 60 mg Q day.  Due to increasing his creatinine he seen in the office in 3 months.  He is to monitor his pulse if he goes over 70 beats per minute he is to resume the 60 mg twice a day.

## 2023-06-05 ENCOUNTER — TELEPHONE (OUTPATIENT)
Dept: CARDIOLOGY | Facility: CLINIC | Age: 78
End: 2023-06-05
Payer: MEDICARE

## 2023-06-05 DIAGNOSIS — Z95.5 S/P CORONARY ARTERY STENT PLACEMENT: Primary | ICD-10-CM

## 2023-06-05 NOTE — TELEPHONE ENCOUNTER
----- Message from Danni Guerrero RN sent at 2023 11:58 AM CDT -----  Regardin/1 EKG Order  The EKG performed on 23 is missing an order.  Please place an order so that the EKG can be read.    Thank You,  Danni Guerrero RN  [facility]/ [place]   [phone]

## 2023-06-08 RX ORDER — PIOGLITAZONEHYDROCHLORIDE 30 MG/1
TABLET ORAL
Qty: 90 TABLET | Refills: 1 | Status: SHIPPED | OUTPATIENT
Start: 2023-06-08 | End: 2023-09-20 | Stop reason: SDUPTHER

## 2023-06-08 NOTE — TELEPHONE ENCOUNTER
Refill Decision Note   Janak Booker  is requesting a refill authorization.  Brief Assessment and Rationale for Refill:  Approve     Medication Therapy Plan:  pcp note on 4/26/23 states to continue current meds    Medication Reconciliation Completed: No   Comments:     No Care Gaps recommended.     Note composed:4:02 PM 06/08/2023

## 2023-06-08 NOTE — TELEPHONE ENCOUNTER
No care due was identified.  [facility] Embedded Care Due Messages. Reference number: [id number].   6/08/2023 10:18:44 AM CDT

## 2023-06-08 NOTE — TELEPHONE ENCOUNTER
Refill Routing Note   Medication(s) are not appropriate for processing by [facility] for the following reason(s):      Drug-disease interaction    [facility] action(s):  Defer None identified   Medication Therapy Plan: HISTORY OF HEART FAILURE    Pharmacist review requested: Yes     Appointments  past 12m or future 3m with PCP    Date Provider   Last Visit   4/26/2023 See Quach MD   Next Visit   11/27/2023 See Quach MD   ED visits in past 90 days: 0        Note composed:10:39 AM 06/08/2023

## 2023-06-23 ENCOUNTER — HOSPITAL ENCOUNTER (OUTPATIENT)
Facility: HOSPITAL | Age: 78
Discharge: HOME | End: 2023-06-23
Attending: INTERNAL MEDICINE | Admitting: INTERNAL MEDICINE
Payer: MEDICARE

## 2023-06-23 ENCOUNTER — ANESTHESIA EVENT (OUTPATIENT)
Dept: ENDOSCOPY | Facility: HOSPITAL | Age: 78
End: 2023-06-23
Payer: MEDICARE

## 2023-06-23 ENCOUNTER — ANESTHESIA (OUTPATIENT)
Dept: ENDOSCOPY | Facility: HOSPITAL | Age: 78
End: 2023-06-23
Payer: MEDICARE

## 2023-06-23 VITALS
DIASTOLIC BLOOD PRESSURE: 76 MMHG | HEART RATE: 60 BPM | HEIGHT: 73 IN | BODY MASS INDEX: 33.13 KG/M2 | TEMPERATURE: 98 F | RESPIRATION RATE: 20 BRPM | WEIGHT: 250 LBS | SYSTOLIC BLOOD PRESSURE: 120 MMHG | OXYGEN SATURATION: 98 %

## 2023-06-23 DIAGNOSIS — Z86.010 HISTORY OF COLON POLYPS: ICD-10-CM

## 2023-06-23 DIAGNOSIS — K64.8 INTERNAL HEMORRHOIDS: Primary | ICD-10-CM

## 2023-06-23 DIAGNOSIS — K57.90 DIVERTICULOSIS: ICD-10-CM

## 2023-06-23 PROCEDURE — 25000003 PHARM REV CODE 250: Performed by: INTERNAL MEDICINE

## 2023-06-23 PROCEDURE — D9220A PRA ANESTHESIA: Mod: ANES,,, | Performed by: ANESTHESIOLOGY

## 2023-06-23 PROCEDURE — 63600175 PHARM REV CODE 636 W HCPCS: Performed by: NURSE ANESTHETIST, CERTIFIED REGISTERED

## 2023-06-23 PROCEDURE — D9220A PRA ANESTHESIA: Mod: CRNA,,, | Performed by: NURSE ANESTHETIST, CERTIFIED REGISTERED

## 2023-06-23 PROCEDURE — 37000009 HC ANESTHESIA EA ADD 15 MINS: Performed by: INTERNAL MEDICINE

## 2023-06-23 PROCEDURE — 25000003 PHARM REV CODE 250: Performed by: NURSE ANESTHETIST, CERTIFIED REGISTERED

## 2023-06-23 PROCEDURE — G0105 COLORECTAL SCRN; HI RISK IND: HCPCS | Performed by: INTERNAL MEDICINE

## 2023-06-23 PROCEDURE — G0105 COLORECTAL SCRN; HI RISK IND: ICD-10-PCS | Mod: ,,, | Performed by: INTERNAL MEDICINE

## 2023-06-23 PROCEDURE — G0105 COLORECTAL SCRN; HI RISK IND: HCPCS | Mod: ,,, | Performed by: INTERNAL MEDICINE

## 2023-06-23 PROCEDURE — D9220A PRA ANESTHESIA: ICD-10-PCS | Mod: ANES,,, | Performed by: ANESTHESIOLOGY

## 2023-06-23 PROCEDURE — D9220A PRA ANESTHESIA: ICD-10-PCS | Mod: CRNA,,, | Performed by: NURSE ANESTHETIST, CERTIFIED REGISTERED

## 2023-06-23 PROCEDURE — 37000008 HC ANESTHESIA 1ST 15 MINUTES: Performed by: INTERNAL MEDICINE

## 2023-06-23 RX ORDER — SODIUM CHLORIDE 9 MG/ML
INJECTION, SOLUTION INTRAVENOUS CONTINUOUS
Status: DISCONTINUED | OUTPATIENT
Start: 2023-06-23 | End: 2023-06-23 | Stop reason: HOSPADM

## 2023-06-23 RX ORDER — PROPOFOL 10 MG/ML
VIAL (ML) INTRAVENOUS
Status: DISCONTINUED | OUTPATIENT
Start: 2023-06-23 | End: 2023-06-23

## 2023-06-23 RX ORDER — LIDOCAINE HYDROCHLORIDE 20 MG/ML
INJECTION INTRAVENOUS
Status: DISCONTINUED | OUTPATIENT
Start: 2023-06-23 | End: 2023-06-23

## 2023-06-23 RX ADMIN — PROPOFOL 20 MG: 10 INJECTION, EMULSION INTRAVENOUS at 11:06

## 2023-06-23 RX ADMIN — LIDOCAINE HYDROCHLORIDE 100 MG: 20 INJECTION, SOLUTION INTRAVENOUS at 11:06

## 2023-06-23 RX ADMIN — PROPOFOL 100 MG: 10 INJECTION, EMULSION INTRAVENOUS at 11:06

## 2023-06-23 RX ADMIN — PROPOFOL 50 MG: 10 INJECTION, EMULSION INTRAVENOUS at 11:06

## 2023-06-23 RX ADMIN — SODIUM CHLORIDE: 9 INJECTION, SOLUTION INTRAVENOUS at 10:06

## 2023-06-23 NOTE — H&P
CC: History of colon polyps    77 year old male with above. States that symptoms are absent, no alleviating/exacerbating factors. No family history of colorectal CA. Positive personal history of polyps. No bleeding or weight loss.     ROS:  No headache, no fever/chills, no chest pain/SOB, no nausea/vomiting/diarrhea/constipation/GI bleeding/abdominal pain, no dysuria/hematuria.    VSSAF   Exam:   Alert and oriented x 3; no apparent distress   PERRLA, sclera anicteric  CV: Regular rate/rhythm, normal PMI   Lungs: Clear bilaterally with no wheeze/rales   Abdomen: Soft, NT/ND, normal bowel sounds   Ext: No cyanosis, clubbing     Impression:   As above    Plan:   Proceed with endoscopy. Further recs to follow.

## 2023-06-23 NOTE — [employer] PATIENT INSTRUCTIONS
Discharge Summary/Instructions after an Endoscopic Procedure  Patient Name: Janak Booker  Patient MRN: [id number]  Patient YOB: 1945 Friday, June 23, 2023  Rishi Fofana MD  Dear patient,  As a result of recent federal legislation (The Federal Cures Act), you may   receive lab or pathology results from your procedure in your [facility]   account before your physician is able to contact you. Your physician or   their representative will relay the results to you with their   recommendations at their soonest availability.  Thank you,  RESTRICTIONS:  During your procedure today, you received medications for sedation.  These   medications may affect your judgment, balance and coordination.  Therefore,   for 24 hours, you have the following restrictions:   - DO NOT drive a car, operate machinery, make legal/financial decisions,   sign important papers or drink alcohol.    ACTIVITY:  Today: no heavy lifting, straining or running due to procedural   sedation/anesthesia.  The following day: return to full activity including work.  DIET:  Eat and drink normally unless instructed otherwise.     TREATMENT FOR COMMON SIDE EFFECTS:  - Mild abdominal pain, nausea, belching, bloating or excessive gas:  rest,   eat lightly and use a heating pad.  - Sore Throat: treat with throat lozenges and/or gargle with warm salt   water.  - Because air was used during the procedure, expelling large amounts of air   from your rectum or belching is normal.  - If a bowel prep was taken, you may not have a bowel movement for 1-3 days.    This is normal.  SYMPTOMS TO WATCH FOR AND REPORT TO YOUR PHYSICIAN:  1. Abdominal pain or bloating, other than gas cramps.  2. Chest pain.  3. Back pain.  4. Signs of infection such as: chills or fever occurring within 24 hours   after the procedure.  5. Rectal bleeding, which would show as bright red, maroon, or black stools.   (A tablespoon of blood from the rectum is not serious, especially if    hemorrhoids are present.)  6. Vomiting.  7. Weakness or dizziness.  GO DIRECTLY TO THE NEAREST EMERGENCY ROOM IF YOU HAVE ANY OF THE FOLLOWING:      Difficulty breathing              Chills and/or fever over 101 F   Persistent vomiting and/or vomiting blood   Severe abdominal pain   Severe chest pain   Black, tarry stools   Bleeding- more than one tablespoon   Any other symptom or condition that you feel may need urgent attention  Your doctor recommends these additional instructions:  If any biopsies were taken, your doctors clinic will contact you in 1 to 2   weeks with any results.  - Patient has a contact number available for emergencies.  The signs and   symptoms of potential delayed complications were discussed with the   patient.  Return to normal activities tomorrow.  Written discharge   instructions were provided to the patient.   - High fiber diet.   - Continue present medications.   - No repeat colonoscopy due to age and the absence of colonic polyps.   - Discharge patient to home (ambulatory).   - Resume Pradaxa (dabigatran) at prior dose today.   - Return to GI office PRN.  For questions, problems or results please call your physician - Rishi Fofana MD at Work:  [phone].  [facility], EMERGENCY ROOM PHONE NUMBER: [phone]  IF A COMPLICATION OR EMERGENCY SITUATION ARISES AND YOU ARE UNABLE TO REACH   YOUR PHYSICIAN - GO DIRECTLY TO THE EMERGENCY ROOM.  Rishi Fofana MD  6/23/2023 11:24:10 AM  This report has been verified and signed electronically.  Dear patient,  As a result of recent federal legislation (The Federal Cures Act), you may   receive lab or pathology results from your procedure in your [facility]   account before your physician is able to contact you. Your physician or   their representative will relay the results to you with their   recommendations at their soonest availability.  Thank you,  [employer]ATION

## 2023-06-23 NOTE — TRANSFER OF CARE
"Anesthesia Transfer of Care Note    Patient: Janak Booker    Procedure(s) Performed: Procedure(s) (LRB):  COLONOSCOPY (N/A)    Patient location: GI    Anesthesia Type: general    Transport from OR: Transported from OR on room air with adequate spontaneous ventilation    Post pain: adequate analgesia    Post assessment: no apparent anesthetic complications    Post vital signs: stable    Level of consciousness: sedated    Nausea/Vomiting: no nausea/vomiting    Complications: none    Transfer of care protocol was followed      Last vitals:   Visit Vitals  /78 (BP Location: Left arm, Patient Position: Lying)   Pulse 61   Temp 36.8 °C (98.2 °F) (Temporal)   Resp 16   Ht 6' 1" (1.854 m)   Wt 113.4 kg (250 lb)   SpO2 98%   BMI 32.98 kg/m²     "

## 2023-06-23 NOTE — DISCHARGE INSTRUCTIONS
Vss, antony po fluids, denies pain, ambulates easily. IV removed, catheter intact. Discharge instructions provided and states understanding. States ready to go home.  Discharged from facility with family per wheelchair.

## 2023-06-23 NOTE — ANESTHESIA PREPROCEDURE EVALUATION
06/23/2023  Janak Booker is a 77 y.o., male.      Pre-op Assessment    I have reviewed the Patient Summary Reports.     I have reviewed the Nursing Notes. I have reviewed the NPO Status.   I have reviewed the Medications.     Review of Systems  Cardiovascular:   Hypertension Valvular problems/Murmurs CAD   NYHA Classification III TRANSCATHETER AORTIC VALVE REPLACEMENT (TAVR)    Prosthetic aortic valve is normal ECHO 11/22   Pulmonary:   Shortness of breath Sleep Apnea    Renal/:   Chronic Renal Disease    Musculoskeletal:   Arthritis     Neurological:   Neuromuscular Disease,    Endocrine:   Diabetes, type 2        Physical Exam  General: Well nourished, Cooperative, Alert and Oriented    Airway:  Mallampati: II / II  Mouth Opening: Normal  TM Distance: 4 - 6 cm  Tongue: Normal    Dental:  Intact    Chest/Lungs:  Clear to auscultation, Normal Respiratory Rate    Heart:  Rate: Normal  Rhythm: Regular Rhythm  Sounds: Normal        Anesthesia Plan  Type of Anesthesia, risks & benefits discussed:    Anesthesia Type: Gen Natural Airway  Intra-op Monitoring Plan: Standard ASA Monitors  Induction:  IV  Informed Consent: Informed consent signed with the Patient and all parties understand the risks and agree with anesthesia plan.  All questions answered.   ASA Score: 4    Ready For Surgery From Anesthesia Perspective.     .

## 2023-06-23 NOTE — ANESTHESIA POSTPROCEDURE EVALUATION
Anesthesia Post Evaluation    Patient: Janak Booker    Procedure(s) Performed: Procedure(s) (LRB):  COLONOSCOPY (N/A)    Final Anesthesia Type: general      Patient location during evaluation: PACU  Patient participation: Yes- Able to Participate  Level of consciousness: awake and alert and oriented  Post-procedure vital signs: reviewed and stable  Pain management: adequate  Airway patency: patent    PONV status at discharge: No PONV  Anesthetic complications: no      Cardiovascular status: blood pressure returned to baseline  Respiratory status: unassisted, spontaneous ventilation and room air  Hydration status: euvolemic  Follow-up not needed.          Vitals Value Taken Time   /76 06/23/23 1145   Temp 36.7 °C (98 °F) 06/23/23 1145   Pulse 60 06/23/23 1145   Resp 20 06/23/23 1145   SpO2 98 % 06/23/23 1145         No case tracking events are documented in the log.      Pain/Dilip Score: Dilip Score: 10 (6/23/2023 11:50 AM)

## 2023-07-03 ENCOUNTER — LAB VISIT (OUTPATIENT)
Dept: LAB | Facility: HOSPITAL | Age: 78
End: 2023-07-03
Attending: INTERNAL MEDICINE
Payer: MEDICARE

## 2023-07-03 DIAGNOSIS — N18.30 CHRONIC KIDNEY DISEASE, STAGE III (MODERATE): Primary | ICD-10-CM

## 2023-07-03 LAB
ALBUMIN SERPL BCP-MCNC: 3.8 G/DL (ref 3.5–5.2)
ANION GAP SERPL CALC-SCNC: 6 MMOL/L (ref 8–16)
BASOPHILS # BLD AUTO: 0.04 K/UL (ref 0–0.2)
BASOPHILS NFR BLD: 0.7 % (ref 0–1.9)
BILIRUB UR QL STRIP: NEGATIVE
BUN SERPL-MCNC: 28 MG/DL (ref 8–23)
CALCIUM SERPL-MCNC: 9.2 MG/DL (ref 8.7–10.5)
CHLORIDE SERPL-SCNC: 104 MMOL/L (ref 95–110)
CLARITY UR: CLEAR
CO2 SERPL-SCNC: 26 MMOL/L (ref 23–29)
COLOR UR: YELLOW
CREAT SERPL-MCNC: 2.1 MG/DL (ref 0.5–1.4)
CREAT UR-MCNC: 177 MG/DL (ref 23–375)
DIFFERENTIAL METHOD: ABNORMAL
EOSINOPHIL # BLD AUTO: 0.3 K/UL (ref 0–0.5)
EOSINOPHIL NFR BLD: 5.1 % (ref 0–8)
ERYTHROCYTE [DISTWIDTH] IN BLOOD BY AUTOMATED COUNT: 15.9 % (ref 11.5–14.5)
EST. GFR  (NO RACE VARIABLE): 32 ML/MIN/1.73 M^2
FERRITIN SERPL-MCNC: 92 NG/ML (ref 20–300)
GLUCOSE SERPL-MCNC: 157 MG/DL (ref 70–110)
GLUCOSE UR QL STRIP: NEGATIVE
HCT VFR BLD AUTO: 42.3 % (ref 40–54)
HGB BLD-MCNC: 13.9 G/DL (ref 14–18)
HGB UR QL STRIP: NEGATIVE
IMM GRANULOCYTES # BLD AUTO: 0.02 K/UL (ref 0–0.04)
IMM GRANULOCYTES NFR BLD AUTO: 0.3 % (ref 0–0.5)
IRON SERPL-MCNC: 90 UG/DL (ref 45–160)
KETONES UR QL STRIP: NEGATIVE
LEUKOCYTE ESTERASE UR QL STRIP: NEGATIVE
LYMPHOCYTES # BLD AUTO: 2 K/UL (ref 1–4.8)
LYMPHOCYTES NFR BLD: 32.5 % (ref 18–48)
MCH RBC QN AUTO: 28.5 PG (ref 27–31)
MCHC RBC AUTO-ENTMCNC: 32.9 G/DL (ref 32–36)
MCV RBC AUTO: 87 FL (ref 82–98)
MONOCYTES # BLD AUTO: 0.5 K/UL (ref 0.3–1)
MONOCYTES NFR BLD: 7.4 % (ref 4–15)
NEUTROPHILS # BLD AUTO: 3.3 K/UL (ref 1.8–7.7)
NEUTROPHILS NFR BLD: 54 % (ref 38–73)
NITRITE UR QL STRIP: NEGATIVE
NRBC BLD-RTO: 0 /100 WBC
PH UR STRIP: 6 [PH] (ref 5–8)
PHOSPHATE SERPL-MCNC: 3.8 MG/DL (ref 2.7–4.5)
PLATELET # BLD AUTO: 180 K/UL (ref 150–450)
PMV BLD AUTO: 10.8 FL (ref 9.2–12.9)
POTASSIUM SERPL-SCNC: 5.3 MMOL/L (ref 3.5–5.1)
PROT UR QL STRIP: NEGATIVE
PROT UR-MCNC: 12 MG/DL (ref 6–15)
PTH-INTACT SERPL-MCNC: 24 PG/ML (ref 9–77)
RBC # BLD AUTO: 4.88 M/UL (ref 4.6–6.2)
SATURATED IRON: 20 % (ref 20–50)
SODIUM SERPL-SCNC: 136 MMOL/L (ref 136–145)
SP GR UR STRIP: 1.02 (ref 1–1.03)
TOTAL IRON BINDING CAPACITY: 440 UG/DL (ref 250–450)
TRANSFERRIN SERPL-MCNC: 314 MG/DL (ref 200–375)
URN SPEC COLLECT METH UR: NORMAL
UROBILINOGEN UR STRIP-ACNC: NEGATIVE EU/DL
WBC # BLD AUTO: 6.1 K/UL (ref 3.9–12.7)

## 2023-07-03 PROCEDURE — 84466 ASSAY OF TRANSFERRIN: CPT | Performed by: INTERNAL MEDICINE

## 2023-07-03 PROCEDURE — 80069 RENAL FUNCTION PANEL: CPT | Performed by: INTERNAL MEDICINE

## 2023-07-03 PROCEDURE — 82570 ASSAY OF URINE CREATININE: CPT | Performed by: INTERNAL MEDICINE

## 2023-07-03 PROCEDURE — 36415 COLL VENOUS BLD VENIPUNCTURE: CPT | Performed by: INTERNAL MEDICINE

## 2023-07-03 PROCEDURE — 85025 COMPLETE CBC W/AUTO DIFF WBC: CPT | Performed by: INTERNAL MEDICINE

## 2023-07-03 PROCEDURE — 81003 URINALYSIS AUTO W/O SCOPE: CPT | Performed by: INTERNAL MEDICINE

## 2023-07-03 PROCEDURE — 83970 ASSAY OF PARATHORMONE: CPT | Performed by: INTERNAL MEDICINE

## 2023-07-03 PROCEDURE — 84156 ASSAY OF PROTEIN URINE: CPT | Performed by: INTERNAL MEDICINE

## 2023-07-03 PROCEDURE — 82728 ASSAY OF FERRITIN: CPT | Performed by: INTERNAL MEDICINE

## 2023-07-21 RX ORDER — SOTALOL HYDROCHLORIDE 120 MG/1
TABLET ORAL
Qty: 60 TABLET | Refills: 0 | OUTPATIENT
Start: 2023-07-21

## 2023-07-21 RX ORDER — SOTALOL HYDROCHLORIDE 80 MG/1
40 TABLET ORAL DAILY
Qty: 45 TABLET | Refills: 0 | Status: SHIPPED | OUTPATIENT
Start: 2023-07-21 | End: 2023-09-23 | Stop reason: SDUPTHER

## 2023-07-31 RX ORDER — PEN NEEDLE, DIABETIC 32GX 5/32"
NEEDLE, DISPOSABLE MISCELLANEOUS
Qty: 100 EACH | Refills: 3 | Status: SHIPPED | OUTPATIENT
Start: 2023-07-31

## 2023-08-04 ENCOUNTER — TELEPHONE (OUTPATIENT)
Dept: FAMILY MEDICINE | Facility: CLINIC | Age: 78
End: 2023-08-04
Payer: MEDICARE

## 2023-08-14 RX ORDER — LIRAGLUTIDE 6 MG/ML
INJECTION SUBCUTANEOUS
Qty: 27 ML | Refills: 0 | Status: SHIPPED | OUTPATIENT
Start: 2023-08-14 | End: 2023-09-23 | Stop reason: SDUPTHER

## 2023-08-14 NOTE — TELEPHONE ENCOUNTER
I spoke with kristie via phone ( Chago purcell )calling to clarify rx for victoza. It was sent in as 1.8 mL instead of 1.8 mg. Voiced verbal okay to switch no further question.

## 2023-08-14 NOTE — TELEPHONE ENCOUNTER
Provider Staff:  Action required for this patient     Please see care gap opportunities below in Care Due Message.    Thanks!  [facility]     Appointments      Date Provider   Last Visit   4/26/2023 See Quach MD   Next Visit   11/27/2023 See Quach MD     Refill Decision Note   Janak Booker  is requesting a refill authorization.  Brief Assessment and Rationale for Refill:  Approve     Medication Therapy Plan:         Comments:     Note composed:11:10 AM 08/14/2023

## 2023-08-14 NOTE — TELEPHONE ENCOUNTER
Care Due:                  Date            Visit Type   Department     Provider  --------------------------------------------------------------------------------                                EP -                              PRIMARY      [facility]    See Cortez  Last Visit: 04-      CARE ([facility])   MEDICINE       Naccari                              EP -                              PRIMARY      [facility]    See Cortez  Next Visit: 11-      CARE ([facility])   MEDICINE       Naccari                                                            Last  Test          Frequency    Reason                     Performed    Due Date  --------------------------------------------------------------------------------    HBA1C.......  6 months...  liraglutide, metFORMIN,    04-   10-                             pioglitazone.............    [employer] Embedded Care Due Messages. Reference number: [id number].   8/14/2023 5:00:59 AM CDT

## 2023-08-18 RX ORDER — LISINOPRIL 5 MG/1
5 TABLET ORAL
Qty: 90 TABLET | Refills: 3 | Status: SHIPPED | OUTPATIENT
Start: 2023-08-18 | End: 2023-09-23 | Stop reason: SDUPTHER

## 2023-09-07 RX ORDER — DABIGATRAN ETEXILATE MESYLATE 150 MG/1
CAPSULE ORAL
Qty: 180 CAPSULE | Refills: 0 | Status: SHIPPED | OUTPATIENT
Start: 2023-09-07 | End: 2023-09-11 | Stop reason: SDUPTHER

## 2023-09-11 ENCOUNTER — OFFICE VISIT (OUTPATIENT)
Dept: CARDIOLOGY | Facility: CLINIC | Age: 78
End: 2023-09-11
Payer: MEDICARE

## 2023-09-11 VITALS
DIASTOLIC BLOOD PRESSURE: 62 MMHG | OXYGEN SATURATION: 96 % | HEIGHT: 73 IN | BODY MASS INDEX: 33.65 KG/M2 | WEIGHT: 253.88 LBS | SYSTOLIC BLOOD PRESSURE: 122 MMHG | HEART RATE: 73 BPM

## 2023-09-11 DIAGNOSIS — Z95.5 S/P CORONARY ARTERY STENT PLACEMENT: ICD-10-CM

## 2023-09-11 DIAGNOSIS — Z95.2 S/P TAVR (TRANSCATHETER AORTIC VALVE REPLACEMENT): ICD-10-CM

## 2023-09-11 DIAGNOSIS — I48.20 ATRIAL FIBRILLATION, CHRONIC: ICD-10-CM

## 2023-09-11 DIAGNOSIS — E78.5 HYPERLIPIDEMIA ASSOCIATED WITH TYPE 2 DIABETES MELLITUS: ICD-10-CM

## 2023-09-11 DIAGNOSIS — E11.69 HYPERLIPIDEMIA ASSOCIATED WITH TYPE 2 DIABETES MELLITUS: ICD-10-CM

## 2023-09-11 DIAGNOSIS — N18.32 STAGE 3B CHRONIC KIDNEY DISEASE: ICD-10-CM

## 2023-09-11 DIAGNOSIS — Q60.0 SOLITARY KIDNEY, CONGENITAL: Primary | ICD-10-CM

## 2023-09-11 DIAGNOSIS — I25.10 CORONARY ARTERY DISEASE, OCCLUSIVE: ICD-10-CM

## 2023-09-11 PROCEDURE — 1160F PR REVIEW ALL MEDS BY PRESCRIBER/CLIN PHARMACIST DOCUMENTED: ICD-10-PCS | Mod: HCNC,CPTII,S$GLB, | Performed by: INTERNAL MEDICINE

## 2023-09-11 PROCEDURE — 1101F PR PT FALLS ASSESS DOC 0-1 FALLS W/OUT INJ PAST YR: ICD-10-PCS | Mod: HCNC,CPTII,S$GLB, | Performed by: INTERNAL MEDICINE

## 2023-09-11 PROCEDURE — 3074F PR MOST RECENT SYSTOLIC BLOOD PRESSURE < 130 MM HG: ICD-10-PCS | Mod: HCNC,CPTII,S$GLB, | Performed by: INTERNAL MEDICINE

## 2023-09-11 PROCEDURE — 99213 OFFICE O/P EST LOW 20 MIN: CPT | Mod: HCNC,S$GLB,, | Performed by: INTERNAL MEDICINE

## 2023-09-11 PROCEDURE — 3288F PR FALLS RISK ASSESSMENT DOCUMENTED: ICD-10-PCS | Mod: HCNC,CPTII,S$GLB, | Performed by: INTERNAL MEDICINE

## 2023-09-11 PROCEDURE — 1101F PT FALLS ASSESS-DOCD LE1/YR: CPT | Mod: HCNC,CPTII,S$GLB, | Performed by: INTERNAL MEDICINE

## 2023-09-11 PROCEDURE — 99999 PR PBB SHADOW E&M-EST. PATIENT-LVL IV: ICD-10-PCS | Mod: PBBFAC,HCNC,, | Performed by: INTERNAL MEDICINE

## 2023-09-11 PROCEDURE — 1126F AMNT PAIN NOTED NONE PRSNT: CPT | Mod: HCNC,CPTII,S$GLB, | Performed by: INTERNAL MEDICINE

## 2023-09-11 PROCEDURE — 3074F SYST BP LT 130 MM HG: CPT | Mod: HCNC,CPTII,S$GLB, | Performed by: INTERNAL MEDICINE

## 2023-09-11 PROCEDURE — 1159F MED LIST DOCD IN RCRD: CPT | Mod: HCNC,CPTII,S$GLB, | Performed by: INTERNAL MEDICINE

## 2023-09-11 PROCEDURE — 99213 PR OFFICE/OUTPT VISIT, EST, LEVL III, 20-29 MIN: ICD-10-PCS | Mod: HCNC,S$GLB,, | Performed by: INTERNAL MEDICINE

## 2023-09-11 PROCEDURE — 99999 PR PBB SHADOW E&M-EST. PATIENT-LVL IV: CPT | Mod: PBBFAC,HCNC,, | Performed by: INTERNAL MEDICINE

## 2023-09-11 PROCEDURE — 3288F FALL RISK ASSESSMENT DOCD: CPT | Mod: HCNC,CPTII,S$GLB, | Performed by: INTERNAL MEDICINE

## 2023-09-11 PROCEDURE — 1160F RVW MEDS BY RX/DR IN RCRD: CPT | Mod: HCNC,CPTII,S$GLB, | Performed by: INTERNAL MEDICINE

## 2023-09-11 PROCEDURE — 1159F PR MEDICATION LIST DOCUMENTED IN MEDICAL RECORD: ICD-10-PCS | Mod: HCNC,CPTII,S$GLB, | Performed by: INTERNAL MEDICINE

## 2023-09-11 PROCEDURE — 3078F DIAST BP <80 MM HG: CPT | Mod: HCNC,CPTII,S$GLB, | Performed by: INTERNAL MEDICINE

## 2023-09-11 PROCEDURE — 1126F PR PAIN SEVERITY QUANTIFIED, NO PAIN PRESENT: ICD-10-PCS | Mod: HCNC,CPTII,S$GLB, | Performed by: INTERNAL MEDICINE

## 2023-09-11 PROCEDURE — 3078F PR MOST RECENT DIASTOLIC BLOOD PRESSURE < 80 MM HG: ICD-10-PCS | Mod: HCNC,CPTII,S$GLB, | Performed by: INTERNAL MEDICINE

## 2023-09-11 RX ORDER — DABIGATRAN ETEXILATE 150 MG/1
150 CAPSULE ORAL 2 TIMES DAILY
Qty: 180 CAPSULE | Refills: 3 | Status: SHIPPED | OUTPATIENT
Start: 2023-09-11 | End: 2023-09-20 | Stop reason: SDUPTHER

## 2023-09-11 NOTE — PROGRESS NOTES
Patient ID:  Janak Booker is a 78 y.o. male who presents for follow-up of Coronary Artery Disease and Atrial Fibrillation      He has been doing well.  He has been on sotalol 60 mg a day his pulse is running between 50 and 60.  He is on Pradaxa 150 mg b.i.d..  For renal function greater than 30 the doses 150 mg.        Past Medical History:   Diagnosis Date    Anticoagulant long-term use     Aortic stenosis     Arthritis     Atrial fibrillation     CAD (coronary artery disease)     Colon polyps     per colonoscopy 9/11/2014    Diabetes mellitus, type 2     Disc displacement, lumbar     L3    Family history of prostate cancer     GERD (gastroesophageal reflux disease)     Heel bone fracture     left foot    Hyperlipidemia LDL goal < 70     Hypertension     NYHA class 3 heart failure with preserved ejection fraction 10/19/2022    Renal manifestation of secondary diabetes mellitus     Sleep apnea     USES MACHINE    Spinal stenosis, lumbar         Past Surgical History:   Procedure Laterality Date    BACK SURGERY      CARDIAC SURGERY      stents     CARPAL TUNNEL RELEASE Right     CATARACT EXTRACTION W/  INTRAOCULAR LENS IMPLANT Bilateral     COLONOSCOPY N/A 3/27/2018    Procedure: COLONOSCOPY;  Surgeon: Rishi Garcia MD;  Location: [facility] ENDO;  Service: Endoscopy;  Laterality: N/A;    COLONOSCOPY N/A 2/15/2021    Procedure: COLONOSCOPY;  Surgeon: Rishi Garcia MD;  Location: [facility] ENDO;  Service: Endoscopy;  Laterality: N/A;    COLONOSCOPY N/A 6/23/2023    Procedure: COLONOSCOPY;  Surgeon: Rishi Garcia MD;  Location: [facility];  Service: Endoscopy;  Laterality: N/A;    CORONARY ANGIOPLASTY WITH STENT PLACEMENT      x 3    EYE SURGERY      INJECTION OF FACET JOINT N/A 5/14/2019    Procedure: INJECTION, FACET JOINT INJECTION (LUMBAR BLOCK) PLEASE INJECT L3/4;  Surgeon: Catrachito Harley MD;  Location: [facility] PAIN MGT;  Service: Pain Management;  Laterality: N/A;  NEEDS CONSENT, PRADAXA CLEARANCE IN  "CHART    KNEE ARTHROSCOPY W/ MENISCECTOMY  12/22/2008    right    LEFT HEART CATHETERIZATION Left 9/7/2022    Procedure: Left heart cath;  Surgeon: Adoany Quinones MD;  Location: [facility] CATH;  Service: Cardiology;  Laterality: Left;    LUMBAR DISCECTOMY  12/2011    L4-L5 Fusion    LUMBAR EPIDURAL INJECTION  02/04/2019    ROTATOR CUFF REPAIR Left 7/2012    SHOULDER OPEN ROTATOR CUFF REPAIR      SKIN CANCER FOREHEAD 2019      SPINE SURGERY      TIBIA FRACTURE SURGERY      TRANSCATHETER AORTIC VALVE REPLACEMENT (TAVR) Bilateral 10/19/2022    Procedure: REPLACEMENT, AORTIC VALVE, TRANSCATHETER (TAVR);  Surgeon: Adonay Quinones MD;  Location: [facility] CATH;  Service: Cardiology;  Laterality: Bilateral;    TRANSCATHETER AORTIC VALVE REPLACEMENT (TAVR) Bilateral 10/19/2022    Procedure: REPLACEMENT, AORTIC VALVE, TRANSCATHETER (TAVR);  Surgeon: Denver Osborne MD;  Location: [facility] CATH;  Service: Cardiothoracic;  Laterality: Bilateral;    TRANSFORAMINAL EPIDURAL INJECTION OF STEROID Left 9/23/2019    Procedure: INJECTION, STEROID, EPIDURAL, TRANSFORAMINAL APPROACH;  Surgeon: Jose Guadalupe Linn MD;  Location: [facility] PAIN MGT;  Service: Pain Management;  Laterality: Left;  Left TF ADI L4 and L5  OK to hold Pradaxa    TRANSFORAMINAL EPIDURAL INJECTION OF STEROID Left 11/14/2019    Procedure: INJECTION, STEROID, EPIDURAL, TRANSFORAMINAL APPROACH;  Surgeon: Jose Guadalupe Linn MD;  Location: [facility] PAIN MGT;  Service: Pain Management;  Laterality: Left;  Left TF ADI L4-5 and L5-S1          Current Outpatient Medications   Medication Instructions    ascorbic acid (vitamin C) (VITAMIN C) 1,000 mg, Oral, Daily    aspirin (ECOTRIN) 81 mg, Oral, Daily    BD ALCOHOL SWABS PadM USE AS DIRECTED TO CHECK BLOOD GLUCOSE DAILY    dabigatran etexilate (PRADAXA) 150 mg, Oral, 2 times daily, "Do NOT break, chew, or open capsules."    fenofibrate micronized (LOFIBRA) 134 mg, Oral, Daily    FLUAD QUAD 2022-23,65Y UP,,PF, 60 mcg (15 mcg x 4)/0.5 mL Syrg No dose, " "route, or frequency recorded.    lancets (TRUEPLUS LANCETS) 28 gauge Misc 1 lancet , Misc.(Non-Drug; Combo Route), Daily    lisinopriL (PRINIVIL,ZESTRIL) 5 mg, Oral    metFORMIN (GLUCOPHAGE-XR) 500 MG ER 24hr tablet TAKE ONE TABLET BY MOUTH ONCE DAILY    multivitamin with minerals tablet No dose, route, or frequency recorded.    NITROSTAT 0.4 mg, Oral, Every 5 min PRN, Do not take more than 3 tabs a day    omega-3 fatty acids 1,000 mg Cap 1 Capsule(s) Oral OTC once a day.    omeprazole (PRILOSEC) 40 MG capsule TAKE ONE CAPSULE BY MOUTH ONCE DAILY    pen needle, diabetic (BD BARRY 2ND GEN PEN NEEDLE) 32 gauge x 5/32" Ndle Use one needle  once daily    pioglitazone (ACTOS) 30 MG tablet TAKE ONE TABLET BY MOUTH ONCE DAILY    rosuvastatin (CRESTOR) 10 mg, Oral, Nightly    sotaloL (BETAPACE) 40 mg, Oral, Daily    VICTOZA 3-CAITIE 0.6 mg/0.1 mL (18 mg/3 mL) PnIj pen Inject 1.8 ml under the skin daily.        Review of patient's allergies indicates:   Allergen Reactions    Neurontin [gabapentin] Swelling     Leg swelling        Review of Systems   Cardiovascular:  Negative for chest pain, dyspnea on exertion, irregular heartbeat and palpitations.   Respiratory:  Negative for cough and shortness of breath.         Objective:     Vitals:    09/11/23 0915   BP: 122/62   BP Location: Left arm   Patient Position: Sitting   BP Method: Medium (Manual)   Pulse: 73   SpO2: 96%   Weight: 115.2 kg (253 lb 13.8 oz)   Height: 6' 1" (1.854 m)       Physical Exam  Vitals and nursing note reviewed.   HENT:      Head: Normocephalic and atraumatic.   Eyes:      Conjunctiva/sclera: Conjunctivae normal.   Cardiovascular:      Rate and Rhythm: Normal rate. Rhythm irregular.      Heart sounds: Normal heart sounds.   Pulmonary:      Effort: Pulmonary effort is normal.      Breath sounds: Normal breath sounds.   Abdominal:      General: Bowel sounds are normal.      Palpations: Abdomen is soft.   Musculoskeletal:         General: Normal range of " motion.   Skin:     General: Skin is warm and dry.   Neurological:      Mental Status: He is alert and oriented to person, place, and time.   Psychiatric:         Behavior: Behavior normal.         Thought Content: Thought content normal.         Judgment: Judgment normal.       CMP  Sodium   Date Value Ref Range Status   07/03/2023 136 136 - 145 mmol/L Final     Potassium   Date Value Ref Range Status   07/03/2023 5.3 (H) 3.5 - 5.1 mmol/L Final     Chloride   Date Value Ref Range Status   07/03/2023 104 95 - 110 mmol/L Final     CO2   Date Value Ref Range Status   07/03/2023 26 23 - 29 mmol/L Final     Glucose   Date Value Ref Range Status   07/03/2023 157 (H) 70 - 110 mg/dL Final     BUN   Date Value Ref Range Status   07/03/2023 28 (H) 8 - 23 mg/dL Final     Creatinine   Date Value Ref Range Status   07/03/2023 2.1 (H) 0.5 - 1.4 mg/dL Final     Calcium   Date Value Ref Range Status   07/03/2023 9.2 8.7 - 10.5 mg/dL Final     Total Protein   Date Value Ref Range Status   04/26/2023 7.0 6.0 - 8.4 g/dL Final     Albumin   Date Value Ref Range Status   07/03/2023 3.8 3.5 - 5.2 g/dL Final     Total Bilirubin   Date Value Ref Range Status   04/26/2023 0.7 0.1 - 1.0 mg/dL Final     Comment:     For infants and newborns, interpretation of results should be based  on gestational age, weight and in agreement with clinical  observations.    Premature Infant recommended reference ranges:  Up to 24 hours.............<8.0 mg/dL  Up to 48 hours............<12.0 mg/dL  3-5 days..................<15.0 mg/dL  6-29 days.................<15.0 mg/dL       Alkaline Phosphatase   Date Value Ref Range Status   04/26/2023 41 (L) 55 - 135 U/L Final     AST   Date Value Ref Range Status   04/26/2023 18 10 - 40 U/L Final     ALT   Date Value Ref Range Status   04/26/2023 13 10 - 44 U/L Final     Anion Gap   Date Value Ref Range Status   07/03/2023 6 (L) 8 - 16 mmol/L Final     eGFR if    Date Value Ref Range Status    07/05/2022 34.6 (A) >60 mL/min/1.73 m^2 Final     eGFR if non    Date Value Ref Range Status   07/05/2022 29.9 (A) >60 mL/min/1.73 m^2 Final     Comment:     Calculation used to obtain the estimated glomerular filtration  rate (eGFR) is the CKD-EPI equation.         BMP  Lab Results   Component Value Date     07/03/2023    K 5.3 (H) 07/03/2023     07/03/2023    CO2 26 07/03/2023    BUN 28 (H) 07/03/2023    CREATININE 2.1 (H) 07/03/2023    CALCIUM 9.2 07/03/2023    ANIONGAP 6 (L) 07/03/2023    ESTGFRAFRICA 34.6 (A) 07/05/2022    EGFRNONAA 29.9 (A) 07/05/2022      BNP  @LABRCNTIP(BNP,BNPTRIAGEBLO)@   Lab Results   Component Value Date    CHOL 142 04/26/2023    CHOL 140 11/08/2021    CHOL 130 06/09/2020     Lab Results   Component Value Date    HDL 42 04/26/2023    HDL 42 11/08/2021    HDL 41 06/09/2020     Lab Results   Component Value Date    LDLCALC 75.6 04/26/2023    LDLCALC 64.2 11/08/2021    LDLCALC 67.2 06/09/2020     Lab Results   Component Value Date    TRIG 122 04/26/2023    TRIG 169 (H) 11/08/2021    TRIG 109 06/09/2020     Lab Results   Component Value Date    CHOLHDL 29.6 04/26/2023    CHOLHDL 30.0 11/08/2021    CHOLHDL 31.5 06/09/2020      Lab Results   Component Value Date    TSH 1.32 07/20/2011     Lab Results   Component Value Date    HGBA1C 6.3 (H) 04/26/2023     Lab Results   Component Value Date    WBC 6.10 07/03/2023    HGB 13.9 (L) 07/03/2023    HCT 42.3 07/03/2023    MCV 87 07/03/2023     07/03/2023         Results for orders placed during the hospital encounter of 11/14/22    Echo    Interpretation Summary  · Atrial fibrillation observed.  · The left ventricle is normal in size with concentric remodeling and normal systolic function. The estimated ejection fraction is 65%.  · Severe left atrial enlargement.  · Normal right ventricular size with normal right ventricular systolic function.  · Mild right atrial enlargement.  · Mild mitral regurgitation.  · There  is a 34 mm Medtronic Evolut PRO+ transcutaneously-placed aortic bioprosthesis present. There is mild paravalvular aortic insufficiency present. Prosthetic aortic valve is normal. The aortic valve mean gradient is 7 mmHg with a dimensionless index of 0.62.     Results for orders placed during the hospital encounter of 10/19/22    Cardiac catheterization    Narrative  Procedure performed in the Invasive Lab  - See Procedure Log link below for nursing documentation  - See OpNote on Surgeries Tab for physician findings  - See Imaging Tab for radiologist dictation         Assessment:       Atrial fibrillation, chronic  Rate controlled on 60 mg of sotalol    Hyperlipidemia associated with type 2 diabetes mellitus  Controlled on 10 mg of rosuvastatin    S/P coronary artery stent placement  Stable no symptoms of angina    Coronary artery disease, occlusive  Stable no symptoms of angina    S/P TAVR (transcatheter aortic valve replacement)  Stable    Solitary kidney, congenital  Renal function stable       Plan:       Continue the current medications.  Return to the office in 6 months.  He is to get a BNP in approximately 2 months to follow his renal function and potassium

## 2023-09-20 DIAGNOSIS — Q60.0 SOLITARY KIDNEY, CONGENITAL: ICD-10-CM

## 2023-09-20 DIAGNOSIS — N18.32 STAGE 3B CHRONIC KIDNEY DISEASE: ICD-10-CM

## 2023-09-20 NOTE — TELEPHONE ENCOUNTER
Pioglitazone--please transfer prescription to [facility] ([facility] location)  LOV--4-26-23  FOV--11-27-23    Omeprazole--e-scribed to pharmacy on yesterday 9-19-23

## 2023-09-20 NOTE — TELEPHONE ENCOUNTER
No care due was identified.  [facility] Embedded Care Due Messages. Reference number: [id number].   9/20/2023 10:07:43 AM CDT

## 2023-09-20 NOTE — TELEPHONE ENCOUNTER
----- Message from Adelina Pablo sent at 9/20/2023  9:42 AM CDT -----  Contact: self  Type:  RX Refill Request    Who Called:  Pt   Refill or New Rx: Refill   RX Name and Strength:  1. omeprazole (PRILOSEC) 40 MG capsule  2. pioglitazone (ACTOS) 30 MG tablet  How is the patient currently taking it? (ex. 1XDay):as directed   Is this a 30 day or 90 day RX: 90  Preferred Pharmacy with phone number:   [id number] - [place] - [address]  [facility] [place] [address]  Phone: [phone] Fax: [phone]  Local or Mail Order: Local  Ordering Provider: Dr. Quach  Would the patient rather a call back or a response via [facility]?  Call   Best Call Back Number: [phone]  Thank you...

## 2023-09-20 NOTE — TELEPHONE ENCOUNTER
----- Message from Keith Srinivasan sent at 9/20/2023  9:38 AM CDT -----  Regarding: Refill  Contact: patient  Type:  RX Refill Request    Who Called: patient  Refill or New Rx:Refill  RX Name and Strength:dabigatran etexilate (PRADAXA) 150 mg Cap  How is the patient currently taking it? (ex. 1XDay):  Is this a 30 day or 90 day RX:  Preferred Pharmacy with phone number:  [id number] - [place] - [address] [facility] [address]  [facility] [place] [address]  Phone: [phone] Fax: [phone]  Local or Mail Order:local  Ordering Provider:Moise Olguin  Would the patient rather a call back or a response via [facility]? refill  Best Call Back Number:[phone]  Additional Information:

## 2023-09-21 RX ORDER — PIOGLITAZONEHYDROCHLORIDE 30 MG/1
30 TABLET ORAL DAILY
Qty: 90 TABLET | Refills: 2 | Status: SHIPPED | OUTPATIENT
Start: 2023-09-21 | End: 2023-11-06 | Stop reason: SDUPTHER

## 2023-09-22 ENCOUNTER — PATIENT MESSAGE (OUTPATIENT)
Dept: ADMINISTRATIVE | Facility: OTHER | Age: 78
End: 2023-09-22
Payer: MEDICARE

## 2023-09-23 DIAGNOSIS — Q60.0 SOLITARY KIDNEY, CONGENITAL: ICD-10-CM

## 2023-09-23 DIAGNOSIS — N18.30 CONTROLLED TYPE 2 DIABETES MELLITUS WITH STAGE 3 CHRONIC KIDNEY DISEASE, WITHOUT LONG-TERM CURRENT USE OF INSULIN: ICD-10-CM

## 2023-09-23 DIAGNOSIS — E11.22 CONTROLLED TYPE 2 DIABETES MELLITUS WITH STAGE 3 CHRONIC KIDNEY DISEASE, WITHOUT LONG-TERM CURRENT USE OF INSULIN: ICD-10-CM

## 2023-09-23 DIAGNOSIS — N18.32 STAGE 3B CHRONIC KIDNEY DISEASE: ICD-10-CM

## 2023-09-23 RX ORDER — PIOGLITAZONEHYDROCHLORIDE 30 MG/1
30 TABLET ORAL DAILY
Qty: 90 TABLET | Refills: 2 | Status: CANCELLED | OUTPATIENT
Start: 2023-09-23

## 2023-09-23 RX ORDER — OMEPRAZOLE 40 MG/1
40 CAPSULE, DELAYED RELEASE ORAL DAILY
Qty: 90 CAPSULE | Refills: 2 | Status: CANCELLED | OUTPATIENT
Start: 2023-09-23

## 2023-09-23 RX ORDER — METFORMIN HYDROCHLORIDE 500 MG/1
500 TABLET, EXTENDED RELEASE ORAL DAILY
Qty: 90 TABLET | Refills: 0 | Status: CANCELLED | OUTPATIENT
Start: 2023-09-23

## 2023-09-23 NOTE — TELEPHONE ENCOUNTER
No care due was identified.  [facility] Embedded Care Due Messages. Reference number: [id number].   9/23/2023 12:56:27 PM CDT

## 2023-09-24 RX ORDER — PEN NEEDLE, DIABETIC 30 GX3/16"
NEEDLE, DISPOSABLE MISCELLANEOUS
Qty: 100 EACH | Refills: 3 | OUTPATIENT
Start: 2023-09-24

## 2023-09-25 RX ORDER — LIRAGLUTIDE 6 MG/ML
1.8 INJECTION SUBCUTANEOUS DAILY
Qty: 27 ML | Refills: 4 | Status: SHIPPED | OUTPATIENT
Start: 2023-09-25 | End: 2023-11-06 | Stop reason: SDUPTHER

## 2023-09-25 RX ORDER — SOTALOL HYDROCHLORIDE 80 MG/1
40 TABLET ORAL DAILY
Qty: 90 TABLET | Refills: 3 | Status: SHIPPED | OUTPATIENT
Start: 2023-09-25 | End: 2025-09-14

## 2023-09-25 RX ORDER — DABIGATRAN ETEXILATE 150 MG/1
150 CAPSULE ORAL 2 TIMES DAILY
Qty: 180 CAPSULE | Refills: 3 | Status: SHIPPED | OUTPATIENT
Start: 2023-09-25 | End: 2023-11-15 | Stop reason: SDUPTHER

## 2023-09-25 RX ORDER — LISINOPRIL 5 MG/1
5 TABLET ORAL DAILY
Qty: 90 TABLET | Refills: 3 | Status: SHIPPED | OUTPATIENT
Start: 2023-09-25

## 2023-09-25 RX ORDER — ROSUVASTATIN CALCIUM 10 MG/1
10 TABLET, COATED ORAL NIGHTLY
Qty: 90 TABLET | Refills: 2 | Status: SHIPPED | OUTPATIENT
Start: 2023-09-25 | End: 2023-12-14 | Stop reason: SDUPTHER

## 2023-09-25 RX ORDER — FENOFIBRATE 134 MG/1
134 CAPSULE ORAL DAILY
Qty: 30 CAPSULE | Refills: 11 | Status: SHIPPED | OUTPATIENT
Start: 2023-09-25

## 2023-09-25 RX ORDER — DABIGATRAN ETEXILATE 150 MG/1
150 CAPSULE ORAL 2 TIMES DAILY
Qty: 180 CAPSULE | Refills: 3 | Status: SHIPPED | OUTPATIENT
Start: 2023-09-25 | End: 2024-03-12

## 2023-09-25 RX ORDER — NITROGLYCERIN 0.4 MG/1
0.4 TABLET SUBLINGUAL EVERY 5 MIN PRN
Qty: 30 TABLET | Refills: 3 | Status: SHIPPED | OUTPATIENT
Start: 2023-09-25 | End: 2023-10-23 | Stop reason: SDUPTHER

## 2023-09-25 NOTE — TELEPHONE ENCOUNTER
Refill Decision Note   Janak Booker  is requesting a refill authorization.  Brief Assessment and Rationale for Refill:  Quick Discontinue     Medication Therapy Plan:    Pharmacy is requesting new scripts for the following medications without required information, (sig/ frequency/qty/etc)      Medication Reconciliation Completed: No     Comments: Pharmacies have been requesting medications for patients without required information, (sig, frequency, qty, etc.). In addition, requests are sent for medication(s) pt. are currently not taking, and medications patients have never taken.    We have spoken to the pharmacies about these request types and advised their teams previously that we are unable to assess these New Script requests and require all details for these requests. This is a known issue and has been reported.     Note composed:8:54 PM 09/24/2023

## 2023-10-22 ENCOUNTER — PATIENT MESSAGE (OUTPATIENT)
Dept: OTHER | Facility: OTHER | Age: 78
End: 2023-10-22
Payer: MEDICARE

## 2023-10-31 RX ORDER — NITROGLYCERIN 0.4 MG/1
0.4 TABLET SUBLINGUAL EVERY 5 MIN PRN
Qty: 30 TABLET | Refills: 3 | Status: SHIPPED | OUTPATIENT
Start: 2023-10-31 | End: 2024-02-27 | Stop reason: SDUPTHER

## 2023-11-06 DIAGNOSIS — N18.30 CONTROLLED TYPE 2 DIABETES MELLITUS WITH STAGE 3 CHRONIC KIDNEY DISEASE, WITHOUT LONG-TERM CURRENT USE OF INSULIN: ICD-10-CM

## 2023-11-06 DIAGNOSIS — E11.22 CONTROLLED TYPE 2 DIABETES MELLITUS WITH STAGE 3 CHRONIC KIDNEY DISEASE, WITHOUT LONG-TERM CURRENT USE OF INSULIN: ICD-10-CM

## 2023-11-06 NOTE — TELEPHONE ENCOUNTER
Care Due:                  Date            Visit Type   Department     Provider  --------------------------------------------------------------------------------                                EP -                              PRIMARY      [facility]    See Cortez  Last Visit: 04-      CARE ([facility])   MEDICINE       Naccari                              EP -                              PRIMARY      [facility]    See Cortez  Next Visit: 11-      CARE ([facility])   MEDICINE       Naccari                                                            Last  Test          Frequency    Reason                     Performed    Due Date  --------------------------------------------------------------------------------    HBA1C.......  6 months...  liraglutide, metFORMIN,    04-   10-                             pioglitazone.............    [employer] Embedded Care Due Messages. Reference number: [id number].   11/06/2023 11:43:31 AM CST

## 2023-11-07 RX ORDER — PIOGLITAZONEHYDROCHLORIDE 30 MG/1
30 TABLET ORAL DAILY
Qty: 90 TABLET | Refills: 2 | Status: SHIPPED | OUTPATIENT
Start: 2023-11-07

## 2023-11-07 RX ORDER — LIRAGLUTIDE 6 MG/ML
1.8 INJECTION SUBCUTANEOUS DAILY
Qty: 27 ML | Refills: 4 | Status: SHIPPED | OUTPATIENT
Start: 2023-11-07 | End: 2024-02-07 | Stop reason: DRUGHIGH

## 2023-11-07 RX ORDER — METFORMIN HYDROCHLORIDE 500 MG/1
500 TABLET, EXTENDED RELEASE ORAL DAILY
Qty: 90 TABLET | Refills: 0 | Status: SHIPPED | OUTPATIENT
Start: 2023-11-07 | End: 2024-02-21 | Stop reason: SDUPTHER

## 2023-11-07 RX ORDER — OMEPRAZOLE 40 MG/1
40 CAPSULE, DELAYED RELEASE ORAL DAILY
Qty: 90 CAPSULE | Refills: 2 | Status: SHIPPED | OUTPATIENT
Start: 2023-11-07

## 2023-11-09 DIAGNOSIS — E11.22 CONTROLLED TYPE 2 DIABETES MELLITUS WITH STAGE 3 CHRONIC KIDNEY DISEASE, WITHOUT LONG-TERM CURRENT USE OF INSULIN: ICD-10-CM

## 2023-11-09 DIAGNOSIS — N18.30 CONTROLLED TYPE 2 DIABETES MELLITUS WITH STAGE 3 CHRONIC KIDNEY DISEASE, WITHOUT LONG-TERM CURRENT USE OF INSULIN: ICD-10-CM

## 2023-11-09 RX ORDER — METFORMIN HYDROCHLORIDE 500 MG/1
500 TABLET, EXTENDED RELEASE ORAL
Qty: 90 TABLET | Refills: 0 | OUTPATIENT
Start: 2023-11-09

## 2023-11-09 NOTE — TELEPHONE ENCOUNTER
Refill Decision Note   Janak Booker  is requesting a refill authorization.  Brief Assessment and Rationale for Refill:  Quick Discontinue     Medication Therapy Plan:  Receipt confirmed by pharmacy (11/7/2023  7:01 AM CST)      Comments:     Note composed:2:00 PM 11/09/2023

## 2023-11-09 NOTE — TELEPHONE ENCOUNTER
No care due was identified.  [facility] Embedded Care Due Messages. Reference number: [id number].   11/09/2023 8:09:16 AM CST

## 2023-11-15 DIAGNOSIS — Q60.0 SOLITARY KIDNEY, CONGENITAL: ICD-10-CM

## 2023-11-15 DIAGNOSIS — N18.32 STAGE 3B CHRONIC KIDNEY DISEASE: ICD-10-CM

## 2023-11-15 RX ORDER — DABIGATRAN ETEXILATE 150 MG/1
150 CAPSULE ORAL 2 TIMES DAILY
Qty: 180 CAPSULE | Refills: 3 | Status: SHIPPED | OUTPATIENT
Start: 2023-11-15 | End: 2024-03-11 | Stop reason: SDUPTHER

## 2023-11-15 NOTE — TELEPHONE ENCOUNTER
----- Message from Ivania Stephenson, Patient Care Assistant sent at 11/15/2023 12:21 PM CST -----  Contact: Pt  Type: Needs Medical Advice    Who Called: Pt  Best Call Back Number: [phone] (home)   Inquiry/Question: Pt is calling to get a PA for dabigatran etexilate (PRADAXA) 150 mg Cap. Please advise Thank you~

## 2023-11-27 ENCOUNTER — LAB VISIT (OUTPATIENT)
Dept: LAB | Facility: HOSPITAL | Age: 78
End: 2023-11-27
Attending: STUDENT IN AN ORGANIZED HEALTH CARE EDUCATION/TRAINING PROGRAM
Payer: MEDICARE

## 2023-11-27 ENCOUNTER — PATIENT OUTREACH (OUTPATIENT)
Dept: ADMINISTRATIVE | Facility: HOSPITAL | Age: 78
End: 2023-11-27
Payer: MEDICARE

## 2023-11-27 ENCOUNTER — OFFICE VISIT (OUTPATIENT)
Dept: FAMILY MEDICINE | Facility: CLINIC | Age: 78
End: 2023-11-27
Payer: MEDICARE

## 2023-11-27 VITALS
DIASTOLIC BLOOD PRESSURE: 82 MMHG | OXYGEN SATURATION: 97 % | SYSTOLIC BLOOD PRESSURE: 126 MMHG | HEART RATE: 84 BPM | BODY MASS INDEX: 33.54 KG/M2 | RESPIRATION RATE: 18 BRPM | WEIGHT: 253.06 LBS | HEIGHT: 73 IN

## 2023-11-27 DIAGNOSIS — I48.91 ATRIAL FIBRILLATION, UNSPECIFIED TYPE: ICD-10-CM

## 2023-11-27 DIAGNOSIS — N18.30 CONTROLLED TYPE 2 DIABETES MELLITUS WITH STAGE 3 CHRONIC KIDNEY DISEASE, WITHOUT LONG-TERM CURRENT USE OF INSULIN: Primary | ICD-10-CM

## 2023-11-27 DIAGNOSIS — N18.30 CONTROLLED TYPE 2 DIABETES MELLITUS WITH STAGE 3 CHRONIC KIDNEY DISEASE, WITHOUT LONG-TERM CURRENT USE OF INSULIN: ICD-10-CM

## 2023-11-27 DIAGNOSIS — E11.22 CONTROLLED TYPE 2 DIABETES MELLITUS WITH STAGE 3 CHRONIC KIDNEY DISEASE, WITHOUT LONG-TERM CURRENT USE OF INSULIN: Primary | ICD-10-CM

## 2023-11-27 DIAGNOSIS — E11.22 CONTROLLED TYPE 2 DIABETES MELLITUS WITH STAGE 3 CHRONIC KIDNEY DISEASE, WITHOUT LONG-TERM CURRENT USE OF INSULIN: ICD-10-CM

## 2023-11-27 LAB
ESTIMATED AVG GLUCOSE: 134 MG/DL (ref 68–131)
HBA1C MFR BLD: 6.3 % (ref 4–5.6)

## 2023-11-27 PROCEDURE — 3079F PR MOST RECENT DIASTOLIC BLOOD PRESSURE 80-89 MM HG: ICD-10-PCS | Mod: HCNC,CPTII,S$GLB, | Performed by: STUDENT IN AN ORGANIZED HEALTH CARE EDUCATION/TRAINING PROGRAM

## 2023-11-27 PROCEDURE — 1160F PR REVIEW ALL MEDS BY PRESCRIBER/CLIN PHARMACIST DOCUMENTED: ICD-10-PCS | Mod: HCNC,CPTII,S$GLB, | Performed by: STUDENT IN AN ORGANIZED HEALTH CARE EDUCATION/TRAINING PROGRAM

## 2023-11-27 PROCEDURE — 1101F PR PT FALLS ASSESS DOC 0-1 FALLS W/OUT INJ PAST YR: ICD-10-PCS | Mod: HCNC,CPTII,S$GLB, | Performed by: STUDENT IN AN ORGANIZED HEALTH CARE EDUCATION/TRAINING PROGRAM

## 2023-11-27 PROCEDURE — 99214 OFFICE O/P EST MOD 30 MIN: CPT | Mod: HCNC,S$GLB,, | Performed by: STUDENT IN AN ORGANIZED HEALTH CARE EDUCATION/TRAINING PROGRAM

## 2023-11-27 PROCEDURE — 83036 HEMOGLOBIN GLYCOSYLATED A1C: CPT | Mod: HCNC | Performed by: STUDENT IN AN ORGANIZED HEALTH CARE EDUCATION/TRAINING PROGRAM

## 2023-11-27 PROCEDURE — 1160F RVW MEDS BY RX/DR IN RCRD: CPT | Mod: HCNC,CPTII,S$GLB, | Performed by: STUDENT IN AN ORGANIZED HEALTH CARE EDUCATION/TRAINING PROGRAM

## 2023-11-27 PROCEDURE — 3288F PR FALLS RISK ASSESSMENT DOCUMENTED: ICD-10-PCS | Mod: HCNC,CPTII,S$GLB, | Performed by: STUDENT IN AN ORGANIZED HEALTH CARE EDUCATION/TRAINING PROGRAM

## 2023-11-27 PROCEDURE — 1126F PR PAIN SEVERITY QUANTIFIED, NO PAIN PRESENT: ICD-10-PCS | Mod: HCNC,CPTII,S$GLB, | Performed by: STUDENT IN AN ORGANIZED HEALTH CARE EDUCATION/TRAINING PROGRAM

## 2023-11-27 PROCEDURE — 1101F PT FALLS ASSESS-DOCD LE1/YR: CPT | Mod: HCNC,CPTII,S$GLB, | Performed by: STUDENT IN AN ORGANIZED HEALTH CARE EDUCATION/TRAINING PROGRAM

## 2023-11-27 PROCEDURE — 36415 COLL VENOUS BLD VENIPUNCTURE: CPT | Mod: HCNC,PO | Performed by: STUDENT IN AN ORGANIZED HEALTH CARE EDUCATION/TRAINING PROGRAM

## 2023-11-27 PROCEDURE — 1159F PR MEDICATION LIST DOCUMENTED IN MEDICAL RECORD: ICD-10-PCS | Mod: HCNC,CPTII,S$GLB, | Performed by: STUDENT IN AN ORGANIZED HEALTH CARE EDUCATION/TRAINING PROGRAM

## 2023-11-27 PROCEDURE — 3079F DIAST BP 80-89 MM HG: CPT | Mod: HCNC,CPTII,S$GLB, | Performed by: STUDENT IN AN ORGANIZED HEALTH CARE EDUCATION/TRAINING PROGRAM

## 2023-11-27 PROCEDURE — 3074F SYST BP LT 130 MM HG: CPT | Mod: HCNC,CPTII,S$GLB, | Performed by: STUDENT IN AN ORGANIZED HEALTH CARE EDUCATION/TRAINING PROGRAM

## 2023-11-27 PROCEDURE — 1126F AMNT PAIN NOTED NONE PRSNT: CPT | Mod: HCNC,CPTII,S$GLB, | Performed by: STUDENT IN AN ORGANIZED HEALTH CARE EDUCATION/TRAINING PROGRAM

## 2023-11-27 PROCEDURE — 99999 PR PBB SHADOW E&M-EST. PATIENT-LVL V: CPT | Mod: PBBFAC,HCNC,, | Performed by: STUDENT IN AN ORGANIZED HEALTH CARE EDUCATION/TRAINING PROGRAM

## 2023-11-27 PROCEDURE — 3288F FALL RISK ASSESSMENT DOCD: CPT | Mod: HCNC,CPTII,S$GLB, | Performed by: STUDENT IN AN ORGANIZED HEALTH CARE EDUCATION/TRAINING PROGRAM

## 2023-11-27 PROCEDURE — 99214 PR OFFICE/OUTPT VISIT, EST, LEVL IV, 30-39 MIN: ICD-10-PCS | Mod: HCNC,S$GLB,, | Performed by: STUDENT IN AN ORGANIZED HEALTH CARE EDUCATION/TRAINING PROGRAM

## 2023-11-27 PROCEDURE — 3074F PR MOST RECENT SYSTOLIC BLOOD PRESSURE < 130 MM HG: ICD-10-PCS | Mod: HCNC,CPTII,S$GLB, | Performed by: STUDENT IN AN ORGANIZED HEALTH CARE EDUCATION/TRAINING PROGRAM

## 2023-11-27 PROCEDURE — 1159F MED LIST DOCD IN RCRD: CPT | Mod: HCNC,CPTII,S$GLB, | Performed by: STUDENT IN AN ORGANIZED HEALTH CARE EDUCATION/TRAINING PROGRAM

## 2023-11-27 PROCEDURE — 99999 PR PBB SHADOW E&M-EST. PATIENT-LVL V: ICD-10-PCS | Mod: PBBFAC,HCNC,, | Performed by: STUDENT IN AN ORGANIZED HEALTH CARE EDUCATION/TRAINING PROGRAM

## 2023-11-27 RX ORDER — SOTALOL HYDROCHLORIDE 120 MG/1
TABLET ORAL
COMMUNITY
End: 2024-03-12

## 2023-11-27 NOTE — PROGRESS NOTES
Population Health Chart Review & Patient Outreach Details    Outreach Performed: NO    Additional Pop Health Notes:           Updates Requested / Reviewed:      Updated Care Coordination Note         Health Maintenance Topics Overdue:    Health Maintenance Due   Topic Date Due    Eye Exam  09/16/2023    Hemoglobin A1c  10/26/2023         Health Maintenance Topic(s) Outreach Outcomes & Actions Taken:    Eye Exam - Outreach Outcomes & Actions Taken  : External Records Requested & Care Team Updated if Applicable

## 2023-11-27 NOTE — LETTER
AUTHORIZATION FOR RELEASE OF   CONFIDENTIAL INFORMATION    Dear Dr Dillard,    We are seeing Janak Booker, date of birth 1945, in the clinic at [facility]. See Quach MD is the patient's PCP. Janak Booker has an outstanding lab/procedure at the time we reviewed his chart. In order to help keep his health information updated, he has authorized us to request the following medical record(s):        (  )  MAMMOGRAM                                      (  )  COLONOSCOPY      (  )  PAP SMEAR                                          (  )  OUTSIDE LAB RESULTS     (  )  DEXA SCAN                                          ( X )  EYE EXAM            (  )  FOOT EXAM                                          (  )  ENTIRE RECORD     (  )  OUTSIDE IMMUNIZATIONS                 (  )  _______________         Please fax records to [facility], Naccari, Craig P, MD at [phone]     Thanks so much and have a great dya!    Ginette Grayson LPN [facility]  [address] [place] [address]  P- [phone]  F [phone]           Patient Name: Janak Booker  : 1945  Patient Phone #: [phone]

## 2023-11-27 NOTE — PROGRESS NOTES
"[facility] MEDICINE CLINIC NOTE    Patient Name: Janak Booker  YOB: 1945    PRESENTING HISTORY     History of Present Illness:  Mr. Janak Booker is a 78 y.o. male here for routine f/u.       T2DM- BG running 130s.    Victoza 1.8   Metformin 500mg.    Actos 30mg.    1 low bg, remote.     Afib- on pradaxa. Investigating potential Watchmen. He will discus with his primary cardiologist. No sig bleeding events.     Renal function is stable.     ROS      OBJECTIVE:   Vital Signs:  Vitals:    11/27/23 0919   BP: 126/82   Pulse: 84   Resp: 18   SpO2: 97%   Weight: 114.8 kg (253 lb 1.4 oz)   Height: 6' 1" (1.854 m)          Physical Exam: Normal, no change.     Physical Exam    ASSESSMENT & PLAN:     Controlled type 2 diabetes mellitus with stage 3 chronic kidney disease, without long-term current use of insulin  -     HEMOGLOBIN A1C; Future; Expected date: 11/27/2023  Stable, continue current medications  F/u 6 months.     Atrial fibrillation, unspecified type  Stable, continue current medications  Will defer decision making on Watchmen to try to come off AC to his cardiologist.            eSe Quach  Internal Medicine            "

## 2023-12-14 NOTE — TELEPHONE ENCOUNTER
----- Message from Miguel Londono sent at 12/14/2023  2:04 PM CST -----  Contact: Self  Type:  RX Refill Request    Who Called:  Patient  Refill or New Rx:  Refill  RX Name and Strength:  rosuvastatin (CRESTOR) 10 MG tablet  How is the patient currently taking it? (ex. 1XDay):  as directed  Is this a 30 day or 90 day RX:  90  Preferred Pharmacy with phone number:    [facility] #[id number] - [address] AT [facility] [address]  [address]  [place] [address]  Phone: [phone] Fax: [phone]      Local or Mail Order:  Local  Ordering Provider:  ISMAEL  Best Call Back Number:  [phone]   Additional Information:

## 2023-12-15 RX ORDER — ROSUVASTATIN CALCIUM 10 MG/1
10 TABLET, COATED ORAL NIGHTLY
Qty: 90 TABLET | Refills: 2 | Status: SHIPPED | OUTPATIENT
Start: 2023-12-15 | End: 2024-03-11 | Stop reason: SDUPTHER

## 2023-12-28 ENCOUNTER — PATIENT OUTREACH (OUTPATIENT)
Dept: ADMINISTRATIVE | Facility: HOSPITAL | Age: 78
End: 2023-12-28
Payer: MEDICARE

## 2023-12-28 NOTE — LETTER
**STAT PLEASE, SECOND REQUEST**    AUTHORIZATION FOR RELEASE OF   CONFIDENTIAL INFORMATION    Dear Dr Dillard,    We are seeing Janak Booker, date of birth 1945, in the clinic at [facility]. See Quach MD is the patient's PCP. Janak Booker has an outstanding lab/procedure at the time we reviewed his chart. In order to help keep his health information updated, he has authorized us to request the following medical record(s):        (  )  MAMMOGRAM                                      (  )  COLONOSCOPY      (  )  PAP SMEAR                                          (  )  OUTSIDE LAB RESULTS     (  )  DEXA SCAN                                          ( X )  EYE EXAM            (  )  FOOT EXAM                                          (  )  ENTIRE RECORD     (  )  OUTSIDE IMMUNIZATIONS                 (  )  _______________         Please fax records to [facility], Naccari, Craig P, MD at [phone]    Thanks so much and have a great day!    Ginette Grayson LPN [address] [place] [address]  P- [phone]   [phone]           Patient Name: Janak Booker  : 1945  Patient Phone #: [phone]

## 2023-12-28 NOTE — PROGRESS NOTES
Population Health Chart Review & Patient Outreach Details    Outreach Performed: NO    Additional Pop Health Notes:           Updates Requested / Reviewed:      Updated Care Coordination Note         Health Maintenance Topics Overdue:    Health Maintenance Due   Topic Date Due    Eye Exam  09/16/2023         Health Maintenance Topic(s) Outreach Outcomes & Actions Taken:    Eye Exam - Outreach Outcomes & Actions Taken  : External Records Requested & Care Team Updated if Applicable

## 2024-01-03 ENCOUNTER — TELEPHONE (OUTPATIENT)
Dept: ADMINISTRATIVE | Facility: CLINIC | Age: 79
End: 2024-01-03
Payer: MEDICARE

## 2024-01-03 NOTE — TELEPHONE ENCOUNTER
Called pt; no answer; left message informing pt I was calling to reschedule his eawv appt that is scheduled for 1/4/24; left my name and number for pt to return my call to reschedule appt

## 2024-01-04 ENCOUNTER — LAB VISIT (OUTPATIENT)
Dept: LAB | Facility: HOSPITAL | Age: 79
End: 2024-01-04
Attending: INTERNAL MEDICINE
Payer: MEDICARE

## 2024-01-04 ENCOUNTER — TELEPHONE (OUTPATIENT)
Dept: ADMINISTRATIVE | Facility: CLINIC | Age: 79
End: 2024-01-04
Payer: MEDICARE

## 2024-01-04 DIAGNOSIS — N18.30 CHRONIC KIDNEY DISEASE, STAGE III (MODERATE): Primary | ICD-10-CM

## 2024-01-04 DIAGNOSIS — D64.9 ANEMIA, UNSPECIFIED: ICD-10-CM

## 2024-01-04 LAB
25(OH)D3+25(OH)D2 SERPL-MCNC: 41 NG/ML (ref 30–96)
ALBUMIN SERPL BCP-MCNC: 4.1 G/DL (ref 3.5–5.2)
ANION GAP SERPL CALC-SCNC: 8 MMOL/L (ref 8–16)
BACTERIA #/AREA URNS HPF: NEGATIVE /HPF
BASOPHILS # BLD AUTO: 0.04 K/UL (ref 0–0.2)
BASOPHILS NFR BLD: 0.7 % (ref 0–1.9)
BUN SERPL-MCNC: 27 MG/DL (ref 8–23)
CALCIUM SERPL-MCNC: 9.3 MG/DL (ref 8.7–10.5)
CHLORIDE SERPL-SCNC: 105 MMOL/L (ref 95–110)
CO2 SERPL-SCNC: 24 MMOL/L (ref 23–29)
CREAT SERPL-MCNC: 1.7 MG/DL (ref 0.5–1.4)
CREAT UR-MCNC: 148 MG/DL (ref 23–375)
DIFFERENTIAL METHOD BLD: ABNORMAL
EOSINOPHIL # BLD AUTO: 0.3 K/UL (ref 0–0.5)
EOSINOPHIL NFR BLD: 5.9 % (ref 0–8)
ERYTHROCYTE [DISTWIDTH] IN BLOOD BY AUTOMATED COUNT: 15.9 % (ref 11.5–14.5)
EST. GFR  (NO RACE VARIABLE): 41 ML/MIN/1.73 M^2
FERRITIN SERPL-MCNC: 100.2 NG/ML (ref 20–300)
GLUCOSE SERPL-MCNC: 111 MG/DL (ref 70–110)
HCT VFR BLD AUTO: 43.6 % (ref 40–54)
HGB BLD-MCNC: 14.3 G/DL (ref 14–18)
HYALINE CASTS #/AREA URNS LPF: 1 /LPF
IMM GRANULOCYTES # BLD AUTO: 0.02 K/UL (ref 0–0.04)
IMM GRANULOCYTES NFR BLD AUTO: 0.4 % (ref 0–0.5)
IRON SERPL-MCNC: 58 UG/DL (ref 45–160)
LYMPHOCYTES # BLD AUTO: 2 K/UL (ref 1–4.8)
LYMPHOCYTES NFR BLD: 37.3 % (ref 18–48)
MCH RBC QN AUTO: 28.9 PG (ref 27–31)
MCHC RBC AUTO-ENTMCNC: 32.8 G/DL (ref 32–36)
MCV RBC AUTO: 88 FL (ref 82–98)
MICROSCOPIC COMMENT: NORMAL
MONOCYTES # BLD AUTO: 0.4 K/UL (ref 0.3–1)
MONOCYTES NFR BLD: 8.1 % (ref 4–15)
NEUTROPHILS # BLD AUTO: 2.6 K/UL (ref 1.8–7.7)
NEUTROPHILS NFR BLD: 47.6 % (ref 38–73)
NRBC BLD-RTO: 0 /100 WBC
PHOSPHATE SERPL-MCNC: 3.3 MG/DL (ref 2.7–4.5)
PLATELET # BLD AUTO: 192 K/UL (ref 150–450)
PMV BLD AUTO: 10.9 FL (ref 9.2–12.9)
POTASSIUM SERPL-SCNC: 4.4 MMOL/L (ref 3.5–5.1)
PROT UR-MCNC: 8 MG/DL (ref 6–15)
PTH-INTACT SERPL-MCNC: 26.5 PG/ML (ref 9–77)
RBC # BLD AUTO: 4.94 M/UL (ref 4.6–6.2)
RBC #/AREA URNS HPF: 1 /HPF (ref 0–4)
SATURATED IRON: 14 % (ref 20–50)
SODIUM SERPL-SCNC: 137 MMOL/L (ref 136–145)
SQUAMOUS #/AREA URNS HPF: 0 /HPF
TOTAL IRON BINDING CAPACITY: 420 UG/DL (ref 250–450)
TRANSFERRIN SERPL-MCNC: 300 MG/DL (ref 200–375)
WBC # BLD AUTO: 5.41 K/UL (ref 3.9–12.7)
WBC #/AREA URNS HPF: 0 /HPF (ref 0–5)

## 2024-01-04 PROCEDURE — 83970 ASSAY OF PARATHORMONE: CPT | Performed by: INTERNAL MEDICINE

## 2024-01-04 PROCEDURE — 85025 COMPLETE CBC W/AUTO DIFF WBC: CPT | Performed by: INTERNAL MEDICINE

## 2024-01-04 PROCEDURE — 36415 COLL VENOUS BLD VENIPUNCTURE: CPT | Performed by: INTERNAL MEDICINE

## 2024-01-04 PROCEDURE — 84156 ASSAY OF PROTEIN URINE: CPT | Performed by: INTERNAL MEDICINE

## 2024-01-04 PROCEDURE — 82570 ASSAY OF URINE CREATININE: CPT | Performed by: INTERNAL MEDICINE

## 2024-01-04 PROCEDURE — 80069 RENAL FUNCTION PANEL: CPT | Performed by: INTERNAL MEDICINE

## 2024-01-04 PROCEDURE — 82728 ASSAY OF FERRITIN: CPT | Performed by: INTERNAL MEDICINE

## 2024-01-04 PROCEDURE — 82306 VITAMIN D 25 HYDROXY: CPT | Performed by: INTERNAL MEDICINE

## 2024-01-04 PROCEDURE — 81001 URINALYSIS AUTO W/SCOPE: CPT | Performed by: INTERNAL MEDICINE

## 2024-01-04 PROCEDURE — 83540 ASSAY OF IRON: CPT | Performed by: INTERNAL MEDICINE

## 2024-01-04 NOTE — TELEPHONE ENCOUNTER
Called pt; no answer; left message informing pt I was calling to reschedule his eawv appt that is scheduled today 1/4/24; left my name and number for pt to return my call to reschedule appt

## 2024-01-31 ENCOUNTER — PATIENT OUTREACH (OUTPATIENT)
Dept: ADMINISTRATIVE | Facility: HOSPITAL | Age: 79
End: 2024-01-31
Payer: MEDICARE

## 2024-01-31 NOTE — LETTER
**SECOND REQUEST**      AUTHORIZATION FOR RELEASE OF   CONFIDENTIAL INFORMATION    Dear Dr Dillard,    We are seeing Janak Booker, date of birth 1945, in the clinic at [facility]. See Quach MD is the patient's PCP. Janak Booker has an outstanding lab/procedure at the time we reviewed his chart. In order to help keep his health information updated, he has authorized us to request the following medical record(s):        (  )  MAMMOGRAM                                      (  )  COLONOSCOPY      (  )  PAP SMEAR                                          (  )  OUTSIDE LAB RESULTS     (  )  DEXA SCAN                                          ( X )  EYE EXAM            (  )  FOOT EXAM                                          (  )  ENTIRE RECORD     (  )  OUTSIDE IMMUNIZATIONS                 (  )  _______________         Please fax records to [facility], Naccari, Craig P, MD at [phone]     Thanks so much and have a great day!    Ginette Grayson LPN [address] [place] [address]  - [phone]   [phone]           Patient Name: Janak Booker  : 1945  Patient Phone #: [phone]

## 2024-01-31 NOTE — PROGRESS NOTES
Population Health Chart Review & Patient Outreach Details    Outreach Performed: NO    Additional Pop Health Notes:           Updates Requested / Reviewed:      Updated Care Coordination Note         Health Maintenance Topics Overdue:    There are no preventive care reminders to display for this patient.      Health Maintenance Topic(s) Outreach Outcomes & Actions Taken:    Eye Exam - Outreach Outcomes & Actions Taken  : External Records Requested & Care Team Updated if Applicable

## 2024-02-06 ENCOUNTER — TELEPHONE (OUTPATIENT)
Dept: CARDIOLOGY | Facility: CLINIC | Age: 79
End: 2024-02-06
Payer: MEDICARE

## 2024-02-06 NOTE — TELEPHONE ENCOUNTER
----- Message from Keith Srinivasan sent at 2/6/2024  3:07 PM CST -----  Regarding: return call  Contact: patient  Type:  Patient Returning Call    Who Called:patient  Who Left Message for Patient:office nurse  Does the patient know what this is regarding?:new medication  Would the patient rather a call back or a response via [facility]? Please call  Best Call Back Number:[phone]  Additional Information:

## 2024-02-07 ENCOUNTER — TELEPHONE (OUTPATIENT)
Dept: FAMILY MEDICINE | Facility: CLINIC | Age: 79
End: 2024-02-07
Payer: MEDICARE

## 2024-02-07 DIAGNOSIS — E11.22 CONTROLLED TYPE 2 DIABETES MELLITUS WITH STAGE 3 CHRONIC KIDNEY DISEASE, WITHOUT LONG-TERM CURRENT USE OF INSULIN: Primary | ICD-10-CM

## 2024-02-07 DIAGNOSIS — N18.30 CONTROLLED TYPE 2 DIABETES MELLITUS WITH STAGE 3 CHRONIC KIDNEY DISEASE, WITHOUT LONG-TERM CURRENT USE OF INSULIN: Primary | ICD-10-CM

## 2024-02-07 RX ORDER — DULAGLUTIDE 1.5 MG/.5ML
1.5 INJECTION, SOLUTION SUBCUTANEOUS
Qty: 4 PEN | Refills: 11 | Status: SHIPPED | OUTPATIENT
Start: 2024-02-07 | End: 2024-02-09

## 2024-02-07 NOTE — TELEPHONE ENCOUNTER
----- Message from Gulshan Duckworth sent at 2/7/2024  9:51 AM CST -----  Regarding: rt call  Type:  Patient Returning Call    Who Called:pt    Who Left Message for Patient:unknown    Does the patient know what this is regarding?:yes    Best Call Back Number:[phone]      Additional Information: pt st th he was told by pharm that his VICTOZA is on back order, & he wants to know is it something else he could in the mean time. Please call to discuss.

## 2024-02-07 NOTE — TELEPHONE ENCOUNTER
Contacted pt via phone to advise of new prescription of Trulicity sent to pharmacy. Pt verbalized understanding.

## 2024-02-08 ENCOUNTER — TELEPHONE (OUTPATIENT)
Dept: FAMILY MEDICINE | Facility: CLINIC | Age: 79
End: 2024-02-08
Payer: MEDICARE

## 2024-02-08 NOTE — TELEPHONE ENCOUNTER
Keith Srinivasan Staff     ----- Message from Keith Srinivasan sent at 2/8/2024 10:34 AM CST -----  Regarding: return call  Contact: patient  Type:  Patient Returning Call    Who Called:patient  Who Left Message for Patient:office nurse  Does the patient know what this is regarding?:medication/ all pharmacies are out  Would the patient rather a call back or a response via [facility]? Please advise  Best Call Back Number:[phone]  Additional Information:

## 2024-02-08 NOTE — TELEPHONE ENCOUNTER
Patient states [facility] does not have Trulicity in stock.  Writer also placed call to [facility] to confirm if they have Trulicity 1.5 mg in stock.  Pharmacist advised this dosage is on back order with the .  Pharmacist states they only have 0.75 mg, 3 mg, and 4.5 mg in stock (at [facility]).  Will send follow up message to Dr. Quach for notification.

## 2024-02-09 ENCOUNTER — TELEPHONE (OUTPATIENT)
Dept: FAMILY MEDICINE | Facility: CLINIC | Age: 79
End: 2024-02-09
Payer: MEDICARE

## 2024-02-09 DIAGNOSIS — E11.22 CONTROLLED TYPE 2 DIABETES MELLITUS WITH STAGE 3 CHRONIC KIDNEY DISEASE, WITHOUT LONG-TERM CURRENT USE OF INSULIN: Primary | ICD-10-CM

## 2024-02-09 DIAGNOSIS — N18.30 CONTROLLED TYPE 2 DIABETES MELLITUS WITH STAGE 3 CHRONIC KIDNEY DISEASE, WITHOUT LONG-TERM CURRENT USE OF INSULIN: Primary | ICD-10-CM

## 2024-02-09 RX ORDER — DULAGLUTIDE 3 MG/.5ML
3 INJECTION, SOLUTION SUBCUTANEOUS
Qty: 4 PEN | Refills: 11 | Status: SHIPPED | OUTPATIENT
Start: 2024-02-09 | End: 2024-04-16 | Stop reason: SDUPTHER

## 2024-02-09 NOTE — TELEPHONE ENCOUNTER
Call placed to patient for notification. Patient verbalized understanding.            See Quach MD  You4 hours ago (12:14 PM)       3mg dose sent. Fingers crossed.

## 2024-02-09 NOTE — TELEPHONE ENCOUNTER
Suha Araya Staff     ----- Message from Suha Araya sent at 2/9/2024  8:21 AM CST -----  Contact: Patient  Type:  RX Refill Request    Who Called:  Patient  Refill or New Rx:  Refill    RX Name and Strength:  dulaglutide (TRULICITY) 1.5 mg/0.5 mL pen injector     Preferred Pharmacy with phone number:      [id number] - [place] - [address] AT [facility] [address]  [facility] [place] [address]  Phone: [phone] Fax: [phone]    Local or Mail Order:  Local  Ordering Provider:  Dr Quach    Would the patient rather a call back or a response via [facility]?   Call back  Best Call Back Number:  [phone]    Additional Information:   States he would like to speak with someone about an alternative medication to the Trulicity because the pharmacist advised it has been on backorder for a while and not sure when they will have it in stock - states he is running out - please call - thank you

## 2024-02-21 DIAGNOSIS — N18.30 CONTROLLED TYPE 2 DIABETES MELLITUS WITH STAGE 3 CHRONIC KIDNEY DISEASE, WITHOUT LONG-TERM CURRENT USE OF INSULIN: ICD-10-CM

## 2024-02-21 DIAGNOSIS — E11.22 CONTROLLED TYPE 2 DIABETES MELLITUS WITH STAGE 3 CHRONIC KIDNEY DISEASE, WITHOUT LONG-TERM CURRENT USE OF INSULIN: ICD-10-CM

## 2024-02-21 RX ORDER — METFORMIN HYDROCHLORIDE 500 MG/1
500 TABLET, EXTENDED RELEASE ORAL DAILY
Qty: 90 TABLET | Refills: 1 | Status: SHIPPED | OUTPATIENT
Start: 2024-02-21 | End: 2024-05-24 | Stop reason: SDUPTHER

## 2024-02-21 NOTE — TELEPHONE ENCOUNTER
Refill Decision Note   Janak Jhony  is requesting a refill authorization.  Brief Assessment and Rationale for Refill:  Approve     Medication Therapy Plan: Renal fx reviewed      Pharmacist review requested: Yes   Extended chart review required: Yes   Comments:     Note composed:1:36 PM 02/21/2024

## 2024-02-21 NOTE — TELEPHONE ENCOUNTER
No care due was identified.  [facility] Embedded Care Due Messages. Reference number: [id number].   2/21/2024 11:40:16 AM CST

## 2024-02-21 NOTE — TELEPHONE ENCOUNTER
Refill Routing Note   Medication(s) are not appropriate for processing by [facility] for the following reason(s):        Required labs abnormal:     [facility] action(s):  Defer      Medication Therapy Plan:       Pharmacist review requested: Yes     Appointments  past 12m or future 3m with PCP    Date Provider   Last Visit   11/27/2023 See Quach MD   Next Visit   4/11/2024 See Quach MD   ED visits in past 90 days: 0        Note composed:12:55 PM 02/21/2024

## 2024-03-11 DIAGNOSIS — Q60.0 SOLITARY KIDNEY, CONGENITAL: ICD-10-CM

## 2024-03-11 DIAGNOSIS — N18.32 STAGE 3B CHRONIC KIDNEY DISEASE: ICD-10-CM

## 2024-03-11 RX ORDER — DABIGATRAN ETEXILATE 150 MG/1
150 CAPSULE ORAL 2 TIMES DAILY
Qty: 180 CAPSULE | Refills: 3 | Status: SHIPPED | OUTPATIENT
Start: 2024-03-11 | End: 2024-04-17 | Stop reason: SDUPTHER

## 2024-03-11 RX ORDER — ROSUVASTATIN CALCIUM 10 MG/1
10 TABLET, COATED ORAL NIGHTLY
Qty: 90 TABLET | Refills: 2 | Status: SHIPPED | OUTPATIENT
Start: 2024-03-11

## 2024-03-12 ENCOUNTER — OFFICE VISIT (OUTPATIENT)
Dept: CARDIOLOGY | Facility: CLINIC | Age: 79
End: 2024-03-12
Payer: MEDICARE

## 2024-03-12 VITALS
BODY MASS INDEX: 32.92 KG/M2 | OXYGEN SATURATION: 100 % | HEIGHT: 73 IN | WEIGHT: 248.38 LBS | DIASTOLIC BLOOD PRESSURE: 70 MMHG | HEART RATE: 70 BPM | SYSTOLIC BLOOD PRESSURE: 124 MMHG

## 2024-03-12 DIAGNOSIS — E11.59 HYPERTENSION ASSOCIATED WITH DIABETES: ICD-10-CM

## 2024-03-12 DIAGNOSIS — I15.2 HYPERTENSION ASSOCIATED WITH DIABETES: ICD-10-CM

## 2024-03-12 DIAGNOSIS — I48.20 ATRIAL FIBRILLATION, CHRONIC: Primary | ICD-10-CM

## 2024-03-12 DIAGNOSIS — E11.22 CONTROLLED TYPE 2 DIABETES MELLITUS WITH STAGE 3 CHRONIC KIDNEY DISEASE, WITHOUT LONG-TERM CURRENT USE OF INSULIN: ICD-10-CM

## 2024-03-12 DIAGNOSIS — E78.5 HYPERLIPIDEMIA ASSOCIATED WITH TYPE 2 DIABETES MELLITUS: ICD-10-CM

## 2024-03-12 DIAGNOSIS — E11.69 HYPERLIPIDEMIA ASSOCIATED WITH TYPE 2 DIABETES MELLITUS: ICD-10-CM

## 2024-03-12 DIAGNOSIS — N18.30 CONTROLLED TYPE 2 DIABETES MELLITUS WITH STAGE 3 CHRONIC KIDNEY DISEASE, WITHOUT LONG-TERM CURRENT USE OF INSULIN: ICD-10-CM

## 2024-03-12 DIAGNOSIS — I25.10 CORONARY ARTERY DISEASE, OCCLUSIVE: ICD-10-CM

## 2024-03-12 DIAGNOSIS — N18.32 STAGE 3B CHRONIC KIDNEY DISEASE: ICD-10-CM

## 2024-03-12 DIAGNOSIS — Z79.01 ANTICOAGULANT LONG-TERM USE: ICD-10-CM

## 2024-03-12 PROCEDURE — 1101F PT FALLS ASSESS-DOCD LE1/YR: CPT | Mod: HCNC,CPTII,S$GLB, | Performed by: INTERNAL MEDICINE

## 2024-03-12 PROCEDURE — 1126F AMNT PAIN NOTED NONE PRSNT: CPT | Mod: HCNC,CPTII,S$GLB, | Performed by: INTERNAL MEDICINE

## 2024-03-12 PROCEDURE — 3288F FALL RISK ASSESSMENT DOCD: CPT | Mod: HCNC,CPTII,S$GLB, | Performed by: INTERNAL MEDICINE

## 2024-03-12 PROCEDURE — 1159F MED LIST DOCD IN RCRD: CPT | Mod: HCNC,CPTII,S$GLB, | Performed by: INTERNAL MEDICINE

## 2024-03-12 PROCEDURE — 99213 OFFICE O/P EST LOW 20 MIN: CPT | Mod: HCNC,S$GLB,, | Performed by: INTERNAL MEDICINE

## 2024-03-12 PROCEDURE — 3078F DIAST BP <80 MM HG: CPT | Mod: HCNC,CPTII,S$GLB, | Performed by: INTERNAL MEDICINE

## 2024-03-12 PROCEDURE — 1160F RVW MEDS BY RX/DR IN RCRD: CPT | Mod: HCNC,CPTII,S$GLB, | Performed by: INTERNAL MEDICINE

## 2024-03-12 PROCEDURE — 99999 PR PBB SHADOW E&M-EST. PATIENT-LVL IV: CPT | Mod: PBBFAC,HCNC,, | Performed by: INTERNAL MEDICINE

## 2024-03-12 PROCEDURE — 3074F SYST BP LT 130 MM HG: CPT | Mod: HCNC,CPTII,S$GLB, | Performed by: INTERNAL MEDICINE

## 2024-03-12 NOTE — ASSESSMENT & PLAN NOTE
Rate controlled on sotalol.  He is on chronic anticoagulation.  A Watchman was discussed.  At this point the patient is to decide

## 2024-03-12 NOTE — PROGRESS NOTES
Patient ID:  Janak Booker is a 78 y.o. male who presents for follow-up of Atrial Fibrillation and Coronary Artery Disease      Atrial fibrillation, chronic  Rate controlled on 80 mg of sotalol     Hyperlipidemia associated with type 2 diabetes mellitus  Controlled on 10 mg of rosuvastatin     S/P coronary artery stent placement  Stable no symptoms of angina     Coronary artery disease, occlusive  Stable no symptoms of angina     S/P TAVR (transcatheter aortic valve replacement)  Stable     Solitary kidney, congenital  Renal function stable     He has been doing fine denies any chest pains any shortness of breath.  The Watchman was offered after his valve replacement.  He is considering the medication although he has never had any bleeding from the Pradaxa.  The options were discussed with the patient he is to decide.  Otherwise he has been doing very well from cardiovascular standpoint.        Past Medical History:   Diagnosis Date    Anticoagulant long-term use     Aortic stenosis     Arthritis     Atrial fibrillation     CAD (coronary artery disease)     Colon polyps     per colonoscopy 9/11/2014    Diabetes mellitus, type 2     Disc displacement, lumbar     L3    Family history of prostate cancer     GERD (gastroesophageal reflux disease)     Heel bone fracture     left foot    Hyperlipidemia LDL goal < 70     Hypertension     NYHA class 3 heart failure with preserved ejection fraction 10/19/2022    Renal manifestation of secondary diabetes mellitus     Sleep apnea     USES MACHINE    Spinal stenosis, lumbar         Past Surgical History:   Procedure Laterality Date    BACK SURGERY      CARDIAC SURGERY      stents     CARPAL TUNNEL RELEASE Right     CATARACT EXTRACTION W/  INTRAOCULAR LENS IMPLANT Bilateral     COLONOSCOPY N/A 3/27/2018    Procedure: COLONOSCOPY;  Surgeon: Rishi Garcia MD;  Location: [facility];  Service: Endoscopy;  Laterality: N/A;    COLONOSCOPY N/A 2/15/2021    Procedure:  COLONOSCOPY;  Surgeon: Rishi Garcia MD;  Location: [facility] ENDO;  Service: Endoscopy;  Laterality: N/A;    COLONOSCOPY N/A 6/23/2023    Procedure: COLONOSCOPY;  Surgeon: Rishi Garcia MD;  Location: [facility] ENDO;  Service: Endoscopy;  Laterality: N/A;    CORONARY ANGIOPLASTY WITH STENT PLACEMENT      x 3    EYE SURGERY      INJECTION OF FACET JOINT N/A 5/14/2019    Procedure: INJECTION, FACET JOINT INJECTION (LUMBAR BLOCK) PLEASE INJECT L3/4;  Surgeon: Catrachito Harley MD;  Location: [facility] PAIN MGT;  Service: Pain Management;  Laterality: N/A;  NEEDS CONSENT, PRADAXA CLEARANCE IN CHART    KNEE ARTHROSCOPY W/ MENISCECTOMY  12/22/2008    right    LEFT HEART CATHETERIZATION Left 9/7/2022    Procedure: Left heart cath;  Surgeon: Adonay Quinones MD;  [facility];  Service: Cardiology;  Laterality: Left;    LUMBAR DISCECTOMY  12/2011    L4-L5 Fusion    LUMBAR EPIDURAL INJECTION  02/04/2019    ROTATOR CUFF REPAIR Left 7/2012    SHOULDER OPEN ROTATOR CUFF REPAIR      SKIN CANCER FOREHEAD 2019      SPINE SURGERY      TIBIA FRACTURE SURGERY      TRANSCATHETER AORTIC VALVE REPLACEMENT (TAVR) Bilateral 10/19/2022    Procedure: REPLACEMENT, AORTIC VALVE, TRANSCATHETER (TAVR);  Surgeon: Adonay Quinones MD;  Location: [facility] C[facility];  Service: Cardiology;  Laterality: Bilateral;    TRANSCATHETER AORTIC VALVE REPLACEMENT (TAVR) Bilateral 10/19/2022    Procedure: REPLACEMENT, AORTIC VALVE, TRANSCATHETER (TAVR);  Surgeon: Denver Osborne MD;  Location: [facility];  Service: Cardiothoracic;  Laterality: Bilateral;    TRANSFORAMINAL EPIDURAL INJECTION OF STEROID Left 9/23/2019    Procedure: INJECTION, STEROID, EPIDURAL, TRANSFORAMINAL APPROACH;  Surgeon: Jose Guadalupe Linn MD;  Location: [facility]GT;  Service: Pain Management;  Laterality: Left;  Left TF ADI L4 and L5  OK to hold Pradaxa    TRANSFORAMINAL EPIDURAL INJECTION OF STEROID Left 11/14/2019    Procedure: INJECTION, STEROID, EPIDURAL, TRANSFORAMINAL APPROACH;  Surgeon:  "Jose Guadalupe Linn MD;  Location: [facility]GT;  Service: Pain Management;  Laterality: Left;  Left TF ADI L4-5 and L5-S1          Current Outpatient Medications   Medication Instructions    ascorbic acid (vitamin C) (VITAMIN C) 1,000 mg, Oral, Daily    aspirin (ECOTRIN) 81 mg, Oral, Daily    BD ALCOHOL SWABS PadM USE AS DIRECTED TO CHECK BLOOD GLUCOSE DAILY    dabigatran etexilate (PRADAXA) 150 mg, Oral, 2 times daily, "Do NOT break, chew, or open capsules."    fenofibrate micronized (LOFIBRA) 134 mg, Oral, Daily    lancets (TRUEPLUS LANCETS) 28 gauge Misc 1 lancet , Misc.(Non-Drug; Combo Route), Daily    lisinopriL (PRINIVIL,ZESTRIL) 5 mg, Oral, Daily    metFORMIN (GLUCOPHAGE-XR) 500 mg, Oral, Daily    multivitamin with minerals tablet No dose, route, or frequency recorded.    NITROSTAT 0.4 mg SL tablet DISSOLVE 1 TABLET UNDER THE TONGUE EVERY 5 MINUTES AS NEEDED FOR CHEST PAIN. DO NOT TAKE MORE THAN 3 TABLETS PER DAY    omega-3 fatty acids 1,000 mg Cap 1 Capsule(s) Oral OTC once a day.    omeprazole (PRILOSEC) 40 mg, Oral, Daily    pen needle, diabetic (BD BARRY 2ND GEN PEN NEEDLE) 32 gauge x 5/32" Ndle Use one needle  once daily    pioglitazone (ACTOS) 30 mg, Oral, Daily    rosuvastatin (CRESTOR) 10 mg, Oral, Nightly    sotaloL (BETAPACE) 40 mg, Oral, Daily    TRULICITY 3 mg, Subcutaneous, Every 7 days        Review of patient's allergies indicates:   Allergen Reactions    Neurontin [gabapentin] Swelling     Leg swelling        Review of Systems   Cardiovascular:  Negative for chest pain, dyspnea on exertion and palpitations.   Respiratory:  Negative for cough and shortness of breath.         Objective:     Vitals:    03/12/24 1049   BP: 124/70   BP Location: Left arm   Patient Position: Sitting   BP Method: Medium (Manual)   Pulse: 70   SpO2: 100%   Weight: 112.6 kg (248 lb 5.6 oz)   Height: 6' 1" (1.854 m)       Physical Exam  Vitals and nursing note reviewed.   HENT:      Head: Normocephalic and atraumatic.   Eyes: "      Conjunctiva/sclera: Conjunctivae normal.   Cardiovascular:      Rate and Rhythm: Normal rate. Rhythm irregular.      Heart sounds: Normal heart sounds.   Pulmonary:      Effort: Pulmonary effort is normal.      Breath sounds: Normal breath sounds.   Abdominal:      General: Bowel sounds are normal.      Palpations: Abdomen is soft.   Musculoskeletal:         General: Normal range of motion.   Skin:     General: Skin is warm and dry.   Neurological:      Mental Status: He is alert and oriented to person, place, and time.   Psychiatric:         Behavior: Behavior normal.         Thought Content: Thought content normal.         Judgment: Judgment normal.       CMP  Sodium   Date Value Ref Range Status   01/04/2024 137 136 - 145 mmol/L Final     Potassium   Date Value Ref Range Status   01/04/2024 4.4 3.5 - 5.1 mmol/L Final     Chloride   Date Value Ref Range Status   01/04/2024 105 95 - 110 mmol/L Final     CO2   Date Value Ref Range Status   01/04/2024 24 23 - 29 mmol/L Final     Glucose   Date Value Ref Range Status   01/04/2024 111 (H) 70 - 110 mg/dL Final     BUN   Date Value Ref Range Status   01/04/2024 27 (H) 8 - 23 mg/dL Final     Creatinine   Date Value Ref Range Status   01/04/2024 1.7 (H) 0.5 - 1.4 mg/dL Final     Calcium   Date Value Ref Range Status   01/04/2024 9.3 8.7 - 10.5 mg/dL Final     Total Protein   Date Value Ref Range Status   10/17/2023 6.7 6.0 - 8.4 g/dL Final     Albumin   Date Value Ref Range Status   01/04/2024 4.1 3.5 - 5.2 g/dL Final     Total Bilirubin   Date Value Ref Range Status   10/17/2023 1.0 0.2 - 1.3 mg/dL Final     Alkaline Phosphatase   Date Value Ref Range Status   10/17/2023 57 38 - 145 U/L Final     AST   Date Value Ref Range Status   10/17/2023 45 17 - 59 U/L Final     ALT   Date Value Ref Range Status   10/17/2023 18 0 - 50 U/L Final     Anion Gap   Date Value Ref Range Status   01/04/2024 8 8 - 16 mmol/L Final     eGFR if    Date Value Ref Range  Status   07/05/2022 34.6 (A) >60 mL/min/1.73 m^2 Final     eGFR if non    Date Value Ref Range Status   07/05/2022 29.9 (A) >60 mL/min/1.73 m^2 Final     Comment:     Calculation used to obtain the estimated glomerular filtration  rate (eGFR) is the CKD-EPI equation.         BMP  Lab Results   Component Value Date     01/04/2024    K 4.4 01/04/2024     01/04/2024    CO2 24 01/04/2024    BUN 27 (H) 01/04/2024    CREATININE 1.7 (H) 01/04/2024    CALCIUM 9.3 01/04/2024    ANIONGAP 8 01/04/2024    ESTGFRAFRICA 34.6 (A) 07/05/2022    EGFRNONAA 29.9 (A) 07/05/2022      BNP  @LABRCNTIP(BNP,BNPTRIAGEBLO)@   Lab Results   Component Value Date    CHOL 142 04/26/2023    CHOL 140 11/08/2021    CHOL 130 06/09/2020     Lab Results   Component Value Date    HDL 42 04/26/2023    HDL 42 11/08/2021    HDL 41 06/09/2020     Lab Results   Component Value Date    LDLCALC 75.6 04/26/2023    LDLCALC 64.2 11/08/2021    LDLCALC 67.2 06/09/2020     Lab Results   Component Value Date    TRIG 122 04/26/2023    TRIG 169 (H) 11/08/2021    TRIG 109 06/09/2020     Lab Results   Component Value Date    CHOLHDL 29.6 04/26/2023    CHOLHDL 30.0 11/08/2021    CHOLHDL 31.5 06/09/2020      Lab Results   Component Value Date    TSH 1.32 07/20/2011     Lab Results   Component Value Date    HGBA1C 6.3 (H) 11/27/2023     Lab Results   Component Value Date    WBC 5.41 01/04/2024    HGB 14.3 01/04/2024    HCT 43.6 01/04/2024    MCV 88 01/04/2024     01/04/2024         Results for orders placed during the hospital encounter of 10/17/23    Echo    Interpretation Summary    Left Ventricle: Septal motion is consistent with bundle branch block. There is normal systolic function with a visually estimated ejection fraction of 60 - 65%.    Left Atrium: Left atrium is moderately dilated.    Right Ventricle: Normal right ventricular cavity size. Wall thickness is normal. Right ventricle wall motion  is normal. Systolic function is  normal.    Right Atrium: Right atrium is moderately dilated.    Aortic Valve: There is a transcatheter valve replacement in the aortic position. It is reported to be a [employer] valve. There is no stenosis. Aortic valve area by VTI is 2.71 cm². Aortic valve peak velocity is 1.17 m/s. Mean gradient is 3 mmHg. The dimensionless index is 0.78.    Tricuspid Valve: There is mild regurgitation.    IVC/SVC: Normal venous pressure at 3 mmHg.     Results for orders placed during the hospital encounter of 10/19/22    Cardiac catheterization    Narrative  Procedure performed in the Invasive Lab  - See Procedure Log link below for nursing documentation  - See OpNote on Surgeries Tab for physician findings  - See Imaging Tab for radiologist dictation         Assessment:       Atrial fibrillation, chronic  Rate controlled on sotalol.  He is on chronic anticoagulation.  A Watchman was discussed.  At this point the patient is to decide    Hyperlipidemia associated with type 2 diabetes mellitus  On 10 mg of rosuvastatin    Coronary artery disease, occlusive  No symptoms of angina    Hypertension associated with diabetes  Controlled on lisinopril 5 mg    CKD (chronic kidney disease) stage 3, GFR 30-59 ml/min  Solitary kidney.  Stable followed by Nephrology    Controlled type 2 diabetes mellitus with stage 3 chronic kidney disease, without long-term current use of insulin  Controlled on Actos, metformin, Trulicity       Plan:           Return to the office in 6 months.  Continue the current medical therapy.  Pradaxa for anticoagulation, rosuvastatin for his cholesterol, lisinopril for his hypertension.  Continue Actos metformin and Trulicity for control of his diabetes

## 2024-04-16 DIAGNOSIS — N18.30 CONTROLLED TYPE 2 DIABETES MELLITUS WITH STAGE 3 CHRONIC KIDNEY DISEASE, WITHOUT LONG-TERM CURRENT USE OF INSULIN: ICD-10-CM

## 2024-04-16 DIAGNOSIS — E11.22 CONTROLLED TYPE 2 DIABETES MELLITUS WITH STAGE 3 CHRONIC KIDNEY DISEASE, WITHOUT LONG-TERM CURRENT USE OF INSULIN: ICD-10-CM

## 2024-04-16 NOTE — TELEPHONE ENCOUNTER
No care due was identified.  [facility] Embedded Care Due Messages. Reference number: [id number].   4/16/2024 2:40:20 PM CDT

## 2024-04-17 DIAGNOSIS — N18.32 STAGE 3B CHRONIC KIDNEY DISEASE: ICD-10-CM

## 2024-04-17 DIAGNOSIS — Q60.0 SOLITARY KIDNEY, CONGENITAL: ICD-10-CM

## 2024-04-18 ENCOUNTER — HOSPITAL ENCOUNTER (OUTPATIENT)
Dept: RADIOLOGY | Facility: CLINIC | Age: 79
Discharge: HOME | End: 2024-04-18
Attending: STUDENT IN AN ORGANIZED HEALTH CARE EDUCATION/TRAINING PROGRAM
Payer: MEDICARE

## 2024-04-18 ENCOUNTER — OFFICE VISIT (OUTPATIENT)
Dept: FAMILY MEDICINE | Facility: CLINIC | Age: 79
End: 2024-04-18
Payer: MEDICARE

## 2024-04-18 VITALS
WEIGHT: 248.69 LBS | DIASTOLIC BLOOD PRESSURE: 69 MMHG | TEMPERATURE: 98 F | SYSTOLIC BLOOD PRESSURE: 128 MMHG | HEIGHT: 73 IN | BODY MASS INDEX: 32.96 KG/M2

## 2024-04-18 DIAGNOSIS — S63.632A SPRAIN OF INTERPHALANGEAL JOINT OF RIGHT MIDDLE FINGER, INITIAL ENCOUNTER: ICD-10-CM
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
## 2024-04-18 DIAGNOSIS — N18.30 CONTROLLED TYPE 2 DIABETES MELLITUS WITH STAGE 3 CHRONIC KIDNEY DISEASE, WITHOUT LONG-TERM CURRENT USE OF INSULIN: ICD-10-CM

## 2024-04-18 DIAGNOSIS — I48.91 ATRIAL FIBRILLATION, UNSPECIFIED TYPE: ICD-10-CM
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
## 2024-04-18 DIAGNOSIS — N18.32 STAGE 3B CHRONIC KIDNEY DISEASE: ICD-10-CM

## 2024-04-18 DIAGNOSIS — E11.22 CONTROLLED TYPE 2 DIABETES MELLITUS WITH STAGE 3 CHRONIC KIDNEY DISEASE, WITHOUT LONG-TERM CURRENT USE OF INSULIN: ICD-10-CM

## 2024-04-18 DIAGNOSIS — I70.0 AORTIC ATHEROSCLEROSIS: ICD-10-CM

## 2024-04-18 PROCEDURE — 3078F DIAST BP <80 MM HG: CPT | Mod: HCNC,CPTII,S$GLB, | Performed by: STUDENT IN AN ORGANIZED HEALTH CARE EDUCATION/TRAINING PROGRAM

## 2024-04-18 PROCEDURE — G2211 COMPLEX E/M VISIT ADD ON: HCPCS | Mod: HCNC,S$GLB,, | Performed by: STUDENT IN AN ORGANIZED HEALTH CARE EDUCATION/TRAINING PROGRAM

## 2024-04-18 PROCEDURE — 3074F SYST BP LT 130 MM HG: CPT | Mod: HCNC,CPTII,S$GLB, | Performed by: STUDENT IN AN ORGANIZED HEALTH CARE EDUCATION/TRAINING PROGRAM

## 2024-04-18 PROCEDURE — 73140 X-RAY EXAM OF FINGER(S): CPT | Mod: TC,HCNC,FY,PO,RT

## 2024-04-18 PROCEDURE — 99214 OFFICE O/P EST MOD 30 MIN: CPT | Mod: HCNC,S$GLB,, | Performed by: STUDENT IN AN ORGANIZED HEALTH CARE EDUCATION/TRAINING PROGRAM

## 2024-04-18 PROCEDURE — 3288F FALL RISK ASSESSMENT DOCD: CPT | Mod: HCNC,CPTII,S$GLB, | Performed by: STUDENT IN AN ORGANIZED HEALTH CARE EDUCATION/TRAINING PROGRAM

## 2024-04-18 PROCEDURE — 1101F PT FALLS ASSESS-DOCD LE1/YR: CPT | Mod: HCNC,CPTII,S$GLB, | Performed by: STUDENT IN AN ORGANIZED HEALTH CARE EDUCATION/TRAINING PROGRAM

## 2024-04-18 PROCEDURE — 73140 X-RAY EXAM OF FINGER(S): CPT | Mod: 26,HCNC,RT,S$GLB | Performed by: RADIOLOGY

## 2024-04-18 PROCEDURE — 1159F MED LIST DOCD IN RCRD: CPT | Mod: HCNC,CPTII,S$GLB, | Performed by: STUDENT IN AN ORGANIZED HEALTH CARE EDUCATION/TRAINING PROGRAM

## 2024-04-18 PROCEDURE — 1125F AMNT PAIN NOTED PAIN PRSNT: CPT | Mod: HCNC,CPTII,S$GLB, | Performed by: STUDENT IN AN ORGANIZED HEALTH CARE EDUCATION/TRAINING PROGRAM

## 2024-04-18 PROCEDURE — 99999 PR PBB SHADOW E&M-EST. PATIENT-LVL V: CPT | Mod: PBBFAC,HCNC,, | Performed by: STUDENT IN AN ORGANIZED HEALTH CARE EDUCATION/TRAINING PROGRAM

## 2024-04-18 PROCEDURE — 1160F RVW MEDS BY RX/DR IN RCRD: CPT | Mod: HCNC,CPTII,S$GLB, | Performed by: STUDENT IN AN ORGANIZED HEALTH CARE EDUCATION/TRAINING PROGRAM

## 2024-04-18 RX ORDER — DULAGLUTIDE 3 MG/.5ML
3 INJECTION, SOLUTION SUBCUTANEOUS
Qty: 4 PEN | Refills: 11 | Status: SHIPPED | OUTPATIENT
Start: 2024-04-18 | End: 2025-04-18

## 2024-04-18 NOTE — PATIENT INSTRUCTIONS
Chinmay Briceno,     If you are due for any health screening(s) below please notify me so we can arrange them to be ordered and scheduled to maintain your health. Most healthy patients complete it. Don't lose out on improving your health.     All of your core healthy metrics are met.

## 2024-04-18 NOTE — PROGRESS NOTES
"[facility] CLINIC NOTE    Patient Name: Janak Booker  YOB: 1945    PRESENTING HISTORY     History of Present Illness:  Mr. Janak Booker is a 78 y.o. male here for routien checkup.     He is feeling fine, no complaints.   He tri[pped on uneven concrete 2 days ago and fell onto the dorsum of his right hand and scraped his right forearm. Pain at middle finger, around PIP. No prior treatment. No other injuries reported.     His breathign feels pretty good overall.   No orthopnea or PND.     DM- due for recheck.     Renal function has been good.     ROS      OBJECTIVE:   Vital Signs:  Vitals:    04/18/24 1323   BP: 128/69   Temp: 97.7 °F (36.5 °C)   TempSrc: Oral   Weight: 112.8 kg (248 lb 10.9 oz)   Height: 6' 1" (1.854 m)         Physical Exam  Vitals and nursing note reviewed.   Constitutional:       Appearance: He is not diaphoretic.   HENT:      Head: Normocephalic and atraumatic.      Right Ear: External ear normal.      Left Ear: External ear normal.   Eyes:      General: No scleral icterus.     Conjunctiva/sclera: Conjunctivae normal.      Pupils: Pupils are equal, round, and reactive to light.   Neck:      Thyroid: No thyromegaly.   Cardiovascular:      Rate and Rhythm: Normal rate and regular rhythm.      Heart sounds: Normal heart sounds. No murmur heard.  Pulmonary:      Effort: Pulmonary effort is normal. No respiratory distress.      Breath sounds: Normal breath sounds. No wheezing or rales.   Musculoskeletal:      Cervical back: Normal range of motion and neck supple.      Comments: Ecchymosis to right hand about MCP joints. Reduced ROM middle finger with moderate edema.    Lymphadenopathy:      Cervical: No cervical adenopathy.   Skin:     General: Skin is warm and dry.      Findings: No erythema or rash.   Neurological:      Mental Status: He is alert and oriented to person, place, and time.      Gait: Gait is intact.   Psychiatric:         Mood and Affect: Mood and " affect normal.         Cognition and Memory: Memory normal.         Judgment: Judgment normal.         ASSESSMENT & PLAN:     Fall, initial encounter  -     X-Ray Finger 2 or More Views Right; Future; Expected date: 04/18/2024    Sprain of interphalangeal joint of right middle finger, initial encounter  -     X-Ray Finger 2 or More Views Right; Future; Expected date: 04/18/2024    Controlled type 2 diabetes mellitus with stage 3 chronic kidney disease, without long-term current use of insulin  -     Lipid Panel; Future; Expected date: 04/18/2024  -     Hemoglobin A1C; Future; Expected date: 04/18/2024  -     Comprehensive Metabolic Panel; Future; Expected date: 04/18/2024  -     CBC Auto Differential; Future; Expected date: 04/18/2024  Stable, continue current medications    Stage 3b chronic kidney disease  -     Lipid Panel; Future; Expected date: 04/18/2024  -     Hemoglobin A1C; Future; Expected date: 04/18/2024  -     Comprehensive Metabolic Panel; Future; Expected date: 04/18/2024  -     CBC Auto Differential; Future; Expected date: 04/18/2024  Recheck.     Atrial fibrillation, unspecified type  -     Lipid Panel; Future; Expected date: 04/18/2024  -     Hemoglobin A1C; Future; Expected date: 04/18/2024  -     Comprehensive Metabolic Panel; Future; Expected date: 04/18/2024  -     CBC Auto Differential; Future; Expected date: 04/18/2024  One fall, bleeding risk not markedly elevated at this time in my opinion. Continue AUGUSTIN Quach  Internal Medicine    Visit complexity inherent to evaluation and management associated with medical care services that serve as the continuing focal point for all needed health care services and/or with medical care services that are part of ongoing care related to a patient's single, serious condition or a complex condition provided today

## 2024-04-23 RX ORDER — DABIGATRAN ETEXILATE 150 MG/1
150 CAPSULE ORAL 2 TIMES DAILY
Qty: 180 CAPSULE | Refills: 3 | Status: SHIPPED | OUTPATIENT
Start: 2024-04-23

## 2024-05-08 RX ORDER — OMEPRAZOLE 40 MG/1
40 CAPSULE, DELAYED RELEASE ORAL
Qty: 90 CAPSULE | Refills: 3 | Status: SHIPPED | OUTPATIENT
Start: 2024-05-08

## 2024-05-08 NOTE — TELEPHONE ENCOUNTER
No care due was identified.  [facility] Embedded Care Due Messages. Reference number: [id number].   5/08/2024 12:23:56 PM CDT

## 2024-05-09 NOTE — TELEPHONE ENCOUNTER
Refill Decision Note   Janak Jhony  is requesting a refill authorization.  Brief Assessment and Rationale for Refill:  Approve     Medication Therapy Plan:        Comments:     Note composed:10:58 PM 05/08/2024

## 2024-05-23 ENCOUNTER — TELEPHONE (OUTPATIENT)
Dept: FAMILY MEDICINE | Facility: CLINIC | Age: 79
End: 2024-05-23
Payer: MEDICARE

## 2024-05-23 DIAGNOSIS — N18.30 CONTROLLED TYPE 2 DIABETES MELLITUS WITH STAGE 3 CHRONIC KIDNEY DISEASE, WITHOUT LONG-TERM CURRENT USE OF INSULIN: Primary | ICD-10-CM

## 2024-05-23 DIAGNOSIS — E11.22 CONTROLLED TYPE 2 DIABETES MELLITUS WITH STAGE 3 CHRONIC KIDNEY DISEASE, WITHOUT LONG-TERM CURRENT USE OF INSULIN: Primary | ICD-10-CM

## 2024-05-23 RX ORDER — SEMAGLUTIDE 1.34 MG/ML
1 INJECTION, SOLUTION SUBCUTANEOUS
Qty: 3 ML | Refills: 11 | Status: SHIPPED | OUTPATIENT
Start: 2024-05-23 | End: 2025-05-23

## 2024-05-23 NOTE — TELEPHONE ENCOUNTER
Call placed to patient for notification. Patient verbalized understanding.            See Quach MD  You30 minutes ago (2:35 PM)       1 mg ozempic sent.    Keep an eye on BG to make sure we get the dosing right. Difficult when switching.

## 2024-05-24 DIAGNOSIS — N18.30 CONTROLLED TYPE 2 DIABETES MELLITUS WITH STAGE 3 CHRONIC KIDNEY DISEASE, WITHOUT LONG-TERM CURRENT USE OF INSULIN: ICD-10-CM

## 2024-05-24 DIAGNOSIS — E11.22 CONTROLLED TYPE 2 DIABETES MELLITUS WITH STAGE 3 CHRONIC KIDNEY DISEASE, WITHOUT LONG-TERM CURRENT USE OF INSULIN: ICD-10-CM

## 2024-05-24 RX ORDER — SEMAGLUTIDE 1.34 MG/ML
1 INJECTION, SOLUTION SUBCUTANEOUS
Qty: 3 ML | Refills: 11 | OUTPATIENT
Start: 2024-05-24 | End: 2025-05-24

## 2024-05-24 RX ORDER — METFORMIN HYDROCHLORIDE 500 MG/1
500 TABLET, EXTENDED RELEASE ORAL DAILY
Qty: 90 TABLET | Refills: 1 | OUTPATIENT
Start: 2024-05-24

## 2024-05-24 NOTE — TELEPHONE ENCOUNTER
No care due was identified.  [facility] Embedded Care Due Messages. Reference number: [id number].   5/24/2024 11:39:39 AM CDT

## 2024-05-24 NOTE — TELEPHONE ENCOUNTER
----- Message from Suha Araya sent at 5/24/2024  1:10 PM CDT -----  Contact: Patient  Type:  RX Refill Request    Who Called:   Patient  Refill or New Rx:  Refill    RX Name and Strength:  metFORMIN (GLUCOPHAGE-XR) 500 MG ER 24hr tablet     Preferred Pharmacy with phone number:      [id number] - [place] - [address] AT [facility] [address]  [facility] [address] [address]  Phone: [phone] Fax: [phone]    Local or Mail Order:  Local  Ordering Provider:  Dr Quach    Would the patient rather a call back or a response via [facility]?   Call back  Best Call Back Number:  [phone]    Additional Information:   States he has one pill left and is requesting refills - please call - thank you

## 2024-05-24 NOTE — TELEPHONE ENCOUNTER
No care due was identified.  [facility] Embedded Care Due Messages. Reference number: [id number].   5/24/2024 11:05:14 AM CDT

## 2024-05-24 NOTE — TELEPHONE ENCOUNTER
This is a duplicate request   He has remaining refill at [facility]        ----- Message from Lorna Cueva sent at 5/24/2024 11:02 AM CDT -----  Type:  RX Refill Request    Who Called:  pt   Refill or New Rx:  refill   RX Name and Strength:  metFORMIN (GLUCOPHAGE-XR) 500 MG ER 24hr tablet  How is the patient currently taking it? (ex. 1XDay):  as direct   Is this a 30 day or 90 day RX:  90  Preferred Pharmacy with phone number:    [facility] #[id number] - [place] - [address]  [address] [address]  Phone: [phone] Fax: [phone]      Local or Mail Order:  local   Ordering Provider:  flora Perdomo Call Back Number:  [phone] (home)     Additional Information:  please advise

## 2024-05-27 RX ORDER — METFORMIN HYDROCHLORIDE 500 MG/1
500 TABLET, EXTENDED RELEASE ORAL DAILY
Qty: 90 TABLET | Refills: 1 | Status: SHIPPED | OUTPATIENT
Start: 2024-05-27

## 2024-06-26 RX ORDER — PIOGLITAZONEHYDROCHLORIDE 30 MG/1
30 TABLET ORAL DAILY
Qty: 90 TABLET | Refills: 2 | Status: SHIPPED | OUTPATIENT
Start: 2024-06-26

## 2024-06-26 NOTE — TELEPHONE ENCOUNTER
No care due was identified.  [facility] Embedded Care Due Messages. Reference number: [id number].   6/26/2024 9:06:16 AM CDT

## 2024-06-30 RX ORDER — NITROGLYCERIN 0.4 MG/1
0.4 TABLET SUBLINGUAL EVERY 5 MIN PRN
Qty: 25 TABLET | Refills: 4 | Status: SHIPPED | OUTPATIENT
Start: 2024-06-30

## 2024-06-30 RX ORDER — ROSUVASTATIN CALCIUM 10 MG/1
10 TABLET, COATED ORAL NIGHTLY
Qty: 90 TABLET | Refills: 2 | Status: SHIPPED | OUTPATIENT
Start: 2024-06-30 | End: 2024-06-30 | Stop reason: SDUPTHER

## 2024-07-01 RX ORDER — ROSUVASTATIN CALCIUM 10 MG/1
10 TABLET, COATED ORAL NIGHTLY
Qty: 90 TABLET | Refills: 2 | Status: SHIPPED | OUTPATIENT
Start: 2024-07-01

## 2024-07-02 ENCOUNTER — LAB VISIT (OUTPATIENT)
Dept: LAB | Facility: HOSPITAL | Age: 79
End: 2024-07-02
Attending: INTERNAL MEDICINE
Payer: MEDICARE

## 2024-07-02 DIAGNOSIS — N18.30 CHRONIC KIDNEY DISEASE, STAGE III (MODERATE): Primary | ICD-10-CM

## 2024-07-02 LAB
25(OH)D3+25(OH)D2 SERPL-MCNC: 58 NG/ML (ref 30–96)
ALBUMIN SERPL BCP-MCNC: 4.3 G/DL (ref 3.5–5.2)
ANION GAP SERPL CALC-SCNC: 7 MMOL/L (ref 8–16)
BASOPHILS # BLD AUTO: 0.03 K/UL (ref 0–0.2)
BASOPHILS NFR BLD: 0.5 % (ref 0–1.9)
BUN SERPL-MCNC: 29 MG/DL (ref 8–23)
CALCIUM SERPL-MCNC: 9.8 MG/DL (ref 8.7–10.5)
CHLORIDE SERPL-SCNC: 106 MMOL/L (ref 95–110)
CO2 SERPL-SCNC: 23 MMOL/L (ref 23–29)
CREAT SERPL-MCNC: 1.9 MG/DL (ref 0.5–1.4)
DIFFERENTIAL METHOD BLD: ABNORMAL
EOSINOPHIL # BLD AUTO: 0.3 K/UL (ref 0–0.5)
EOSINOPHIL NFR BLD: 4.9 % (ref 0–8)
ERYTHROCYTE [DISTWIDTH] IN BLOOD BY AUTOMATED COUNT: 15.6 % (ref 11.5–14.5)
EST. GFR  (NO RACE VARIABLE): 35.9 ML/MIN/1.73 M^2
GLUCOSE SERPL-MCNC: 129 MG/DL (ref 70–110)
HCT VFR BLD AUTO: 43.6 % (ref 40–54)
HGB BLD-MCNC: 14.2 G/DL (ref 14–18)
IMM GRANULOCYTES # BLD AUTO: 0.02 K/UL (ref 0–0.04)
IMM GRANULOCYTES NFR BLD AUTO: 0.3 % (ref 0–0.5)
LYMPHOCYTES # BLD AUTO: 2.1 K/UL (ref 1–4.8)
LYMPHOCYTES NFR BLD: 32.2 % (ref 18–48)
MCH RBC QN AUTO: 28.5 PG (ref 27–31)
MCHC RBC AUTO-ENTMCNC: 32.6 G/DL (ref 32–36)
MCV RBC AUTO: 88 FL (ref 82–98)
MICROSCOPIC COMMENT: NORMAL
MONOCYTES # BLD AUTO: 0.5 K/UL (ref 0.3–1)
MONOCYTES NFR BLD: 8 % (ref 4–15)
NEUTROPHILS # BLD AUTO: 3.5 K/UL (ref 1.8–7.7)
NEUTROPHILS NFR BLD: 54.1 % (ref 38–73)
NRBC BLD-RTO: 0 /100 WBC
PHOSPHATE SERPL-MCNC: 3.8 MG/DL (ref 2.7–4.5)
PLATELET # BLD AUTO: 177 K/UL (ref 150–450)
PMV BLD AUTO: 11.1 FL (ref 9.2–12.9)
POTASSIUM SERPL-SCNC: 4.8 MMOL/L (ref 3.5–5.1)
PTH-INTACT SERPL-MCNC: 17.5 PG/ML (ref 9–77)
RBC # BLD AUTO: 4.98 M/UL (ref 4.6–6.2)
RBC #/AREA URNS HPF: 0 /HPF (ref 0–4)
SODIUM SERPL-SCNC: 136 MMOL/L (ref 136–145)
WBC # BLD AUTO: 6.5 K/UL (ref 3.9–12.7)
WBC #/AREA URNS HPF: 1 /HPF (ref 0–5)

## 2024-07-02 PROCEDURE — 81001 URINALYSIS AUTO W/SCOPE: CPT | Performed by: INTERNAL MEDICINE

## 2024-07-02 PROCEDURE — 36415 COLL VENOUS BLD VENIPUNCTURE: CPT | Performed by: INTERNAL MEDICINE

## 2024-07-02 PROCEDURE — 85025 COMPLETE CBC W/AUTO DIFF WBC: CPT | Performed by: INTERNAL MEDICINE

## 2024-07-02 PROCEDURE — 83970 ASSAY OF PARATHORMONE: CPT | Performed by: INTERNAL MEDICINE

## 2024-07-02 PROCEDURE — 82306 VITAMIN D 25 HYDROXY: CPT | Performed by: INTERNAL MEDICINE

## 2024-07-02 PROCEDURE — 80069 RENAL FUNCTION PANEL: CPT | Performed by: INTERNAL MEDICINE

## 2024-08-08 ENCOUNTER — PATIENT MESSAGE (OUTPATIENT)
Dept: OTHER | Facility: OTHER | Age: 79
End: 2024-08-08
Payer: MEDICARE

## 2024-08-11 DIAGNOSIS — N18.30 CONTROLLED TYPE 2 DIABETES MELLITUS WITH STAGE 3 CHRONIC KIDNEY DISEASE, WITHOUT LONG-TERM CURRENT USE OF INSULIN: ICD-10-CM

## 2024-08-11 DIAGNOSIS — E11.22 CONTROLLED TYPE 2 DIABETES MELLITUS WITH STAGE 3 CHRONIC KIDNEY DISEASE, WITHOUT LONG-TERM CURRENT USE OF INSULIN: ICD-10-CM

## 2024-08-11 RX ORDER — DULAGLUTIDE 3 MG/.5ML
3 INJECTION, SOLUTION SUBCUTANEOUS
Qty: 4 PEN | Refills: 11 | Status: CANCELLED | OUTPATIENT
Start: 2024-08-11 | End: 2025-08-11

## 2024-08-11 NOTE — TELEPHONE ENCOUNTER
Care Due:                  Date            Visit Type   Department     Provider  --------------------------------------------------------------------------------                                EP -                              PRIMARY      [facility]    See Cortez  Last Visit: 04-      CARE ([facility])   MEDICINE       Naccari                              EP -                              PRIMARY      [facility]    See Cortez  Next Visit: 10-      CARE ([facility])   MEDICINE       Naccari                                                            Last  Test          Frequency    Reason                     Performed    Due Date  --------------------------------------------------------------------------------    HBA1C.......  6 months...  dulaglutide, metFORMIN,    04-   10-                             pioglitazone, semaglutide    [employer] Embedded Care Due Messages. Reference number: [id number].   8/11/2024 1:30:17 PM CDT

## 2024-08-22 RX ORDER — ROSUVASTATIN CALCIUM 10 MG/1
10 TABLET, COATED ORAL NIGHTLY
Qty: 90 TABLET | Refills: 2 | Status: SHIPPED | OUTPATIENT
Start: 2024-08-22

## 2024-08-26 RX ORDER — ROSUVASTATIN CALCIUM 10 MG/1
10 TABLET, COATED ORAL NIGHTLY
Qty: 90 TABLET | Refills: 2 | Status: SHIPPED | OUTPATIENT
Start: 2024-08-26 | End: 2024-08-26 | Stop reason: SDUPTHER

## 2024-08-27 DIAGNOSIS — E11.22 CONTROLLED TYPE 2 DIABETES MELLITUS WITH STAGE 3 CHRONIC KIDNEY DISEASE, WITHOUT LONG-TERM CURRENT USE OF INSULIN: ICD-10-CM

## 2024-08-27 DIAGNOSIS — N18.30 CONTROLLED TYPE 2 DIABETES MELLITUS WITH STAGE 3 CHRONIC KIDNEY DISEASE, WITHOUT LONG-TERM CURRENT USE OF INSULIN: ICD-10-CM

## 2024-08-27 RX ORDER — NITROGLYCERIN 0.4 MG/1
0.4 TABLET SUBLINGUAL EVERY 5 MIN PRN
Qty: 25 TABLET | Refills: 4 | Status: SHIPPED | OUTPATIENT
Start: 2024-08-27

## 2024-08-27 RX ORDER — ROSUVASTATIN CALCIUM 10 MG/1
10 TABLET, COATED ORAL NIGHTLY
Qty: 90 TABLET | Refills: 3 | Status: SHIPPED | OUTPATIENT
Start: 2024-08-27

## 2024-08-27 RX ORDER — METFORMIN HYDROCHLORIDE 500 MG/1
TABLET, EXTENDED RELEASE ORAL
Qty: 90 TABLET | Refills: 0 | Status: SHIPPED | OUTPATIENT
Start: 2024-08-27

## 2024-08-27 NOTE — TELEPHONE ENCOUNTER
Refill Decision Note   Janak Booker  is requesting a refill authorization.  Brief Assessment and Rationale for Refill:  Approve     Medication Therapy Plan:         Pharmacist review requested: Yes   Comments:     Note composed:12:15 PM 08/27/2024

## 2024-08-27 NOTE — TELEPHONE ENCOUNTER
Refill Routing Note   Medication(s) are not appropriate for processing by [facility] for the following reason(s):        Required labs abnormal    [facility] action(s):  Defer           Pharmacist review requested: Yes     Appointments  past 12m or future 3m with PCP    Date Provider   Last Visit   4/18/2024 See Quach MD   Next Visit   10/23/2024 See Quach MD   ED visits in past 90 days: 0        Note composed:12:04 PM 08/27/2024

## 2024-08-27 NOTE — TELEPHONE ENCOUNTER
No care due was identified.  Health [facility] Embedded Care Due Messages. Reference number: [id number].   8/27/2024 8:09:18 AM CDT

## 2024-09-06 NOTE — TELEPHONE ENCOUNTER
----- Message from Keith Srinivasan sent at 9/6/2024  2:14 PM CDT -----  Regarding: refill  Contact: patient  Type:  RX Refill Request    Who Called: patient  Refill or New Rx:refill  RX Name and Strength:sotaloL (BETAPACE) 80 MG tablet  How is the patient currently taking it? (ex. 1XDay):  Is this a 30 day or 90 day RX:90  Preferred Pharmacy with phone number:  [id number] - [address] AT [facility] [address]  [address]  [place] [address]  Phone: [phone] Fax: [phone]  Local or Mail Order:local  Ordering Provider:Moise  Would the patient rather a call back or a response via [facility]? refill  Best Call Back Number:[phone]  Additional Information:

## 2024-09-10 RX ORDER — SOTALOL HYDROCHLORIDE 80 MG/1
40 TABLET ORAL DAILY
Qty: 90 TABLET | Refills: 3 | Status: SHIPPED | OUTPATIENT
Start: 2024-09-10 | End: 2026-08-31

## 2024-09-17 NOTE — TELEPHONE ENCOUNTER
No care due was identified.  [facility] Embedded Care Due Messages. Reference number: [id number].   9/17/2024 5:20:30 PM CDT

## 2024-09-18 RX ORDER — PIOGLITAZONEHYDROCHLORIDE 30 MG/1
30 TABLET ORAL DAILY
Qty: 90 TABLET | Refills: 2 | Status: SHIPPED | OUTPATIENT
Start: 2024-09-18

## 2024-09-24 DIAGNOSIS — E11.22 CONTROLLED TYPE 2 DIABETES MELLITUS WITH STAGE 3 CHRONIC KIDNEY DISEASE, WITHOUT LONG-TERM CURRENT USE OF INSULIN: ICD-10-CM

## 2024-09-24 DIAGNOSIS — N18.30 CONTROLLED TYPE 2 DIABETES MELLITUS WITH STAGE 3 CHRONIC KIDNEY DISEASE, WITHOUT LONG-TERM CURRENT USE OF INSULIN: ICD-10-CM

## 2024-09-24 NOTE — TELEPHONE ENCOUNTER
No care due was identified.  Health [facility] Embedded Care Due Messages. Reference number: [id number].   9/24/2024 2:09:40 PM CDT

## 2024-09-25 RX ORDER — DULAGLUTIDE 3 MG/.5ML
3 INJECTION, SOLUTION SUBCUTANEOUS
Qty: 4 PEN | Refills: 11 | Status: SHIPPED | OUTPATIENT
Start: 2024-09-25 | End: 2025-09-25

## 2024-10-08 DIAGNOSIS — E11.22 CONTROLLED TYPE 2 DIABETES MELLITUS WITH STAGE 3 CHRONIC KIDNEY DISEASE, WITHOUT LONG-TERM CURRENT USE OF INSULIN: ICD-10-CM

## 2024-10-08 DIAGNOSIS — N18.30 CONTROLLED TYPE 2 DIABETES MELLITUS WITH STAGE 3 CHRONIC KIDNEY DISEASE, WITHOUT LONG-TERM CURRENT USE OF INSULIN: ICD-10-CM

## 2024-10-08 NOTE — TELEPHONE ENCOUNTER
No care due was identified.  Health [facility] Embedded Care Due Messages. Reference number: [id number].   10/08/2024 10:43:42 AM CDT

## 2024-10-10 RX ORDER — METFORMIN HYDROCHLORIDE 500 MG/1
TABLET, EXTENDED RELEASE ORAL
Qty: 90 TABLET | Refills: 0 | OUTPATIENT
Start: 2024-10-10

## 2024-10-15 RX ORDER — SOTALOL HYDROCHLORIDE 80 MG/1
40 TABLET ORAL DAILY
Qty: 90 TABLET | Refills: 3 | Status: SHIPPED | OUTPATIENT
Start: 2024-10-15 | End: 2026-10-05

## 2024-10-18 ENCOUNTER — TELEPHONE (OUTPATIENT)
Dept: ADMINISTRATIVE | Facility: CLINIC | Age: 79
End: 2024-10-18
Payer: MEDICARE

## 2024-10-23 ENCOUNTER — OFFICE VISIT (OUTPATIENT)
Dept: FAMILY MEDICINE | Facility: CLINIC | Age: 79
End: 2024-10-23
Payer: MEDICARE

## 2024-10-23 ENCOUNTER — LAB VISIT (OUTPATIENT)
Dept: LAB | Facility: HOSPITAL | Age: 79
End: 2024-10-23
Attending: STUDENT IN AN ORGANIZED HEALTH CARE EDUCATION/TRAINING PROGRAM
Payer: MEDICARE

## 2024-10-23 VITALS
DIASTOLIC BLOOD PRESSURE: 70 MMHG | HEIGHT: 73 IN | SYSTOLIC BLOOD PRESSURE: 122 MMHG | OXYGEN SATURATION: 97 % | WEIGHT: 246.06 LBS | HEART RATE: 77 BPM | BODY MASS INDEX: 32.61 KG/M2

## 2024-10-23 DIAGNOSIS — N18.32 STAGE 3B CHRONIC KIDNEY DISEASE: ICD-10-CM

## 2024-10-23 DIAGNOSIS — N18.30 CONTROLLED TYPE 2 DIABETES MELLITUS WITH STAGE 3 CHRONIC KIDNEY DISEASE, WITHOUT LONG-TERM CURRENT USE OF INSULIN: ICD-10-CM

## 2024-10-23 DIAGNOSIS — M19.09 OSTEOARTHRITIS OF OTHER SITE, UNSPECIFIED OSTEOARTHRITIS TYPE: ICD-10-CM

## 2024-10-23 DIAGNOSIS — E11.22 CONTROLLED TYPE 2 DIABETES MELLITUS WITH STAGE 3 CHRONIC KIDNEY DISEASE, WITHOUT LONG-TERM CURRENT USE OF INSULIN: ICD-10-CM

## 2024-10-23 DIAGNOSIS — Z95.2 S/P TAVR (TRANSCATHETER AORTIC VALVE REPLACEMENT): Primary | ICD-10-CM

## 2024-10-23 DIAGNOSIS — I48.20 ATRIAL FIBRILLATION, CHRONIC: ICD-10-CM

## 2024-10-23 PROCEDURE — 99999 PR PBB SHADOW E&M-EST. PATIENT-LVL V: CPT | Mod: PBBFAC,HCNC,, | Performed by: STUDENT IN AN ORGANIZED HEALTH CARE EDUCATION/TRAINING PROGRAM

## 2024-10-23 PROCEDURE — 1125F AMNT PAIN NOTED PAIN PRSNT: CPT | Mod: CPTII,S$GLB,, | Performed by: STUDENT IN AN ORGANIZED HEALTH CARE EDUCATION/TRAINING PROGRAM

## 2024-10-23 PROCEDURE — 1101F PT FALLS ASSESS-DOCD LE1/YR: CPT | Mod: CPTII,S$GLB,, | Performed by: STUDENT IN AN ORGANIZED HEALTH CARE EDUCATION/TRAINING PROGRAM

## 2024-10-23 PROCEDURE — 1159F MED LIST DOCD IN RCRD: CPT | Mod: CPTII,S$GLB,, | Performed by: STUDENT IN AN ORGANIZED HEALTH CARE EDUCATION/TRAINING PROGRAM

## 2024-10-23 PROCEDURE — 1160F RVW MEDS BY RX/DR IN RCRD: CPT | Mod: CPTII,S$GLB,, | Performed by: STUDENT IN AN ORGANIZED HEALTH CARE EDUCATION/TRAINING PROGRAM

## 2024-10-23 PROCEDURE — 82570 ASSAY OF URINE CREATININE: CPT | Performed by: STUDENT IN AN ORGANIZED HEALTH CARE EDUCATION/TRAINING PROGRAM

## 2024-10-23 PROCEDURE — G2211 COMPLEX E/M VISIT ADD ON: HCPCS | Mod: S$GLB,,, | Performed by: STUDENT IN AN ORGANIZED HEALTH CARE EDUCATION/TRAINING PROGRAM

## 2024-10-23 PROCEDURE — 3074F SYST BP LT 130 MM HG: CPT | Mod: CPTII,S$GLB,, | Performed by: STUDENT IN AN ORGANIZED HEALTH CARE EDUCATION/TRAINING PROGRAM

## 2024-10-23 PROCEDURE — 3288F FALL RISK ASSESSMENT DOCD: CPT | Mod: CPTII,S$GLB,, | Performed by: STUDENT IN AN ORGANIZED HEALTH CARE EDUCATION/TRAINING PROGRAM

## 2024-10-23 PROCEDURE — 99214 OFFICE O/P EST MOD 30 MIN: CPT | Mod: S$GLB,,, | Performed by: STUDENT IN AN ORGANIZED HEALTH CARE EDUCATION/TRAINING PROGRAM

## 2024-10-23 PROCEDURE — 3078F DIAST BP <80 MM HG: CPT | Mod: CPTII,S$GLB,, | Performed by: STUDENT IN AN ORGANIZED HEALTH CARE EDUCATION/TRAINING PROGRAM

## 2024-10-23 RX ORDER — DULAGLUTIDE 3 MG/.5ML
3 INJECTION, SOLUTION SUBCUTANEOUS
Qty: 12 PEN | Refills: 3 | Status: SHIPPED | OUTPATIENT
Start: 2024-10-23 | End: 2025-10-23

## 2024-10-23 RX ORDER — DICLOFENAC SODIUM 10 MG/G
GEL TOPICAL
Qty: 100 G | Refills: 0 | Status: SHIPPED | OUTPATIENT
Start: 2024-10-23

## 2024-10-23 NOTE — PROGRESS NOTES
Patient ID: Janak Booker is a 79 y.o. male.    Chief Complaint: Follow-up (6m f/u)    History of Present Illness    CHIEF COMPLAINT:  Janak presents today for follow-up regarding fatigue.    FATIGUE AND CARDIOVASCULAR HISTORY:  He reports experiencing fatigue, tiring out easily with minimal activity. He has difficulty completing tasks and needs to sit down more frequently than usual, feeling fatigued after walking short distances such as to the mailbox. He denies any chest pain associated with the fatigue or with exertion. He has a history of atrial fibrillation and is currently on sotalol. He also has a history of stent placement and valve replacement. He does not have a pacemaker and continues to follow up with his cardiologist regularly.    RENAL HISTORY:  He was born with one kidney. His kidney function has been stable for a few years. He is under the care of Dr. Moody for his renal condition.    DIABETES MANAGEMENT:  His blood sugar is generally well-controlled with no recent hypoglycemic episodes. He has had difficulty obtaining Trulicity and has occasionally used Ozempic as an alternative without issues. He is considering switching to a mail-order pharmacy to address medication access issues. He recently discontinued metformin as per office instructions.    MUSCULOSKELETAL:  He reports constant and severe thumb pain in both hands, suspecting it may be due to arthritis.    GASTROINTESTINAL:  He denies blood in stool or black, sticky stools.          Physical Exam    Vitals: Blood pressure: 122/70 mmHg (left arm). Heart rate: 77 bpm.  General: No acute distress. Well-developed. Well-nourished.  Eyes: EOMI. Sclerae anicteric.  HENT: Normocephalic. Atraumatic. Nares patent. Moist oral mucosa.  Cardiovascular: Regular rate. Regular rhythm. No murmurs. No rubs. No gallops. Normal S1, S2. Cardiac rhythm slightly erratic with early beats.  Respiratory: Normal respiratory effort. Clear to auscultation bilaterally.  No rales. No rhonchi. No wheezing.  Abdomen: Soft. Non-tender. Non-distended. Normoactive bowel sounds.  Musculoskeletal: No  obvious deformity.  Extremities: No lower extremity edema.  Neurological: Alert & oriented x3. No slurred speech. Normal gait.  Psychiatric: Normal mood. Normal affect. Good insight. Good judgment.  Skin: Warm. Dry. No rash.  Neck: All normal.         Assessment & Plan    Assessed patient's fatigue complaint  Noted patient has AFib and is on sotalol; EKG Afib but rate controlled.   Considered potential causes of fatigue including anemia, thyroid dysfunction, and electrolyte imbalance  Evaluated hand pain, likely arthritis; considered treatment options in context of patient's anticoagulation and kidney function  Will discontinue metformin due to potential risk with decreased kidney function    DIABETES:  - Explained that being off Trulicity for 1 week would not significantly impact blood sugar control given current good management.  - Continued Trulicity as primary diabetes medication.  - Discontinued metformin 500 mg daily.    HAND PAIN:  - Started topical cream for hand pain.  - Started Tylenol 1000 mg as needed for hand pain.    CARDIOVASCULAR HEALTH:  - Ordered EKG.  - Ordered echocardiogram.    MEDICATIONS/SUPPLEMENTS:  - Discussed risks of using NSAIDs with anticoagulants and compromised kidney function.    HYPERTENSION:  - Janak to discontinue use of home blood pressure monitor to avoid confusion with office readings.    LABS:  - Ordered complete blood count.  - Ordered thyroid function tests.  - Ordered electrolyte panel.          Janak was seen today for follow-up.    Diagnoses and all orders for this visit:    S/P TAVR (transcatheter aortic valve replacement)  -     IN OFFICE EKG 12-LEAD (to Muse)  -     Echo; Future    Atrial fibrillation, chronic  -     IN OFFICE EKG 12-LEAD (to Muse)  -     Echo; Future  -     CBC Auto Differential; Future  -     TSH; Future    Controlled type 2  diabetes mellitus with stage 3 chronic kidney disease, without long-term current use of insulin  -     dulaglutide (TRULICITY) 3 mg/0.5 mL pen injector; Inject 3 mg into the skin every 7 days.  -     Hemoglobin A1C; Future  -     Comprehensive Metabolic Panel; Future  -     Microalbumin/Creatinine Ratio, Urine; Future    Stage 3b chronic kidney disease  -     Comprehensive Metabolic Panel; Future    Osteoarthritis of other site, unspecified osteoarthritis type  -     diclofenac sodium (VOLTAREN) 1 % Gel; Apply small amount (2 grams) to painful joint area 4 times daily as needed          No follow-ups on file.    This note was generated with the assistance of ambient listening technology. Verbal consent was obtained by the patient and accompanying visitor(s) for the recording of patient appointment to facilitate this note. I attest to having reviewed and edited the generated note for accuracy, though some syntax or spelling errors may persist. Please contact the author of this note for any clarification.

## 2024-10-24 LAB
ALBUMIN/CREAT UR: 10.4 UG/MG (ref 0–30)
CREAT UR-MCNC: 125 MG/DL (ref 23–375)
MICROALBUMIN UR DL<=1MG/L-MCNC: 13 UG/ML

## 2024-10-30 ENCOUNTER — HOSPITAL ENCOUNTER (OUTPATIENT)
Dept: CARDIOLOGY | Facility: HOSPITAL | Age: 79
Discharge: HOME | End: 2024-10-30
Attending: STUDENT IN AN ORGANIZED HEALTH CARE EDUCATION/TRAINING PROGRAM
Payer: MEDICARE

## 2024-10-30 VITALS — WEIGHT: 246 LBS | BODY MASS INDEX: 32.6 KG/M2 | HEIGHT: 73 IN

## 2024-10-30 DIAGNOSIS — I48.20 ATRIAL FIBRILLATION, CHRONIC: ICD-10-CM

## 2024-10-30 DIAGNOSIS — Z95.2 S/P TAVR (TRANSCATHETER AORTIC VALVE REPLACEMENT): ICD-10-CM

## 2024-10-30 LAB
AORTIC ROOT ANNULUS: 3.2 CM
APICAL FOUR CHAMBER EJECTION FRACTION: 64 %
AV INDEX (PROSTH): 0.57
AV MEAN GRADIENT: 8 MMHG
AV PEAK GRADIENT: 14.4 MMHG
AV VALVE AREA BY VELOCITY RATIO: 1.7 CM²
AV VALVE AREA: 1.8 CM²
AV VELOCITY RATIO: 0.53
BSA FOR ECHO PROCEDURE: 2.4 M2
CV ECHO LV RWT: 0.59 CM
DOP CALC AO PEAK VEL: 1.9 M/S
DOP CALC AO VTI: 39.5 CM
DOP CALC LVOT AREA: 3.1 CM2
DOP CALC LVOT DIAMETER: 2 CM
DOP CALC LVOT PEAK VEL: 1 M/S
DOP CALC LVOT STROKE VOLUME: 70.3 CM3
DOP CALCLVOT PEAK VEL VTI: 22.4 CM
E WAVE DECELERATION TIME: 195 MSEC
E/E' RATIO: 10.17 M/S
ECHO LV POSTERIOR WALL: 1.3 CM (ref 0.6–1.1)
EJECTION FRACTION: 65 %
FRACTIONAL SHORTENING: 36.4 % (ref 28–44)
INTERVENTRICULAR SEPTUM: 1.3 CM (ref 0.6–1.1)
IVRT: 84 MSEC
LEFT ATRIUM AREA SYSTOLIC (APICAL 2 CHAMBER): 24.4 CM2
LEFT ATRIUM AREA SYSTOLIC (APICAL 4 CHAMBER): 23.1 CM2
LEFT ATRIUM SIZE: 5.6 CM
LEFT ATRIUM VOLUME INDEX MOD: 29.8 ML/M2
LEFT ATRIUM VOLUME MOD: 70 ML
LEFT INTERNAL DIMENSION IN SYSTOLE: 2.8 CM (ref 2.1–4)
LEFT VENTRICLE DIASTOLIC VOLUME INDEX: 37.7 ML/M2
LEFT VENTRICLE DIASTOLIC VOLUME: 88.6 ML
LEFT VENTRICLE END DIASTOLIC VOLUME APICAL 4 CHAMBER: 120 ML
LEFT VENTRICLE END SYSTOLIC VOLUME APICAL 2 CHAMBER: 72 ML
LEFT VENTRICLE END SYSTOLIC VOLUME APICAL 4 CHAMBER: 67.9 ML
LEFT VENTRICLE MASS INDEX: 91.5 G/M2
LEFT VENTRICLE SYSTOLIC VOLUME INDEX: 12.8 ML/M2
LEFT VENTRICLE SYSTOLIC VOLUME: 30.1 ML
LEFT VENTRICULAR INTERNAL DIMENSION IN DIASTOLE: 4.4 CM (ref 3.5–6)
LEFT VENTRICULAR MASS: 215.1 G
LV LATERAL E/E' RATIO: 8.36 M/S
LV SEPTAL E/E' RATIO: 13 M/S
LVED V (TEICH): 88.6 ML
LVES V (TEICH): 30.1 ML
LVOT MG: 2 MMHG
LVOT MV: 0.63 CM/S
MV PEAK A VEL: 0 M/S
MV PEAK E VEL: 1.17 M/S
MV STENOSIS PRESSURE HALF TIME: 61 MS
MV VALVE AREA P 1/2 METHOD: 3.61 CM2
PISA TR MAX VEL: 2.24 M/S
RA PRESSURE ESTIMATED: 3 MMHG
RIGHT VENTRICULAR END-DIASTOLIC DIMENSION: 2.97 CM
RV TB RVSP: 5 MMHG
TDI LATERAL: 0.14 M/S
TDI SEPTAL: 0.09 M/S
TDI: 0.12 M/S
TR MAX PG: 20 MMHG
TV REST PULMONARY ARTERY PRESSURE: 23 MMHG
Z-SCORE OF LEFT VENTRICULAR DIMENSION IN END DIASTOLE: -8.04
Z-SCORE OF LEFT VENTRICULAR DIMENSION IN END SYSTOLE: -5.9

## 2024-10-30 PROCEDURE — 93306 TTE W/DOPPLER COMPLETE: CPT | Mod: 26,,, | Performed by: INTERNAL MEDICINE

## 2024-10-30 PROCEDURE — 93306 TTE W/DOPPLER COMPLETE: CPT

## 2024-11-12 RX ORDER — SOTALOL HYDROCHLORIDE 80 MG/1
40 TABLET ORAL DAILY
Qty: 90 TABLET | Refills: 3 | Status: SHIPPED | OUTPATIENT
Start: 2024-11-12 | End: 2026-11-02

## 2024-11-13 RX ORDER — ROSUVASTATIN CALCIUM 10 MG/1
10 TABLET, COATED ORAL NIGHTLY
Qty: 90 TABLET | Refills: 3 | Status: SHIPPED | OUTPATIENT
Start: 2024-11-13

## 2024-11-21 ENCOUNTER — TELEPHONE (OUTPATIENT)
Dept: CARDIOLOGY | Facility: CLINIC | Age: 79
End: 2024-11-21
Payer: MEDICARE

## 2024-11-21 NOTE — TELEPHONE ENCOUNTER
----- Message from Keith sent at 11/21/2024 10:26 AM CST -----  Regarding: refill  Contact: patient  Type:  RX Refill Request    Who Called: patient  Refill or New Rx:refill  RX Name and Strength:sotaloL (BETAPACE) 80 MG tablet  How is the patient currently taking it? (ex. 1XDay):  Is this a 30 day or 90 day RX:  Preferred Pharmacy with phone number:  [facility] #[id number] - [address] AT [facility] [address]  [address]  [place] [address]  Phone: [phone] Fax: [phone]  Local or Mail Order:local  Ordering Provider:Moise  Would the patient rather a call back or a response via [facility]? refill  Best Call Back Number:[phone]  Additional Information:

## 2024-11-28 DIAGNOSIS — E11.22 CONTROLLED TYPE 2 DIABETES MELLITUS WITH STAGE 3 CHRONIC KIDNEY DISEASE, WITHOUT LONG-TERM CURRENT USE OF INSULIN: ICD-10-CM

## 2024-11-28 DIAGNOSIS — N18.30 CONTROLLED TYPE 2 DIABETES MELLITUS WITH STAGE 3 CHRONIC KIDNEY DISEASE, WITHOUT LONG-TERM CURRENT USE OF INSULIN: ICD-10-CM

## 2024-11-29 RX ORDER — SEMAGLUTIDE 1.34 MG/ML
1 INJECTION, SOLUTION SUBCUTANEOUS
Qty: 3 ML | Refills: 11 | Status: SHIPPED | OUTPATIENT
Start: 2024-11-29 | End: 2025-11-29

## 2024-11-29 RX ORDER — SOTALOL HYDROCHLORIDE 80 MG/1
40 TABLET ORAL DAILY
Qty: 90 TABLET | Refills: 3 | Status: SHIPPED | OUTPATIENT
Start: 2024-11-29 | End: 2026-11-19

## 2024-11-29 NOTE — TELEPHONE ENCOUNTER
No care due was identified.  Health [facility] Embedded Care Due Messages. Reference number: [id number].   11/28/2024 9:42:08 PM CST

## 2024-11-29 NOTE — TELEPHONE ENCOUNTER
Spoke with Mr. Booker and he states Trulicity has been out of stock and difficult for him to refill. Mr. Booker been taking Ozempic instead due to it being easier for him to refill at this time.

## 2024-12-18 ENCOUNTER — TELEPHONE (OUTPATIENT)
Dept: FAMILY MEDICINE | Facility: CLINIC | Age: 79
End: 2024-12-18
Payer: MEDICARE

## 2024-12-18 NOTE — TELEPHONE ENCOUNTER
Gualberto Arreguin Staff     ----- Message from Gualberto sent at 12/18/2024  2:59 PM CST -----  Type:  Needs Medical Advice    Who Called: PT      Would the patient rather a call back or a response via [facility]? Call back    Best Call Back Number: [phone]      Additional Information: Sts he was told he needs to have his PA done before the beginning of the year and needs them to contact [employer] to get his Trulicity/Ozempic approved whichever he is supposed to be on.   Please advise -- Thank you

## 2025-01-06 ENCOUNTER — LAB VISIT (OUTPATIENT)
Dept: LAB | Facility: HOSPITAL | Age: 80
End: 2025-01-06
Attending: INTERNAL MEDICINE
Payer: MEDICARE

## 2025-01-06 DIAGNOSIS — N18.30 CHRONIC KIDNEY DISEASE, STAGE III (MODERATE): Primary | ICD-10-CM

## 2025-01-06 LAB
25(OH)D3+25(OH)D2 SERPL-MCNC: 39 NG/ML (ref 30–96)
ALBUMIN SERPL BCP-MCNC: 4.2 G/DL (ref 3.5–5.2)
ANION GAP SERPL CALC-SCNC: 4 MMOL/L (ref 8–16)
BASOPHILS # BLD AUTO: 0.05 K/UL (ref 0–0.2)
BASOPHILS NFR BLD: 0.6 % (ref 0–1.9)
BILIRUB UR QL STRIP: NEGATIVE
BUN SERPL-MCNC: 23 MG/DL (ref 8–23)
CALCIUM SERPL-MCNC: 9.9 MG/DL (ref 8.7–10.5)
CHLORIDE SERPL-SCNC: 104 MMOL/L (ref 95–110)
CLARITY UR: CLEAR
CO2 SERPL-SCNC: 30 MMOL/L (ref 23–29)
COLOR UR: YELLOW
CREAT SERPL-MCNC: 1.3 MG/DL (ref 0.5–1.4)
CREAT UR-MCNC: 97 MG/DL (ref 23–375)
DIFFERENTIAL METHOD BLD: ABNORMAL
EOSINOPHIL # BLD AUTO: 0.4 K/UL (ref 0–0.5)
EOSINOPHIL NFR BLD: 4.9 % (ref 0–8)
ERYTHROCYTE [DISTWIDTH] IN BLOOD BY AUTOMATED COUNT: 15.9 % (ref 11.5–14.5)
EST. GFR  (NO RACE VARIABLE): 56.2 ML/MIN/1.73 M^2
GLUCOSE SERPL-MCNC: 112 MG/DL (ref 70–110)
GLUCOSE UR QL STRIP: NEGATIVE
HCT VFR BLD AUTO: 44.4 % (ref 40–54)
HGB BLD-MCNC: 14.3 G/DL (ref 14–18)
HGB UR QL STRIP: NEGATIVE
IMM GRANULOCYTES # BLD AUTO: 0.03 K/UL (ref 0–0.04)
IMM GRANULOCYTES NFR BLD AUTO: 0.4 % (ref 0–0.5)
KETONES UR QL STRIP: NEGATIVE
LEUKOCYTE ESTERASE UR QL STRIP: NEGATIVE
LYMPHOCYTES # BLD AUTO: 2.1 K/UL (ref 1–4.8)
LYMPHOCYTES NFR BLD: 27 % (ref 18–48)
MCH RBC QN AUTO: 29.3 PG (ref 27–31)
MCHC RBC AUTO-ENTMCNC: 32.2 G/DL (ref 32–36)
MCV RBC AUTO: 91 FL (ref 82–98)
MONOCYTES # BLD AUTO: 0.6 K/UL (ref 0.3–1)
MONOCYTES NFR BLD: 7.2 % (ref 4–15)
NEUTROPHILS # BLD AUTO: 4.7 K/UL (ref 1.8–7.7)
NEUTROPHILS NFR BLD: 59.9 % (ref 38–73)
NITRITE UR QL STRIP: NEGATIVE
NRBC BLD-RTO: 0 /100 WBC
PH UR STRIP: 7 [PH] (ref 5–8)
PHOSPHATE SERPL-MCNC: 3.8 MG/DL (ref 2.7–4.5)
PLATELET # BLD AUTO: 171 K/UL (ref 150–450)
PMV BLD AUTO: 10.8 FL (ref 9.2–12.9)
POTASSIUM SERPL-SCNC: 5 MMOL/L (ref 3.5–5.1)
PROT UR QL STRIP: NEGATIVE
PROT UR-MCNC: 8 MG/DL (ref 6–15)
PROT/CREAT UR: 0.08 MG/G{CREAT} (ref 0–0.2)
PTH-INTACT SERPL-MCNC: 25.4 PG/ML (ref 9–77)
RBC # BLD AUTO: 4.88 M/UL (ref 4.6–6.2)
SODIUM SERPL-SCNC: 138 MMOL/L (ref 136–145)
SP GR UR STRIP: 1.02 (ref 1–1.03)
URN SPEC COLLECT METH UR: NORMAL
UROBILINOGEN UR STRIP-ACNC: NEGATIVE EU/DL
WBC # BLD AUTO: 7.88 K/UL (ref 3.9–12.7)

## 2025-01-06 PROCEDURE — 83970 ASSAY OF PARATHORMONE: CPT | Performed by: INTERNAL MEDICINE

## 2025-01-06 PROCEDURE — 82306 VITAMIN D 25 HYDROXY: CPT | Performed by: INTERNAL MEDICINE

## 2025-01-06 PROCEDURE — 80069 RENAL FUNCTION PANEL: CPT | Performed by: INTERNAL MEDICINE

## 2025-01-06 PROCEDURE — 81003 URINALYSIS AUTO W/O SCOPE: CPT | Performed by: INTERNAL MEDICINE

## 2025-01-06 PROCEDURE — 82570 ASSAY OF URINE CREATININE: CPT | Performed by: INTERNAL MEDICINE

## 2025-01-06 PROCEDURE — 36415 COLL VENOUS BLD VENIPUNCTURE: CPT | Performed by: INTERNAL MEDICINE

## 2025-01-06 PROCEDURE — 85025 COMPLETE CBC W/AUTO DIFF WBC: CPT | Performed by: INTERNAL MEDICINE

## 2025-01-13 DIAGNOSIS — Z00.00 ENCOUNTER FOR MEDICARE ANNUAL WELLNESS EXAM: ICD-10-CM

## 2025-01-24 DIAGNOSIS — N18.30 CONTROLLED TYPE 2 DIABETES MELLITUS WITH STAGE 3 CHRONIC KIDNEY DISEASE, WITHOUT LONG-TERM CURRENT USE OF INSULIN: ICD-10-CM

## 2025-01-24 DIAGNOSIS — E11.22 CONTROLLED TYPE 2 DIABETES MELLITUS WITH STAGE 3 CHRONIC KIDNEY DISEASE, WITHOUT LONG-TERM CURRENT USE OF INSULIN: ICD-10-CM

## 2025-01-24 RX ORDER — ROSUVASTATIN CALCIUM 10 MG/1
10 TABLET, COATED ORAL NIGHTLY
Qty: 90 TABLET | Refills: 3 | Status: SHIPPED | OUTPATIENT
Start: 2025-01-24

## 2025-01-24 RX ORDER — OMEPRAZOLE 40 MG/1
40 CAPSULE, DELAYED RELEASE ORAL
Qty: 90 CAPSULE | Refills: 3 | OUTPATIENT
Start: 2025-01-24

## 2025-01-24 RX ORDER — PIOGLITAZONEHYDROCHLORIDE 30 MG/1
30 TABLET ORAL DAILY
Qty: 90 TABLET | Refills: 2 | OUTPATIENT
Start: 2025-01-24

## 2025-01-24 RX ORDER — SEMAGLUTIDE 1.34 MG/ML
1 INJECTION, SOLUTION SUBCUTANEOUS
Qty: 3 ML | Refills: 11 | Status: SHIPPED | OUTPATIENT
Start: 2025-01-24 | End: 2026-01-24

## 2025-01-24 RX ORDER — SOTALOL HYDROCHLORIDE 80 MG/1
40 TABLET ORAL DAILY
Qty: 90 TABLET | Refills: 3 | Status: SHIPPED | OUTPATIENT
Start: 2025-01-24 | End: 2027-01-14

## 2025-01-24 NOTE — TELEPHONE ENCOUNTER
Patient do not need a new script sent to pharmacy he have refills on file. Pharmacy will get refills ready and contact patient

## 2025-01-24 NOTE — TELEPHONE ENCOUNTER
Care Due:                  Date            Visit Type   Department     Provider  --------------------------------------------------------------------------------                                EP -                              PRIMARY      [facility]    See Cortez  Last Visit: 10-      CARE ([facility])   MEDICINE       Main  Next Visit: None Scheduled  None         None Found                                                            Last  Test          Frequency    Reason                     Performed    Due Date  --------------------------------------------------------------------------------    HBA1C.......  6 months...  pioglitazone.............  10-   04-    Health [facility] Embedded Care Due Messages. Reference number: [id number].   1/24/2025 9:48:37 AM CST

## 2025-01-30 ENCOUNTER — TELEPHONE (OUTPATIENT)
Dept: FAMILY MEDICINE | Facility: CLINIC | Age: 80
End: 2025-01-30
Payer: MEDICARE

## 2025-01-30 DIAGNOSIS — E11.22 CONTROLLED TYPE 2 DIABETES MELLITUS WITH STAGE 3 CHRONIC KIDNEY DISEASE, WITHOUT LONG-TERM CURRENT USE OF INSULIN: Primary | ICD-10-CM

## 2025-01-30 DIAGNOSIS — N18.30 CONTROLLED TYPE 2 DIABETES MELLITUS WITH STAGE 3 CHRONIC KIDNEY DISEASE, WITHOUT LONG-TERM CURRENT USE OF INSULIN: Primary | ICD-10-CM

## 2025-01-30 RX ORDER — METFORMIN HYDROCHLORIDE 500 MG/1
500 TABLET, EXTENDED RELEASE ORAL
Qty: 90 TABLET | Refills: 3 | Status: SHIPPED | OUTPATIENT
Start: 2025-01-30 | End: 2026-01-30

## 2025-01-30 NOTE — TELEPHONE ENCOUNTER
LOV--10/23/24  FOV--4/29/25  Patient requesting refill of Actos and Metformin.    --Prescription for Actos e-scribed to [facility] on 9/18/24.  --Metformin discontinued on 10/23/24 citing patient no longer taking as reason for discontinue.  Patient states he is still taking this medication.  Requesting to know if he should continue to take this medication or discontinue taking medication.  Patient states he is enrolled in digital medicine and blood sugars are listed in medical record.  Please advise.    his Order Has Been Discontinued    Order Status Reason By On   Discontinued Patient no longer taking See Quach MD 10/23/24 0949            metFORMIN (GLUCOPHAGE-XR) 500 MG ER 24hr tablet [[id number]]

## 2025-01-30 NOTE — TELEPHONE ENCOUNTER
----- Message from Keith sent at 1/30/2025  3:08 PM CST -----  Regarding: PA  Contact: patient  Type:  Patient Returning Call    Who Called:patient  Who Left Message for Patient:nurse  Does the patient know what this is regarding?:pioglitazone (ACTOS) 30 MG tablet     and  Metformin 500mg  Would the patient rather a call back or a response via [facility]? Both medication need prior authorization  Best Call Back Number:[phone]  Additional Information:   [id number] - [address] - [address] AT [facility] [address]  [address]  [place] [address]  Phone: [phone] Fax: [phone]

## 2025-01-31 NOTE — TELEPHONE ENCOUNTER
Patient returned call to office. Call transferred directly to writer per patient access representative Rachna Driver.  Reviewed information in attached message with patient who verbally indicated understanding.

## 2025-01-31 NOTE — TELEPHONE ENCOUNTER
Prescription for Metformin sent to pharmacy.  Writer contacted pharmacy and confirmed Actos prescription is available on file, and will be processed to  today. Call placed to patient x's 2. No answer received. Left detailed message requesting return call to office.      See Quach MD  You17 hours ago (8:11 PM)       That's fine. Sent to [facility].

## 2025-01-31 NOTE — TELEPHONE ENCOUNTER
Call placed to patient. No answer received. Left message requesting return call to office.    [phone] --no answer, left message   [phone] --no answer, left message     Also sent [facility] message to patient for notification at this time.

## 2025-02-03 RX ORDER — NITROGLYCERIN 0.4 MG/1
0.4 TABLET SUBLINGUAL EVERY 5 MIN PRN
Qty: 25 TABLET | Refills: 4 | Status: SHIPPED | OUTPATIENT
Start: 2025-02-03

## 2025-02-20 DIAGNOSIS — N18.32 STAGE 3B CHRONIC KIDNEY DISEASE: ICD-10-CM

## 2025-02-20 DIAGNOSIS — Q60.0 SOLITARY KIDNEY, CONGENITAL: ICD-10-CM

## 2025-02-20 DIAGNOSIS — N18.30 CONTROLLED TYPE 2 DIABETES MELLITUS WITH STAGE 3 CHRONIC KIDNEY DISEASE, WITHOUT LONG-TERM CURRENT USE OF INSULIN: ICD-10-CM

## 2025-02-20 DIAGNOSIS — E11.22 CONTROLLED TYPE 2 DIABETES MELLITUS WITH STAGE 3 CHRONIC KIDNEY DISEASE, WITHOUT LONG-TERM CURRENT USE OF INSULIN: ICD-10-CM

## 2025-02-20 RX ORDER — SEMAGLUTIDE 1.34 MG/ML
1 INJECTION, SOLUTION SUBCUTANEOUS
Qty: 3 ML | Refills: 11 | Status: SHIPPED | OUTPATIENT
Start: 2025-02-20 | End: 2026-02-20

## 2025-02-20 RX ORDER — OMEPRAZOLE 40 MG/1
40 CAPSULE, DELAYED RELEASE ORAL EVERY MORNING
Qty: 90 CAPSULE | Refills: 3 | Status: SHIPPED | OUTPATIENT
Start: 2025-02-20

## 2025-02-20 NOTE — TELEPHONE ENCOUNTER
No care due was identified.  [facility] Embedded Care Due Messages. Reference number: [id number].   2/20/2025 9:56:18 AM CST

## 2025-03-01 RX ORDER — DABIGATRAN ETEXILATE 150 MG/1
150 CAPSULE ORAL 2 TIMES DAILY
Qty: 60 CAPSULE | Refills: 0 | Status: SHIPPED | OUTPATIENT
Start: 2025-03-01

## 2025-03-01 RX ORDER — SOTALOL HYDROCHLORIDE 80 MG/1
40 TABLET ORAL DAILY
Qty: 30 TABLET | Refills: 0 | Status: SHIPPED | OUTPATIENT
Start: 2025-03-01 | End: 2027-02-19

## 2025-03-01 RX ORDER — ROSUVASTATIN CALCIUM 10 MG/1
10 TABLET, COATED ORAL NIGHTLY
Qty: 30 TABLET | Refills: 0 | Status: SHIPPED | OUTPATIENT
Start: 2025-03-01

## 2025-03-05 RX ORDER — SOTALOL HYDROCHLORIDE 80 MG/1
40 TABLET ORAL DAILY
Qty: 30 TABLET | Refills: 0 | Status: SHIPPED | OUTPATIENT
Start: 2025-03-05 | End: 2027-02-23

## 2025-03-05 RX ORDER — ROSUVASTATIN CALCIUM 10 MG/1
10 TABLET, COATED ORAL NIGHTLY
Qty: 30 TABLET | Refills: 0 | Status: SHIPPED | OUTPATIENT
Start: 2025-03-05

## 2025-03-11 ENCOUNTER — HOSPITAL ENCOUNTER (EMERGENCY)
Facility: HOSPITAL | Age: 80
Discharge: HOME | End: 2025-03-11
Attending: EMERGENCY MEDICINE
Payer: MEDICARE

## 2025-03-11 VITALS
SYSTOLIC BLOOD PRESSURE: 148 MMHG | HEIGHT: 73 IN | RESPIRATION RATE: 18 BRPM | HEART RATE: 74 BPM | WEIGHT: 245 LBS | OXYGEN SATURATION: 99 % | TEMPERATURE: 99 F | DIASTOLIC BLOOD PRESSURE: 82 MMHG | BODY MASS INDEX: 32.47 KG/M2

## 2025-03-11 DIAGNOSIS — S91.113A LACERATION OF GREAT TOE: ICD-10-CM

## 2025-03-11 PROCEDURE — 12001 RPR S/N/AX/GEN/TRNK 2.5CM/<: CPT

## 2025-03-11 PROCEDURE — 90471 IMMUNIZATION ADMIN: CPT | Performed by: NURSE PRACTITIONER

## 2025-03-11 PROCEDURE — 90715 TDAP VACCINE 7 YRS/> IM: CPT | Performed by: NURSE PRACTITIONER

## 2025-03-11 PROCEDURE — 63600175 PHARM REV CODE 636 W HCPCS: Performed by: NURSE PRACTITIONER

## 2025-03-11 PROCEDURE — 99284 EMERGENCY DEPT VISIT MOD MDM: CPT | Mod: 25

## 2025-03-11 PROCEDURE — 25000003 PHARM REV CODE 250: Performed by: NURSE PRACTITIONER

## 2025-03-11 RX ORDER — MUPIROCIN 20 MG/G
OINTMENT TOPICAL 2 TIMES DAILY
Qty: 30 G | Refills: 0 | Status: SHIPPED | OUTPATIENT
Start: 2025-03-11 | End: 2025-03-21

## 2025-03-11 RX ORDER — LIDOCAINE HYDROCHLORIDE 10 MG/ML
10 INJECTION, SOLUTION INFILTRATION; PERINEURAL
Status: COMPLETED | OUTPATIENT
Start: 2025-03-11 | End: 2025-03-11

## 2025-03-11 RX ORDER — CEPHALEXIN 500 MG/1
500 CAPSULE ORAL EVERY 12 HOURS
Qty: 10 CAPSULE | Refills: 0 | Status: SHIPPED | OUTPATIENT
Start: 2025-03-11 | End: 2025-03-16

## 2025-03-11 RX ORDER — MUPIROCIN 20 MG/G
1 OINTMENT TOPICAL
Status: COMPLETED | OUTPATIENT
Start: 2025-03-11 | End: 2025-03-11

## 2025-03-11 RX ADMIN — CLOSTRIDIUM TETANI TOXOID ANTIGEN (FORMALDEHYDE INACTIVATED), CORYNEBACTERIUM DIPHTHERIAE TOXOID ANTIGEN (FORMALDEHYDE INACTIVATED), BORDETELLA PERTUSSIS TOXOID ANTIGEN (GLUTARALDEHYDE INACTIVATED), BORDETELLA PERTUSSIS FILAMENTOUS HEMAGGLUTININ ANTIGEN (FORMALDEHYDE INACTIVATED), BORDETELLA PERTUSSIS PERTACTIN ANTIGEN, AND BORDETELLA PERTUSSIS FIMBRIAE 2/3 ANTIGEN 0.5 ML: 5; 2; 2.5; 5; 3; 5 INJECTION, SUSPENSION INTRAMUSCULAR at 01:03

## 2025-03-11 RX ADMIN — LIDOCAINE HYDROCHLORIDE 10 ML: 10 INJECTION, SOLUTION INFILTRATION; PERINEURAL at 01:03

## 2025-03-11 RX ADMIN — MUPIROCIN 1 G: 20 OINTMENT TOPICAL at 01:03

## 2025-03-12 ENCOUNTER — TELEPHONE (OUTPATIENT)
Dept: FAMILY MEDICINE | Facility: CLINIC | Age: 80
End: 2025-03-12
Payer: MEDICARE

## 2025-03-12 NOTE — TELEPHONE ENCOUNTER
----- Message from Rita sent at 3/12/2025  9:39 AM CDT -----  Type:  Appointment RequestCaller is requesting a appointment. Name of Caller:pt When is the first available appointment?not seen Would the patient rather a call back or a response via [facility]? Please call Best Call Back Number:[phone] Additional Information: pt is requesting an appt in the next 9 days for suture removal after ER visit     Please call back to advise. Thanks!

## 2025-03-13 NOTE — ED PROVIDER NOTES
Encounter Date: 3/11/2025       History     Chief Complaint   Patient presents with    Laceration     Left big toe dropped a  on it     Patient is a 79 y.o. male who presents to the ED 03/11/2025 with a chief complaint of Left great toe laceration that occurred today.  Patient was using a  proximally an hour prior to arrival when he dropped it on his left foot cutting his great toe.  He does take any antiplatelet daily.  He is on no other blood thinners.  He is unsure of his last tetanus.  He is diabetic.  Other past medical and surgical history noted below.              Review of patient's allergies indicates:   Allergen Reactions    Neurontin [gabapentin] Swelling     Leg swelling     Past Medical History:   Diagnosis Date    Anticoagulant long-term use     Aortic stenosis     Arthritis     Atrial fibrillation     CAD (coronary artery disease)     Colon polyps     per colonoscopy 9/11/2014    Diabetes mellitus, type 2     Disc displacement, lumbar     L3    Family history of prostate cancer     GERD (gastroesophageal reflux disease)     Heel bone fracture     left foot    Hyperlipidemia LDL goal < 70     Hypertension     NYHA class 3 heart failure with preserved ejection fraction 10/19/2022    Renal manifestation of secondary diabetes mellitus     Sleep apnea     USES MACHINE    Spinal stenosis, lumbar      Past Surgical History:   Procedure Laterality Date    BACK SURGERY      CARDIAC SURGERY      stents     CARPAL TUNNEL RELEASE Right     CATARACT EXTRACTION W/  INTRAOCULAR LENS IMPLANT Bilateral     COLONOSCOPY N/A 3/27/2018    Procedure: COLONOSCOPY;  Surgeon: Rishi Garcia MD;  Location: [facility];  Service: Endoscopy;  Laterality: N/A;    COLONOSCOPY N/A 2/15/2021    Procedure: COLONOSCOPY;  Surgeon: Rishi Garcia MD;  Location: [facility];  Service: Endoscopy;  Laterality: N/A;    COLONOSCOPY N/A 6/23/2023    Procedure: COLONOSCOPY;  Surgeon: Rishi Garcia MD;  Location: [facility];   Service: Endoscopy;  Laterality: N/A;    CORONARY ANGIOPLASTY WITH STENT PLACEMENT      x 3    EYE SURGERY      INJECTION OF FACET JOINT N/A 5/14/2019    Procedure: INJECTION, FACET JOINT INJECTION (LUMBAR BLOCK) PLEASE INJECT L3/4;  Surgeon: Catrachito Harley MD;  Location: [facility];  Service: Pain Management;  Laterality: N/A;  NEEDS CONSENT, PRADAXA CLEARANCE IN CHART    KNEE ARTHROSCOPY W/ MENISCECTOMY  12/22/2008    right    LEFT HEART CATHETERIZATION Left 9/7/2022    Procedure: Left heart cath;  Surgeon: Adonay Quinones MD;  [facility];  Service: Cardiology;  Laterality: Left;    LUMBAR DISCECTOMY  12/2011    L4-L5 Fusion    LUMBAR EPIDURAL INJECTION  02/04/2019    ROTATOR CUFF REPAIR Left 7/2012    SHOULDER OPEN ROTATOR CUFF REPAIR      SKIN CANCER FOREHEAD 2019      SPINE SURGERY      TIBIA FRACTURE SURGERY      TRANSCATHETER AORTIC VALVE REPLACEMENT (TAVR) Bilateral 10/19/2022    Procedure: REPLACEMENT, AORTIC VALVE, TRANSCATHETER (TAVR);  Surgeon: Adonay Quinones MD;  Location: [facility];  Service: Cardiology;  Laterality: Bilateral;    TRANSCATHETER AORTIC VALVE REPLACEMENT (TAVR) Bilateral 10/19/2022    Procedure: REPLACEMENT, AORTIC VALVE, TRANSCATHETER (TAVR);  Surgeon: Denver Osborne MD;  Location: [facility];  Service: Cardiothoracic;  Laterality: Bilateral;    TRANSFORAMINAL EPIDURAL INJECTION OF STEROID Left 9/23/2019    Procedure: INJECTION, STEROID, EPIDURAL, TRANSFORAMINAL APPROACH;  Surgeon: Jose Guadalupe Linn MD;  Location: [facility];  Service: Pain Management;  Laterality: Left;  Left TF ADI L4 and L5  OK to hold Pradaxa    TRANSFORAMINAL EPIDURAL INJECTION OF STEROID Left 11/14/2019    Procedure: INJECTION, STEROID, EPIDURAL, TRANSFORAMINAL APPROACH;  Surgeon: Jose Guadalupe Linn MD;  Location: [facility];  Service: Pain Management;  Laterality: Left;  Left TF ADI L4-5 and L5-S1     Family History   Problem Relation Name Age of Onset    Heart failure Father      Heart disease  Father          MI    Prostate cancer Father      Heart failure Mother      Heart disease Mother      No Known Problems Sister Abby     No Known Problems Daughter Ayaka     No Known Problems Son Kosta     No Known Problems Daughter Junito      Social History[1]  Review of Systems   Constitutional:  Negative for chills and fever.   Respiratory:  Negative for chest tightness and shortness of breath.    Cardiovascular:  Negative for chest pain.   Gastrointestinal:  Negative for abdominal pain.   Genitourinary:  Negative for dysuria.   Musculoskeletal:  Negative for arthralgias and myalgias.   Skin:  Positive for wound. Negative for rash.   Neurological:  Negative for syncope, weakness and numbness.   Hematological:  Does not bruise/bleed easily.       Physical Exam     Initial Vitals [03/11/25 1210]   BP Pulse Resp Temp SpO2   (!) 162/84 72 16 98.5 °F (36.9 °C) 99 %      MAP       --         Physical Exam    Nursing note and vitals reviewed.  Constitutional: Vital signs are normal. He appears well-developed and well-nourished.   HENT:   Head: Normocephalic and atraumatic.   Eyes: Pupils are equal, round, and reactive to light.   Neck: Neck supple.   Cardiovascular:  Normal rate, regular rhythm, normal heart sounds and intact distal pulses.     Exam reveals no gallop and no friction rub.       No murmur heard.  Pulmonary/Chest: Breath sounds normal. He has no wheezes. He has no rhonchi. He has no rales.   Musculoskeletal:      Cervical back: Neck supple.      Left ankle: Normal.      Left foot: Normal range of motion and normal capillary refill. Laceration and tenderness present. No swelling, deformity, bunion, Charcot foot, foot drop, prominent metatarsal heads, bony tenderness or crepitus. Normal pulse.      Comments: Dorsum of the left great toe with 2 cm horizontal laceration proximal to the IP joint with normal flexion and extension of the joint no visible bone or tendon.  No brisk bleeding.     Neurological: He is  alert and oriented to person, place, and time. He has normal strength.   Skin: Skin is warm, dry and intact.   Psychiatric: He has a normal mood and affect. His speech is normal and behavior is normal.         ED Course   Lac Repair    Date/Time: 3/11/2025 12:03 PM    Performed by: Betsy Keller NP  Authorized by: Deonte Buenrostro MD    Consent:     Consent obtained:  Verbal    Consent given by:  Patient    Risks, benefits, and alternatives were discussed: yes      Risks discussed:  Infection and pain    Alternatives discussed:  No treatment  Universal protocol:     Test results available: yes      Patient identity confirmed:  Arm band and verbally with patient  Anesthesia:     Anesthesia method:  Local infiltration    Local anesthetic:  Lidocaine 1% w/o epi  Laceration details:     Location:  Toe    Toe location:  L big toe    Length (cm):  2  Exploration:     Limited defect created (wound extended): no      Hemostasis achieved with:  Direct pressure    Imaging obtained: x-ray      Imaging outcome: foreign body not noted      Wound exploration: wound explored through full range of motion      Contaminated: no    Treatment:     Area cleansed with:  Povidone-iodine    Amount of cleaning:  Standard    Irrigation solution:  Sterile saline    Irrigation method:  Syringe    Visualized foreign bodies/material removed: no      Debridement:  None    Undermining:  None    Scar revision: no    Skin repair:     Repair method:  Sutures    Suture size:  4-0    Wound skin closure material used: ethilon.    Suture technique:  Simple interrupted    Number of sutures:  5  Approximation:     Approximation:  Close  Repair type:     Repair type:  Simple  Post-procedure details:     Dressing:  Antibiotic ointment and non-adherent dressing    Procedure completion:  Tolerated well, no immediate complications    Labs Reviewed - No data to display       Imaging Results              X-Ray Foot Complete Left (Final result)  Result time  03/11/25 14:27:47      Final result by Chelle De La Cruz MD (03/11/25 14:27:47)                   Impression:      As above      Electronically signed by: Chelle De La Cruz MD  Date:    03/11/2025  Time:    14:27               Narrative:    EXAMINATION:  XR FOOT COMPLETE 3 VIEW LEFT    CLINICAL HISTORY:  .  Laceration without foreign body of unspecified great toe without damage to nail, initial encounter    TECHNIQUE:  AP, lateral and oblique views of the left foot were performed.    COMPARISON:  None    FINDINGS:  A bandage on the distal foot and great toe medially limit evaluation for subtle findings including subtle radiopaque foreign bodies.  As visualized no radiopaque foreign bodies are seen in the soft tissues.  No acute fracture or dislocation.  Severe degenerative change 1st metatarsophalangeal joint.  Small enthesophyte at site of insertion of the Achilles tendon and tiny plantar calcaneal spur                                       Medications   LIDOcaine HCL 10 mg/ml (1%) injection 10 mL (10 mLs Infiltration Given 3/11/25 1322)   mupirocin 2 % ointment 1 Tube (1 g Topical (Top) Given 3/11/25 1322)   Tdap vaccine injection 0.5 mL (0.5 mLs Intramuscular Given 3/11/25 1353)     Medical Decision Making  Amount and/or Complexity of Data Reviewed  Radiology: ordered.    Risk  Prescription drug management.         APC / Resident Notes:   Patient is a 79 y.o. male who presents to the ED 03/11/2025 who underwent emergent evaluation for Laceration to left great toe that occurred today.  Evidence of underlying fracture or ligament injury with normal flexion extension at the IP joint no visible bone or tendon.  Wound irrigated thoroughly and cleansed in the emergency department with no visible retained foreign bodies.  X-ray noted.  No underlying fracture.  He is given topical and oral antibiotics given history of diabetes and mechanism of injury in his tetanus is updated today.  He is placed in postop shoe. Based on  my clinical evaluation, I do not appreciate any immediate, emergent, or life threatening condition or etiology that warrants additional workup today and feel that the patient can be discharged with close follow up care. Case discussed with Dr. Buenrostro who is agreeable to plan of care. Follow up and return precautions discussed; patient verbalized understanding and is agreeable to plan of care. Patient discharged home in stable condition.                             Medical Decision Making:   Differential Diagnosis:   Laceration  Fracture  Contusion  Foreign body  Ligament injury             Clinical Impression:  Final diagnoses:  [S91.113A] Laceration of great toe          ED Disposition Condition    Discharge Stable          ED Prescriptions       Medication Sig Dispense Start Date End Date Auth. Provider    mupirocin (BACTROBAN) 2 % ointment Apply topically 2 (two) times daily. for 10 days 30 g 3/11/2025 3/21/2025 Betsy Keller NP    cephALEXin (KEFLEX) 500 MG capsule Take 1 capsule (500 mg total) by mouth every 12 (twelve) hours. for 5 days 10 capsule 3/11/2025 3/16/2025 Betsy Keller NP          Follow-up Information       Follow up With Specialties Details Why Contact Info Additional Information    See Quach MD Family Medicine In 1 week For suture removal [address]  [facility] [id number] [phone]       [facility]  Emergency Medicine  As needed, If symptoms worsen [address] [place] [address] [facility] [id number] 1st floor               [1]   Social History  Tobacco Use    Smoking status: Never     Passive exposure: Never    Smokeless tobacco: Never   Substance Use Topics    Alcohol use: Yes     Alcohol/week: 5.0 standard drinks of alcohol     Types: 6 Standard drinks or equivalent per week     Comment: occasionally    Drug use: No        Betsy Keller NP  03/12/25 2010

## 2025-03-18 ENCOUNTER — OFFICE VISIT (OUTPATIENT)
Dept: FAMILY MEDICINE | Facility: CLINIC | Age: 80
End: 2025-03-18
Payer: MEDICARE

## 2025-03-18 VITALS
DIASTOLIC BLOOD PRESSURE: 68 MMHG | OXYGEN SATURATION: 95 % | HEIGHT: 73 IN | BODY MASS INDEX: 33.11 KG/M2 | SYSTOLIC BLOOD PRESSURE: 122 MMHG | HEART RATE: 77 BPM | WEIGHT: 249.81 LBS | RESPIRATION RATE: 18 BRPM

## 2025-03-18 DIAGNOSIS — S91.119D: Primary | ICD-10-CM

## 2025-03-18 PROCEDURE — 3074F SYST BP LT 130 MM HG: CPT | Mod: HCNC,CPTII,S$GLB, | Performed by: STUDENT IN AN ORGANIZED HEALTH CARE EDUCATION/TRAINING PROGRAM

## 2025-03-18 PROCEDURE — 99212 OFFICE O/P EST SF 10 MIN: CPT | Mod: HCNC,S$GLB,, | Performed by: STUDENT IN AN ORGANIZED HEALTH CARE EDUCATION/TRAINING PROGRAM

## 2025-03-18 PROCEDURE — 3288F FALL RISK ASSESSMENT DOCD: CPT | Mod: HCNC,CPTII,S$GLB, | Performed by: STUDENT IN AN ORGANIZED HEALTH CARE EDUCATION/TRAINING PROGRAM

## 2025-03-18 PROCEDURE — 1159F MED LIST DOCD IN RCRD: CPT | Mod: HCNC,CPTII,S$GLB, | Performed by: STUDENT IN AN ORGANIZED HEALTH CARE EDUCATION/TRAINING PROGRAM

## 2025-03-18 PROCEDURE — 99999 PR PBB SHADOW E&M-EST. PATIENT-LVL V: CPT | Mod: PBBFAC,HCNC,, | Performed by: STUDENT IN AN ORGANIZED HEALTH CARE EDUCATION/TRAINING PROGRAM

## 2025-03-18 PROCEDURE — 1126F AMNT PAIN NOTED NONE PRSNT: CPT | Mod: HCNC,CPTII,S$GLB, | Performed by: STUDENT IN AN ORGANIZED HEALTH CARE EDUCATION/TRAINING PROGRAM

## 2025-03-18 PROCEDURE — 3078F DIAST BP <80 MM HG: CPT | Mod: HCNC,CPTII,S$GLB, | Performed by: STUDENT IN AN ORGANIZED HEALTH CARE EDUCATION/TRAINING PROGRAM

## 2025-03-18 PROCEDURE — 1160F RVW MEDS BY RX/DR IN RCRD: CPT | Mod: HCNC,CPTII,S$GLB, | Performed by: STUDENT IN AN ORGANIZED HEALTH CARE EDUCATION/TRAINING PROGRAM

## 2025-03-18 PROCEDURE — 1101F PT FALLS ASSESS-DOCD LE1/YR: CPT | Mod: HCNC,CPTII,S$GLB, | Performed by: STUDENT IN AN ORGANIZED HEALTH CARE EDUCATION/TRAINING PROGRAM

## 2025-03-18 NOTE — PROGRESS NOTES
Patient ID: Janak Booker is a 79 y.o. male.    Chief Complaint: Suture / Staple Removal (R big toe )    History of Present Illness    CHIEF COMPLAINT:  Patient presents today for follow up of left great toe laceration after emergency department visit.    LEFT GREAT TOE INJURY:  He sustained a laceration to the dorsal aspect of his left great toe 7 days ago when he dropped a  on his foot, cutting through his shoe. The injury resulted in significant bleeding and required sutures in the Emergency Department. He has been applying ointment and keeping the wound covered with a bandage. He denies experiencing any significant pain at the site.          Physical Exam    Skin: Warm. Dry. No rash. No discharge. Wound edges not quite approximated. Clean 2 cm laceration across the dorsal aspect of the left great toe. No erythema.         Assessment & Plan    IMPRESSION:  - Assessed laceration on left great toe from  injury, sutured in ED 7 days ago.  - Wound edges not fully approximated, but no signs of infection.  - Determined laceration not fully healed; given foot location, extended healing time of 10-14 days appropriate.  - Current wound care regimen effective, no changes needed.    FOLLOW-UP:  - Follow up on Friday (10-day chad post-injury) for suture removal.            No follow-ups on file.    This note was generated with the assistance of ambient listening technology. It was not used during the time of the visit, I used it for dictation afterwards.  I attest to having reviewed and edited the generated note for accuracy, though some syntax or spelling errors may persist. Please contact the author of this note for any clarification.

## 2025-03-21 ENCOUNTER — OFFICE VISIT (OUTPATIENT)
Dept: FAMILY MEDICINE | Facility: CLINIC | Age: 80
End: 2025-03-21
Payer: MEDICARE

## 2025-03-21 VITALS
WEIGHT: 251.75 LBS | HEART RATE: 75 BPM | OXYGEN SATURATION: 97 % | DIASTOLIC BLOOD PRESSURE: 64 MMHG | HEIGHT: 73 IN | BODY MASS INDEX: 33.36 KG/M2 | SYSTOLIC BLOOD PRESSURE: 122 MMHG

## 2025-03-21 DIAGNOSIS — S91.119D: Primary | ICD-10-CM

## 2025-03-21 PROCEDURE — 99999 PR PBB SHADOW E&M-EST. PATIENT-LVL V: CPT | Mod: PBBFAC,HCNC,, | Performed by: PHYSICIAN ASSISTANT

## 2025-03-21 NOTE — PROGRESS NOTES
OFFICE VISIT   3/21/2025    Patient ID: Janak Booker is a 79 y.o. male    SUBJECTIVE:  Suture / Staple Removal      HPI/ROS:  History of Present Illness    CHIEF COMPLAINT:  Patient presents today for suture removal from toe injury    HISTORY OF PRESENT ILLNESS:  He injured his toe when he dropped a  on his foot, resulting in significant bleeding. He is on a blood thinner. He has been applying prescribed medication from the hospital and covering the toe with a small glove during showers to keep the area dry.      ROS:  General: -fever, -chills  Skin: -rash, -lesion, +wound, -redness, -pain         Past Medical History:   Diagnosis Date    Anticoagulant long-term use     Aortic stenosis     Arthritis     Atrial fibrillation     CAD (coronary artery disease)     Colon polyps     per colonoscopy 9/11/2014    Diabetes mellitus, type 2     Disc displacement, lumbar     L3    Family history of prostate cancer     GERD (gastroesophageal reflux disease)     Heel bone fracture     left foot    Hyperlipidemia LDL goal < 70     Hypertension     NYHA class 3 heart failure with preserved ejection fraction 10/19/2022    Renal manifestation of secondary diabetes mellitus     Sleep apnea     USES MACHINE    Spinal stenosis, lumbar        Social History     Tobacco Use    Smoking status: Never     Passive exposure: Never    Smokeless tobacco: Never   Substance Use Topics    Alcohol use: Yes     Alcohol/week: 5.0 standard drinks of alcohol     Types: 6 Standard drinks or equivalent per week     Comment: occasionally    Drug use: No       Past Surgical History:   Procedure Laterality Date    BACK SURGERY      CARDIAC SURGERY      stents     CARPAL TUNNEL RELEASE Right     CATARACT EXTRACTION W/  INTRAOCULAR LENS IMPLANT Bilateral     COLONOSCOPY N/A 3/27/2018    Procedure: COLONOSCOPY;  Surgeon: Rishi Garcia MD;  Location: [facility];  Service: Endoscopy;  Laterality: N/A;    COLONOSCOPY N/A 2/15/2021    Procedure:  COLONOSCOPY;  Surgeon: Rishi Garcia MD;  Location: [facility] ENDO;  Service: Endoscopy;  Laterality: N/A;    COLONOSCOPY N/A 6/23/2023    Procedure: COLONOSCOPY;  Surgeon: Rishi Garcia MD;  Location: [facility] ENDO;  Service: Endoscopy;  Laterality: N/A;    CORONARY ANGIOPLASTY WITH STENT PLACEMENT      x 3    EYE SURGERY      INJECTION OF FACET JOINT N/A 5/14/2019    Procedure: INJECTION, FACET JOINT INJECTION (LUMBAR BLOCK) PLEASE INJECT L3/4;  Surgeon: Catrachito Harley MD;  Location: [facility]IN [facility];  Service: Pain Management;  Laterality: N/A;  NEEDS CONSENT, PRADAXA CLEARANCE IN CHART    KNEE ARTHROSCOPY W/ MENISCECTOMY  12/22/2008    right    LEFT HEART CATHETERIZATION Left 9/7/2022    Procedure: Left heart cath;  Surgeon: Adonay Quinones MD;  [facility];  Service: Cardiology;  Laterality: Left;    LUMBAR DISCECTOMY  12/2011    L4-L5 Fusion    LUMBAR EPIDURAL INJECTION  02/04/2019    ROTATOR CUFF REPAIR Left 7/2012    SHOULDER OPEN ROTATOR CUFF REPAIR      SKIN CANCER FOREHEAD 2019      SPINE SURGERY      TIBIA FRACTURE SURGERY      TRANSCATHETER AORTIC VALVE REPLACEMENT (TAVR) Bilateral 10/19/2022    Procedure: REPLACEMENT, AORTIC VALVE, TRANSCATHETER (TAVR);  Surgeon: Adonay Quinones MD;  Location: [facility];  Service: Cardiology;  Laterality: Bilateral;    TRANSCATHETER AORTIC VALVE REPLACEMENT (TAVR) Bilateral 10/19/2022    Procedure: REPLACEMENT, AORTIC VALVE, TRANSCATHETER (TAVR);  Surgeon: Denver Osborne MD;  Location: [facility];  Service: Cardiothoracic;  Laterality: Bilateral;    TRANSFORAMINAL EPIDURAL INJECTION OF STEROID Left 9/23/2019    Procedure: INJECTION, STEROID, EPIDURAL, TRANSFORAMINAL APPROACH;  Surgeon: Jose Guadalupe Linn MD;  Location: [facility];  Service: Pain Management;  Laterality: Left;  Left TF ADI L4 and L5  OK to hold Pradaxa    TRANSFORAMINAL EPIDURAL INJECTION OF STEROID Left 11/14/2019    Procedure: INJECTION, STEROID, EPIDURAL, TRANSFORAMINAL APPROACH;  Surgeon:  "Jose Guadalupe Linn MD;  Location: [facility]IN [facility];  Service: Pain Management;  Laterality: Left;  Left TF ADI L4-5 and L5-S1       OBJECTIVE:    /64 (BP Location: Right arm, Patient Position: Sitting)   Pulse 75   Ht 6' 1" (1.854 m)   Wt 114.2 kg (251 lb 12.3 oz)   SpO2 97%   BMI 33.22 kg/m²       Current Outpatient Medications:     ascorbic acid, vitamin C, (VITAMIN C) 1000 MG tablet, Take 1,000 mg by mouth once daily., Disp: , Rfl:     BD ALCOHOL SWABS PadM, USE AS DIRECTED TO CHECK BLOOD GLUCOSE DAILY, Disp: 100 each, Rfl: 5    dabigatran etexilate (PRADAXA) 150 mg Cap, Take 1 capsule (150 mg total) by mouth 2 (two) times daily. "Do NOT break, chew, or open capsules.", Disp: 60 capsule, Rfl: 0    fenofibrate micronized (LOFIBRA) 134 MG Cap, Take 1 capsule (134 mg total) by mouth once daily., Disp: 30 capsule, Rfl: 11    lancets (TRUEPLUS LANCETS) 28 gauge Misc, 1 lancet by Misc.(Non-Drug; Combo Route) route once daily., Disp: 100 each, Rfl: 3    lisinopriL (PRINIVIL,ZESTRIL) 5 MG tablet, Take 1 tablet (5 mg total) by mouth once daily., Disp: 90 tablet, Rfl: 3    metFORMIN (GLUCOPHAGE-XR) 500 MG ER 24hr tablet, Take 1 tablet (500 mg total) by mouth daily with breakfast., Disp: 90 tablet, Rfl: 3    multivitamin with minerals tablet, , Disp: , Rfl:     mupirocin (BACTROBAN) 2 % ointment, Apply topically 2 (two) times daily. for 10 days, Disp: 30 g, Rfl: 0    NITROSTAT 0.4 mg SL tablet, Place 1 tablet (0.4 mg total) under the tongue every 5 (five) minutes as needed for Chest pain., Disp: 25 tablet, Rfl: 4    omega-3 fatty acids 1,000 mg Cap, 1 Capsule(s) Oral OTC once a day., Disp: , Rfl:     omeprazole (PRILOSEC) 40 MG capsule, Take 1 capsule (40 mg total) by mouth every morning., Disp: 90 capsule, Rfl: 3    pioglitazone (ACTOS) 30 MG tablet, Take 1 tablet (30 mg total) by mouth once daily., Disp: 90 tablet, Rfl: 2    rosuvastatin (CRESTOR) 10 MG tablet, Take 1 tablet (10 mg total) by mouth every evening., Disp: " "30 tablet, Rfl: 0    semaglutide (OZEMPIC) 1 mg/dose (4 mg/3 mL), Inject 1 mg into the skin every 7 days., Disp: 3 mL, Rfl: 11    sotaloL (BETAPACE) 80 MG tablet, Take 0.5 tablets (40 mg total) by mouth once daily., Disp: 30 tablet, Rfl: 0    aspirin (ECOTRIN) 81 MG EC tablet, Take 1 tablet (81 mg total) by mouth once daily., Disp: 360 tablet, Rfl: 0    diclofenac sodium (VOLTAREN) 1 % Gel, Apply small amount (2 grams) to painful joint area 4 times daily as needed, Disp: 100 g, Rfl: 0    pen needle, diabetic (BD BARRY 2ND GEN PEN NEEDLE) 32 gauge x 5/32" Ndle, Use one needle  once daily, Disp: 100 each, Rfl: 3    Physical Exam    General: No acute distress. Well-developed. Well-nourished.  Eyes: EOMI. Sclerae anicteric.  HENT: Normocephalic. Atraumatic.   Ears: External ears normal.  Cardiovascular: Regular rate. Regular rhythm. No murmurs. No rubs. No gallops. Normal S1, S2.  Respiratory: Normal respiratory effort. Clear to auscultation bilaterally. No rales. No rhonchi. No wheezing.  Musculoskeletal: No  obvious deformity. Toe incision healing well without current sign of infection. Bleeding a little from suture removal site.  Extremities: No lower extremity edema.  Neurological: Alert & oriented x3. No slurred speech. Normal gait.  Psychiatric: Normal mood. Normal affect. Good insight. Good judgment.  Skin: Warm. Dry. No rash.          Pre and post suture removal:            Assessment/Plan:  Assessment & Plan    S91.119A Laceration without foreign body of unspecified toe without damage to nail, initial encounter    IMPRESSION:  - Assessed suture removal timing for toe injury, determining 10 days post-procedure as appropriate.  - Evaluated wound healing, noting good scabbing and no signs of infection.  - Removed all 5 sutures based on satisfactory healing progress.  - Recommend transitioning to dry wound care to promote further healing.  - Suture removal procedure performed which patient tolerated without " issue.    LACERATION OF TOE:  - Evaluated the patient's toe injury during follow-up, 10 days after suture placement.  - Noted that the patient has been applying topical medication (mupirocin) as instructed by the hospital.  - Observed that the toe laceration with 5 sutures is healing well without signs of infection.  - Assessed that the wound has healed sufficiently for suture removal, which is within the expected timeframe for toe injuries.  - Removed all 5 sutures, noting slight bleeding at one suture removal site during the process.  - Discussed the importance of keeping the wound clean and dry to promote healing.  - Advised the patient to contact the office if there are any changes in wound condition.         Problem List Items Addressed This Visit    None  Visit Diagnoses         Laceration of skin of toe, subsequent encounter    -  Primary    Relevant Orders    SUTURE REMOVAL    Suture Removal (Completed)            Patient verbalizes understanding of instructions and healthcare topics reviewed. All of patient's questions were answered.  Patient encouraged to contact office if other questions arise.    Health Maintenance Due   Topic Date Due    Diabetic Eye Exam  04/02/2025    Lipid Panel  04/18/2025       Follow up if symptoms worsen or fail to improve.    This note was generated with the assistance of ambient listening technology. Verbal consent was obtained by the patient and accompanying visitor(s) for the recording of patient appointment to facilitate this note. I attest to having reviewed and edited the generated note for accuracy, though some syntax or spelling errors may persist. Please contact the author of this note for any clarification.       Rosie Hoffmann PA-C  Family Medicine Physician Assistant

## 2025-03-21 NOTE — PROCEDURES
Suture Removal    Date/Time: 3/21/2025 11:30 AM  Location procedure was performed: [facility]    Performed by: Rosie Hoffmann PA-C  Authorized by: Rosie Hoffmann PA-C  Body area: lower extremity  Location details: left big toe  Wound Appearance: clean, well healed, no drainage and nontender  Sutures Removed: 5  Staples Removed: 0  Post-removal: dressing applied  Complications: No  Patient tolerance: Patient tolerated the procedure well with no immediate complications

## 2025-03-26 ENCOUNTER — TELEPHONE (OUTPATIENT)
Dept: CARDIOLOGY | Facility: CLINIC | Age: 80
End: 2025-03-26
Payer: MEDICARE

## 2025-03-26 DIAGNOSIS — E78.5 HYPERLIPIDEMIA ASSOCIATED WITH TYPE 2 DIABETES MELLITUS: ICD-10-CM

## 2025-03-26 DIAGNOSIS — E11.69 HYPERLIPIDEMIA ASSOCIATED WITH TYPE 2 DIABETES MELLITUS: ICD-10-CM

## 2025-03-26 DIAGNOSIS — I25.10 CORONARY ARTERY DISEASE, OCCLUSIVE: Primary | ICD-10-CM

## 2025-03-26 NOTE — TELEPHONE ENCOUNTER
----- Message from Cait sent at 3/26/2025 12:32 PM CDT -----  Regarding: Needs Medical Order  Contact: patient at [phone]  Type: Needs Medical OrderWho Called:  patient at [phone] Information: patient has scheduled an appointment on 04/10 and would like to have any lab orders put in the system for scheduling. Please call and advise. Thank you   Discharged

## 2025-04-07 ENCOUNTER — LAB VISIT (OUTPATIENT)
Dept: LAB | Facility: HOSPITAL | Age: 80
End: 2025-04-07
Attending: INTERNAL MEDICINE
Payer: MEDICARE

## 2025-04-07 DIAGNOSIS — E11.69 HYPERLIPIDEMIA ASSOCIATED WITH TYPE 2 DIABETES MELLITUS: ICD-10-CM

## 2025-04-07 DIAGNOSIS — E78.5 HYPERLIPIDEMIA ASSOCIATED WITH TYPE 2 DIABETES MELLITUS: ICD-10-CM

## 2025-04-07 DIAGNOSIS — I25.10 CORONARY ARTERY DISEASE, OCCLUSIVE: ICD-10-CM

## 2025-04-07 LAB
ALBUMIN SERPL-MCNC: 3.8 G/DL (ref 3.5–5.2)
ALP SERPL-CCNC: 66 UNIT/L (ref 40–150)
ALT SERPL-CCNC: 14 UNIT/L (ref 10–44)
ANION GAP (SMH): 9 MMOL/L (ref 8–16)
AST SERPL-CCNC: 25 UNIT/L (ref 11–45)
BILIRUB SERPL-MCNC: 1.3 MG/DL (ref 0.1–1)
BUN SERPL-MCNC: 18 MG/DL (ref 8–23)
CALCIUM SERPL-MCNC: 9.1 MG/DL (ref 8.7–10.5)
CHLORIDE SERPL-SCNC: 107 MMOL/L (ref 95–110)
CHOLEST SERPL-MCNC: 148 MG/DL (ref 120–199)
CHOLEST/HDLC SERPL: 3.1 {RATIO} (ref 2–5)
CO2 SERPL-SCNC: 23 MMOL/L (ref 23–29)
CREAT SERPL-MCNC: 1.2 MG/DL (ref 0.5–1.4)
GFR SERPLBLD CREATININE-BSD FMLA CKD-EPI: >60 ML/MIN/1.73/M2
GLUCOSE SERPL-MCNC: 120 MG/DL (ref 70–110)
HDLC SERPL-MCNC: 48 MG/DL (ref 40–75)
HDLC SERPL: 32.4 % (ref 20–50)
LDLC SERPL CALC-MCNC: 58.8 MG/DL (ref 63–159)
NONHDLC SERPL-MCNC: 100 MG/DL
POTASSIUM SERPL-SCNC: 4.7 MMOL/L (ref 3.5–5.1)
PROT SERPL-MCNC: 6.8 GM/DL (ref 6–8.4)
SODIUM SERPL-SCNC: 139 MMOL/L (ref 136–145)
TRIGL SERPL-MCNC: 206 MG/DL (ref 30–150)

## 2025-04-07 PROCEDURE — 36415 COLL VENOUS BLD VENIPUNCTURE: CPT

## 2025-04-07 PROCEDURE — 80061 LIPID PANEL: CPT

## 2025-04-07 PROCEDURE — 80053 COMPREHEN METABOLIC PANEL: CPT

## 2025-04-10 ENCOUNTER — OFFICE VISIT (OUTPATIENT)
Dept: CARDIOLOGY | Facility: CLINIC | Age: 80
End: 2025-04-10
Payer: MEDICARE

## 2025-04-10 VITALS
HEART RATE: 64 BPM | HEIGHT: 73 IN | WEIGHT: 249.44 LBS | OXYGEN SATURATION: 98 % | BODY MASS INDEX: 33.06 KG/M2 | DIASTOLIC BLOOD PRESSURE: 60 MMHG | SYSTOLIC BLOOD PRESSURE: 120 MMHG

## 2025-04-10 DIAGNOSIS — G47.33 OSA ON CPAP: ICD-10-CM

## 2025-04-10 DIAGNOSIS — N18.32 STAGE 3B CHRONIC KIDNEY DISEASE: ICD-10-CM

## 2025-04-10 DIAGNOSIS — E11.69 HYPERLIPIDEMIA ASSOCIATED WITH TYPE 2 DIABETES MELLITUS: Primary | ICD-10-CM

## 2025-04-10 DIAGNOSIS — I48.20 ATRIAL FIBRILLATION, CHRONIC: ICD-10-CM

## 2025-04-10 DIAGNOSIS — Z95.3 S/P TAVR (TRANSCATHETER AORTIC VALVE REPLACEMENT): ICD-10-CM

## 2025-04-10 DIAGNOSIS — I25.10 CORONARY ARTERY DISEASE, OCCLUSIVE: ICD-10-CM

## 2025-04-10 DIAGNOSIS — E11.22 CONTROLLED TYPE 2 DIABETES MELLITUS WITH STAGE 3 CHRONIC KIDNEY DISEASE, WITHOUT LONG-TERM CURRENT USE OF INSULIN: ICD-10-CM

## 2025-04-10 DIAGNOSIS — N18.30 CONTROLLED TYPE 2 DIABETES MELLITUS WITH STAGE 3 CHRONIC KIDNEY DISEASE, WITHOUT LONG-TERM CURRENT USE OF INSULIN: ICD-10-CM

## 2025-04-10 DIAGNOSIS — Z79.01 ANTICOAGULANT LONG-TERM USE: ICD-10-CM

## 2025-04-10 DIAGNOSIS — E78.5 HYPERLIPIDEMIA ASSOCIATED WITH TYPE 2 DIABETES MELLITUS: Primary | ICD-10-CM

## 2025-04-10 PROCEDURE — 3288F FALL RISK ASSESSMENT DOCD: CPT | Mod: HCNC,CPTII,S$GLB, | Performed by: INTERNAL MEDICINE

## 2025-04-10 PROCEDURE — 3074F SYST BP LT 130 MM HG: CPT | Mod: HCNC,CPTII,S$GLB, | Performed by: INTERNAL MEDICINE

## 2025-04-10 PROCEDURE — 1159F MED LIST DOCD IN RCRD: CPT | Mod: HCNC,CPTII,S$GLB, | Performed by: INTERNAL MEDICINE

## 2025-04-10 PROCEDURE — 1160F RVW MEDS BY RX/DR IN RCRD: CPT | Mod: HCNC,CPTII,S$GLB, | Performed by: INTERNAL MEDICINE

## 2025-04-10 PROCEDURE — 99213 OFFICE O/P EST LOW 20 MIN: CPT | Mod: HCNC,S$GLB,, | Performed by: INTERNAL MEDICINE

## 2025-04-10 PROCEDURE — 99999 PR PBB SHADOW E&M-EST. PATIENT-LVL IV: CPT | Mod: PBBFAC,HCNC,, | Performed by: INTERNAL MEDICINE

## 2025-04-10 PROCEDURE — 3078F DIAST BP <80 MM HG: CPT | Mod: HCNC,CPTII,S$GLB, | Performed by: INTERNAL MEDICINE

## 2025-04-10 PROCEDURE — 1126F AMNT PAIN NOTED NONE PRSNT: CPT | Mod: HCNC,CPTII,S$GLB, | Performed by: INTERNAL MEDICINE

## 2025-04-10 PROCEDURE — 1101F PT FALLS ASSESS-DOCD LE1/YR: CPT | Mod: HCNC,CPTII,S$GLB, | Performed by: INTERNAL MEDICINE

## 2025-04-10 NOTE — PROGRESS NOTES
Patient ID:  Janak Booker is a 79 y.o. male who presents for follow-up of Atrial Fibrillation, Coronary Artery Disease, and Results (labs)      Atrial fibrillation, chronic  Rate controlled on sotalol.  He is on chronic anticoagulation.  A Watchman was discussed.  At this point the patient is to decide     Hyperlipidemia associated with type 2 diabetes mellitus  On 10 mg of rosuvastatin     Coronary artery disease, occlusive  No symptoms of angina     Hypertension associated with diabetes  Controlled on lisinopril 5 mg     CKD (chronic kidney disease) stage 3, GFR 30-59 ml/min  Solitary kidney.  Stable followed by Nephrology     Controlled type 2 diabetes mellitus with stage 3 chronic kidney disease, without long-term current use of insulin  Controlled on Actos, metformin, Trulicity    3/12/25  He has been doing fine denies any chest pains any shortness of breath.  The Watchman was offered after his valve replacement.  He is considering the medication although he has never had any bleeding from the Pradaxa.  The options were discussed with the patient he is to decide.  Otherwise he has been doing very well from cardiovascular standpoint.   04/10/2025.    He decided to continue anticoagulation with Pradaxa.  He never had any bleeding disorders.  He sees Dr. Cortes on regular basis for his solitary kidney.  He denies any chest pains his breathing has been doing fine        Past Medical History:   Diagnosis Date    Anticoagulant long-term use     Aortic stenosis     Arthritis     Atrial fibrillation     CAD (coronary artery disease)     Colon polyps     per colonoscopy 9/11/2014    Diabetes mellitus, type 2     Disc displacement, lumbar     L3    Family history of prostate cancer     GERD (gastroesophageal reflux disease)     Heel bone fracture     left foot    Hyperlipidemia LDL goal < 70     Hypertension     NYHA class 3 heart failure with preserved ejection fraction 10/19/2022    Renal manifestation of  secondary diabetes mellitus     Sleep apnea     USES MACHINE    Spinal stenosis, lumbar         Past Surgical History:   Procedure Laterality Date    BACK SURGERY      CARDIAC SURGERY      stents     CARPAL TUNNEL RELEASE Right     CATARACT EXTRACTION W/  INTRAOCULAR LENS IMPLANT Bilateral     COLONOSCOPY N/A 3/27/2018    Procedure: COLONOSCOPY;  Surgeon: Rishi Garcia MD;  Location: [facility] ENDO;  Service: Endoscopy;  Laterality: N/A;    COLONOSCOPY N/A 2/15/2021    Procedure: COLONOSCOPY;  Surgeon: Rishi Garcia MD;  Location: [facility] ENDO;  Service: Endoscopy;  Laterality: N/A;    COLONOSCOPY N/A 6/23/2023    Procedure: COLONOSCOPY;  Surgeon: Rishi Garcia MD;  Location: [facility] ENDO;  Service: Endoscopy;  Laterality: N/A;    CORONARY ANGIOPLASTY WITH STENT PLACEMENT      x 3    EYE SURGERY      INJECTION OF FACET JOINT N/A 5/14/2019    Procedure: INJECTION, FACET JOINT INJECTION (LUMBAR BLOCK) PLEASE INJECT L3/4;  Surgeon: Catrachito Harley MD;  Location: [facility] [place] PAIN MGT;  Service: Pain Management;  Laterality: N/A;  NEEDS CONSENT, PRADAXA CLEARANCE IN CHART    KNEE ARTHROSCOPY W/ MENISCECTOMY  12/22/2008    right    LEFT HEART CATHETERIZATION Left 9/7/2022    Procedure: Left heart cath;  Surgeon: Adonay Quinones MD;  Location: [facility] CATH;  Service: Cardiology;  Laterality: Left;    LUMBAR DISCECTOMY  12/2011    L4-L5 Fusion    LUMBAR EPIDURAL INJECTION  02/04/2019    ROTATOR CUFF REPAIR Left 7/2012    SHOULDER OPEN ROTATOR CUFF REPAIR      SKIN CANCER FOREHEAD 2019      SPINE SURGERY      TIBIA FRACTURE SURGERY      TRANSCATHETER AORTIC VALVE REPLACEMENT (TAVR) Bilateral 10/19/2022    Procedure: REPLACEMENT, AORTIC VALVE, TRANSCATHETER (TAVR);  Surgeon: Adonay Quinones MD;  Location: [facility] CATH;  Service: Cardiology;  Laterality: Bilateral;    TRANSCATHETER AORTIC VALVE REPLACEMENT (TAVR) Bilateral 10/19/2022    Procedure: REPLACEMENT, AORTIC VALVE, TRANSCATHETER (TAVR);  Surgeon: Denver Osborne MD;   "Location: [facility] CATH;  Service: Cardiothoracic;  Laterality: Bilateral;    TRANSFORAMINAL EPIDURAL INJECTION OF STEROID Left 9/23/2019    Procedure: INJECTION, STEROID, EPIDURAL, TRANSFORAMINAL APPROACH;  Surgeon: Jose Guadalupe Linn MD;  Location: [facility] PAIN MGT;  Service: Pain Management;  Laterality: Left;  Left TF ADI L4 and L5  OK to hold Pradaxa    TRANSFORAMINAL EPIDURAL INJECTION OF STEROID Left 11/14/2019    Procedure: INJECTION, STEROID, EPIDURAL, TRANSFORAMINAL APPROACH;  Surgeon: Jose Guadalupe Linn MD;  Location: [facility] PAIN MGT;  Service: Pain Management;  Laterality: Left;  Left TF ADI L4-5 and L5-S1          Current Outpatient Medications   Medication Instructions    ascorbic acid (vitamin C) (VITAMIN C) 1,000 mg, Daily    aspirin (ECOTRIN) 81 mg, Oral, Daily    BD ALCOHOL SWABS PadM USE AS DIRECTED TO CHECK BLOOD GLUCOSE DAILY    dabigatran etexilate (PRADAXA) 150 mg, Oral, 2 times daily, "Do NOT break, chew, or open capsules."    fenofibrate micronized (LOFIBRA) 134 mg, Oral, Daily    lancets (TRUEPLUS LANCETS) 28 gauge Misc 1 lancet , Misc.(Non-Drug; Combo Route), Daily    lisinopriL (PRINIVIL,ZESTRIL) 5 mg, Oral, Daily    metFORMIN (GLUCOPHAGE-XR) 500 mg, Oral, With breakfast    multivitamin with minerals tablet No dose, route, or frequency recorded.    NITROSTAT 0.4 mg, Sublingual, Every 5 min PRN    omega-3 fatty acids 1,000 mg Cap 1 Capsule(s) Oral OTC once a day.    omeprazole (PRILOSEC) 40 mg, Oral, Every morning    OZEMPIC 1 mg, Subcutaneous, Every 7 days    pioglitazone (ACTOS) 30 mg, Oral, Daily    rosuvastatin (CRESTOR) 10 mg, Oral, Nightly    sotaloL (BETAPACE) 40 mg, Oral, Daily        Review of patient's allergies indicates:   Allergen Reactions    Neurontin [gabapentin] Swelling     Leg swelling        Review of Systems   Cardiovascular:  Negative for chest pain, irregular heartbeat and palpitations.   Respiratory:  Negative for cough and shortness of breath.         Objective:     Vitals:    04/10/25 " "1435   BP: 120/60   BP Location: Left arm   Patient Position: Sitting   Pulse: 64   SpO2: 98%   Weight: 113.2 kg (249 lb 7.2 oz)   Height: 6' 1" (1.854 m)       Physical Exam  Vitals and nursing note reviewed.   HENT:      Head: Normocephalic and atraumatic.   Eyes:      Conjunctiva/sclera: Conjunctivae normal.   Cardiovascular:      Rate and Rhythm: Normal rate. Rhythm irregular.      Heart sounds: Normal heart sounds.   Pulmonary:      Effort: Pulmonary effort is normal.      Breath sounds: Normal breath sounds.   Abdominal:      General: Bowel sounds are normal.      Palpations: Abdomen is soft.   Musculoskeletal:         General: Normal range of motion.   Skin:     General: Skin is warm and dry.   Neurological:      Mental Status: He is alert and oriented to person, place, and time.   Psychiatric:         Behavior: Behavior normal.         Thought Content: Thought content normal.         Judgment: Judgment normal.       CMP  Sodium   Date Value Ref Range Status   04/07/2025 139 136 - 145 mmol/L Final     Potassium   Date Value Ref Range Status   04/07/2025 4.7 3.5 - 5.1 mmol/L Final     Chloride   Date Value Ref Range Status   01/06/2025 104 95 - 110 mmol/L Final     CO2   Date Value Ref Range Status   04/07/2025 23 23 - 29 mmol/L Final     Glucose   Date Value Ref Range Status   01/06/2025 112 (H) 70 - 110 mg/dL Final     BUN   Date Value Ref Range Status   04/07/2025 18 8 - 23 mg/dL Final     Creatinine   Date Value Ref Range Status   04/07/2025 1.2 0.5 - 1.4 mg/dL Final     Calcium   Date Value Ref Range Status   04/07/2025 9.1 8.7 - 10.5 mg/dL Final     Total Protein   Date Value Ref Range Status   10/23/2024 6.9 6.0 - 8.4 g/dL Final     Albumin   Date Value Ref Range Status   04/07/2025 3.8 3.5 - 5.2 g/dL Final     Bilirubin Total   Date Value Ref Range Status   04/07/2025 1.3 (H) 0.1 - 1.0 mg/dL Final     Comment:     For infants and newborns, interpretation of results should be based   on gestational age, " weight and in agreement with clinical   observations.    Premature Infant recommended reference ranges:   0-24 hours:  <8.0 mg/dL   24-48 hours: <12.0 mg/dL   3-5 days:    <15.0 mg/dL   6-29 days:   <15.0 mg/dL     ALP   Date Value Ref Range Status   04/07/2025 66 40 - 150 unit/L Final     AST   Date Value Ref Range Status   04/07/2025 25 11 - 45 unit/L Final     ALT   Date Value Ref Range Status   04/07/2025 14 10 - 44 unit/L Final     Anion Gap   Date Value Ref Range Status   01/06/2025 4 (L) 8 - 16 mmol/L Final     eGFR if    Date Value Ref Range Status   07/05/2022 34.6 (A) >60 mL/min/1.73 m^2 Final     eGFR if non    Date Value Ref Range Status   07/05/2022 29.9 (A) >60 mL/min/1.73 m^2 Final     Comment:     Calculation used to obtain the estimated glomerular filtration  rate (eGFR) is the CKD-EPI equation.         BMP  Lab Results   Component Value Date     04/07/2025    K 4.7 04/07/2025     01/06/2025    CO2 23 04/07/2025    BUN 18 04/07/2025    CREATININE 1.2 04/07/2025    CALCIUM 9.1 04/07/2025    ANIONGAP 4 (L) 01/06/2025    ESTGFRAFRICA 34.6 (A) 07/05/2022    EGFRNONAA 29.9 (A) 07/05/2022      BNP  @LABRCNTIP(BNP,BNPTRIAGEBLO)@   Lab Results   Component Value Date    CHOL 148 04/07/2025    CHOL 133 04/18/2024    CHOL 142 04/26/2023     Lab Results   Component Value Date    HDL 48 04/07/2025    HDL 49 04/18/2024    HDL 42 04/26/2023     Lab Results   Component Value Date    LDLCALC 59.6 (L) 04/18/2024    LDLCALC 75.6 04/26/2023    LDLCALC 64.2 11/08/2021     Lab Results   Component Value Date    TRIG 206 (H) 04/07/2025    TRIG 122 04/18/2024    TRIG 122 04/26/2023     Lab Results   Component Value Date    CHOLHDL 36.8 04/18/2024    CHOLHDL 29.6 04/26/2023    CHOLHDL 30.0 11/08/2021      Lab Results   Component Value Date    TSH 1.403 10/23/2024     Lab Results   Component Value Date    HGBA1C 6.0 (H) 10/23/2024     Lab Results   Component Value Date    WBC 7.88  01/06/2025    HGB 14.3 01/06/2025    HCT 44.4 01/06/2025    MCV 91 01/06/2025     01/06/2025         Results for orders placed during the hospital encounter of 10/30/24    Echo    Interpretation Summary    Left Ventricle: The left ventricle is normal in size. Mildly increased wall thickness. There is mild concentric hypertrophy. There is normal systolic function. Ejection fraction is approximately 65%. Unable to assess diastolic function due to atrial fibrillation.    Right Ventricle: Normal right ventricular cavity size. Wall thickness is normal. Systolic function is normal.    Left Atrium: Left atrium is moderately dilated.    Aortic Valve: There is a bioprosthetic valve in the aortic position. Aortic valve area by VTI is 1.8 cm². Aortic valve peak velocity is 1.9 m/s. Mean gradient is 8.0 mmHg. The dimensionless index is 0.57. There is no significant regurgitation.    Mitral Valve: There is mild regurgitation with a centrally directed jet.    Tricuspid Valve: There is mild regurgitation.    IVC/SVC: Normal venous pressure at 3 mmHg.     Results for orders placed during the hospital encounter of 10/19/22    Cardiac catheterization    Narrative  Procedure performed in the Invasive Lab  - See Procedure Log link below for nursing documentation  - See OpNote on Surgeries Tab for physician findings  - See Imaging Tab for radiologist dictation     EKG  Results for orders placed or performed in visit on 10/23/24   IN OFFICE EKG 12-LEAD (to [place] [place])    Collection Time: 10/23/24  9:44 AM   Result Value Ref Range    QRS Duration 82 ms    [facility] QTC Calculation 409 ms    Narrative    Test Reason : Z95.2,I48.20,    Vent. Rate :  69 BPM     Atrial Rate : 441 BPM     P-R Int :    ms          QRS Dur :  82 ms      QT Int : 382 ms       P-R-T Axes :     40  69 degrees    QTcB Int : 409 ms    Atrial fibrillation  Abnormal ECG  When compared with ECG of 17-OCT-2023 10:44,  T wave amplitude has decreased in Inferior  leads  Confirmed by John Caldwell ([id number]) on 11/13/2024 8:37:52 AM    Referred By: JEFF KRISHNA           Confirmed By: John Caldwell      Stress  Results for orders placed during the hospital encounter of 06/07/21    Nuclear Stress - Cardiology Interpreted    Interpretation Summary    Normal myocardial perfusion scan. There is no evidence of myocardial ischemia or infarction.    The gated perfusion images showed an ejection fraction of 65% post stress. Normal ejection fraction is greater than 53%.    There is normal wall motion post stress.    LV cavity size is normal at rest.    The EKG portion of this study is negative for ischemia.    The patient reported no chest pain during the stress test.    Atrial fibrillation was noted during the test with no ventricular ectopy             Assessment:       Atrial fibrillation, chronic   Rate controlled on sotalol, he is on Pradaxa.    Coronary artery disease, occlusive    No symptoms of angina.  Status post percutaneous revascularization on 01/24/2004 of 4.0 x 35 mm in the mid RCA and 2.5 x 13 mm Cypher in the PDA of the RCA.    S/P TAVR (transcatheter aortic valve replacement)    Stable doing well    Anticoagulant long-term use   For thromboembolic prevention secondary to atrial fibrillation    Controlled type 2 diabetes mellitus with stage 3 chronic kidney disease, without long-term current use of insulin   Well controlled on Actos, metformin and Trulicity    DALE on CPAP   Compliant with CPAP machine    CKD (chronic kidney disease) stage 3, GFR 30-59 ml/min   Solitary kidney followed by renal       Plan:        Continue his current medications.  He will be seen in the office in 6 months.

## 2025-04-19 DIAGNOSIS — E11.22 CONTROLLED TYPE 2 DIABETES MELLITUS WITH STAGE 3 CHRONIC KIDNEY DISEASE, WITHOUT LONG-TERM CURRENT USE OF INSULIN: ICD-10-CM

## 2025-04-19 DIAGNOSIS — N18.30 CONTROLLED TYPE 2 DIABETES MELLITUS WITH STAGE 3 CHRONIC KIDNEY DISEASE, WITHOUT LONG-TERM CURRENT USE OF INSULIN: ICD-10-CM

## 2025-04-19 DIAGNOSIS — Q60.0 SOLITARY KIDNEY, CONGENITAL: ICD-10-CM

## 2025-04-19 DIAGNOSIS — N18.32 STAGE 3B CHRONIC KIDNEY DISEASE: ICD-10-CM

## 2025-04-19 RX ORDER — PIOGLITAZONE 30 MG/1
30 TABLET ORAL DAILY
Qty: 90 TABLET | Refills: 2 | Status: CANCELLED | OUTPATIENT
Start: 2025-04-19

## 2025-04-19 RX ORDER — METFORMIN HYDROCHLORIDE 500 MG/1
500 TABLET, EXTENDED RELEASE ORAL
Qty: 90 TABLET | Refills: 3 | Status: CANCELLED | OUTPATIENT
Start: 2025-04-19 | End: 2026-04-19

## 2025-04-19 RX ORDER — OMEPRAZOLE 40 MG/1
40 CAPSULE, DELAYED RELEASE ORAL EVERY MORNING
Qty: 90 CAPSULE | Refills: 3 | Status: CANCELLED | OUTPATIENT
Start: 2025-04-19

## 2025-04-19 RX ORDER — SEMAGLUTIDE 1.34 MG/ML
1 INJECTION, SOLUTION SUBCUTANEOUS
Qty: 3 ML | Refills: 11 | Status: CANCELLED | OUTPATIENT
Start: 2025-04-19 | End: 2026-04-19

## 2025-04-19 NOTE — TELEPHONE ENCOUNTER
Care Due:                  Date            Visit Type   Department     Provider  --------------------------------------------------------------------------------                                EP -                              PRIMARY      [facility] FAM[facility]    See Cortez  Last Visit: 03-      CARE ([facility])   MEDICINE       Main  Next Visit: None Scheduled  None         None Found                                                            Last  Test          Frequency    Reason                     Performed    Due Date  --------------------------------------------------------------------------------    HBA1C.......  6 months...  metFORMIN, pioglitazone,   10-   04-                             semaglutide..............    [employer] Embedded Care Due Messages. Reference number: [id number].   4/19/2025 9:56:26 AM CDT

## 2025-04-22 RX ORDER — DABIGATRAN ETEXILATE 150 MG/1
150 CAPSULE ORAL 2 TIMES DAILY
Qty: 180 CAPSULE | Refills: 3 | Status: SHIPPED | OUTPATIENT
Start: 2025-04-22

## 2025-04-22 RX ORDER — SOTALOL HYDROCHLORIDE 80 MG/1
40 TABLET ORAL DAILY
Qty: 90 TABLET | Refills: 1 | Status: SHIPPED | OUTPATIENT
Start: 2025-04-22 | End: 2027-04-12

## 2025-04-22 RX ORDER — ROSUVASTATIN CALCIUM 10 MG/1
10 TABLET, COATED ORAL NIGHTLY
Qty: 90 TABLET | Refills: 1 | Status: SHIPPED | OUTPATIENT
Start: 2025-04-22

## 2025-04-24 NOTE — ASSESSMENT & PLAN NOTE
No symptoms of angina.  Status post percutaneous revascularization on 01/24/2004 of 4.0 x 35 mm in the mid RCA and 2.5 x 13 mm Cypher in the PDA of the RCA.

## 2025-05-01 ENCOUNTER — TELEPHONE (OUTPATIENT)
Dept: CARDIOLOGY | Facility: CLINIC | Age: 80
End: 2025-05-01
Payer: MEDICARE

## 2025-05-01 NOTE — TELEPHONE ENCOUNTER
----- Message from Lior sent at 5/1/2025 10:58 AM CDT -----  Type:  Needs Medical AdviceWho Called: ptWould the patient rather a call back or a response via [facility]? Call Last Call Back Number: [phone] Additional Information: pt st he received he received a letter from [employer] that they are no longer paying for the dabigatran etexilate (PRADAXA) 150 mg Cap. please call to discuss

## 2025-06-04 RX ORDER — SOTALOL HYDROCHLORIDE 80 MG/1
40 TABLET ORAL DAILY
Qty: 90 TABLET | Refills: 1 | Status: SHIPPED | OUTPATIENT
Start: 2025-06-04 | End: 2027-05-25

## 2025-06-09 ENCOUNTER — LAB VISIT (OUTPATIENT)
Dept: LAB | Facility: HOSPITAL | Age: 80
End: 2025-06-09
Attending: INTERNAL MEDICINE
Payer: MEDICARE

## 2025-06-09 DIAGNOSIS — N18.30 CHRONIC KIDNEY DISEASE, STAGE III (MODERATE): Primary | ICD-10-CM

## 2025-06-09 LAB
ABSOLUTE EOSINOPHIL (SMH): 0.33 K/UL
ABSOLUTE MONOCYTE (SMH): 0.5 K/UL (ref 0.3–1)
ABSOLUTE NEUTROPHIL COUNT (SMH): 3.5 K/UL (ref 1.8–7.7)
ALBUMIN SERPL-MCNC: 4.1 G/DL (ref 3.5–5.2)
ANION GAP (SMH): 10 MMOL/L (ref 8–16)
BASOPHILS # BLD AUTO: 0.05 K/UL
BASOPHILS NFR BLD AUTO: 0.8 %
BILIRUB UR QL STRIP.AUTO: NEGATIVE
BUN SERPL-MCNC: 19 MG/DL (ref 8–23)
CALCIUM SERPL-MCNC: 9.5 MG/DL (ref 8.7–10.5)
CHLORIDE SERPL-SCNC: 104 MMOL/L (ref 95–110)
CLARITY UR: CLEAR
CO2 SERPL-SCNC: 24 MMOL/L (ref 23–29)
COLOR UR AUTO: YELLOW
CREAT SERPL-MCNC: 1.4 MG/DL (ref 0.5–1.4)
CREAT UR-MCNC: 151.5 MG/DL (ref 23–375)
ERYTHROCYTE [DISTWIDTH] IN BLOOD BY AUTOMATED COUNT: 16 % (ref 11.5–14.5)
GFR SERPLBLD CREATININE-BSD FMLA CKD-EPI: 51 ML/MIN/1.73/M2
GLUCOSE SERPL-MCNC: 179 MG/DL (ref 70–110)
GLUCOSE UR QL STRIP: NEGATIVE
HCT VFR BLD AUTO: 45 % (ref 40–54)
HGB BLD-MCNC: 14.6 GM/DL (ref 14–18)
HGB UR QL STRIP: NEGATIVE
IMM GRANULOCYTES # BLD AUTO: 0.02 K/UL (ref 0–0.04)
IMM GRANULOCYTES NFR BLD AUTO: 0.3 % (ref 0–0.5)
KETONES UR QL STRIP: NEGATIVE
LEUKOCYTE ESTERASE UR QL STRIP: NEGATIVE
LYMPHOCYTES # BLD AUTO: 1.82 K/UL (ref 1–4.8)
MCH RBC QN AUTO: 29.4 PG (ref 27–31)
MCHC RBC AUTO-ENTMCNC: 32.4 G/DL (ref 32–36)
MCV RBC AUTO: 91 FL (ref 82–98)
NITRITE UR QL STRIP: NEGATIVE
NUCLEATED RBC (/100WBC) (SMH): 0 /100 WBC
PH UR STRIP: 6 [PH]
PHOSPHATE SERPL-MCNC: 3.1 MG/DL (ref 2.7–4.5)
PLATELET # BLD AUTO: 154 K/UL (ref 150–450)
PMV BLD AUTO: 10.7 FL (ref 9.2–12.9)
POTASSIUM SERPL-SCNC: 5 MMOL/L (ref 3.5–5.1)
PROT UR QL STRIP: ABNORMAL
PROT UR-MCNC: 25 MG/DL
PROT/CREAT UR: 0.17 MG/G{CREAT}
PTH-INTACT SERPL-MCNC: 29.9 PG/ML (ref 9–77)
RBC # BLD AUTO: 4.96 M/UL (ref 4.6–6.2)
RELATIVE EOSINOPHIL (SMH): 5.3 % (ref 0–8)
RELATIVE LYMPHOCYTE (SMH): 29.5 % (ref 18–48)
RELATIVE MONOCYTE (SMH): 8.1 % (ref 4–15)
RELATIVE NEUTROPHIL (SMH): 56 % (ref 38–73)
SODIUM SERPL-SCNC: 138 MMOL/L (ref 136–145)
SP GR UR STRIP: 1.02
UROBILINOGEN UR STRIP-ACNC: NEGATIVE EU/DL
WBC # BLD AUTO: 6.17 K/UL (ref 3.9–12.7)

## 2025-06-09 PROCEDURE — 36415 COLL VENOUS BLD VENIPUNCTURE: CPT

## 2025-06-09 PROCEDURE — 83970 ASSAY OF PARATHORMONE: CPT

## 2025-06-09 PROCEDURE — 82040 ASSAY OF SERUM ALBUMIN: CPT

## 2025-06-09 PROCEDURE — 81003 URINALYSIS AUTO W/O SCOPE: CPT

## 2025-06-09 PROCEDURE — 82570 ASSAY OF URINE CREATININE: CPT

## 2025-06-09 PROCEDURE — 85025 COMPLETE CBC W/AUTO DIFF WBC: CPT

## 2025-06-11 ENCOUNTER — RESULTS FOLLOW-UP (OUTPATIENT)
Dept: FAMILY MEDICINE | Facility: CLINIC | Age: 80
End: 2025-06-11

## 2025-06-11 ENCOUNTER — LAB VISIT (OUTPATIENT)
Dept: LAB | Facility: HOSPITAL | Age: 80
End: 2025-06-11
Attending: STUDENT IN AN ORGANIZED HEALTH CARE EDUCATION/TRAINING PROGRAM
Payer: MEDICARE

## 2025-06-11 ENCOUNTER — OFFICE VISIT (OUTPATIENT)
Dept: FAMILY MEDICINE | Facility: CLINIC | Age: 80
End: 2025-06-11
Payer: MEDICARE

## 2025-06-11 VITALS
RESPIRATION RATE: 18 BRPM | SYSTOLIC BLOOD PRESSURE: 128 MMHG | BODY MASS INDEX: 32.87 KG/M2 | WEIGHT: 248 LBS | HEIGHT: 73 IN | DIASTOLIC BLOOD PRESSURE: 66 MMHG | HEART RATE: 77 BPM | OXYGEN SATURATION: 97 %

## 2025-06-11 DIAGNOSIS — I48.20 ATRIAL FIBRILLATION, CHRONIC: ICD-10-CM

## 2025-06-11 DIAGNOSIS — R41.9 UNSPECIFIED SYMPTOMS AND SIGNS INVOLVING COGNITIVE FUNCTIONS AND AWARENESS: ICD-10-CM

## 2025-06-11 DIAGNOSIS — N18.30 CONTROLLED TYPE 2 DIABETES MELLITUS WITH STAGE 3 CHRONIC KIDNEY DISEASE, WITHOUT LONG-TERM CURRENT USE OF INSULIN: ICD-10-CM

## 2025-06-11 DIAGNOSIS — Z95.3 S/P TAVR (TRANSCATHETER AORTIC VALVE REPLACEMENT): ICD-10-CM

## 2025-06-11 DIAGNOSIS — E11.22 CONTROLLED TYPE 2 DIABETES MELLITUS WITH STAGE 3 CHRONIC KIDNEY DISEASE, WITHOUT LONG-TERM CURRENT USE OF INSULIN: Primary | ICD-10-CM

## 2025-06-11 DIAGNOSIS — E11.22 CONTROLLED TYPE 2 DIABETES MELLITUS WITH STAGE 3 CHRONIC KIDNEY DISEASE, WITHOUT LONG-TERM CURRENT USE OF INSULIN: ICD-10-CM

## 2025-06-11 DIAGNOSIS — N18.32 STAGE 3B CHRONIC KIDNEY DISEASE: ICD-10-CM

## 2025-06-11 DIAGNOSIS — N18.30 CONTROLLED TYPE 2 DIABETES MELLITUS WITH STAGE 3 CHRONIC KIDNEY DISEASE, WITHOUT LONG-TERM CURRENT USE OF INSULIN: Primary | ICD-10-CM

## 2025-06-11 LAB
EAG (OHS): 137 MG/DL (ref 68–131)
HBA1C MFR BLD: 6.4 % (ref 4–5.6)
VIT B12 SERPL-MCNC: 403 PG/ML (ref 210–950)

## 2025-06-11 PROCEDURE — 3288F FALL RISK ASSESSMENT DOCD: CPT | Mod: CPTII,HCNC,S$GLB, | Performed by: STUDENT IN AN ORGANIZED HEALTH CARE EDUCATION/TRAINING PROGRAM

## 2025-06-11 PROCEDURE — 1126F AMNT PAIN NOTED NONE PRSNT: CPT | Mod: CPTII,HCNC,S$GLB, | Performed by: STUDENT IN AN ORGANIZED HEALTH CARE EDUCATION/TRAINING PROGRAM

## 2025-06-11 PROCEDURE — 99214 OFFICE O/P EST MOD 30 MIN: CPT | Mod: HCNC,S$GLB,, | Performed by: STUDENT IN AN ORGANIZED HEALTH CARE EDUCATION/TRAINING PROGRAM

## 2025-06-11 PROCEDURE — 83036 HEMOGLOBIN GLYCOSYLATED A1C: CPT | Mod: HCNC

## 2025-06-11 PROCEDURE — G2211 COMPLEX E/M VISIT ADD ON: HCPCS | Mod: HCNC,S$GLB,, | Performed by: STUDENT IN AN ORGANIZED HEALTH CARE EDUCATION/TRAINING PROGRAM

## 2025-06-11 PROCEDURE — 3074F SYST BP LT 130 MM HG: CPT | Mod: CPTII,HCNC,S$GLB, | Performed by: STUDENT IN AN ORGANIZED HEALTH CARE EDUCATION/TRAINING PROGRAM

## 2025-06-11 PROCEDURE — 1160F RVW MEDS BY RX/DR IN RCRD: CPT | Mod: CPTII,HCNC,S$GLB, | Performed by: STUDENT IN AN ORGANIZED HEALTH CARE EDUCATION/TRAINING PROGRAM

## 2025-06-11 PROCEDURE — 3078F DIAST BP <80 MM HG: CPT | Mod: CPTII,HCNC,S$GLB, | Performed by: STUDENT IN AN ORGANIZED HEALTH CARE EDUCATION/TRAINING PROGRAM

## 2025-06-11 PROCEDURE — 99999 PR PBB SHADOW E&M-EST. PATIENT-LVL IV: CPT | Mod: PBBFAC,HCNC,, | Performed by: STUDENT IN AN ORGANIZED HEALTH CARE EDUCATION/TRAINING PROGRAM

## 2025-06-11 PROCEDURE — 36415 COLL VENOUS BLD VENIPUNCTURE: CPT | Mod: HCNC,PO

## 2025-06-11 PROCEDURE — 1101F PT FALLS ASSESS-DOCD LE1/YR: CPT | Mod: CPTII,HCNC,S$GLB, | Performed by: STUDENT IN AN ORGANIZED HEALTH CARE EDUCATION/TRAINING PROGRAM

## 2025-06-11 PROCEDURE — 82607 VITAMIN B-12: CPT | Mod: HCNC

## 2025-06-11 PROCEDURE — 1159F MED LIST DOCD IN RCRD: CPT | Mod: CPTII,HCNC,S$GLB, | Performed by: STUDENT IN AN ORGANIZED HEALTH CARE EDUCATION/TRAINING PROGRAM

## 2025-06-11 NOTE — PROGRESS NOTES
Patient ID: Janak Booker is a 79 y.o. male.    Chief Complaint: Annual Exam    History of Present Illness    CHIEF COMPLAINT:  Patient presents today for follow up    FATIGUE:  He reports experiencing easy fatigue and is concerned this may be due to age or vitamin deficiency.    DIABETES:  He reports home glucose readings averaging around 130 mg/dL, with levels not dropping below 100 mg/dL.    MEDICATIONS:  He continues Metformin, Ozempic 1 mg weekly, Pradaxa, and Actos. He denies any bleeding issues with Pradaxa or hypoglycemic episodes with Actos.    MEDICAL HISTORY:  He has history of TAVR performed as an outpatient procedure several years ago.    SLEEP APNEA:  He uses CPAP/APAP machine with reported improvement in sleep.            Physical Exam    General: No acute distress. Well-developed. Well-nourished.  Eyes: EOMI. Sclerae anicteric.  HENT: Normocephalic. Atraumatic. Nares patent. Moist oral mucosa.  Cardiovascular: Regular rate. Regular rhythm. No murmurs. No rubs. No gallops. Normal S1, S2. Normal heart sounds.  Respiratory: Normal respiratory effort. Clear to auscultation bilaterally. No rales. No rhonchi. No wheezing. Normal lung sounds.  Musculoskeletal: No  obvious deformity.  Extremities: No lower extremity edema.  Neurological: Alert & oriented x3. No slurred speech. Normal gait.  Psychiatric: Normal mood. Normal affect. Good insight. Good judgment.  Skin: Warm. Dry. No rash.         Assessment & Plan    I48.20 Atrial fibrillation, chronic  Z95.3 S/P TAVR (transcatheter aortic valve replacement)  N18.32 Stage 3b chronic kidney disease  E11.22, N18.30 Controlled type 2 diabetes mellitus with Stage III chronic kidney disease, without long-term current use of insulin  R41.9 Unspecified symptoms and signs involving cognitive functions and awareness  E11.22 Type 2 diabetes mellitus with diabetic chronic kidney disease  G47.33 Obstructive sleep apnea (adult) (pediatric)  I25.10 Atherosclerotic heart  disease of native coronary artery without angina pectoris  Q60.0 Renal agenesis, unilateral  R53.83 Other fatigue  Z79.01 Long term (current) use of anticoagulants  Z95.5 Presence of coronary angioplasty implant and graft    IMPRESSION:  - BP is good.  - Recent labs show good blood counts, renal function, and normal urine.  - Blood sugar of 179 on recent test.  - Home glucose readings around 130, which is satisfactory.  - No reported bleeding issues with Pradaxa.  - No low blood sugars reported with current medication regimen.  - Heart valve sounds good post-TAVR procedure.  - Sleep apnea managed with CPAP/APAP, helping patient sleep well.  - Considered and ruled out anemia as cause of fatigue.    CONTROLLED TYPE 2 DIABETES MELLITUS WITH STAGE 3 CHRONIC KIDNEY DISEASE, WITHOUT LONG-TERM CURRENT USE OF INSULIN:  - Explained that Metformin can potentially cause B12 deficiency and ordered B12 blood test STAT.  - Follow up for A1C and cholesterol tests with next routine labs for Dr. Ceron (twice yearly).  - Await eye exam results from Dr. Coe.    TYPE 2 DIABETES MELLITUS WITH DIABETIC CHRONIC KIDNEY DISEASE:  - Blood sugar varies around 130 at home with recent test showing 179.  - Blood counts and kidney function appear good with normal urine.  - Continue Metformin and Ozempic 1 mg weekly for glycemic control.    OBSTRUCTIVE SLEEP APNEA (ADULT) (PEDIATRIC):  - Patient reports improved sleep quality with current therapy.  - Continue CPAP with APAP for effective sleep apnea management.    ATHEROSCLEROTIC HEART DISEASE OF NATIVE CORONARY ARTERY WITHOUT ANGINA PECTORIS:  - Cardiac status post-TAVR procedure stable.  - Heart valves sound normal with no audible abnormalities on auscultation.  - Discussed cardiac rehabilitation progress.    RENAL AGENESIS, UNILATERAL:  - Single kidney function appears adequate and remains within normal parameters despite unilateral renal agenesis.    OTHER FATIGUE:  - Patient reports easy  fatigability.  - Evaluated possible etiologies including age-related factors and vitamin deficiency.  - B12 deficiency may be contributing to fatigue, potentially related to Metformin use.    LONG TERM (CURRENT) USE OF ANTICOAGULANTS:  - Continue anticoagulation therapy with Pradaxa and aspirin.  - No bleeding complications reported.    PRESENCE OF CORONARY ANGIOPLASTY IMPLANT AND GRAFT:  - Cardiac status post-TAVR procedure stable with normal heart valve function on auscultation.        Janak was seen today for annual exam.    Diagnoses and all orders for this visit:    Controlled type 2 diabetes mellitus with stage 3 chronic kidney disease, without long-term current use of insulin  -     Cancel: Hemoglobin A1C; Future  -     Vitamin B12; Future  -     Hemoglobin A1C; Future    S/P TAVR (transcatheter aortic valve replacement)  -     Cancel: Hemoglobin A1C; Future  -     Vitamin B12; Future  -     Hemoglobin A1C; Future    Atrial fibrillation, chronic  -     Cancel: Hemoglobin A1C; Future  -     Vitamin B12; Future  -     Hemoglobin A1C; Future    Stage 3b chronic kidney disease  -     Cancel: Hemoglobin A1C; Future  -     Vitamin B12; Future  -     Hemoglobin A1C; Future    Unspecified symptoms and signs involving cognitive functions and awareness  -     Vitamin B12; Future            No follow-ups on file.    This note was generated with the assistance of ambient listening technology. Verbal consent was obtained by the patient and accompanying visitor(s) for the recording of patient appointment to facilitate this note. I attest to having reviewed and edited the generated note for accuracy, though some syntax or spelling errors may persist. Please contact the author of this note for any clarification.

## 2025-06-13 ENCOUNTER — PATIENT OUTREACH (OUTPATIENT)
Dept: ADMINISTRATIVE | Facility: HOSPITAL | Age: 80
End: 2025-06-13
Payer: MEDICARE

## 2025-06-13 NOTE — PROGRESS NOTES
Population Health Chart Review & Patient Outreach Details    Updates Requested / Reviewed:      Updated Care Coordination Note, Care Everywhere, and Immunizations Reconciliation Completed or Queried: [place]         Health Maintenance Topics Overdue:      [facility] Score: 1     Eye Exam                       Health Maintenance Topic(s) Outreach Outcomes & Actions Taken:    Eye Exam - Outreach Outcomes & Actions Taken  : External Records Requested & Care Team Updated if Applicable Dr. Dillard

## 2025-06-13 NOTE — LETTER
AUTHORIZATION FOR RELEASE OF   CONFIDENTIAL INFORMATION    Dear Dr. Dillard,    We are seeing Janak Booker, date of birth 1945, in the clinic at [facility]. See Quach MD is the patient's PCP. Janak Booker has an outstanding lab/procedure at the time we reviewed his chart. In order to help keep his health information updated, he has authorized us to request the following medical record(s):       Diabetic EYE EXAM    2024 - 2025 (Most Recent)       Please include the actual eye exam report along with the significant findings:    ________ No Diabetic Retinopathy  ________ Minimal Background Diabetic Retinopathy  ________ Moderate to Severe Background Diabetic Retinopathy  ________ Clinically Significant Macular Edema  ________ Proliferative Diabetic Retinopathy          Please fax records to [facility], Naccari, Craig P, MD,  at [phone] or email to [email or web address].      If you have any questions, please contact [facility] at [phone].       Patient Name: Janak Booker  : 1945  Patient Phone #: [phone]                Janak Booker  MRN: [id number]  : 1945  Age: 79 y.o.  Sex: male         Patient/Legal Guardian Signature  This signature was collected at 2025           _______________________________   Printed Name/Relationship to Patient      Consent for Examination and Treatment: I hereby authorize the providers and employees of [facility] ([facility]) to provide medical treatment/services which includes, but is not limited to, performing and administering tests and diagnostic procedures that are deemed necessary, including, but not limited to, imaging examinations, blood tests and other laboratory procedures as may be required by the hospital, clinic, or may be ordered by my physician(s) or persons working under the general and/or special instructions of my physician(s).      I understand and agree that this consent covers all  authorized persons, including but not limited to physicians, residents, nurse practitioners, physicians' assistants, specialists, consultants, student nurses, and independently contracted physicians, who are called upon by the physician in charge, to carry out the diagnostic procedures and medical or surgical treatment.     I hereby authorize [facility] to retain or dispose of any specimens or tissue, should there be such remaining from any test or procedure.     I hereby authorize and give consent for [facility] providers and employees to take photographs, images or videotapes of such diagnostic, surgical or treatment procedures of Patient as may be required by [facility] or as may be ordered by a physician. I further acknowledge and agree that [facility] may use cameras or other devices for patient monitoring.     I am aware that the practice of medicine is not an exact science, and I acknowledge that no guarantees have been made to me as to the outcome of any tests, procedures or treatment.     Authorization for Release of Information: I understand that my insurance company and/or their agents may need information necessary to make determinations about payment/reimbursement. I hereby provide authorization to release to all insurance companies, their successors, assignees, other parties with whom they may have contracted, or others acting on their behalf, that are involved with payment for any hospital and/or clinic charges incurred by the patient, any information that they request and deem necessary for payment/reimbursement, and/or quality review.  I further authorize the release of my health information to physicians or other health care practitioners on staff who are involved in my health care now and in the future, and to other health care providers, entities, or institutions for the purpose of my continued care and treatment, including referrals.     REGISTRATION AUTHORIZATION  Form No. 69678 (Rev. 3/25/2024)    Page 1  of 3                       Medicare Patient's Certification and Authorization to Release Information and Payment Request:  I certify that the information given by me in applying for payment under Title XVIII of the Social Security Act is correct. I authorize any rowe of medical or other information about me to release to the Social SecurityAdministration, or its intermediaries or carriers, any information needed for this or a related Medicare claim. I request that payment of authorized benefits be made on my behalf.     Assignment of Insurance Benefits:   I hereby authorize any and all insurance companies, health plans, defined   benefit plans, health insurers or any entity that is or may be responsible for payment of my medical expenses to pay all hospital and medical benefits now due, and to become due and payable to me under any hospital benefits, sick benefits, injury benefits or any other benefit for services rendered to me, including Major Medical Benefits, direct to [facility] and all independently contracted physicians. I assign any and all rights that I may have against any and all insurance companies, health plans, defined benefit plans, health insurers or any entity that is or may be responsible for payment of my medical expenses, including, but not limited to any right to appeal a denial of a claim, any right to bring any action, lawsuit, administrative proceeding, or other cause of action on my behalf. I specifically assign my right to pursue litigation against any and all insurance companies, health plans, defined benefit plans, health insurers or any entity that is or may be responsible for payment of my medical expenses based upon a refusal to pay charges.            E. Valuables: It is understood and agreed that [facility] is not liable for the damage to or loss of any money, jewelry,   documents, dentures, eye glasses, hearing aids, prosthetics, or other property of value.     F. Computer Equipment: I  understand and agree that should I choose to use computer equipment owned by [facility] or if I choose to access the Internet via [facility]s network, I do so at my own risk. [facility] is not responsible for any damage to my computer equipment or to any damages of any type that might arise from my loss of equipment or data.     G. Acceptance of Financial Responsibility:  I agree that in consideration of the services and   supplies that have been   or will be furnished to the patient, I am hereby obligated to pay all charges made for or on the account of the patient according to the standard rates (in effect at the time the services and supplies are delivered) established by [facility], including its Patient Financial Assistance Policy to the extent it is applicable. I understand that I am responsible for all charges, or portions thereof, not covered by insurance or other sources. Patient refunds will be distributed only after balances at all [facility] facilities are paid.     H. Communication Authorization:  I hereby authorize [facility] and its representatives, along with any billing service   or  who may work on their behalf, to contact me on   my cell phone and/or home phone using pre- recorded messages, artificial voice messages, automatic telephone dialing devices or other computer assisted technology, or by electronic      mail, text messaging, or by any other form of electronic communication. This includes, but is not limited to, appointment reminders, yearly physical exam reminders, preventive care reminders, patient campaigns, welcome calls, and calls about account balances on my account or any account on which I am listed as a guarantor. I understand I have the right to opt out of these communications at any time.      Relationship  Between  Facility and  Provider:      I understand that some, but not all, providers furnishing services to the patient are not employees or agents of [facility]. The patient is  under the care and supervision of his/her attending physician, and it is the responsibility of the facility and its nursing staff to carry out the instructions of such physicians. It is the responsibility of the patient's physician/designee to obtain the patient's informed consent, when required, for medical or surgical treatment, special diagnostic or therapeutic procedures, or hospital services rendered for the patient under the special instructions of the physician/designee.           REGISTRATION AUTHORIZATION  Form No. 61827 (Rev. 3/25/2024)    Page 2 of 3                       Immunizations: [facility] shares immunization information with state sponsored health departments to help you and your doctor keep track of your immunization records. By signing, you consent to have this information shared with the health department in your state:                                [place] - LINKS ([place] Immunization Network for Kids Statewide)                                [place] - MIIX ([place] Immunization Information eXchange)                                [place] - ImmPRINT (Immunization Patient Registry with Integrated Technology)     TERM: This authorization is valid for this and subsequent care/treatment I receive at [facility] and will remain valid unless/until revoked in writing by me.     [facility]: As used in this document, [facility] means all [facility] owned and managed facilities, including, but not limited to, all health centers, surgery centers, clinics, urgent care centers, and hospitals.         [facility] complies with applicable Federal civil rights laws and does not discriminate on the basis of race, color, national origin, age, disability, or sex.  ATENCIÓN: si habla español, tiene a white disposición servicios gratuitos de asistencia lingüística. Kayli salcedo [phone].  [facility] Ý: N?u b?n nói Ti?ng Vi?t, có các d?ch v? h? tr? ngôn ng? mi?n phí dành cho b?n. G?i s?  [phone].        REGISTRATION AUTHORIZATION  Form No. 90497 (Rev. 3/25/2024)   Page 3 of 3

## 2025-06-16 DIAGNOSIS — N18.30 CONTROLLED TYPE 2 DIABETES MELLITUS WITH STAGE 3 CHRONIC KIDNEY DISEASE, WITHOUT LONG-TERM CURRENT USE OF INSULIN: ICD-10-CM

## 2025-06-16 DIAGNOSIS — E11.22 CONTROLLED TYPE 2 DIABETES MELLITUS WITH STAGE 3 CHRONIC KIDNEY DISEASE, WITHOUT LONG-TERM CURRENT USE OF INSULIN: ICD-10-CM

## 2025-06-16 RX ORDER — SEMAGLUTIDE 1.34 MG/ML
1 INJECTION, SOLUTION SUBCUTANEOUS
Qty: 3 ML | Refills: 11 | Status: CANCELLED | OUTPATIENT
Start: 2025-06-16 | End: 2026-06-16

## 2025-06-16 RX ORDER — OMEPRAZOLE 40 MG/1
40 CAPSULE, DELAYED RELEASE ORAL EVERY MORNING
Qty: 90 CAPSULE | Refills: 3 | Status: CANCELLED | OUTPATIENT
Start: 2025-06-16

## 2025-06-16 NOTE — TELEPHONE ENCOUNTER
I spoke with pharmacy via phone refills for both ozempic and omeprazole at the pharmacy pt will need to call abd request.    Pt aware, advised to call pharmacy for rx. Nof hugother question.

## 2025-06-16 NOTE — TELEPHONE ENCOUNTER
No care due was identified.  [facility] Embedded Care Due Messages. Reference number: [id number].   6/16/2025 4:25:06 PM CDT

## 2025-06-17 RX ORDER — ROSUVASTATIN CALCIUM 10 MG/1
10 TABLET, COATED ORAL NIGHTLY
Qty: 90 TABLET | Refills: 1 | Status: SHIPPED | OUTPATIENT
Start: 2025-06-17

## 2025-07-02 ENCOUNTER — HOSPITAL ENCOUNTER (OUTPATIENT)
Facility: HOSPITAL | Age: 80
Discharge: HOME | End: 2025-07-03
Attending: EMERGENCY MEDICINE | Admitting: STUDENT IN AN ORGANIZED HEALTH CARE EDUCATION/TRAINING PROGRAM
Payer: MEDICARE

## 2025-07-02 DIAGNOSIS — R00.2 PALPITATIONS: ICD-10-CM

## 2025-07-02 DIAGNOSIS — R07.9 CHEST PAIN: ICD-10-CM

## 2025-07-02 DIAGNOSIS — I48.91 ATRIAL FIBRILLATION WITH SLOW VENTRICULAR RESPONSE: Primary | ICD-10-CM

## 2025-07-02 DIAGNOSIS — R07.9 ACUTE CHEST PAIN: ICD-10-CM

## 2025-07-02 LAB
ABSOLUTE EOSINOPHIL (SMH): 0.35 K/UL
ABSOLUTE MONOCYTE (SMH): 0.65 K/UL (ref 0.3–1)
ABSOLUTE NEUTROPHIL COUNT (SMH): 3.2 K/UL (ref 1.8–7.7)
ALBUMIN SERPL-MCNC: 3.7 G/DL (ref 3.5–5.2)
ALP SERPL-CCNC: 63 UNIT/L (ref 55–135)
ALT SERPL-CCNC: 13 UNIT/L (ref 10–44)
ANION GAP (SMH): 8 MMOL/L (ref 8–16)
APTT PPP: 45.5 SECONDS (ref 21–32)
AST SERPL-CCNC: 20 UNIT/L (ref 10–40)
BASOPHILS # BLD AUTO: 0.05 K/UL
BASOPHILS NFR BLD AUTO: 0.7 %
BILIRUB SERPL-MCNC: 0.9 MG/DL (ref 0.1–1)
BNP SERPL-MCNC: 168 PG/ML
BUN SERPL-MCNC: 25 MG/DL (ref 8–23)
CALCIUM SERPL-MCNC: 9 MG/DL (ref 8.7–10.5)
CHLORIDE SERPL-SCNC: 105 MMOL/L (ref 95–110)
CHOLEST SERPL-MCNC: 143 MG/DL (ref 120–199)
CHOLEST/HDLC SERPL: 2.8 {RATIO} (ref 2–5)
CO2 SERPL-SCNC: 24 MMOL/L (ref 23–29)
CREAT SERPL-MCNC: 1.3 MG/DL (ref 0.5–1.4)
ERYTHROCYTE [DISTWIDTH] IN BLOOD BY AUTOMATED COUNT: 16.3 % (ref 11.5–14.5)
GFR SERPLBLD CREATININE-BSD FMLA CKD-EPI: 56 ML/MIN/1.73/M2
GLUCOSE SERPL-MCNC: 124 MG/DL (ref 70–110)
HCT VFR BLD AUTO: 40.2 % (ref 40–54)
HDLC SERPL-MCNC: 52 MG/DL (ref 40–75)
HDLC SERPL: 36.4 % (ref 20–50)
HGB BLD-MCNC: 13.5 GM/DL (ref 14–18)
IMM GRANULOCYTES # BLD AUTO: 0.03 K/UL (ref 0–0.04)
IMM GRANULOCYTES NFR BLD AUTO: 0.4 % (ref 0–0.5)
INR PPP: 1.2 (ref 0.8–1.2)
LDLC SERPL CALC-MCNC: 60.4 MG/DL (ref 63–159)
LYMPHOCYTES # BLD AUTO: 2.66 K/UL (ref 1–4.8)
MAGNESIUM SERPL-MCNC: 1.9 MG/DL (ref 1.6–2.6)
MCH RBC QN AUTO: 30 PG (ref 27–31)
MCHC RBC AUTO-ENTMCNC: 33.6 G/DL (ref 32–36)
MCV RBC AUTO: 89 FL (ref 82–98)
NONHDLC SERPL-MCNC: 91 MG/DL
NUCLEATED RBC (/100WBC) (SMH): 0 /100 WBC
PLATELET # BLD AUTO: 139 K/UL (ref 150–450)
PMV BLD AUTO: 10.5 FL (ref 9.2–12.9)
POCT GLUCOSE: 129 MG/DL (ref 70–110)
POTASSIUM SERPL-SCNC: 4.3 MMOL/L (ref 3.5–5.1)
PROT SERPL-MCNC: 6.2 GM/DL (ref 6–8.4)
PROTHROMBIN TIME: 12.7 SECONDS (ref 9–12.5)
RBC # BLD AUTO: 4.5 M/UL (ref 4.6–6.2)
RELATIVE EOSINOPHIL (SMH): 5 % (ref 0–8)
RELATIVE LYMPHOCYTE (SMH): 38.3 % (ref 18–48)
RELATIVE MONOCYTE (SMH): 9.4 % (ref 4–15)
RELATIVE NEUTROPHIL (SMH): 46.2 % (ref 38–73)
SODIUM SERPL-SCNC: 137 MMOL/L (ref 136–145)
TRIGL SERPL-MCNC: 153 MG/DL (ref 30–150)
TROPONIN HIGH SENSITIVE (SMH): 6.7 PG/ML
TSH SERPL-ACNC: 3.5 UIU/ML (ref 0.34–5.6)
WBC # BLD AUTO: 6.95 K/UL (ref 3.9–12.7)

## 2025-07-02 PROCEDURE — 83880 ASSAY OF NATRIURETIC PEPTIDE: CPT | Performed by: EMERGENCY MEDICINE

## 2025-07-02 PROCEDURE — 84443 ASSAY THYROID STIM HORMONE: CPT

## 2025-07-02 PROCEDURE — 93005 ELECTROCARDIOGRAM TRACING: CPT | Performed by: GENERAL PRACTICE

## 2025-07-02 PROCEDURE — 87389 HIV-1 AG W/HIV-1&-2 AB AG IA: CPT | Performed by: EMERGENCY MEDICINE

## 2025-07-02 PROCEDURE — 80053 COMPREHEN METABOLIC PANEL: CPT | Performed by: EMERGENCY MEDICINE

## 2025-07-02 PROCEDURE — G0378 HOSPITAL OBSERVATION PER HR: HCPCS

## 2025-07-02 PROCEDURE — 83735 ASSAY OF MAGNESIUM: CPT | Performed by: EMERGENCY MEDICINE

## 2025-07-02 PROCEDURE — 99285 EMERGENCY DEPT VISIT HI MDM: CPT | Mod: 25

## 2025-07-02 PROCEDURE — 85025 COMPLETE CBC W/AUTO DIFF WBC: CPT | Performed by: EMERGENCY MEDICINE

## 2025-07-02 PROCEDURE — 25000003 PHARM REV CODE 250

## 2025-07-02 PROCEDURE — 84484 ASSAY OF TROPONIN QUANT: CPT | Performed by: EMERGENCY MEDICINE

## 2025-07-02 PROCEDURE — 85610 PROTHROMBIN TIME: CPT | Performed by: EMERGENCY MEDICINE

## 2025-07-02 PROCEDURE — 25000003 PHARM REV CODE 250: Performed by: EMERGENCY MEDICINE

## 2025-07-02 PROCEDURE — 87522 HEPATITIS C REVRS TRNSCRPJ: CPT | Performed by: EMERGENCY MEDICINE

## 2025-07-02 PROCEDURE — 86803 HEPATITIS C AB TEST: CPT | Performed by: EMERGENCY MEDICINE

## 2025-07-02 PROCEDURE — 85730 THROMBOPLASTIN TIME PARTIAL: CPT | Performed by: EMERGENCY MEDICINE

## 2025-07-02 PROCEDURE — 80061 LIPID PANEL: CPT

## 2025-07-02 PROCEDURE — 93010 ELECTROCARDIOGRAM REPORT: CPT | Mod: ,,, | Performed by: GENERAL PRACTICE

## 2025-07-02 RX ORDER — SODIUM CHLORIDE 0.9 % (FLUSH) 0.9 %
10 SYRINGE (ML) INJECTION EVERY 12 HOURS PRN
Status: DISCONTINUED | OUTPATIENT
Start: 2025-07-02 | End: 2025-07-03 | Stop reason: HOSPADM

## 2025-07-02 RX ORDER — ASPIRIN 81 MG/1
81 TABLET ORAL DAILY
Status: DISCONTINUED | OUTPATIENT
Start: 2025-07-03 | End: 2025-07-03 | Stop reason: HOSPADM

## 2025-07-02 RX ORDER — SODIUM,POTASSIUM PHOSPHATES 280-250MG
2 POWDER IN PACKET (EA) ORAL
Status: DISCONTINUED | OUTPATIENT
Start: 2025-07-02 | End: 2025-07-03 | Stop reason: HOSPADM

## 2025-07-02 RX ORDER — LANOLIN ALCOHOL/MO/W.PET/CERES
800 CREAM (GRAM) TOPICAL
Status: DISCONTINUED | OUTPATIENT
Start: 2025-07-02 | End: 2025-07-03 | Stop reason: HOSPADM

## 2025-07-02 RX ORDER — ATORVASTATIN CALCIUM 40 MG/1
40 TABLET, FILM COATED ORAL DAILY
Status: DISCONTINUED | OUTPATIENT
Start: 2025-07-03 | End: 2025-07-03 | Stop reason: HOSPADM

## 2025-07-02 RX ORDER — ALUMINUM HYDROXIDE, MAGNESIUM HYDROXIDE, AND SIMETHICONE 1200; 120; 1200 MG/30ML; MG/30ML; MG/30ML
30 SUSPENSION ORAL 4 TIMES DAILY PRN
Status: DISCONTINUED | OUTPATIENT
Start: 2025-07-02 | End: 2025-07-03 | Stop reason: HOSPADM

## 2025-07-02 RX ORDER — FUROSEMIDE 40 MG/1
40 TABLET ORAL DAILY
Status: COMPLETED | OUTPATIENT
Start: 2025-07-03 | End: 2025-07-03

## 2025-07-02 RX ORDER — MUPIROCIN 20 MG/G
1 OINTMENT TOPICAL 3 TIMES DAILY
COMMUNITY
Start: 2025-06-29

## 2025-07-02 RX ORDER — MORPHINE SULFATE 4 MG/ML
4 INJECTION, SOLUTION INTRAMUSCULAR; INTRAVENOUS EVERY 4 HOURS PRN
Refills: 0 | Status: DISCONTINUED | OUTPATIENT
Start: 2025-07-02 | End: 2025-07-03 | Stop reason: HOSPADM

## 2025-07-02 RX ORDER — IBUPROFEN 200 MG
16 TABLET ORAL
Status: DISCONTINUED | OUTPATIENT
Start: 2025-07-02 | End: 2025-07-03 | Stop reason: HOSPADM

## 2025-07-02 RX ORDER — GLUCAGON 1 MG
1 KIT INJECTION
Status: DISCONTINUED | OUTPATIENT
Start: 2025-07-02 | End: 2025-07-03 | Stop reason: HOSPADM

## 2025-07-02 RX ORDER — CEPHALEXIN 250 MG/1
500 CAPSULE ORAL 4 TIMES DAILY
Status: DISCONTINUED | OUTPATIENT
Start: 2025-07-03 | End: 2025-07-03 | Stop reason: HOSPADM

## 2025-07-02 RX ORDER — NALOXONE HCL 0.4 MG/ML
0.02 VIAL (ML) INJECTION
Status: DISCONTINUED | OUTPATIENT
Start: 2025-07-02 | End: 2025-07-03 | Stop reason: HOSPADM

## 2025-07-02 RX ORDER — PANTOPRAZOLE SODIUM 40 MG/1
40 TABLET, DELAYED RELEASE ORAL
Status: DISCONTINUED | OUTPATIENT
Start: 2025-07-03 | End: 2025-07-03 | Stop reason: HOSPADM

## 2025-07-02 RX ORDER — NAPROXEN SODIUM 220 MG/1
162 TABLET, FILM COATED ORAL
Status: COMPLETED | OUTPATIENT
Start: 2025-07-02 | End: 2025-07-02

## 2025-07-02 RX ORDER — LISINOPRIL 5 MG/1
5 TABLET ORAL DAILY
Status: DISCONTINUED | OUTPATIENT
Start: 2025-07-03 | End: 2025-07-03 | Stop reason: HOSPADM

## 2025-07-02 RX ORDER — ACETAMINOPHEN 325 MG/1
650 TABLET ORAL EVERY 8 HOURS PRN
Status: DISCONTINUED | OUTPATIENT
Start: 2025-07-02 | End: 2025-07-02

## 2025-07-02 RX ORDER — NITROGLYCERIN 0.4 MG/1
0.4 TABLET SUBLINGUAL EVERY 5 MIN PRN
Status: DISCONTINUED | OUTPATIENT
Start: 2025-07-02 | End: 2025-07-03 | Stop reason: HOSPADM

## 2025-07-02 RX ORDER — ACETAMINOPHEN 500 MG
1000 TABLET ORAL EVERY 6 HOURS PRN
Status: DISCONTINUED | OUTPATIENT
Start: 2025-07-02 | End: 2025-07-03 | Stop reason: HOSPADM

## 2025-07-02 RX ORDER — IBUPROFEN 200 MG
24 TABLET ORAL
Status: DISCONTINUED | OUTPATIENT
Start: 2025-07-02 | End: 2025-07-03 | Stop reason: HOSPADM

## 2025-07-02 RX ORDER — CEPHALEXIN 500 MG/1
500 CAPSULE ORAL 4 TIMES DAILY
COMMUNITY
Start: 2025-06-29 | End: 2025-07-10

## 2025-07-02 RX ORDER — INSULIN ASPART 100 [IU]/ML
0-5 INJECTION, SOLUTION INTRAVENOUS; SUBCUTANEOUS EVERY 6 HOURS PRN
Status: DISCONTINUED | OUTPATIENT
Start: 2025-07-02 | End: 2025-07-03 | Stop reason: HOSPADM

## 2025-07-02 RX ORDER — FENOFIBRATE 145 MG/1
145 TABLET, FILM COATED ORAL DAILY
Status: DISCONTINUED | OUTPATIENT
Start: 2025-07-03 | End: 2025-07-03 | Stop reason: HOSPADM

## 2025-07-02 RX ORDER — DABIGATRAN ETEXILATE 150 MG/1
150 CAPSULE ORAL 2 TIMES DAILY
Status: DISCONTINUED | OUTPATIENT
Start: 2025-07-02 | End: 2025-07-03 | Stop reason: HOSPADM

## 2025-07-02 RX ORDER — HYDROCODONE BITARTRATE AND ACETAMINOPHEN 5; 325 MG/1; MG/1
1 TABLET ORAL EVERY 6 HOURS PRN
Refills: 0 | Status: DISCONTINUED | OUTPATIENT
Start: 2025-07-02 | End: 2025-07-03 | Stop reason: HOSPADM

## 2025-07-02 RX ORDER — AMOXICILLIN 250 MG
1 CAPSULE ORAL DAILY
Status: DISCONTINUED | OUTPATIENT
Start: 2025-07-03 | End: 2025-07-03 | Stop reason: HOSPADM

## 2025-07-02 RX ORDER — GLUCAGON 1 MG
1 KIT INJECTION
Status: DISCONTINUED | OUTPATIENT
Start: 2025-07-02 | End: 2025-07-02

## 2025-07-02 RX ORDER — TALC
6 POWDER (GRAM) TOPICAL NIGHTLY PRN
Status: DISCONTINUED | OUTPATIENT
Start: 2025-07-02 | End: 2025-07-03 | Stop reason: HOSPADM

## 2025-07-02 RX ORDER — ONDANSETRON HYDROCHLORIDE 2 MG/ML
4 INJECTION, SOLUTION INTRAVENOUS EVERY 6 HOURS PRN
Status: DISCONTINUED | OUTPATIENT
Start: 2025-07-02 | End: 2025-07-03 | Stop reason: HOSPADM

## 2025-07-02 RX ORDER — LORATADINE 10 MG/1
10 TABLET ORAL DAILY
COMMUNITY
Start: 2025-06-29

## 2025-07-02 RX ADMIN — DABIGATRAN ETEXILATE 150 MG: 150 CAPSULE ORAL at 10:07

## 2025-07-02 RX ADMIN — ASPIRIN 81 MG CHEWABLE TABLET 162 MG: 81 TABLET CHEWABLE at 07:07

## 2025-07-02 NOTE — ED PROVIDER NOTES
Encounter Date: 7/2/2025       History     Chief Complaint   Patient presents with    Palpitations    Chest Pain     This is a 79-year-old male with history of AFib, coronary artery disease, diabetes, hypertension, hyperlipidemia, CHF, obstructive sleep apnea comes in complaining of palpitations and chest pain.  Patient reports that symptoms started a few hours prior to arrival.  He was sitting down.  He began to have chest heaviness.  He reports that it was a different feeling than one he has had before.  He took 2 nitroglycerin and 2 baby aspirins.  Patient reports that he feels improved at present.  Symptoms were unrelated to rest or exertion.  He denies any recent chest pain.  He reports a remote history of cardiac stents and history of TAVR.  He is on anticoagulation.      Review of patient's allergies indicates:   Allergen Reactions    Neurontin [gabapentin] Swelling     Leg swelling     Past Medical History:   Diagnosis Date    Anticoagulant long-term use     Aortic stenosis     Arthritis     Atrial fibrillation     CAD (coronary artery disease)     Colon polyps     per colonoscopy 9/11/2014    Diabetes mellitus, type 2     Disc displacement, lumbar     L3    Family history of prostate cancer     GERD (gastroesophageal reflux disease)     Heel bone fracture     left foot    Hyperlipidemia LDL goal < 70     Hypertension     NYHA class 3 heart failure with preserved ejection fraction 10/19/2022    Renal manifestation of secondary diabetes mellitus     Sleep apnea     USES MACHINE    Spinal stenosis, lumbar      Past Surgical History:   Procedure Laterality Date    BACK SURGERY      CARDIAC SURGERY      stents     CARPAL TUNNEL RELEASE Right     CATARACT EXTRACTION W/  INTRAOCULAR LENS IMPLANT Bilateral     COLONOSCOPY N/A 3/27/2018    Procedure: COLONOSCOPY;  Surgeon: Rishi Garcia MD;  Location: [facility];  Service: Endoscopy;  Laterality: N/A;    COLONOSCOPY N/A 2/15/2021    Procedure: COLONOSCOPY;   Surgeon: Rishi Garcia MD;  Location: [facility] ENDO;  Service: Endoscopy;  Laterality: N/A;    COLONOSCOPY N/A 6/23/2023    Procedure: COLONOSCOPY;  Surgeon: Rishi Garcia MD;  Location: [facility] ENDO;  Service: Endoscopy;  Laterality: N/A;    CORONARY ANGIOPLASTY WITH STENT PLACEMENT      x 3    EYE SURGERY      INJECTION OF FACET JOINT N/A 5/14/2019    Procedure: INJECTION, FACET JOINT INJECTION (LUMBAR BLOCK) PLEASE INJECT L3/4;  Surgeon: Catrachito Harley MD;  Location: [facility] PAIN MGT;  Service: Pain Management;  Laterality: N/A;  NEEDS CONSENT, PRADAXA CLEARANCE IN CHART    KNEE ARTHROSCOPY W/ MENISCECTOMY  12/22/2008    right    LEFT HEART CATHETERIZATION Left 9/7/2022    Procedure: Left heart cath;  Surgeon: Adonay Quinones MD;  [facility];  Service: Cardiology;  Laterality: Left;    LUMBAR DISCECTOMY  12/2011    L4-L5 Fusion    LUMBAR EPIDURAL INJECTION  02/04/2019    ROTATOR CUFF REPAIR Left 7/2012    SHOULDER OPEN ROTATOR CUFF REPAIR      SKIN CANCER FOREHEAD 2019      SPINE SURGERY      TIBIA FRACTURE SURGERY      TRANSCATHETER AORTIC VALVE REPLACEMENT (TAVR) Bilateral 10/19/2022    Procedure: REPLACEMENT, AORTIC VALVE, TRANSCATHETER (TAVR);  Surgeon: Adonay Quinones MD;  Location: [facility] CATH;  Service: Cardiology;  Laterality: Bilateral;    TRANSCATHETER AORTIC VALVE REPLACEMENT (TAVR) Bilateral 10/19/2022    Procedure: REPLACEMENT, AORTIC VALVE, TRANSCATHETER (TAVR);  Surgeon: Denver Osborne MD;  Location: [facility] CATH;  Service: Cardiothoracic;  Laterality: Bilateral;    TRANSFORAMINAL EPIDURAL INJECTION OF STEROID Left 9/23/2019    Procedure: INJECTION, STEROID, EPIDURAL, TRANSFORAMINAL APPROACH;  Surgeon: Jose Guadalupe Linn MD;  Location: [facility] PAIN MGT;  Service: Pain Management;  Laterality: Left;  Left TF ADI L4 and L5  OK to hold Pradaxa    TRANSFORAMINAL EPIDURAL INJECTION OF STEROID Left 11/14/2019    Procedure: INJECTION, STEROID, EPIDURAL, TRANSFORAMINAL APPROACH;  Surgeon: Jose Guadalupe Linn MD;   Location: [facility] PAIN T;  Service: Pain Management;  Laterality: Left;  Left TF ADI L4-5 and L5-S1     Family History   Problem Relation Name Age of Onset    Heart failure Father      Heart disease Father          MI    Prostate cancer Father      Heart failure Mother      Heart disease Mother      No Known Problems Sister Abby     No Known Problems Daughter Ayaka     No Known Problems Son Kosta     No Known Problems Daughter Junito      Social History[1]  Review of Systems   Constitutional:  Negative for chills and fever.   HENT:  Negative for congestion, sore throat and trouble swallowing.    Respiratory:  Positive for shortness of breath. Negative for cough.    Cardiovascular:  Positive for chest pain and palpitations.   Gastrointestinal:  Negative for abdominal pain, diarrhea, nausea and vomiting.   Genitourinary:  Negative for dysuria and flank pain.   Musculoskeletal:  Negative for back pain and neck pain.   Neurological:  Negative for weakness, numbness and headaches.   Psychiatric/Behavioral:  Negative for agitation and confusion.    All other systems reviewed and are negative.      Physical Exam     Initial Vitals [07/02/25 1839]   BP Pulse Resp Temp SpO2   (!) 160/89 60 19 97.7 °F (36.5 °C) 98 %      MAP       --         Physical Exam    Nursing note and vitals reviewed.  Constitutional: Vital signs are normal. He appears well-developed and well-nourished.  Non-toxic appearance. No distress.   HENT:   Head: Normocephalic and atraumatic. Mouth/Throat: Oropharynx is clear and moist.   Eyes: EOM are normal. Pupils are equal, round, and reactive to light.   Neck: Neck supple.   Normal range of motion.  Cardiovascular:  Intact distal pulses.           Irregularly irregular   Pulmonary/Chest: Breath sounds normal. He has no wheezes.   Abdominal: Abdomen is soft. Bowel sounds are normal. There is no abdominal tenderness.   Musculoskeletal:         General: No tenderness or edema. Normal range of motion.       Cervical back: Normal range of motion and neck supple. No rigidity. No muscular tenderness.     Lymphadenopathy:     He has no cervical adenopathy.     He has no axillary adenopathy.   Neurological: He is alert and oriented to person, place, and time. He has normal strength. No cranial nerve deficit or sensory deficit. Gait normal.   Skin: Skin is warm, dry and intact.   Psychiatric: He has a normal mood and affect. His behavior is normal.         ED Course   Procedures  Labs Reviewed   COMPREHENSIVE METABOLIC PANEL - Abnormal       Result Value    Sodium 137      Potassium 4.3      Chloride 105      CO2 24      Glucose 124 (*)     BUN 25 (*)     Creatinine 1.3      Calcium 9.0      Protein Total 6.2      Albumin 3.7      Bilirubin Total 0.9      ALP 63      AST 20      ALT 13      Anion Gap 8      eGFR 56 (*)    B-TYPE NATRIURETIC PEPTIDE - Abnormal     (*)    CBC WITH DIFFERENTIAL - Abnormal    WBC 6.95      RBC 4.50 (*)     Hgb 13.5 (*)     Hct 40.2      MCV 89      MCH 30.0      MCHC 33.6      RDW 16.3 (*)     Platelet Count 139 (*)     MPV 10.5      Nucleated RBC 0      Neut % 46.2      Lymph % 38.3      Mono % 9.4      Eos % 5.0      Basophil % 0.7      Imm Grans % 0.4      Neut # 3.2      Lymph # 2.66      Mono # 0.65      Eos # 0.35      Baso # 0.05      Imm Grans # 0.03     MAGNESIUM - Normal    Magnesium 1.9     TROPONIN I HIGH SENSITIVITY - Normal    Troponin High Sensitive 6.7     CBC W/ AUTO DIFFERENTIAL    Narrative:     The following orders were created for panel order CBC auto differential.  Procedure                               Abnormality         Status                     ---------                               -----------         ------                     CBC with Differential[[id number]]       Abnormal            Final result                 Please view results for these tests on the individual orders.   PROTIME-INR   APTT   HEPATITIS C ANTIBODY   HEP C VIRUS HOLD SPECIMEN   HIV 1 / 2  ANTIBODY   HIV VIRUS CONFIRMATION HOLD SPECIMEN     EKG Readings: (Independently Interpreted)   EKG was independently reviewed by me contemporaneously with patient care  Time: 6:31 p.m.  Rate: 59 beats per minute  Atrial fibrillation with slow ventricular response  Unchanged from prior       Imaging Results              X-Ray Chest 1 View (Final result)  Result time 07/02/25 20:21:08      Final result by Eagle Connolly MD (07/02/25 20:21:08)                   Impression:      As above      Electronically signed by: Eagle Connolly  Date:    07/02/2025  Time:    20:21               Narrative:    EXAMINATION:  XR CHEST 1 VIEW    CLINICAL HISTORY:  Chest pain, unspecified    TECHNIQUE:  Single frontal view of the chest was performed.    COMPARISON:  11/24/2018    FINDINGS:  Enlarged cardiac silhouette.  Bibasilar scarring or atelectasis.  No definite focal consolidation.                                       Medications   aspirin chewable tablet 162 mg (162 mg Oral Given 7/2/25 1938)     Medical Decision Making  This is a 79-year-old male with history of AFib, coronary artery disease, diabetes, hypertension, hyperlipidemia, CHF, obstructive sleep apnea comes in complaining of palpitations and chest pain.  On evaluation patient's vitals are stable.  He is alert and oriented.  He is neurologically intact.    Orders included EKG, CBC, CMP, troponin, BNP, chest x-ray.  He was given a full-dose aspirin.    Comorbidities contributing to patient's presentation include history of coronary artery disease, CHF, AFib, diabetes, hypertension, hyperlipidemia, obstructive sleep apnea.  Acute exacerbation of chronic illness: Patient comes in with chest pain.    I reviewed patient's external medical notes.  Patient had a TAVR in October of last year.    Differential diagnosis includes ACS, PE, arrhythmia, electrolyte abnormality, aortic dissection.    Amount and/or Complexity of Data Reviewed  Independent Historian:       Details: Patient's wife is present and is an independent historian.  External Data Reviewed: labs, radiology, ECG and notes.     Details: As detailed above.  Labs: ordered.  Radiology: ordered and independent interpretation performed.     Details: Chest x-ray was independently reviewed by me and showed cardiomegaly with no infiltrate.  ECG/medicine tests: ordered and independent interpretation performed. Decision-making details documented in ED Course.  Discussion of management or test interpretation with external provider(s): Case was discussed with hospitalist NP who will admit the patient.    Risk  OTC drugs.  Prescription drug management.  Decision regarding hospitalization.  Risk Details: MDM continued:  Patient's workup is concerning for ACS in the setting of his age and multiple comorbidities and known coronary artery disease history.  He will be admitted for rule out.                                      Clinical Impression:  Final diagnoses:  [R00.2] Palpitations  [R07.9] Acute chest pain          ED Disposition Condition    Observation                       [1]   Social History  Tobacco Use    Smoking status: Never     Passive exposure: Never    Smokeless tobacco: Never   Substance Use Topics    Alcohol use: Yes     Alcohol/week: 5.0 standard drinks of alcohol     Types: 6 Standard drinks or equivalent per week     Comment: occasionally    Drug use: No        Lucie Hendrickson MD  07/02/25 [id number]

## 2025-07-03 ENCOUNTER — CLINICAL SUPPORT (OUTPATIENT)
Dept: CARDIOLOGY | Facility: HOSPITAL | Age: 80
End: 2025-07-03
Attending: EMERGENCY MEDICINE
Payer: MEDICARE

## 2025-07-03 VITALS — BODY MASS INDEX: 32.46 KG/M2 | HEIGHT: 73 IN | WEIGHT: 244.94 LBS

## 2025-07-03 VITALS
BODY MASS INDEX: 32.47 KG/M2 | DIASTOLIC BLOOD PRESSURE: 93 MMHG | HEART RATE: 58 BPM | WEIGHT: 245 LBS | OXYGEN SATURATION: 96 % | SYSTOLIC BLOOD PRESSURE: 156 MMHG | TEMPERATURE: 98 F | RESPIRATION RATE: 18 BRPM | HEIGHT: 73 IN

## 2025-07-03 LAB
ABSOLUTE EOSINOPHIL (SMH): 0.33 K/UL
ABSOLUTE MONOCYTE (SMH): 0.62 K/UL (ref 0.3–1)
ABSOLUTE NEUTROPHIL COUNT (SMH): 2.8 K/UL (ref 1.8–7.7)
ALBUMIN SERPL-MCNC: 3.6 G/DL (ref 3.5–5.2)
ALP SERPL-CCNC: 59 UNIT/L (ref 55–135)
ALT SERPL-CCNC: 14 UNIT/L (ref 10–44)
ANION GAP (SMH): 7 MMOL/L (ref 8–16)
APICAL FOUR CHAMBER EJECTION FRACTION: 58 %
APICAL TWO CHAMBER EJECTION FRACTION: 62 %
AST SERPL-CCNC: 16 UNIT/L (ref 10–40)
AV INDEX (PROSTH): 0.75
AV MEAN GRADIENT: 3 MMHG
AV PEAK GRADIENT: 5 MMHG
AV VALVE AREA BY VELOCITY RATIO: 2.3 CM²
AV VALVE AREA: 2.3 CM²
AV VELOCITY RATIO: 0.73
BASOPHILS # BLD AUTO: 0.04 K/UL
BASOPHILS NFR BLD AUTO: 0.6 %
BILIRUB SERPL-MCNC: 1 MG/DL (ref 0.1–1)
BSA FOR ECHO PROCEDURE: 2.39 M2
BUN SERPL-MCNC: 21 MG/DL (ref 8–23)
CALCIUM SERPL-MCNC: 8.9 MG/DL (ref 8.7–10.5)
CHLORIDE SERPL-SCNC: 107 MMOL/L (ref 95–110)
CO2 SERPL-SCNC: 25 MMOL/L (ref 23–29)
CREAT SERPL-MCNC: 1.2 MG/DL (ref 0.5–1.4)
CV ECHO LV RWT: 0.55 CM
CV PHARM DOSE: 0.4 MG
CV STRESS BASE HR: 68 BPM
DIASTOLIC BLOOD PRESSURE: 83 MMHG
DOP CALC AO PEAK VEL: 1.1 M/S
DOP CALC AO VTI: 27.4 CM
DOP CALC LVOT AREA: 3.1 CM2
DOP CALC LVOT DIAMETER: 2 CM
DOP CALC LVOT PEAK VEL: 0.8 M/S
DOP CALC LVOT STROKE VOLUME: 64.4 CM3
DOP CALC MV VTI: 31.7 CM
DOP CALCLVOT PEAK VEL VTI: 20.5 CM
E WAVE DECELERATION TIME: 124 MSEC
E/E' RATIO: 11 M/S
ECHO LV POSTERIOR WALL: 1.4 CM (ref 0.6–1.1)
ERYTHROCYTE [DISTWIDTH] IN BLOOD BY AUTOMATED COUNT: 16 % (ref 11.5–14.5)
FRACTIONAL SHORTENING: 35.3 % (ref 28–44)
GFR SERPLBLD CREATININE-BSD FMLA CKD-EPI: >60 ML/MIN/1.73/M2
GLUCOSE SERPL-MCNC: 115 MG/DL (ref 70–110)
HCT VFR BLD AUTO: 39.7 % (ref 40–54)
HCV AB SERPL QL IA: REACTIVE
HGB BLD-MCNC: 13.1 GM/DL (ref 14–18)
HIV 1+2 AB+HIV1 P24 AG SERPL QL IA: NORMAL
IMM GRANULOCYTES # BLD AUTO: 0.03 K/UL (ref 0–0.04)
IMM GRANULOCYTES NFR BLD AUTO: 0.5 % (ref 0–0.5)
INTERVENTRICULAR SEPTUM: 1.2 CM (ref 0.6–1.1)
IVC DIAMETER: 1.82 CM
LEFT ATRIUM AREA SYSTOLIC (APICAL 2 CHAMBER): 33.5 CM2
LEFT ATRIUM AREA SYSTOLIC (APICAL 4 CHAMBER): 24.2 CM2
LEFT ATRIUM SIZE: 5.1 CM
LEFT ATRIUM VOLUME INDEX MOD: 46 ML/M2
LEFT ATRIUM VOLUME MOD: 109 ML
LEFT INTERNAL DIMENSION IN SYSTOLE: 3.3 CM (ref 2.1–4)
LEFT VENTRICLE DIASTOLIC VOLUME INDEX: 52.34 ML/M2
LEFT VENTRICLE DIASTOLIC VOLUME: 123 ML
LEFT VENTRICLE END DIASTOLIC VOLUME APICAL 2 CHAMBER: 119 ML
LEFT VENTRICLE END DIASTOLIC VOLUME APICAL 4 CHAMBER: 97 ML
LEFT VENTRICLE END SYSTOLIC VOLUME APICAL 2 CHAMBER: 129 ML
LEFT VENTRICLE END SYSTOLIC VOLUME APICAL 4 CHAMBER: 75.5 ML
LEFT VENTRICLE MASS INDEX: 114.9 G/M2
LEFT VENTRICLE SYSTOLIC VOLUME INDEX: 18.3 ML/M2
LEFT VENTRICLE SYSTOLIC VOLUME: 43 ML
LEFT VENTRICULAR INTERNAL DIMENSION IN DIASTOLE: 5.1 CM (ref 3.5–6)
LEFT VENTRICULAR MASS: 270.1 G
LV LATERAL E/E' RATIO: 9.7 M/S
LV SEPTAL E/E' RATIO: 12.6 M/S
LVED V (TEICH): 122.68 ML
LVES V (TEICH): 42.53 ML
LVOT MG: 1 MMHG
LVOT MV: 0.54 CM/S
LYMPHOCYTES # BLD AUTO: 2.54 K/UL (ref 1–4.8)
MAGNESIUM SERPL-MCNC: 1.9 MG/DL (ref 1.6–2.6)
MCH RBC QN AUTO: 29.6 PG (ref 27–31)
MCHC RBC AUTO-ENTMCNC: 33 G/DL (ref 32–36)
MCV RBC AUTO: 90 FL (ref 82–98)
MV MEAN GRADIENT: 2 MMHG
MV PEAK E VEL: 1.26 M/S
MV PEAK GRADIENT: 6 MMHG
MV STENOSIS PRESSURE HALF TIME: 36 MS
MV VALVE AREA BY CONTINUITY EQUATION: 2.03 CM2
MV VALVE AREA P 1/2 METHOD: 6.11 CM2
NUCLEATED RBC (/100WBC) (SMH): 0 /100 WBC
OHS CV CPX 1 MINUTE RECOVERY HEART RATE: 95 BPM
OHS CV CPX 85 PERCENT MAX PREDICTED HEART RATE MALE: 121
OHS CV CPX MAX PREDICTED HEART RATE: 142
OHS CV CPX PATIENT IS FEMALE: 0
OHS CV CPX PATIENT IS MALE: 1
OHS CV CPX PEAK DIASTOLIC BLOOD PRESSURE: 93 MMHG
OHS CV CPX PEAK HEAR RATE: 95 BPM
OHS CV CPX PEAK RATE PRESSURE PRODUCT: NORMAL
OHS CV CPX PEAK SYSTOLIC BLOOD PRESSURE: 152 MMHG
OHS CV CPX PERCENT MAX PREDICTED HEART RATE ACHIEVED: 67
OHS CV CPX RATE PRESSURE PRODUCT PRESENTING: NORMAL
OHS LV EJECTION FRACTION SIMPSONS BIPLANE MOD: 60 %
OHS QRS DURATION: 84 MS
OHS QTC CALCULATION: 411 MS
PISA TR MAX VEL: 2.3 M/S
PLATELET # BLD AUTO: 144 K/UL (ref 150–450)
PMV BLD AUTO: 11.2 FL (ref 9.2–12.9)
POCT GLUCOSE: 125 MG/DL (ref 70–110)
POTASSIUM SERPL-SCNC: 4.2 MMOL/L (ref 3.5–5.1)
PROT SERPL-MCNC: 5.8 GM/DL (ref 6–8.4)
PV MV: 0.52 M/S
PV PEAK GRADIENT: 2 MMHG
PV PEAK VELOCITY: 0.78 M/S
RBC # BLD AUTO: 4.43 M/UL (ref 4.6–6.2)
RELATIVE EOSINOPHIL (SMH): 5.2 % (ref 0–8)
RELATIVE LYMPHOCYTE (SMH): 40.3 % (ref 18–48)
RELATIVE MONOCYTE (SMH): 9.8 % (ref 4–15)
RELATIVE NEUTROPHIL (SMH): 43.6 % (ref 38–73)
RIGHT ATRIUM END SYSTOLIC VOLUME APICAL 4 CHAMBER INDEX BSA: 16.17 ML/M2
RIGHT ATRIUM VOLUME AREA LENGTH APICAL 4 CHAMBER: 38 ML
RV TISSUE DOPPLER FREE WALL SYSTOLIC VELOCITY 1 (APICAL 4 CHAMBER VIEW): 10.9 CM/S
SODIUM SERPL-SCNC: 139 MMOL/L (ref 136–145)
SYSTOLIC BLOOD PRESSURE: 151 MMHG
TDI LATERAL: 0.13 M/S
TDI SEPTAL: 0.1 M/S
TDI: 0.12 M/S
TR MAX PG: 21 MMHG
TRICUSPID ANNULAR PLANE SYSTOLIC EXCURSION: 2.1 CM
TROPONIN HIGH SENSITIVE (SMH): 5.5 PG/ML
TROPONIN HIGH SENSITIVE (SMH): 6 PG/ML
WBC # BLD AUTO: 6.31 K/UL (ref 3.9–12.7)
Z-SCORE OF LEFT VENTRICULAR DIMENSION IN END DIASTOLE: -6.56
Z-SCORE OF LEFT VENTRICULAR DIMENSION IN END SYSTOLE: -4.6

## 2025-07-03 PROCEDURE — G0378 HOSPITAL OBSERVATION PER HR: HCPCS

## 2025-07-03 PROCEDURE — 25000003 PHARM REV CODE 250

## 2025-07-03 PROCEDURE — 36415 COLL VENOUS BLD VENIPUNCTURE: CPT

## 2025-07-03 PROCEDURE — 63600175 PHARM REV CODE 636 W HCPCS: Performed by: FAMILY MEDICINE

## 2025-07-03 PROCEDURE — 84484 ASSAY OF TROPONIN QUANT: CPT

## 2025-07-03 PROCEDURE — 93017 CV STRESS TEST TRACING ONLY: CPT

## 2025-07-03 PROCEDURE — 83735 ASSAY OF MAGNESIUM: CPT

## 2025-07-03 PROCEDURE — 93306 TTE W/DOPPLER COMPLETE: CPT

## 2025-07-03 PROCEDURE — 85025 COMPLETE CBC W/AUTO DIFF WBC: CPT

## 2025-07-03 PROCEDURE — 82040 ASSAY OF SERUM ALBUMIN: CPT

## 2025-07-03 PROCEDURE — 93306 TTE W/DOPPLER COMPLETE: CPT | Mod: 26,,, | Performed by: GENERAL PRACTICE

## 2025-07-03 PROCEDURE — 93016 CV STRESS TEST SUPVJ ONLY: CPT | Mod: ,,, | Performed by: GENERAL PRACTICE

## 2025-07-03 PROCEDURE — 84484 ASSAY OF TROPONIN QUANT: CPT | Mod: 91

## 2025-07-03 PROCEDURE — 93018 CV STRESS TEST I&R ONLY: CPT | Mod: ,,, | Performed by: GENERAL PRACTICE

## 2025-07-03 PROCEDURE — A9502 TC99M TETROFOSMIN: HCPCS | Performed by: FAMILY MEDICINE

## 2025-07-03 RX ORDER — REGADENOSON 0.08 MG/ML
0.4 INJECTION, SOLUTION INTRAVENOUS ONCE
Status: COMPLETED | OUTPATIENT
Start: 2025-07-03 | End: 2025-07-03

## 2025-07-03 RX ADMIN — DABIGATRAN ETEXILATE 150 MG: 150 CAPSULE ORAL at 09:07

## 2025-07-03 RX ADMIN — REGADENOSON 0.4 MG: 0.08 INJECTION, SOLUTION INTRAVENOUS at 01:07

## 2025-07-03 RX ADMIN — CEPHALEXIN 500 MG: 250 CAPSULE ORAL at 12:07

## 2025-07-03 RX ADMIN — TETROFOSMIN 26.9 MILLICURIE: 0.23 INJECTION, POWDER, LYOPHILIZED, FOR SOLUTION INTRAVENOUS at 01:07

## 2025-07-03 RX ADMIN — LISINOPRIL 5 MG: 5 TABLET ORAL at 09:07

## 2025-07-03 RX ADMIN — FENOFIBRATE 145 MG: 145 TABLET, FILM COATED ORAL at 09:07

## 2025-07-03 RX ADMIN — SENNOSIDES AND DOCUSATE SODIUM 1 TABLET: 50; 8.6 TABLET ORAL at 09:07

## 2025-07-03 RX ADMIN — ASPIRIN 81 MG: 81 TABLET ORAL at 09:07

## 2025-07-03 RX ADMIN — FUROSEMIDE 40 MG: 40 TABLET ORAL at 09:07

## 2025-07-03 RX ADMIN — ATORVASTATIN CALCIUM 40 MG: 40 TABLET, FILM COATED ORAL at 09:07

## 2025-07-03 RX ADMIN — CEPHALEXIN 500 MG: 250 CAPSULE ORAL at 09:07

## 2025-07-03 RX ADMIN — TETROFOSMIN 10.5 MILLICURIE: 0.23 INJECTION, POWDER, LYOPHILIZED, FOR SOLUTION INTRAVENOUS at 12:07

## 2025-07-03 RX ADMIN — THERA TABS 1 TABLET: TAB at 09:07

## 2025-07-03 NOTE — PLAN OF CARE
Patient cleared for discharge from case management standpoint.    Follow up appointments scheduled and added to AVS. Spouse to provide transport home     Chart and discharge orders reviewed.  Patient discharged home with no further case management needs.     07/03/25 1528   Final Note   Assessment Type Final Discharge Note   Anticipated Discharge Disposition Home   Hospital Resources/Appts/Education Provided Provided patient/caregiver with written discharge plan information;Appointments scheduled and added to AVS   Post-Acute Status   Discharge Delays None known at this time

## 2025-07-03 NOTE — CONSULTS
[facility]  Department of Cardiology  Consult Note      PATIENT NAME: Janak Booker    MRN: [id number]  TODAY'S DATE: 07/03/2025  ADMIT DATE: 7/2/2025                          CONSULT REQUESTED BY: Bonita Huston DO    SUBJECTIVE     PRINCIPAL PROBLEM: Atrial fibrillation with slow ventricular response    HPI:    Patient is a 79 year old male who presented to the ER with complaints of palpitations, chest heaviness, fatigue and leg weakness. He did take nitro at home and states he thinks it may have helped. He states the chest pain lasted about 30-45 minutes. HS troponin levels were negative, and he was found to be in Afib with HR in the upper 50s. He has known hx of CAD with stents many years ago and TAVR about 3 years ago. He also has hx of HTN, HLD, and DALE. He is free of chest pain now.       REASON FOR CONSULT:  From Hospitalist H&P: Jhony Briceno he is a 79-year-old male with a past medical history of atrial fibrillation on aspirin and Pradaxa, CAD, s/p TAVR, hypertension, hyperlipidemia and obstructive sleep apnea who presents to the ED today with complaints of palpitations. He reports the palpitations began late this afternoon with associated fatigue and lower extremity weakness with chest heaviness. He denies any alleviating or aggravating factors. He reports a long history of Afib but states he normally does not feel palpitations or have these sensations. Symptoms were mostly relieved after taking 2 nitroglycerin and 2 aspirins prior to arrival. He denies any current chest pain but endorses intermittent palpitations. ED workup was positive for atrial fibrillation with slow ventricular response on EKG, and elevated BNP of 168. Hospital Medicine was consulted, and patient is agreeable to hospital admission for further workup, close monitoring and medical management.       Review of patient's allergies indicates:   Allergen Reactions    Neurontin [gabapentin] Swelling     Leg swelling        Past Medical History:   Diagnosis Date    Anticoagulant long-term use     Aortic stenosis     Arthritis     Atrial fibrillation     CAD (coronary artery disease)     Colon polyps     per colonoscopy 9/11/2014    Diabetes mellitus, type 2     Disc displacement, lumbar     L3    Family history of prostate cancer     GERD (gastroesophageal reflux disease)     Heel bone fracture     left foot    Hyperlipidemia LDL goal < 70     Hypertension     NYHA class 3 heart failure with preserved ejection fraction 10/19/2022    Renal manifestation of secondary diabetes mellitus     Sleep apnea     USES MACHINE    Spinal stenosis, lumbar      Past Surgical History:   Procedure Laterality Date    BACK SURGERY      CARDIAC SURGERY      stents     CARPAL TUNNEL RELEASE Right     CATARACT EXTRACTION W/  INTRAOCULAR LENS IMPLANT Bilateral     COLONOSCOPY N/A 3/27/2018    Procedure: COLONOSCOPY;  Surgeon: Rishi Garcia MD;  Location: [facility] ENDO;  Service: Endoscopy;  Laterality: N/A;    COLONOSCOPY N/A 2/15/2021    Procedure: COLONOSCOPY;  Surgeon: Rishi Garcia MD;  Location: [facility] ENDO;  Service: Endoscopy;  Laterality: N/A;    COLONOSCOPY N/A 6/23/2023    Procedure: COLONOSCOPY;  Surgeon: Rishi Garcia MD;  Location: [facility] ENDO;  Service: Endoscopy;  Laterality: N/A;    CORONARY ANGIOPLASTY WITH STENT PLACEMENT      x 3    EYE SURGERY      INJECTION OF FACET JOINT N/A 5/14/2019    Procedure: INJECTION, FACET JOINT INJECTION (LUMBAR BLOCK) PLEASE INJECT L3/4;  Surgeon: Catrachito Harley MD;  Location: [facility] PAIN MGT;  Service: Pain Management;  Laterality: N/A;  NEEDS CONSENT, PRADAXA CLEARANCE IN CHART    KNEE ARTHROSCOPY W/ MENISCECTOMY  12/22/2008    right    LEFT HEART CATHETERIZATION Left 9/7/2022    Procedure: Left heart cath;  Surgeon: Adonay Quinones MD;  Location: [facility] CATH;  Service: Cardiology;  Laterality: Left;    LUMBAR DISCECTOMY  12/2011    L4-L5 Fusion    LUMBAR EPIDURAL INJECTION  02/04/2019    ROTATOR  CUFF REPAIR Left 7/2012    SHOULDER OPEN ROTATOR CUFF REPAIR      SKIN CANCER FOREHEAD 2019      SPINE SURGERY      TIBIA FRACTURE SURGERY      TRANSCATHETER AORTIC VALVE REPLACEMENT (TAVR) Bilateral 10/19/2022    Procedure: REPLACEMENT, AORTIC VALVE, TRANSCATHETER (TAVR);  Surgeon: Adonay Quinones MD;  [facility];  Service: Cardiology;  Laterality: Bilateral;    TRANSCATHETER AORTIC VALVE REPLACEMENT (TAVR) Bilateral 10/19/2022    Procedure: REPLACEMENT, AORTIC VALVE, TRANSCATHETER (TAVR);  Surgeon: Denver Osborne MD;  [facility];  Service: Cardiothoracic;  Laterality: Bilateral;    TRANSFORAMINAL EPIDURAL INJECTION OF STEROID Left 9/23/2019    Procedure: INJECTION, STEROID, EPIDURAL, TRANSFORAMINAL APPROACH;  Surgeon: Jose Guadalupe Linn MD;  Location: [facility] PAIN MGT;  Service: Pain Management;  Laterality: Left;  Left TF ADI L4 and L5  OK to hold Pradaxa    TRANSFORAMINAL EPIDURAL INJECTION OF STEROID Left 11/14/2019    Procedure: INJECTION, STEROID, EPIDURAL, TRANSFORAMINAL APPROACH;  Surgeon: Jose Guadalupe Linn MD;  Location: [facility] PAIN MGT;  Service: Pain Management;  Laterality: Left;  Left TF ADI L4-5 and L5-S1     Social History[1]     REVIEW OF SYSTEMS  Per HPI    OBJECTIVE     VITAL SIGNS (Most Recent)  Temp: 98.1 °F (36.7 °C) (07/03/25 0850)  Pulse: 63 (07/03/25 0850)  Resp: 18 (07/03/25 0850)  BP: (!) 157/80 (07/03/25 0850)  SpO2: 96 % (07/03/25 0850)    VENTILATION STATUS  Resp: 18 (07/03/25 0850)  SpO2: 96 % (07/03/25 0850)           I & O (Last 24H):  Intake/Output Summary (Last 24 hours) at 7/3/2025 1113  Last data filed at 7/3/2025 0800  Gross per 24 hour   Intake 0 ml   Output --   Net 0 ml       WEIGHTS  Wt Readings from Last 1 Encounters:   07/02/25 1839 111.1 kg (245 lb)       PHYSICAL EXAM  CONSTITUTIONAL: No fever, no chills  HEENT: Normocephalic, atraumatic,pupils reactive to light                 NECK:  No JVD no carotid bruit  CVS: S1S2+, RRR  LUNGS: Clear  ABDOMEN: Soft, NT,  BS+  EXTREMITIES: No cyanosis, edema  : No castrejon catheter  NEURO: AAO X 3  PSY: Normal affect      HOME MEDICATIONS:Medications Ordered Prior to Encounter[2]    SCHEDULED MEDS:   aspirin  81 mg Oral Daily    atorvastatin  40 mg Oral Daily    cephALEXin  500 mg Oral QID    dabigatran etexilate  150 mg Oral BID    fenofibrate  145 mg Oral Daily    lisinopriL  5 mg Oral Daily    multivitamin  1 tablet Oral Daily    pantoprazole  40 mg Oral Before breakfast    senna-docusate  1 tablet Oral Daily       CONTINUOUS INFUSIONS:    PRN MEDS:  Current Facility-Administered Medications:     acetaminophen, 1,000 mg, Oral, Q6H PRN    aluminum-magnesium hydroxide-simethicone, 30 mL, Oral, QID PRN    dextrose 50%, 12.5 g, Intravenous, PRN    dextrose 50%, 12.5 g, Intravenous, PRN    dextrose 50%, 25 g, Intravenous, PRN    glucagon (human recombinant), 1 mg, Intramuscular, PRN    glucose, 16 g, Oral, PRN    glucose, 24 g, Oral, PRN    HYDROcodone-acetaminophen, 1 tablet, Oral, Q6H PRN    insulin aspart U-100, 0-5 Units, Subcutaneous, Q6H PRN    magnesium oxide, 800 mg, Oral, PRN    magnesium oxide, 800 mg, Oral, PRN    melatonin, 6 mg, Oral, Nightly PRN    morphine, 4 mg, Intravenous, Q4H PRN    naloxone, 0.02 mg, Intravenous, PRN    nitroGLYCERIN, 0.4 mg, Sublingual, Q5 Min PRN    ondansetron, 4 mg, Intravenous, Q6H PRN    potassium bicarbonate, 35 mEq, Oral, PRN    potassium bicarbonate, 50 mEq, Oral, PRN    potassium bicarbonate, 60 mEq, Oral, PRN    potassium, sodium phosphates, 2 packet, Oral, PRN    potassium, sodium phosphates, 2 packet, Oral, PRN    potassium, sodium phosphates, 2 packet, Oral, PRN    sodium chloride 0.9%, 10 mL, Intravenous, Q12H PRN    LABS AND DIAGNOSTICS     CBC LAST 3 DAYS  Recent Labs   Lab 07/02/25  1938 07/03/25  0409   WBC 6.95 6.31   RBC 4.50* 4.43*   HGB 13.5* 13.1*   HCT 40.2 39.7*   MCV 89 90   MCH 30.0 29.6   MCHC 33.6 33.0   RDW 16.3* 16.0*   * 144*   MPV 10.5 11.2   NRBC 0 0  "      COAGULATION LAST 3 DAYS  Recent Labs   Lab 07/02/25 1938   INR 1.2   APTT 45.5*       CHEMISTRY LAST 3 DAYS  Recent Labs   Lab 07/02/25 1938 07/03/25  0409    139   K 4.3 4.2    107   CO2 24 25   ANIONGAP 8 7*   BUN 25* 21   CREATININE 1.3 1.2   * 115*   CALCIUM 9.0 8.9   MG 1.9 1.9   ALBUMIN 3.7 3.6   PROT 6.2 5.8*   ALKPHOS 63 59   ALT 13 14   AST 20 16   BILITOT 0.9 1.0       CARDIAC PROFILE LAST 3 DAYS  Recent Labs   Lab 07/02/25  1938   *       ENDOCRINE LAST 3 DAYS  Recent Labs   Lab 07/02/25 1938   TSH 3.502       LAST ARTERIAL BLOOD GAS  ABG  No results for input(s): "PH", "PO2", "PCO2", "HCO3", "BE" in the last 168 hours.    LAST 7 DAYS MICROBIOLOGY   Microbiology Results (last 7 days)       ** No results found for the last 168 hours. **            MOST RECENT IMAGING  X-Ray Chest 1 View  Narrative: EXAMINATION:  XR CHEST 1 VIEW    CLINICAL HISTORY:  Chest pain, unspecified    TECHNIQUE:  Single frontal view of the chest was performed.    COMPARISON:  11/24/2018    FINDINGS:  Enlarged cardiac silhouette.  Bibasilar scarring or atelectasis.  No definite focal consolidation.  Impression: As above    Electronically signed by: Eagle Connolly  Date:    07/02/2025  Time:    20:21      ECHOCARDIOGRAM RESULTS (last 5)  Results for orders placed during the hospital encounter of 10/30/24    Echo    Interpretation Summary    Left Ventricle: The left ventricle is normal in size. Mildly increased wall thickness. There is mild concentric hypertrophy. There is normal systolic function. Ejection fraction is approximately 65%. Unable to assess diastolic function due to atrial fibrillation.    Right Ventricle: Normal right ventricular cavity size. Wall thickness is normal. Systolic function is normal.    Left Atrium: Left atrium is moderately dilated.    Aortic Valve: There is a bioprosthetic valve in the aortic position. Aortic valve area by VTI is 1.8 cm². Aortic valve peak velocity " is 1.9 m/s. Mean gradient is 8.0 mmHg. The dimensionless index is 0.57. There is no significant regurgitation.    Mitral Valve: There is mild regurgitation with a centrally directed jet.    Tricuspid Valve: There is mild regurgitation.    IVC/SVC: Normal venous pressure at 3 mmHg.      Results for orders placed during the hospital encounter of 10/17/23    Echo    Interpretation Summary    Left Ventricle: Septal motion is consistent with bundle branch block. There is normal systolic function with a visually estimated ejection fraction of 60 - 65%.    Left Atrium: Left atrium is moderately dilated.    Right Ventricle: Normal right ventricular cavity size. Wall thickness is normal. Right ventricle wall motion  is normal. Systolic function is normal.    Right Atrium: Right atrium is moderately dilated.    Aortic Valve: There is a transcatheter valve replacement in the aortic position. It is reported to be a BadSeed valve. There is no stenosis. Aortic valve area by VTI is 2.71 cm². Aortic valve peak velocity is 1.17 m/s. Mean gradient is 3 mmHg. The dimensionless index is 0.78.    Tricuspid Valve: There is mild regurgitation.    IVC/SVC: Normal venous pressure at 3 mmHg.      Results for orders placed during the hospital encounter of 11/14/22    Echo    Interpretation Summary  · Atrial fibrillation observed.  · The left ventricle is normal in size with concentric remodeling and normal systolic function. The estimated ejection fraction is 65%.  · Severe left atrial enlargement.  · Normal right ventricular size with normal right ventricular systolic function.  · Mild right atrial enlargement.  · Mild mitral regurgitation.  · There is a 34 mm Medtronic Evolut PRO+ transcutaneously-placed aortic bioprosthesis present. There is mild paravalvular aortic insufficiency present. Prosthetic aortic valve is normal. The aortic valve mean gradient is 7 mmHg with a dimensionless index of 0.62.      Results for orders placed  during the hospital encounter of 10/19/22    Echo    Interpretation Summary  · The left ventricle is normal in size with normal systolic function.  · Severe left atrial enlargement.  · The estimated ejection fraction is 65%.  · Atrial fibrillation observed.  · Normal right ventricular size with normal right ventricular systolic function.  · There is a transcutaneously-placed aortic bioprosthesis present. There is no aortic insufficiency present. Prosthetic aortic valve is normal.  · The aortic valve mean gradient is 9 mmHg with a dimensionless index of 0.61.  · Mild tricuspid regurgitation.  · Normal central venous pressure (3 mmHg).  · The estimated PA systolic pressure is 28 mmHg.      Echo    Interpretation Summary  Limited 2D and color-flow Doppler Intraoperative transthoracic echocardiogram done doing TAVR procedure.  Baseline normal biventricular function severe stenosis.  Post self expanding valve implant is well positioned with no perivalvular leak or aortic insufficiency.      CURRENT/PREVIOUS VISIT EKG  Results for orders placed or performed during the hospital encounter of 07/02/25   EKG 12-lead    Collection Time: 07/02/25  6:31 PM   Result Value Ref Range    QRS Duration 84 ms    [facility] QTC Calculation 411 ms    Narrative    Test Reason : R00.2,    Vent. Rate :  59 BPM     Atrial Rate :    BPM     P-R Int :    ms          QRS Dur :  84 ms      QT Int : 416 ms       P-R-T Axes :     76  59 degrees    QTcB Int : 411 ms    Atrial fibrillation with slow ventricular response  Abnormal ECG  When compared with ECG of 23-Oct-2024 09:44,  No significant change was found    Referred By: AAAREFERRAL SELF           Confirmed By:        08/2022 Angiographic findings:   Left main coronary artery-large vessel normal appearance for age.   Left anterior descending-normal vessel proximal to mid small vessel mid distal with diffuse spasm. Four small diagonals all less than 1.5 mm in diameter. There is a 50% short discrete  lesions seen the mid LAD.   Circumflex-normal size vessel with a large branching 1st obtuse marginal diffuse disease seen throughout the circumflex system all less than 30% short discrete 40% lesions in the 1st obtuse marginal.   Right coronary artery-large dominant vessel diffuse disease seen throughout its entire course all less than 30%. There is a patent stents in its proximal portion less than 35% InStent restenoses.     ASSESSMENT/PLAN:     Active Hospital Problems    Diagnosis    *Atrial fibrillation with slow ventricular response    DALE on CPAP     APAP      Controlled type 2 diabetes mellitus with stage 3 chronic kidney disease, without long-term current use of insulin    CKD (chronic kidney disease) stage 3, GFR 30-59 ml/min       ASSESSMENT & PLAN:     Chest pain  Afib with slower response  CAD with hx of stents  Hx of TAVR  DM  CKD      RECOMMENDATIONS:    HS troponin levels have been negative, and EKG is unremarkable. He is chest pain free right now.   Will try for stress test today.   He has a follow up with Dr. Phipps for next week. If stress test is not profoundly abnormal, okay to ID home off sotalol and follow up next week.   Check cardiac event monitor at ID.         Gissell Kunz NP  Date of Service: 07/03/2025  11:13 AM    I have personally interviewed and examined the patient, I have reviewed the Nurse Practitioner's history and physical, assessment, and plan. I have personally evaluated the patient at bedside and agree with the findings and made appropriate changes as necessary in recommendations.    mpression:     1.   No evidence of pharmacologically induced reversible ischemia or infarct.     2.   Normal left ventricular wall motion.     3.   Calculated ejection fraction of 60 %.        Electronically signed by:Sukhwinder Helms  Date:                                            07/03/2025  Time:                                           14:35    OK TO FL    Antoni Red MD  Department of  "Cardiology  [facility]  07/03/2025 11:14 AM         [1]   Social History  Tobacco Use    Smoking status: Never     Passive exposure: Never    Smokeless tobacco: Never   Substance Use Topics    Alcohol use: Yes     Alcohol/week: 5.0 standard drinks of alcohol     Types: 6 Standard drinks or equivalent per week     Comment: occasionally    Drug use: No   [2]   No current facility-administered medications on file prior to encounter.     Current Outpatient Medications on File Prior to Encounter   Medication Sig Dispense Refill    ascorbic acid, vitamin C, (VITAMIN C) 1000 MG tablet Take 1,000 mg by mouth once daily.      aspirin (ECOTRIN) 81 MG EC tablet Take 1 tablet (81 mg total) by mouth once daily. 360 tablet 0    cephALEXin (KEFLEX) 500 MG capsule Take 500 mg by mouth 4 (four) times daily.      dabigatran etexilate (PRADAXA) 150 mg Cap Take 1 capsule (150 mg total) by mouth 2 (two) times daily. "Do NOT break, chew, or open capsules." 180 capsule 3    fenofibrate micronized (LOFIBRA) 134 MG Cap Take 1 capsule (134 mg total) by mouth once daily. 30 capsule 11    lisinopriL (PRINIVIL,ZESTRIL) 5 MG tablet Take 1 tablet (5 mg total) by mouth once daily. 90 tablet 3    loratadine (CLARITIN) 10 mg tablet Take 10 mg by mouth once daily.      metFORMIN (GLUCOPHAGE-XR) 500 MG ER 24hr tablet Take 1 tablet (500 mg total) by mouth daily with breakfast. 90 tablet 3    multivitamin with minerals tablet Take 1 tablet by mouth once daily.      mupirocin (BACTROBAN) 2 % ointment Apply 1 g topically 3 (three) times daily.      NITROSTAT 0.4 mg SL tablet Place 1 tablet (0.4 mg total) under the tongue every 5 (five) minutes as needed for Chest pain. 25 tablet 4    omega-3 fatty acids 1,000 mg Cap Take 1 capsule by mouth once daily. 1 Capsule(s) Oral OTC once a day.      omeprazole (PRILOSEC) 40 MG capsule Take 1 capsule (40 mg total) by mouth every morning. 90 capsule 3    pioglitazone (ACTOS) 30 MG tablet Take 1 tablet " (30 mg total) by mouth once daily. 90 tablet 2    rosuvastatin (CRESTOR) 10 MG tablet Take 1 tablet (10 mg total) by mouth every evening. 90 tablet 1    semaglutide (OZEMPIC) 1 mg/dose (4 mg/3 mL) Inject 1 mg into the skin every 7 days. 3 mL 11    sotaloL (BETAPACE) 80 MG tablet Take 0.5 tablets (40 mg total) by mouth once daily. 90 tablet 1    BD ALCOHOL SWABS PadM USE AS DIRECTED TO CHECK BLOOD GLUCOSE DAILY 100 each 5    lancets (TRUEPLUS LANCETS) 28 gauge Misc 1 lancet by Misc.(Non-Drug; Combo Route) route once daily. 100 each 3

## 2025-07-03 NOTE — PROGRESS NOTES
AVS virtually reviewed with Janak Booker in its entirety with emphasis on diet, medications, follow-up appointments and reasons to return to the ED. Patient also encouraged to utilize their patient portal. Ease and convenience of use reiterated. Education complete and patient voiced understanding. All questions answered. Discharge teaching complete.

## 2025-07-03 NOTE — PT/OT/SLP PROGRESS
Physical Therapy      Patient Name:  Janak Booker   MRN:  [id number]    Patient not seen today secondary to  (Away from room for stress test). Will follow-up 7/4/2025.

## 2025-07-03 NOTE — H&P
[facility] - Emergency Dept  Hospital Medicine  History & Physical    Patient Name: Janak Booker  MRN: [id number]  Patient Class: OP- Observation  Admission Date: 7/2/2025  Attending Physician: Hernán Machado MD   Primary Care Provider: See Quach MD         Patient information was obtained from patient, spouse/SO, past medical records, and ER records.     Subjective:     Principal Problem:Atrial fibrillation with slow ventricular response    Chief Complaint:   Chief Complaint   Patient presents with    Palpitations    Chest Pain        HPI: Jhony Briceno he is a 79-year-old male with a past medical history of atrial fibrillation on aspirin and Pradaxa, CAD, s/p TAVR, hypertension, hyperlipidemia and obstructive sleep apnea who presents to the ED today with complaints of palpitations.  He reports the palpitations began late this afternoon with associated fatigue and lower extremity weakness with chest heaviness.  He denies any alleviating or aggravating factors.  He reports a long history of Afib but states he normally does not feel palpitations or have these sensations.  Symptoms were mostly relieved after taking 2 nitroglycerin and 2 aspirins prior to arrival.  He denies any current chest pain but endorses intermittent palpitations.  ED workup was positive for atrial fibrillation with slow ventricular response on EKG, and elevated BNP of 168.  Hospital Medicine was consulted, and patient is agreeable to hospital admission for further workup, close monitoring and medical management.    Past Medical History:   Diagnosis Date    Anticoagulant long-term use     Aortic stenosis     Arthritis     Atrial fibrillation     CAD (coronary artery disease)     Colon polyps     per colonoscopy 9/11/2014    Diabetes mellitus, type 2     Disc displacement, lumbar     L3    Family history of prostate cancer     GERD (gastroesophageal reflux disease)     Heel bone fracture     left foot    Hyperlipidemia LDL  goal < 70     Hypertension     NYHA class 3 heart failure with preserved ejection fraction 10/19/2022    Renal manifestation of secondary diabetes mellitus     Sleep apnea     USES MACHINE    Spinal stenosis, lumbar        Past Surgical History:   Procedure Laterality Date    BACK SURGERY      CARDIAC SURGERY      stents     CARPAL TUNNEL RELEASE Right     CATARACT EXTRACTION W/  INTRAOCULAR LENS IMPLANT Bilateral     COLONOSCOPY N/A 3/27/2018    Procedure: COLONOSCOPY;  Surgeon: Rishi Garcia MD;  Location: [facility] ENDO;  Service: Endoscopy;  Laterality: N/A;    COLONOSCOPY N/A 2/15/2021    Procedure: COLONOSCOPY;  Surgeon: Rishi Garcia MD;  Location: [facility] ENDO;  Service: Endoscopy;  Laterality: N/A;    COLONOSCOPY N/A 6/23/2023    Procedure: COLONOSCOPY;  Surgeon: Rishi Garcia MD;  Location: [facility] ENDO;  Service: Endoscopy;  Laterality: N/A;    CORONARY ANGIOPLASTY WITH STENT PLACEMENT      x 3    EYE SURGERY      INJECTION OF FACET JOINT N/A 5/14/2019    Procedure: INJECTION, FACET JOINT INJECTION (LUMBAR BLOCK) PLEASE INJECT L3/4;  Surgeon: Catrachito Harley MD;  Location: [facility] PAIN MGT;  Service: Pain Management;  Laterality: N/A;  NEEDS CONSENT, PRADAXA CLEARANCE IN CHART    KNEE ARTHROSCOPY W/ MENISCECTOMY  12/22/2008    right    LEFT HEART CATHETERIZATION Left 9/7/2022    Procedure: Left heart cath;  Surgeon: Adonay Quinones MD;  Location: [facility] CATH;  Service: Cardiology;  Laterality: Left;    LUMBAR DISCECTOMY  12/2011    L4-L5 Fusion    LUMBAR EPIDURAL INJECTION  02/04/2019    ROTATOR CUFF REPAIR Left 7/2012    SHOULDER OPEN ROTATOR CUFF REPAIR      SKIN CANCER FOREHEAD 2019      SPINE SURGERY      TIBIA FRACTURE SURGERY      TRANSCATHETER AORTIC VALVE REPLACEMENT (TAVR) Bilateral 10/19/2022    Procedure: REPLACEMENT, AORTIC VALVE, TRANSCATHETER (TAVR);  Surgeon: Adonay Quinones MD;  [facility];  Service: Cardiology;  Laterality: Bilateral;    TRANSCATHETER AORTIC VALVE REPLACEMENT (TAVR)  "Bilateral 10/19/2022    Procedure: REPLACEMENT, AORTIC VALVE, TRANSCATHETER (TAVR);  Surgeon: Denver Osborne MD;  Location: [facility] CATH;  Service: Cardiothoracic;  Laterality: Bilateral;    TRANSFORAMINAL EPIDURAL INJECTION OF STEROID Left 9/23/2019    Procedure: INJECTION, STEROID, EPIDURAL, TRANSFORAMINAL APPROACH;  Surgeon: Jose Guadalupe Linn MD;  Location: [facility] PAIN MGT;  Service: Pain Management;  Laterality: Left;  Left TF ADI L4 and L5  OK to hold Pradaxa    TRANSFORAMINAL EPIDURAL INJECTION OF STEROID Left 11/14/2019    Procedure: INJECTION, STEROID, EPIDURAL, TRANSFORAMINAL APPROACH;  Surgeon: Jose Guadalupe Linn MD;  Location: [facility] PAIN MGT;  Service: Pain Management;  Laterality: Left;  Left TF ADI L4-5 and L5-S1       Review of patient's allergies indicates:   Allergen Reactions    Neurontin [gabapentin] Swelling     Leg swelling       No current facility-administered medications on file prior to encounter.     Current Outpatient Medications on File Prior to Encounter   Medication Sig    ascorbic acid, vitamin C, (VITAMIN C) 1000 MG tablet Take 1,000 mg by mouth once daily.    aspirin (ECOTRIN) 81 MG EC tablet Take 1 tablet (81 mg total) by mouth once daily.    cephALEXin (KEFLEX) 500 MG capsule Take 500 mg by mouth 4 (four) times daily.    dabigatran etexilate (PRADAXA) 150 mg Cap Take 1 capsule (150 mg total) by mouth 2 (two) times daily. "Do NOT break, chew, or open capsules."    fenofibrate micronized (LOFIBRA) 134 MG Cap Take 1 capsule (134 mg total) by mouth once daily.    lisinopriL (PRINIVIL,ZESTRIL) 5 MG tablet Take 1 tablet (5 mg total) by mouth once daily.    loratadine (CLARITIN) 10 mg tablet Take 10 mg by mouth once daily.    metFORMIN (GLUCOPHAGE-XR) 500 MG ER 24hr tablet Take 1 tablet (500 mg total) by mouth daily with breakfast.    multivitamin with minerals tablet Take 1 tablet by mouth once daily.    mupirocin (BACTROBAN) 2 % ointment Apply 1 g topically 3 (three) times daily.    NITROSTAT 0.4 mg " SL tablet Place 1 tablet (0.4 mg total) under the tongue every 5 (five) minutes as needed for Chest pain.    omega-3 fatty acids 1,000 mg Cap Take 1 capsule by mouth once daily. 1 Capsule(s) Oral OTC once a day.    omeprazole (PRILOSEC) 40 MG capsule Take 1 capsule (40 mg total) by mouth every morning.    pioglitazone (ACTOS) 30 MG tablet Take 1 tablet (30 mg total) by mouth once daily.    rosuvastatin (CRESTOR) 10 MG tablet Take 1 tablet (10 mg total) by mouth every evening.    semaglutide (OZEMPIC) 1 mg/dose (4 mg/3 mL) Inject 1 mg into the skin every 7 days.    sotaloL (BETAPACE) 80 MG tablet Take 0.5 tablets (40 mg total) by mouth once daily.    BD ALCOHOL SWABS PadM USE AS DIRECTED TO CHECK BLOOD GLUCOSE DAILY    lancets (TRUEPLUS LANCETS) 28 gauge Misc 1 lancet by Misc.(Non-Drug; Combo Route) route once daily.     Family History       Problem Relation (Age of Onset)    Heart disease Father, Mother    Heart failure Father, Mother    No Known Problems Sister, Daughter, Son, Daughter    Prostate cancer Father          Tobacco Use    Smoking status: Never     Passive exposure: Never    Smokeless tobacco: Never   Substance and Sexual Activity    Alcohol use: Yes     Alcohol/week: 5.0 standard drinks of alcohol     Types: 6 Standard drinks or equivalent per week     Comment: occasionally    Drug use: No    Sexual activity: Yes     Partners: Female     Review of Systems   Constitutional:  Negative for activity change, fatigue and fever.   Respiratory:  Positive for shortness of breath. Negative for cough and wheezing.    Cardiovascular:  Positive for palpitations. Negative for chest pain and leg swelling.   Gastrointestinal:  Negative for abdominal distention, abdominal pain, constipation, diarrhea, nausea and vomiting.   Endocrine: Negative for cold intolerance and heat intolerance.   Genitourinary:  Negative for difficulty urinating and dysuria.   Musculoskeletal:  Negative for arthralgias, gait problem, joint  swelling and myalgias.   Allergic/Immunologic: Negative for environmental allergies.   Neurological:  Positive for weakness. Negative for dizziness, tremors, light-headedness, numbness and headaches.   Hematological:  Negative for adenopathy.     Objective:     Vital Signs (Most Recent):  Temp: 97.7 °F (36.5 °C) (25)  Pulse: 60 (25)  Resp: 19 (25)  BP: (!) 160/89 (25)  SpO2: 98 % (25) Vital Signs (24h Range):  Temp:  [97.7 °F (36.5 °C)] 97.7 °F (36.5 °C)  Pulse:  [60] 60  Resp:  [19] 19  SpO2:  [98 %] 98 %  BP: (160)/(89) 160/89     Weight: 111.1 kg (245 lb)  Body mass index is 32.32 kg/m².     Physical Exam  Vitals and nursing note reviewed.   Constitutional:       General: He is awake. He is not in acute distress.     Appearance: He is well-developed and well-groomed. He is obese. He is not ill-appearing.   HENT:      Head: Normocephalic and atraumatic.   Eyes:      Extraocular Movements: Extraocular movements intact.      Conjunctiva/sclera: Conjunctivae normal.   Cardiovascular:      Rate and Rhythm: Bradycardia present. Rhythm irregularly irregular.      Pulses: Normal pulses.           Radial pulses are 2+ on the right side and 2+ on the left side.        Dorsalis pedis pulses are 2+ on the right side and 2+ on the left side.      Heart sounds: Normal heart sounds.   Pulmonary:      Effort: Pulmonary effort is normal. No tachypnea, bradypnea or respiratory distress.      Breath sounds: Normal breath sounds. No decreased air movement.   Abdominal:      General: Bowel sounds are normal. There is no distension.      Palpations: Abdomen is soft.      Tenderness: There is no abdominal tenderness.   Musculoskeletal:         General: Normal range of motion.      Cervical back: Normal range of motion. No rigidity or tenderness.      Right lower le+ Pitting Edema present.      Left lower le+ Pitting Edema present.   Skin:     General: Skin is warm and dry.       Capillary Refill: Capillary refill takes less than 2 seconds.   Neurological:      General: No focal deficit present.      Mental Status: He is alert and oriented to person, place, and time. Mental status is at baseline.      GCS: GCS eye subscore is 4. GCS verbal subscore is 5. GCS motor subscore is 6.   Psychiatric:         Mood and Affect: Mood normal.         Behavior: Behavior is cooperative.                Significant Labs: All pertinent labs within the past 24 hours have been reviewed.  CBC:   Recent Labs   Lab 07/02/25 1938   WBC 6.95   HGB 13.5*   HCT 40.2   *     CMP:   Recent Labs   Lab 07/02/25 1938      K 4.3      CO2 24   *   BUN 25*   CREATININE 1.3   CALCIUM 9.0   PROT 6.2   ALBUMIN 3.7   BILITOT 0.9   ALKPHOS 63   AST 20   ALT 13   ANIONGAP 8     Cardiac Markers:   Recent Labs   Lab 07/02/25 1938   *     Coagulation:   Recent Labs   Lab 07/02/25 1938   INR 1.2   APTT 45.5*     Lipid Panel:  Pending  Magnesium:   Recent Labs   Lab 07/02/25 1938   MG 1.9     Troponin:   Troponin High Sensitive   Date Value Ref Range Status   07/02/2025 6.7 <=14.9 pg/mL Final     Comment:     Troponin results differ between methods. Do not use   results between Troponin methods interchangeably.    Alkaline Phospatase levels above 400 U/L may   cause false positive results.    Access hsTnI should not be used for patients taking   Asfotase mary kay (Strensiq).       TSH:  Pending    Significant Imaging: I have reviewed all pertinent imaging results/findings within the past 24 hours.  I have reviewed and interpreted all pertinent imaging results/findings within the past 24 hours.  Assessment/Plan:     Assessment & Plan  Atrial fibrillation with slow ventricular response  Patient has persistent (7 days or more) atrial fibrillation. Patient is currently in atrial fibrillation. [id number] Score: 4. The patients heart rate in the last 24 hours is as follows:  Pulse  Min: 60  Max: 60  "    Antiarrhythmics   80 mg sotalol daily (patient reports taking 40 mg daily) - hold for now    Anticoagulants  dabigatran etexilate capsule 150 mg, 2 times daily, Oral  Aspirin 81 mg daily    Plan  - Replete lytes with a goal of K>4, Mg >2  - Patient is anticoagulated, see medications listed above.  - Patient's afib is currently uncontrolled. Will hold sotalol for slow AFib  - Admit to telemetry      CKD (chronic kidney disease) stage 3, GFR 30-59 ml/min  Creatine stable for now. BMP reviewed- noted Estimated Creatinine Clearance: 61.2 mL/min (based on SCr of 1.3 mg/dL). according to latest data. Based on current GFR, CKD stage is stage 2 - GFR 60-89.  Monitor UOP and serial BMP and adjust therapy as needed. Renally dose meds. Avoid nephrotoxic medications and procedures.    - Furosemide 40 mg ordered once for a.m.  - Trend renal function daily  - Follows Dr. Cortes  Controlled type 2 diabetes mellitus with stage 3 chronic kidney disease, without long-term current use of insulin  Patient's FSGs are controlled on current medication regimen.  Last A1c reviewed-   Lab Results   Component Value Date    HGBA1C 6.4 (H) 06/11/2025     Most recent fingerstick glucose reviewed- No results for input(s): "POCTGLUCOSE" in the last 24 hours.  Current correctional scale  Low  Maintain anti-hyperglycemic dose as follows-   Antihyperglycemics (From admission, onward)      Start     Stop Route Frequency Ordered    07/02/25 2212  insulin aspart U-100 pen 0-5 Units         -- SubQ Every 6 hours PRN 07/02/25 2116          - Hold Oral hypoglycemics while patient is in the hospital  - Hold Ozempic  - Accu-Cheks AC and HS  - NPO for possible stress in a.m.    DALE on CPAP  Chronic, stable.    - Patient declines CPAP order  - Vital signs q.4 hours  - Continuous pulse oximetry  VTE Risk Mitigation (From admission, onward)           Ordered     dabigatran etexilate capsule 150 mg  2 times daily         07/02/25 2116     Reason for No " Pharmacological VTE Prophylaxis  Once        Question:  Reasons:  Answer:  Already adequately anticoagulated on oral Anticoagulants    07/02/25 2116     Place DEVORA hose  Until discontinued        Comments: For bilateral leg swelling    07/02/25 2116     IP VTE HIGH RISK PATIENT  Once         07/02/25 2116     Place sequential compression device  Until discontinued         07/02/25 2116                  Advance Care Planning     Code Status: Full Code  I engaged the the patient and spouse in a voluntary conversation about the patient's preferences for care at the very end of life. The patient wishes to have CPR and all other invasive treatments performed when her heart and/or breathing stops. I communicated to the patient that a full code will be placed in the chart.    A total of 17 min was spent on advance care planning, goals of care discussion, emotional support, formulating and communicating prognosis and exploring burden/benefit of various approaches of treatment. This discussion occurred on a fully voluntary basis with the verbal consent of the patient and/or family.       On 07/02/2025, patient should be placed in hospital observation services under my care in collaboration with Dr. Machado.           Vishal Allen, [facility]  Department of Hospital Medicine  [facility] - Emergency Dept

## 2025-07-03 NOTE — DISCHARGE INSTRUCTIONS
Please discontinue sotalol  Cardiology recommends outpatient event monitor that can be placed in the cardiology office.       Our goal at [facility] is to always give you outstanding care and exceptional service. You may receive a survey from [employer] by mail, text or e-mail in the next 24-48 hours asking about the care you received with us. The survey should only take 5-10 minutes to complete and is very important to us.     Your feedback provides us with a way to recognize our staff who work tirelessly to provide the best care! Also, your responses help us learn how to improve when your experience was below our aspiration of excellence. We are always looking for ways to improve your stay. We WILL use your feedback to continue making improvements to help us provide the highest quality care. We keep your personal information and feedback confidential. We appreciate your time completing this survey and can't wait to hear from you!!!    We look forward to your continued care with us! Thanks so much for choosing [facility] for your healthcare needs!

## 2025-07-03 NOTE — ASSESSMENT & PLAN NOTE
Creatine stable for now. BMP reviewed- noted Estimated Creatinine Clearance: 61.2 mL/min (based on SCr of 1.3 mg/dL). according to latest data. Based on current GFR, CKD stage is stage 2 - GFR 60-89.  Monitor UOP and serial BMP and adjust therapy as needed. Renally dose meds. Avoid nephrotoxic medications and procedures.    - Furosemide 40 mg ordered once for a.m.  - Trend renal function daily  - Follows Dr. Cortes

## 2025-07-03 NOTE — NURSING
All discharge instructions given. Answered all questions. Removed IV . cardiac monitor placed at desk with . Escorted patient out to monitor vehicle.

## 2025-07-03 NOTE — ASSESSMENT & PLAN NOTE
Chronic, stable.    - Patient declines CPAP order  - Vital signs q.4 hours  - Continuous pulse oximetry

## 2025-07-03 NOTE — ASSESSMENT & PLAN NOTE
Patient has persistent (7 days or more) atrial fibrillation. Patient is currently in atrial fibrillation. [id number] Score: 4. The patients heart rate in the last 24 hours is as follows:  Pulse  Min: 55  Max: 63     Antiarrhythmics   80 mg sotalol daily (patient reports taking 40 mg daily) - hold for now    Anticoagulants  dabigatran etexilate capsule 150 mg, 2 times daily, Oral  Aspirin 81 mg daily    Plan  - Replete lytes with a goal of K>4, Mg >2  - Patient is anticoagulated, see medications listed above.  - Patient's afib is currently uncontrolled. Will hold sotalol for slow AFib  - Admit to telemetry

## 2025-07-03 NOTE — ASSESSMENT & PLAN NOTE
Patient has persistent (7 days or more) atrial fibrillation. Patient is currently in atrial fibrillation. [id number] Score: 4. The patients heart rate in the last 24 hours is as follows:  Pulse  Min: 60  Max: 60     Antiarrhythmics   80 mg sotalol daily (patient reports taking 40 mg daily) - hold for now    Anticoagulants  dabigatran etexilate capsule 150 mg, 2 times daily, Oral  Aspirin 81 mg daily    Plan  - Replete lytes with a goal of K>4, Mg >2  - Patient is anticoagulated, see medications listed above.  - Patient's afib is currently uncontrolled. Will hold sotalol for slow AFib  - Admit to telemetry

## 2025-07-03 NOTE — HPI
Jhony Briceno he is a 79-year-old male with a past medical history of atrial fibrillation on aspirin and Pradaxa, CAD, s/p TAVR, hypertension, hyperlipidemia and obstructive sleep apnea who presents to the ED today with complaints of palpitations.  He reports the palpitations began late this afternoon with associated fatigue and lower extremity weakness with chest heaviness.  He denies any alleviating or aggravating factors.  He reports a long history of Afib but states he normally does not feel palpitations or have these sensations.  Symptoms were mostly relieved after taking 2 nitroglycerin and 2 aspirins prior to arrival.  He denies any current chest pain but endorses intermittent palpitations.  ED workup was positive for atrial fibrillation with slow ventricular response on EKG, and elevated BNP of 168.  Hospital Medicine was consulted, and patient is agreeable to hospital admission for further workup, close monitoring and medical management.

## 2025-07-03 NOTE — ASSESSMENT & PLAN NOTE
"Patient's FSGs are controlled on current medication regimen.  Last A1c reviewed-   Lab Results   Component Value Date    HGBA1C 6.4 (H) 06/11/2025     Most recent fingerstick glucose reviewed- No results for input(s): "POCTGLUCOSE" in the last 24 hours.  Current correctional scale  Low  Maintain anti-hyperglycemic dose as follows-   Antihyperglycemics (From admission, onward)      Start     Stop Route Frequency Ordered    07/02/25 2212  insulin aspart U-100 pen 0-5 Units         -- SubQ Every 6 hours PRN 07/02/25 2116          - Hold Oral hypoglycemics while patient is in the hospital  - Hold Ozempic  - Accu-Cheks AC and HS  - NPO for possible stress in a.m.    "

## 2025-07-03 NOTE — ASSESSMENT & PLAN NOTE
Patient's FSGs are controlled on current medication regimen.  Last A1c reviewed-   Lab Results   Component Value Date    HGBA1C 6.4 (H) 06/11/2025     Most recent fingerstick glucose reviewed-   Recent Labs   Lab 07/02/25  2234 07/03/25  1200   POCTGLUCOSE 129* 125*     Current correctional scale  Low  Maintain anti-hyperglycemic dose as follows-   Antihyperglycemics (From admission, onward)      Start     Stop Route Frequency Ordered    07/02/25 2212  insulin aspart U-100 pen 0-5 Units         -- SubQ Every 6 hours PRN 07/02/25 2116          - Hold Oral hypoglycemics while patient is in the hospital  - Hold Ozempic  - Accu-Cheks AC and HS  - NPO for possible stress in a.m.

## 2025-07-03 NOTE — ASSESSMENT & PLAN NOTE
Creatine stable for now. BMP reviewed- noted Estimated Creatinine Clearance: 66.3 mL/min (based on SCr of 1.2 mg/dL). according to latest data. Based on current GFR, CKD stage is stage 2 - GFR 60-89.  Monitor UOP and serial BMP and adjust therapy as needed. Renally dose meds. Avoid nephrotoxic medications and procedures.    - Furosemide 40 mg ordered once for a.m.  - Trend renal function daily  - Follows Dr. Cortes

## 2025-07-03 NOTE — SUBJECTIVE & OBJECTIVE
Past Medical History:   Diagnosis Date    Anticoagulant long-term use     Aortic stenosis     Arthritis     Atrial fibrillation     CAD (coronary artery disease)     Colon polyps     per colonoscopy 9/11/2014    Diabetes mellitus, type 2     Disc displacement, lumbar     L3    Family history of prostate cancer     GERD (gastroesophageal reflux disease)     Heel bone fracture     left foot    Hyperlipidemia LDL goal < 70     Hypertension     NYHA class 3 heart failure with preserved ejection fraction 10/19/2022    Renal manifestation of secondary diabetes mellitus     Sleep apnea     USES MACHINE    Spinal stenosis, lumbar        Past Surgical History:   Procedure Laterality Date    BACK SURGERY      CARDIAC SURGERY      stents     CARPAL TUNNEL RELEASE Right     CATARACT EXTRACTION W/  INTRAOCULAR LENS IMPLANT Bilateral     COLONOSCOPY N/A 3/27/2018    Procedure: COLONOSCOPY;  Surgeon: Rishi Garcia MD;  Location: [facility] ENDO;  Service: Endoscopy;  Laterality: N/A;    COLONOSCOPY N/A 2/15/2021    Procedure: COLONOSCOPY;  Surgeon: Rishi Garcia MD;  Location: [facility] ENDO;  Service: Endoscopy;  Laterality: N/A;    COLONOSCOPY N/A 6/23/2023    Procedure: COLONOSCOPY;  Surgeon: Rishi Garcia MD;  Location: [facility] ENDO;  Service: Endoscopy;  Laterality: N/A;    CORONARY ANGIOPLASTY WITH STENT PLACEMENT      x 3    EYE SURGERY      INJECTION OF FACET JOINT N/A 5/14/2019    Procedure: INJECTION, FACET JOINT INJECTION (LUMBAR BLOCK) PLEASE INJECT L3/4;  Surgeon: Catrachito Harley MD;  Location: [facility] PAIN MGT;  Service: Pain Management;  Laterality: N/A;  NEEDS CONSENT, PRADAXA CLEARANCE IN CHART    KNEE ARTHROSCOPY W/ MENISCECTOMY  12/22/2008    right    LEFT HEART CATHETERIZATION Left 9/7/2022    Procedure: Left heart cath;  Surgeon: Adonay Quinones MD;  Location: [facility] CATH;  Service: Cardiology;  Laterality: Left;    LUMBAR DISCECTOMY  12/2011    L4-L5 Fusion    LUMBAR EPIDURAL INJECTION  02/04/2019    ROTATOR CUFF  "REPAIR Left 7/2012    SHOULDER OPEN ROTATOR CUFF REPAIR      SKIN CANCER FOREHEAD 2019      SPINE SURGERY      TIBIA FRACTURE SURGERY      TRANSCATHETER AORTIC VALVE REPLACEMENT (TAVR) Bilateral 10/19/2022    Procedure: REPLACEMENT, AORTIC VALVE, TRANSCATHETER (TAVR);  Surgeon: Adonay Quinones MD;  [facility];  Service: Cardiology;  Laterality: Bilateral;    TRANSCATHETER AORTIC VALVE REPLACEMENT (TAVR) Bilateral 10/19/2022    Procedure: REPLACEMENT, AORTIC VALVE, TRANSCATHETER (TAVR);  Surgeon: Denver Osborne MD;  [facility]ATH;  Service: Cardiothoracic;  Laterality: Bilateral;    TRANSFORAMINAL EPIDURAL INJECTION OF STEROID Left 9/23/2019    Procedure: INJECTION, STEROID, EPIDURAL, TRANSFORAMINAL APPROACH;  Surgeon: Jose Guadalupe Linn MD;  Location: [facility] PAIN MGT;  Service: Pain Management;  Laterality: Left;  Left TF ADI L4 and L5  OK to hold Pradaxa    TRANSFORAMINAL EPIDURAL INJECTION OF STEROID Left 11/14/2019    Procedure: INJECTION, STEROID, EPIDURAL, TRANSFORAMINAL APPROACH;  Surgeon: Jose Guadalupe Linn MD;  Location: [facility] PAIN MGT;  Service: Pain Management;  Laterality: Left;  Left TF ADI L4-5 and L5-S1       Review of patient's allergies indicates:   Allergen Reactions    Neurontin [gabapentin] Swelling     Leg swelling       No current facility-administered medications on file prior to encounter.     Current Outpatient Medications on File Prior to Encounter   Medication Sig    ascorbic acid, vitamin C, (VITAMIN C) 1000 MG tablet Take 1,000 mg by mouth once daily.    aspirin (ECOTRIN) 81 MG EC tablet Take 1 tablet (81 mg total) by mouth once daily.    cephALEXin (KEFLEX) 500 MG capsule Take 500 mg by mouth 4 (four) times daily.    dabigatran etexilate (PRADAXA) 150 mg Cap Take 1 capsule (150 mg total) by mouth 2 (two) times daily. "Do NOT break, chew, or open capsules."    fenofibrate micronized (LOFIBRA) 134 MG Cap Take 1 capsule (134 mg total) by mouth once daily.    lisinopriL (PRINIVIL,ZESTRIL) " 5 MG tablet Take 1 tablet (5 mg total) by mouth once daily.    loratadine (CLARITIN) 10 mg tablet Take 10 mg by mouth once daily.    metFORMIN (GLUCOPHAGE-XR) 500 MG ER 24hr tablet Take 1 tablet (500 mg total) by mouth daily with breakfast.    multivitamin with minerals tablet Take 1 tablet by mouth once daily.    mupirocin (BACTROBAN) 2 % ointment Apply 1 g topically 3 (three) times daily.    NITROSTAT 0.4 mg SL tablet Place 1 tablet (0.4 mg total) under the tongue every 5 (five) minutes as needed for Chest pain.    omega-3 fatty acids 1,000 mg Cap Take 1 capsule by mouth once daily. 1 Capsule(s) Oral OTC once a day.    omeprazole (PRILOSEC) 40 MG capsule Take 1 capsule (40 mg total) by mouth every morning.    pioglitazone (ACTOS) 30 MG tablet Take 1 tablet (30 mg total) by mouth once daily.    rosuvastatin (CRESTOR) 10 MG tablet Take 1 tablet (10 mg total) by mouth every evening.    semaglutide (OZEMPIC) 1 mg/dose (4 mg/3 mL) Inject 1 mg into the skin every 7 days.    sotaloL (BETAPACE) 80 MG tablet Take 0.5 tablets (40 mg total) by mouth once daily.    BD ALCOHOL SWABS PadM USE AS DIRECTED TO CHECK BLOOD GLUCOSE DAILY    lancets (TRUEPLUS LANCETS) 28 gauge Misc 1 lancet by Misc.(Non-Drug; Combo Route) route once daily.     Family History       Problem Relation (Age of Onset)    Heart disease Father, Mother    Heart failure Father, Mother    No Known Problems Sister, Daughter, Son, Daughter    Prostate cancer Father          Tobacco Use    Smoking status: Never     Passive exposure: Never    Smokeless tobacco: Never   Substance and Sexual Activity    Alcohol use: Yes     Alcohol/week: 5.0 standard drinks of alcohol     Types: 6 Standard drinks or equivalent per week     Comment: occasionally    Drug use: No    Sexual activity: Yes     Partners: Female     Review of Systems   Constitutional:  Negative for activity change, fatigue and fever.   Respiratory:  Positive for shortness of breath. Negative for cough and  wheezing.    Cardiovascular:  Positive for palpitations. Negative for chest pain and leg swelling.   Gastrointestinal:  Negative for abdominal distention, abdominal pain, constipation, diarrhea, nausea and vomiting.   Endocrine: Negative for cold intolerance and heat intolerance.   Genitourinary:  Negative for difficulty urinating and dysuria.   Musculoskeletal:  Negative for arthralgias, gait problem, joint swelling and myalgias.   Allergic/Immunologic: Negative for environmental allergies.   Neurological:  Positive for weakness. Negative for dizziness, tremors, light-headedness, numbness and headaches.   Hematological:  Negative for adenopathy.     Objective:     Vital Signs (Most Recent):  Temp: 97.7 °F (36.5 °C) (07/02/25 1839)  Pulse: 60 (07/02/25 1839)  Resp: 19 (07/02/25 1839)  BP: (!) 160/89 (07/02/25 1839)  SpO2: 98 % (07/02/25 1839) Vital Signs (24h Range):  Temp:  [97.7 °F (36.5 °C)] 97.7 °F (36.5 °C)  Pulse:  [60] 60  Resp:  [19] 19  SpO2:  [98 %] 98 %  BP: (160)/(89) 160/89     Weight: 111.1 kg (245 lb)  Body mass index is 32.32 kg/m².     Physical Exam  Vitals and nursing note reviewed.   Constitutional:       General: He is awake. He is not in acute distress.     Appearance: He is well-developed and well-groomed. He is obese. He is not ill-appearing.   HENT:      Head: Normocephalic and atraumatic.   Eyes:      Extraocular Movements: Extraocular movements intact.      Conjunctiva/sclera: Conjunctivae normal.   Cardiovascular:      Rate and Rhythm: Bradycardia present. Rhythm irregularly irregular.      Pulses: Normal pulses.           Radial pulses are 2+ on the right side and 2+ on the left side.        Dorsalis pedis pulses are 2+ on the right side and 2+ on the left side.      Heart sounds: Normal heart sounds.   Pulmonary:      Effort: Pulmonary effort is normal. No tachypnea, bradypnea or respiratory distress.      Breath sounds: Normal breath sounds. No decreased air movement.   Abdominal:       General: Bowel sounds are normal. There is no distension.      Palpations: Abdomen is soft.      Tenderness: There is no abdominal tenderness.   Musculoskeletal:         General: Normal range of motion.      Cervical back: Normal range of motion. No rigidity or tenderness.      Right lower le+ Pitting Edema present.      Left lower le+ Pitting Edema present.   Skin:     General: Skin is warm and dry.      Capillary Refill: Capillary refill takes less than 2 seconds.   Neurological:      General: No focal deficit present.      Mental Status: He is alert and oriented to person, place, and time. Mental status is at baseline.      GCS: GCS eye subscore is 4. GCS verbal subscore is 5. GCS motor subscore is 6.   Psychiatric:         Mood and Affect: Mood normal.         Behavior: Behavior is cooperative.                Significant Labs: All pertinent labs within the past 24 hours have been reviewed.  CBC:   Recent Labs   Lab 25   WBC 6.95   HGB 13.5*   HCT 40.2   *     CMP:   Recent Labs   Lab 25      K 4.3      CO2 24   *   BUN 25*   CREATININE 1.3   CALCIUM 9.0   PROT 6.2   ALBUMIN 3.7   BILITOT 0.9   ALKPHOS 63   AST 20   ALT 13   ANIONGAP 8     Cardiac Markers:   Recent Labs   Lab 25   *     Coagulation:   Recent Labs   Lab 25   INR 1.2   APTT 45.5*     Lipid Panel:  Pending  Magnesium:   Recent Labs   Lab 25   MG 1.9     Troponin:   Troponin High Sensitive   Date Value Ref Range Status   2025 6.7 <=14.9 pg/mL Final     Comment:     Troponin results differ between methods. Do not use   results between Troponin methods interchangeably.    Alkaline Phospatase levels above 400 U/L may   cause false positive results.    Access hsTnI should not be used for patients taking   Asfotase mary kay (Strensiq).       TSH:  Pending    Significant Imaging: I have reviewed all pertinent imaging results/findings within the past 24  hours.  I have reviewed and interpreted all pertinent imaging results/findings within the past 24 hours.

## 2025-07-03 NOTE — PLAN OF CARE
07/03/25 1019   CHRISTENSEN Message   Medicare Outpatient and Observation Notification regarding financial responsibility Given to patient/caregiver;Explained to patient/caregiver;Signed/date by patient/caregiver   Date CHRISTENSEN was signed 07/03/25   Time CHRISTENSEN was signed 0946     Pt was explained CHRISTENSEN. Pt verbalized understanding of CHRISTENSEN and signed. CHRISTENSEN scanned to .

## 2025-07-05 ENCOUNTER — RESULTS FOLLOW-UP (OUTPATIENT)
Dept: EMERGENCY MEDICINE | Facility: HOSPITAL | Age: 80
End: 2025-07-05

## 2025-07-08 ENCOUNTER — PATIENT OUTREACH (OUTPATIENT)
Dept: ADMINISTRATIVE | Facility: CLINIC | Age: 80
End: 2025-07-08
Payer: MEDICARE

## 2025-07-08 NOTE — PROGRESS NOTES
C3 nurse attempted to contact Janak Booker  for a TCC post hospital discharge follow up call. No answer. LVM requesting a callback at [phone].    The patient does not have a scheduled HOSFU appointment. Message sent to PCP's staff to assist with HOSFU appointment scheduling.

## 2025-07-08 NOTE — TELEPHONE ENCOUNTER
Offered appointment on tomorrow's date.  Patient states he is unavailable today or tomorrow and has an existing appointment with cardiology on Thursday 7/10/25 with pacemaker implant scheduled in the future.  Patient states he will discuss with cardiologist if follow up is needed with family medicine and will call back to schedule appointment if cardiologist advise it is still needed.

## 2025-07-09 NOTE — PROGRESS NOTES
C3 nurse spoke with patient Janak KAJAL Booker . TCC call complete. Patient declined a HOSFU appointment.

## 2025-07-10 ENCOUNTER — HOSPITAL ENCOUNTER (OUTPATIENT)
Dept: CARDIOLOGY | Facility: CLINIC | Age: 80
Discharge: HOME | End: 2025-07-10
Attending: NURSE PRACTITIONER
Payer: MEDICARE

## 2025-07-10 ENCOUNTER — OFFICE VISIT (OUTPATIENT)
Dept: CARDIOLOGY | Facility: CLINIC | Age: 80
End: 2025-07-10
Payer: MEDICARE

## 2025-07-10 VITALS
DIASTOLIC BLOOD PRESSURE: 62 MMHG | BODY MASS INDEX: 33.25 KG/M2 | WEIGHT: 250.88 LBS | HEART RATE: 72 BPM | SYSTOLIC BLOOD PRESSURE: 122 MMHG | OXYGEN SATURATION: 97 % | HEIGHT: 73 IN

## 2025-07-10 DIAGNOSIS — I48.91 ATRIAL FIBRILLATION WITH SLOW VENTRICULAR RESPONSE: Primary | ICD-10-CM

## 2025-07-10 DIAGNOSIS — Z95.3 S/P TAVR (TRANSCATHETER AORTIC VALVE REPLACEMENT): ICD-10-CM

## 2025-07-10 DIAGNOSIS — E78.5 HYPERLIPIDEMIA ASSOCIATED WITH TYPE 2 DIABETES MELLITUS: ICD-10-CM

## 2025-07-10 DIAGNOSIS — I48.91 ATRIAL FIBRILLATION WITH SLOW VENTRICULAR RESPONSE: ICD-10-CM

## 2025-07-10 DIAGNOSIS — G47.33 OSA ON CPAP: ICD-10-CM

## 2025-07-10 DIAGNOSIS — Z79.01 ANTICOAGULANT LONG-TERM USE: ICD-10-CM

## 2025-07-10 DIAGNOSIS — E11.69 HYPERLIPIDEMIA ASSOCIATED WITH TYPE 2 DIABETES MELLITUS: ICD-10-CM

## 2025-07-10 PROCEDURE — 1160F RVW MEDS BY RX/DR IN RCRD: CPT | Mod: CPTII,HCNC,S$GLB, | Performed by: INTERNAL MEDICINE

## 2025-07-10 PROCEDURE — 1126F AMNT PAIN NOTED NONE PRSNT: CPT | Mod: CPTII,HCNC,S$GLB, | Performed by: INTERNAL MEDICINE

## 2025-07-10 PROCEDURE — 1159F MED LIST DOCD IN RCRD: CPT | Mod: CPTII,HCNC,S$GLB, | Performed by: INTERNAL MEDICINE

## 2025-07-10 PROCEDURE — 3288F FALL RISK ASSESSMENT DOCD: CPT | Mod: CPTII,HCNC,S$GLB, | Performed by: INTERNAL MEDICINE

## 2025-07-10 PROCEDURE — 3078F DIAST BP <80 MM HG: CPT | Mod: CPTII,HCNC,S$GLB, | Performed by: INTERNAL MEDICINE

## 2025-07-10 PROCEDURE — 3074F SYST BP LT 130 MM HG: CPT | Mod: CPTII,HCNC,S$GLB, | Performed by: INTERNAL MEDICINE

## 2025-07-10 PROCEDURE — 99213 OFFICE O/P EST LOW 20 MIN: CPT | Mod: HCNC,S$GLB,, | Performed by: INTERNAL MEDICINE

## 2025-07-10 PROCEDURE — 99999 PR PBB SHADOW E&M-EST. PATIENT-LVL IV: CPT | Mod: PBBFAC,HCNC,, | Performed by: INTERNAL MEDICINE

## 2025-07-10 PROCEDURE — 1101F PT FALLS ASSESS-DOCD LE1/YR: CPT | Mod: CPTII,HCNC,S$GLB, | Performed by: INTERNAL MEDICINE

## 2025-07-10 NOTE — PROGRESS NOTES
Patient ID:  Janak Booker is a 79 y.o. male who presents with Hospital Follow Up and Atrial Fibrillation      History of Present Illness    CHIEF COMPLAINT:  Patient presents today for follow up after a recent episode of AF with weakness and dyspnea.    HISTORY OF PRESENT ILLNESS:  He experienced an AF episode on July 2nd with increased intensity, LLE weakness, and difficulty breathing. He reports this episode felt different from his typical AF experiences. Symptoms occurred on a hot day and were initially unrelieved by self-administered multiple nitroglycerin doses and ASA 81 mg. He notes feeling better by the time of medical evaluation.    CARDIOVASCULAR HISTORY:  He has a history of LAD spasm, documented during prior angiogram by Dr. Quinones, revealing a small vessel distally with a fused spasm less than 1.5 mm in diameter. He underwent TAVR in 2022.    RECENT STUDIES:  Stress test results were good and echo showed valve functioning well. Thyroid studies were WNL.    MEDICATIONS:  Current medications include Pradaxa, Fenofibrate, Lisinopril, Metformin, Ozempic, Actos, Rosuvastatin, and ASA 81 mg. He was recently discontinued from Sotalol. He denies taking insulin, using Ozempic for diabetes management.      ROS:  General: +weakness  Cardiovascular: +feelings of fast heart rate, +feeling of skipped beats, +feelings of slow heart rate  Respiratory: +shortness of breath, +difficulty breathing  Musculoskeletal: +muscle weakness           Past Medical History:   Diagnosis Date    Anticoagulant long-term use     Aortic stenosis     Arthritis     Atrial fibrillation     CAD (coronary artery disease)     Colon polyps     per colonoscopy 9/11/2014    Diabetes mellitus, type 2     Disc displacement, lumbar     L3    Family history of prostate cancer     GERD (gastroesophageal reflux disease)     Heel bone fracture     left foot    Hyperlipidemia LDL goal < 70     Hypertension     NYHA class 3 heart failure with  preserved ejection fraction 10/19/2022    Renal manifestation of secondary diabetes mellitus     Sleep apnea     USES MACHINE    Spinal stenosis, lumbar         Past Surgical History:   Procedure Laterality Date    BACK SURGERY      CARDIAC SURGERY      stents     CARPAL TUNNEL RELEASE Right     CATARACT EXTRACTION W/  INTRAOCULAR LENS IMPLANT Bilateral     COLONOSCOPY N/A 3/27/2018    Procedure: COLONOSCOPY;  Surgeon: Rishi Garcia MD;  Location: [facility] ENDO;  Service: Endoscopy;  Laterality: N/A;    COLONOSCOPY N/A 2/15/2021    Procedure: COLONOSCOPY;  Surgeon: Rishi Garcia MD;  Location: [facility] ENDO;  Service: Endoscopy;  Laterality: N/A;    COLONOSCOPY N/A 6/23/2023    Procedure: COLONOSCOPY;  Surgeon: Rishi Garcia MD;  Location: [facility] ENDO;  Service: Endoscopy;  Laterality: N/A;    CORONARY ANGIOPLASTY WITH STENT PLACEMENT      x 3    EYE SURGERY      INJECTION OF FACET JOINT N/A 5/14/2019    Procedure: INJECTION, FACET JOINT INJECTION (LUMBAR BLOCK) PLEASE INJECT L3/4;  Surgeon: Catrachito Harley MD;  Location: [facility] PAIN MGT;  Service: Pain Management;  Laterality: N/A;  NEEDS CONSENT, PRADAXA CLEARANCE IN CHART    KNEE ARTHROSCOPY W/ MENISCECTOMY  12/22/2008    right    LEFT HEART CATHETERIZATION Left 9/7/2022    Procedure: Left heart cath;  Surgeon: Adonay Quinones MD;  [facility];  Service: Cardiology;  Laterality: Left;    LUMBAR DISCECTOMY  12/2011    L4-L5 Fusion    LUMBAR EPIDURAL INJECTION  02/04/2019    ROTATOR CUFF REPAIR Left 7/2012    SHOULDER OPEN ROTATOR CUFF REPAIR      SKIN CANCER FOREHEAD 2019      SPINE SURGERY      TIBIA FRACTURE SURGERY      TRANSCATHETER AORTIC VALVE REPLACEMENT (TAVR) Bilateral 10/19/2022    Procedure: REPLACEMENT, AORTIC VALVE, TRANSCATHETER (TAVR);  Surgeon: Adonay Quinones MD;  Location: [facility] CATH;  Service: Cardiology;  Laterality: Bilateral;    TRANSCATHETER AORTIC VALVE REPLACEMENT (TAVR) Bilateral 10/19/2022    Procedure: REPLACEMENT, AORTIC VALVE,  "TRANSCATHETER (TAVR);  Surgeon: Denver Osborne MD;  [facility];  Service: Cardiothoracic;  Laterality: Bilateral;    TRANSFORAMINAL EPIDURAL INJECTION OF STEROID Left 9/23/2019    Procedure: INJECTION, STEROID, EPIDURAL, TRANSFORAMINAL APPROACH;  Surgeon: Jose Guadalupe Linn MD;  Location: [facility] PAIN MGT;  Service: Pain Management;  Laterality: Left;  Left TF ADI L4 and L5  OK to hold Pradaxa    TRANSFORAMINAL EPIDURAL INJECTION OF STEROID Left 11/14/2019    Procedure: INJECTION, STEROID, EPIDURAL, TRANSFORAMINAL APPROACH;  Surgeon: Jose Guadalupe Linn MD;  Location: [facility] PAIN MGT;  Service: Pain Management;  Laterality: Left;  Left TF ADI L4-5 and L5-S1          Current Outpatient Medications   Medication Instructions    ascorbic acid (vitamin C) (VITAMIN C) 1,000 mg, Daily    aspirin (ECOTRIN) 81 mg, Oral, Daily    BD ALCOHOL SWABS PadM USE AS DIRECTED TO CHECK BLOOD GLUCOSE DAILY    dabigatran etexilate (PRADAXA) 150 mg, Oral, 2 times daily, "Do NOT break, chew, or open capsules."    fenofibrate micronized (LOFIBRA) 134 mg, Oral, Daily    lancets (TRUEPLUS LANCETS) 28 gauge Misc 1 lancet , Misc.(Non-Drug; Combo Route), Daily    lisinopriL (PRINIVIL,ZESTRIL) 5 mg, Oral, Daily    loratadine (CLARITIN) 10 mg, Daily    metFORMIN (GLUCOPHAGE-XR) 500 mg, Oral, With breakfast    multivitamin with minerals tablet 1 tablet, Daily    mupirocin (BACTROBAN) 1 g, 3 times daily    NITROSTAT 0.4 mg, Sublingual, Every 5 min PRN    omega-3 fatty acids 1,000 mg Cap 1 capsule, Daily    omeprazole (PRILOSEC) 40 mg, Oral, Every morning    OZEMPIC 1 mg, Subcutaneous, Every 7 days    pioglitazone (ACTOS) 30 mg, Oral, Daily    rosuvastatin (CRESTOR) 10 mg, Oral, Nightly        Review of patient's allergies indicates:   Allergen Reactions    Neurontin [gabapentin] Swelling     Leg swelling           Objective:     Vitals:    07/10/25 1305   BP: 122/62   BP Location: Left arm   Patient Position: Sitting   Pulse: 72   SpO2: 97%   Weight: " "113.8 kg (250 lb 14.1 oz)   Height: 6' 1" (1.854 m)        Physical Exam    General: No acute distress. Well-developed. Well-nourished.  Eyes: EOMI. Sclerae anicteric.  HENT: Normocephalic. Atraumatic. Nares patent. Moist oral mucosa.  Cardiovascular: Regular rate. Irregular rhythm. Grade 1/6 systolic murmur at the base. No rubs. No gallops. Normal S1, S2.  Respiratory: Normal respiratory effort. Clear to auscultation bilaterally. No rales. No rhonchi. No wheezing.  Musculoskeletal: No  obvious deformity.  Extremities: No lower extremity edema.  Neurological: Alert & oriented x3. No slurred speech. Normal gait.  Psychiatric: Normal mood. Normal affect. Good insight. Good judgment.  Skin: Warm. Dry. No rash.         CARDIAC PROFILE  BNP   Date Value Ref Range Status   07/02/2025 168 (H) <=99 pg/mL Final     Comment:     Values of less than 100 pg/ml are consistent with non-CHF populations.     Troponin I   Date Value Ref Range Status   08/27/2021 <0.030 <=0.040 ng/mL Final     CMP  Sodium   Date Value Ref Range Status   07/03/2025 139 136 - 145 mmol/L Final     Potassium   Date Value Ref Range Status   07/03/2025 4.2 3.5 - 5.1 mmol/L Final     Chloride   Date Value Ref Range Status   07/03/2025 107 95 - 110 mmol/L Final     CO2   Date Value Ref Range Status   07/03/2025 25 23 - 29 mmol/L Final     Glucose   Date Value Ref Range Status   07/03/2025 115 (H) 70 - 110 mg/dL Final     BUN   Date Value Ref Range Status   07/03/2025 21 8 - 23 mg/dL Final     Creatinine   Date Value Ref Range Status   07/03/2025 1.2 0.5 - 1.4 mg/dL Final     Calcium   Date Value Ref Range Status   07/03/2025 8.9 8.7 - 10.5 mg/dL Final     Protein Total   Date Value Ref Range Status   07/03/2025 5.8 (L) 6.0 - 8.4 gm/dL Final     Albumin   Date Value Ref Range Status   07/03/2025 3.6 3.5 - 5.2 g/dL Final     Bilirubin Total   Date Value Ref Range Status   07/03/2025 1.0 0.1 - 1.0 mg/dL Final     Comment:     For infants and newborns, " interpretation of results should be based   on gestational age, weight and in agreement with clinical   observations.    Premature Infant recommended reference ranges:   0-24 hours:  <8.0 mg/dL   24-48 hours: <12.0 mg/dL   3-5 days:    <15.0 mg/dL   6-29 days:   <15.0 mg/dL     ALP   Date Value Ref Range Status   07/03/2025 59 55 - 135 unit/L Final     AST   Date Value Ref Range Status   07/03/2025 16 10 - 40 unit/L Final     ALT   Date Value Ref Range Status   07/03/2025 14 10 - 44 unit/L Final     Anion Gap   Date Value Ref Range Status   07/03/2025 7 (L) 8 - 16 mmol/L Final     eGFR   Date Value Ref Range Status   07/03/2025 >60 >60 mL/min/1.73/m2 Final   01/06/2025 56.2 (A) >60 mL/min/1.73 m^2 Final      Lab Results   Component Value Date    CHOL 143 07/02/2025    CHOL 148 04/07/2025    CHOL 133 04/18/2024     Lab Results   Component Value Date    HDL 52 07/02/2025    HDL 48 04/07/2025    HDL 49 04/18/2024     Lab Results   Component Value Date    LDLCALC 60.4 (L) 07/02/2025    LDLCALC 58.8 (L) 04/07/2025    LDLCALC 59.6 (L) 04/18/2024     Lab Results   Component Value Date    TRIG 153 (H) 07/02/2025    TRIG 206 (H) 04/07/2025    TRIG 122 04/18/2024     Lab Results   Component Value Date    CHOLHDL 36.4 07/02/2025    CHOLHDL 32.4 04/07/2025    CHOLHDL 36.8 04/18/2024      Lab Results   Component Value Date    TSH 3.502 07/02/2025     Lab Results   Component Value Date    HGBA1C 6.4 (H) 06/11/2025     Lab Results   Component Value Date    WBC 6.31 07/03/2025    HGB 13.1 (L) 07/03/2025    HCT 39.7 (L) 07/03/2025    MCV 90 07/03/2025     (L) 07/03/2025        Results for orders placed during the hospital encounter of 07/02/25    Echo    Interpretation Summary    Left Ventricle: The left ventricle is normal in size. Increased wall thickness. There is mild concentric hypertrophy. There is normal systolic function with a visually estimated ejection fraction of 55 - 60%. Unable to assess diastolic function due  to atrial fibrillation.    Right Ventricle: The right ventricle is normal in size    Left Atrium: The left atrium is severely dilated    Right Atrium: The right atrium is mildly dilated .    Aortic Valve: Not well visualized due to poor acoustic window. There is a transcatheter valve replacement in the aortic position.    Mitral Valve: There is mild to moderate regurgitation.    Tricuspid Valve: There is mild regurgitation. The estimated PA systolic pressure is at least 21 mmHg.     Results for orders placed during the hospital encounter of 10/19/22    Cardiac catheterization    Narrative  Procedure performed in the Invasive Lab  - See Procedure Log link below for nursing documentation  - See OpNote on Surgeries Tab for physician findings  - See Imaging Tab for radiologist dictation       EKG  Results for orders placed or performed during the hospital encounter of 07/02/25   EKG 12-lead    Collection Time: 07/02/25  6:31 PM   Result Value Ref Range    QRS Duration 84 ms    [facility] QTC Calculation 411 ms    Narrative    Test Reason : R00.2,    Vent. Rate :  59 BPM     Atrial Rate :    BPM     P-R Int :    ms          QRS Dur :  84 ms      QT Int : 416 ms       P-R-T Axes :     76  59 degrees    QTcB Int : 411 ms    Atrial fibrillation with slow ventricular response  Abnormal ECG  When compared with ECG of 23-Oct-2024 09:44,  No significant change was found  Confirmed by Antoni Red ([id number]) on 7/3/2025 11:17:12 AM    Referred By: AAAREFERRAL SELF           Confirmed By: Antoni Red        Stress  Results for orders placed during the hospital encounter of 07/02/25    Nuclear Stress Test    Interpretation Summary    The ECG portion of the study is negative for ischemia.    The patient reported no chest pain during the stress test.    There were no arrhythmias during stress.    The nuclear portion of this study will be reported separately.           Assessment/Plan:          1. Atrial fibrillation with slow ventricular  response    2. Hyperlipidemia associated with type 2 diabetes mellitus    3. S/P TAVR (transcatheter aortic valve replacement)    4. Anticoagulant long-term use    5. DALE on CPAP      Assessment & Plan    I48.0 Paroxysmal atrial fibrillation  I20.1 Angina pectoris with documented spasm  R01.0 Benign and innocent cardiac murmurs  E11.9 Type 2 diabetes mellitus without complications  G56.92 Unspecified mononeuropathy of left upper limb  Z95.2 Presence of prosthetic heart valve  Z79.84 Long term (current) use of oral hypoglycemic drugs    PLAN SUMMARY:  - Continue nitroglycerin as needed for symptoms  - Discontinue sotalol due to slow heart rate  - Maintain normal activities while wearing heart monitor  - Avoid extreme temperatures and excessive sweating  - Limit outdoor activities when sweating becomes excessive  - Follow up in September to review heart monitor results and determine if treatment changes are needed    ATRIAL FIBRILLATION:  - Patient was taken off sotalol due to slow heart rate.  - Ordered heart monitor to assess heart rate variability off medication during normal activities.    ANGINA PECTORIS WITH CORONARY ARTERY SPASM:  - Angiogram results show spasm of LAD with small vessel distally which may respond well to nitroglycerin.  - Continued nitroglycerin as needed for symptoms.    PROSTHETIC HEART VALVE:  - Stress test and echocardiogram results confirm valve is functioning well.    HEART MONITORING:  - Patient to maintain normal activities while wearing heart monitor, avoid extreme temperatures and excessive sweating to ensure monitor adhesion, limit outdoor activities when sweating becomes excessive.    FOLLOW-UP:  - Follow up in September to review heart monitor results and determine if any treatment changes are needed.           This note was generated with the assistance of ambient listening technology. Verbal consent was obtained by the patient and accompanying visitor(s) for the recording of  patient appointment to facilitate this note. I attest to having reviewed and edited the generated note for accuracy, though some syntax or spelling errors may persist. Please contact the author of this note for any clarification.

## 2025-07-16 DIAGNOSIS — N18.30 CONTROLLED TYPE 2 DIABETES MELLITUS WITH STAGE 3 CHRONIC KIDNEY DISEASE, WITHOUT LONG-TERM CURRENT USE OF INSULIN: ICD-10-CM

## 2025-07-16 DIAGNOSIS — E11.22 CONTROLLED TYPE 2 DIABETES MELLITUS WITH STAGE 3 CHRONIC KIDNEY DISEASE, WITHOUT LONG-TERM CURRENT USE OF INSULIN: ICD-10-CM

## 2025-07-16 NOTE — TELEPHONE ENCOUNTER
No care due was identified.  [facility] Embedded Care Due Messages. Reference number: [id number].   7/16/2025 4:48:52 PM CDT

## 2025-07-18 DIAGNOSIS — R76.8 HEPATITIS C ANTIBODY DETECTED: Primary | ICD-10-CM

## 2025-07-18 RX ORDER — PIOGLITAZONE 30 MG/1
30 TABLET ORAL DAILY
Qty: 90 TABLET | Refills: 2 | Status: SHIPPED | OUTPATIENT
Start: 2025-07-18

## 2025-07-18 RX ORDER — METFORMIN HYDROCHLORIDE 500 MG/1
500 TABLET, EXTENDED RELEASE ORAL
Qty: 90 TABLET | Refills: 3 | Status: SHIPPED | OUTPATIENT
Start: 2025-07-18 | End: 2026-07-18

## 2025-07-21 ENCOUNTER — LAB VISIT (OUTPATIENT)
Dept: LAB | Facility: HOSPITAL | Age: 80
End: 2025-07-21
Attending: EMERGENCY MEDICINE
Payer: MEDICARE

## 2025-07-21 DIAGNOSIS — R76.8 HEPATITIS C ANTIBODY DETECTED: ICD-10-CM

## 2025-07-21 PROCEDURE — 87522 HEPATITIS C REVRS TRNSCRPJ: CPT

## 2025-07-21 PROCEDURE — 36415 COLL VENOUS BLD VENIPUNCTURE: CPT

## 2025-07-22 LAB
HCV RNA SERPL NAA+PROBE-ACNC: NORMAL IU/ML
REF LAB TEST REF RANGE: NORMAL

## 2025-07-24 ENCOUNTER — PATIENT OUTREACH (OUTPATIENT)
Dept: ADMINISTRATIVE | Facility: HOSPITAL | Age: 80
End: 2025-07-24
Payer: MEDICARE

## 2025-07-24 RX ORDER — OMEPRAZOLE 40 MG/1
40 CAPSULE, DELAYED RELEASE ORAL EVERY MORNING
Qty: 90 CAPSULE | Refills: 3 | Status: SHIPPED | OUTPATIENT
Start: 2025-07-24

## 2025-07-24 RX ORDER — OMEPRAZOLE 40 MG/1
40 CAPSULE, DELAYED RELEASE ORAL
Qty: 90 CAPSULE | Refills: 3 | OUTPATIENT
Start: 2025-07-24

## 2025-07-24 NOTE — LETTER
AUTHORIZATION FOR RELEASE OF   CONFIDENTIAL INFORMATION    Dear Dr. Dillard,    We are seeing Janak Booker, date of birth 1945, in the clinic at [facility]. See Quach MD is the patient's PCP. Janak Booker has an outstanding lab/procedure at the time we reviewed his chart. In order to help keep his health information updated, he has authorized us to request the following medical record(s):       Diabetic EYE EXAM     2024 - 2025 (Most Recent Please)    Please include the actual eye exam report along with the significant findings:    ________ No Diabetic Retinopathy  ________ Minimal Background Diabetic Retinopathy  ________ Moderate to Severe Background Diabetic Retinopathy  ________ Clinically Significant Macular Edema  ________ Proliferative Diabetic Retinopathy          Please fax records to [facility], Naccari, Craig P, MD,  at [phone] or email to [email or web address].      If you have any questions, please contact [place] at [phone].      Patient Name: Janak Booker  : 1945  Patient Phone #: [phone]                Janak Booker  MRN: [id number]  : 1945  Age: 79 y.o.  Sex: male         Patient/Legal Guardian Signature  This signature was collected at 2025           _______________________________   Printed Name/Relationship to Patient      Consent for Examination and Treatment: I hereby authorize the providers and employees of [facility] ([facility]) to provide medical treatment/services which includes, but is not limited to, performing and administering tests and diagnostic procedures that are deemed necessary, including, but not limited to, imaging examinations, blood tests and other laboratory procedures as may be required by the hospital, clinic, or may be ordered by my physician(s) or persons working under the general and/or special instructions of my physician(s).      I understand and agree that this consent covers  all authorized persons, including but not limited to physicians, residents, nurse practitioners, physicians' assistants, specialists, consultants, student nurses, and independently contracted physicians, who are called upon by the physician in charge, to carry out the diagnostic procedures and medical or surgical treatment.     I hereby authorize [facility] to retain or dispose of any specimens or tissue, should there be such remaining from any test or procedure.     I hereby authorize and give consent for [facility] providers and employees to take photographs, images or videotapes of such diagnostic, surgical or treatment procedures of Patient as may be required by [facility] or as may be ordered by a physician. I further acknowledge and agree that [facility] may use cameras or other devices for patient monitoring.     I am aware that the practice of medicine is not an exact science, and I acknowledge that no guarantees have been made to me as to the outcome of any tests, procedures or treatment.     Authorization for Release of Information: I understand that my insurance company and/or their agents may need information necessary to make determinations about payment/reimbursement. I hereby provide authorization to release to all insurance companies, their successors, assignees, other parties with whom they may have contracted, or others acting on their behalf, that are involved with payment for any hospital and/or clinic charges incurred by the patient, any information that they request and deem necessary for payment/reimbursement, and/or quality review.  I further authorize the release of my health information to physicians or other health care practitioners on staff who are involved in my health care now and in the future, and to other health care providers, entities, or institutions for the purpose of my continued care and treatment, including referrals.     REGISTRATION AUTHORIZATION  Form No. 89138 (Rev. 3/25/2024)     Page 1 of 3                       Medicare Patient's Certification and Authorization to Release Information and Payment Request:  I certify that the information given by me in applying for payment under Title XVIII of the Social Security Act is correct. I authorize any rowe of medical or other information about me to release to the Social SecurityAdministration, or its intermediaries or carriers, any information needed for this or a related Medicare claim. I request that payment of authorized benefits be made on my behalf.     Assignment of Insurance Benefits:   I hereby authorize any and all insurance companies, health plans, defined   benefit plans, health insurers or any entity that is or may be responsible for payment of my medical expenses to pay all hospital and medical benefits now due, and to become due and payable to me under any hospital benefits, sick benefits, injury benefits or any other benefit for services rendered to me, including Major Medical Benefits, direct to [facility] and all independently contracted physicians. I assign any and all rights that I may have against any and all insurance companies, health plans, defined benefit plans, health insurers or any entity that is or may be responsible for payment of my medical expenses, including, but not limited to any right to appeal a denial of a claim, any right to bring any action, lawsuit, administrative proceeding, or other cause of action on my behalf. I specifically assign my right to pursue litigation against any and all insurance companies, health plans, defined benefit plans, health insurers or any entity that is or may be responsible for payment of my medical expenses based upon a refusal to pay charges.            E. Valuables: It is understood and agreed that [facility] is not liable for the damage to or loss of any money, jewelry,   documents, dentures, eye glasses, hearing aids, prosthetics, or other property of value.     F. Computer  Equipment: I understand and agree that should I choose to use computer equipment owned by [facility] or if I choose to access the Internet via [facility]s network, I do so at my own risk. [facility] is not responsible for any damage to my computer equipment or to any damages of any type that might arise from my loss of equipment or data.     G. Acceptance of Financial Responsibility:  I agree that in consideration of the services and   supplies that have been   or will be furnished to the patient, I am hereby obligated to pay all charges made for or on the account of the patient according to the standard rates (in effect at the time the services and supplies are delivered) established by [facility], including its Patient Financial Assistance Policy to the extent it is applicable. I understand that I am responsible for all charges, or portions thereof, not covered by insurance or other sources. Patient refunds will be distributed only after balances at all [facility] facilities are paid.     H. Communication Authorization:  I hereby authorize [facility] and its representatives, along with any billing service   or  who may work on their behalf, to contact me on   my cell phone and/or home phone using pre- recorded messages, artificial voice messages, automatic telephone dialing devices or other computer assisted technology, or by electronic      mail, text messaging, or by any other form of electronic communication. This includes, but is not limited to, appointment reminders, yearly physical exam reminders, preventive care reminders, patient campaigns, welcome calls, and calls about account balances on my account or any account on which I am listed as a guarantor. I understand I have the right to opt out of these communications at any time.      Relationship  Between  Facility and  Provider:      I understand that some, but not all, providers furnishing services to the patient are not employees or agents of [facility].  The patient is under the care and supervision of his/her attending physician, and it is the responsibility of the facility and its nursing staff to carry out the instructions of such physicians. It is the responsibility of the patient's physician/designee to obtain the patient's informed consent, when required, for medical or surgical treatment, special diagnostic or therapeutic procedures, or hospital services rendered for the patient under the special instructions of the physician/designee.           REGISTRATION AUTHORIZATION  Form No. 08978 (Rev. 3/25/2024)    Page 2 of 3                       Immunizations: [facility] shares immunization information with state sponsored health departments to help you and your doctor keep track of your immunization records. By signing, you consent to have this information shared with the health department in your state:                                [place] - LINKS ([place] Immunization Network for Kids Statewide)                                [place] - MIIX ([place] Immunization Information eXchange)                                [place] - ImmPRINT (Immunization Patient Registry with Integrated Technology)     TERM: This authorization is valid for this and subsequent care/treatment I receive at [facility] and will remain valid unless/until revoked in writing by me.     [facility]: As used in this document, [facility] means all [facility] owned and managed facilities, including, but not limited to, all health centers, surgery centers, clinics, urgent care centers, and hospitals.         [facility] complies with applicable Federal civil rights laws and does not discriminate on the basis of race, color, national origin, age, disability, or sex.  ATENCIÓN: si habla español, tiene a white disposición servicios gratuitos de asistencia lingüística. Kayli salcedo [phone].  TORITO Ý: N?u b?n nói Ti?ng Vi?t, có các d?ch v? h? tr? ngôn ng? mi?n phí dành cho  b?n. G?i s? [phone].        REGISTRATION AUTHORIZATION  Form No. 28258 (Rev. 3/25/2024)   Page 3 of 3

## 2025-07-24 NOTE — TELEPHONE ENCOUNTER
No care due was identified.  Health [facility] Embedded Care Due Messages. Reference number: [id number].   7/24/2025 3:28:42 AM CDT

## 2025-07-24 NOTE — PROGRESS NOTES
Population Health Chart Review & Patient Outreach Details    Updates Requested / Reviewed:      Updated Care Coordination Note, Care Everywhere, and Immunizations Reconciliation Completed or Queried: [place]         Health Maintenance Topics Overdue:      [facility] Score: 1     Eye Exam                       Health Maintenance Topic(s) Outreach Outcomes & Actions Taken:    Eye Exam - Outreach Outcomes & Actions Taken  : External Records Requested & Care Team Updated if Applicable - Dr. Dillard - 2nd request

## 2025-07-24 NOTE — TELEPHONE ENCOUNTER
LOV 6/11/25  LAB 7/3/25  Next OV 12/11/25    Spoke with [facility] on [address]. [facility] stated there is no more refills on file for Omeprazole, and a new prescription will need to be sent to the pharmacy. Please advise.      Copied from CRM #[id number]. Topic: Medications - Medication Refill  >> Jul 24, 2025 10:08 AM Med Assistant Bernie wrote:  Type:  RX Refill Request    Who Called: patient  Refill or New Rx:refill  RX Name and Strength:omeprazole (PRILOSEC) 40 MG capsule  How is the patient currently taking it? (ex. 1XDay):as directed  Is this a 30 day or 90 day RX:90  Preferred Pharmacy with phone number:  [id number] - [place]  [address] AT [facility] [address]  [address] [address]  Phone: [phone] Fax: [phone]    Local or Mail Order:local  Ordering Provider:Dr. Quach  Would the patient rather a call back or a response via [facility]? call  Best Call Back Number:[phone] (home)    Additional Information: Please call patient to advise.  Thanks!

## 2025-07-29 ENCOUNTER — OUTPATIENT CASE MANAGEMENT (OUTPATIENT)
Dept: ADMINISTRATIVE | Facility: OTHER | Age: 80
End: 2025-07-29
Payer: MEDICARE

## 2025-07-29 DIAGNOSIS — Q60.0 SOLITARY KIDNEY, CONGENITAL: ICD-10-CM

## 2025-07-29 DIAGNOSIS — N18.32 STAGE 3B CHRONIC KIDNEY DISEASE: ICD-10-CM

## 2025-07-30 RX ORDER — DABIGATRAN ETEXILATE 150 MG/1
150 CAPSULE ORAL 2 TIMES DAILY
Qty: 180 CAPSULE | Refills: 3 | Status: SHIPPED | OUTPATIENT
Start: 2025-07-30

## 2025-08-12 ENCOUNTER — TELEPHONE (OUTPATIENT)
Dept: CARDIOLOGY | Facility: CLINIC | Age: 80
End: 2025-08-12
Payer: MEDICARE

## 2025-08-18 ENCOUNTER — TELEPHONE (OUTPATIENT)
Dept: FAMILY MEDICINE | Facility: CLINIC | Age: 80
End: 2025-08-18
Payer: MEDICARE

## (undated) DEVICE — APPLICATOR CHLORAPREP CLR 10.5

## (undated) DEVICE — NDL HYPODERMIC BLUNT 18G 1.5IN

## (undated) DEVICE — SYR DISP LL 5CC

## (undated) DEVICE — NDL SPINAL SPINOCAN 22GX3.5

## (undated) DEVICE — SEE MEDLINE ITEM 153215

## (undated) DEVICE — BANDAGE ADHESIVE

## (undated) DEVICE — DRESSING LEUKOPLAST FLEX 1X3IN

## (undated) DEVICE — NDL SAFETY 25G X 1.5 ECLIPSE

## (undated) DEVICE — TUBING MINIBORE EXTENSION

## (undated) DEVICE — SYS LABEL CORRECT MED

## (undated) DEVICE — GLOVE PROTEXIS PI CLASSIC 6.5

## (undated) DEVICE — SOL LAC RINGERS 500CC

## (undated) DEVICE — GLOVE PROTEXIS PI CLASSIC 7.5